# Patient Record
Sex: FEMALE | Race: WHITE | NOT HISPANIC OR LATINO | Employment: UNEMPLOYED | URBAN - METROPOLITAN AREA
[De-identification: names, ages, dates, MRNs, and addresses within clinical notes are randomized per-mention and may not be internally consistent; named-entity substitution may affect disease eponyms.]

---

## 2017-03-20 ENCOUNTER — OFFICE VISIT - RIVER FALLS (OUTPATIENT)
Dept: FAMILY MEDICINE | Facility: CLINIC | Age: 37
End: 2017-03-20

## 2017-03-20 ENCOUNTER — COMMUNICATION - RIVER FALLS (OUTPATIENT)
Dept: FAMILY MEDICINE | Facility: CLINIC | Age: 37
End: 2017-03-20

## 2017-03-23 LAB
CREAT SERPL-MCNC: 0.63 MG/DL (ref 0.5–1.1)
GLUCOSE BLD-MCNC: 116 MG/DL (ref 65–99)

## 2017-05-24 ENCOUNTER — OFFICE VISIT - RIVER FALLS (OUTPATIENT)
Dept: FAMILY MEDICINE | Facility: CLINIC | Age: 37
End: 2017-05-24

## 2017-05-24 ENCOUNTER — COMMUNICATION - RIVER FALLS (OUTPATIENT)
Dept: FAMILY MEDICINE | Facility: CLINIC | Age: 37
End: 2017-05-24

## 2017-05-24 ASSESSMENT — MIFFLIN-ST. JEOR: SCORE: 1692.91

## 2017-07-20 ENCOUNTER — OFFICE VISIT - RIVER FALLS (OUTPATIENT)
Dept: FAMILY MEDICINE | Facility: CLINIC | Age: 37
End: 2017-07-20

## 2017-07-31 ENCOUNTER — OFFICE VISIT - RIVER FALLS (OUTPATIENT)
Dept: FAMILY MEDICINE | Facility: CLINIC | Age: 37
End: 2017-07-31

## 2017-07-31 ASSESSMENT — MIFFLIN-ST. JEOR: SCORE: 1704.7

## 2017-08-09 ENCOUNTER — AMBULATORY - RIVER FALLS (OUTPATIENT)
Dept: FAMILY MEDICINE | Facility: CLINIC | Age: 37
End: 2017-08-09

## 2017-08-10 ENCOUNTER — AMBULATORY - RIVER FALLS (OUTPATIENT)
Dept: FAMILY MEDICINE | Facility: CLINIC | Age: 37
End: 2017-08-10

## 2018-01-10 ENCOUNTER — OFFICE VISIT - RIVER FALLS (OUTPATIENT)
Dept: FAMILY MEDICINE | Facility: CLINIC | Age: 38
End: 2018-01-10

## 2018-01-10 ASSESSMENT — MIFFLIN-ST. JEOR: SCORE: 1737.36

## 2018-01-26 ENCOUNTER — OFFICE VISIT - RIVER FALLS (OUTPATIENT)
Dept: FAMILY MEDICINE | Facility: CLINIC | Age: 38
End: 2018-01-26

## 2018-03-20 ENCOUNTER — AMBULATORY - RIVER FALLS (OUTPATIENT)
Dept: FAMILY MEDICINE | Facility: CLINIC | Age: 38
End: 2018-03-20

## 2018-03-21 LAB
CREAT SERPL-MCNC: 0.77 MG/DL (ref 0.5–1.1)
GLUCOSE BLD-MCNC: 237 MG/DL (ref 65–99)

## 2018-03-22 ENCOUNTER — OFFICE VISIT - RIVER FALLS (OUTPATIENT)
Dept: FAMILY MEDICINE | Facility: CLINIC | Age: 38
End: 2018-03-22

## 2018-03-22 ASSESSMENT — MIFFLIN-ST. JEOR: SCORE: 1705.61

## 2018-03-23 LAB — HBA1C MFR BLD: 7.5 %

## 2018-04-05 ENCOUNTER — OFFICE VISIT - RIVER FALLS (OUTPATIENT)
Dept: FAMILY MEDICINE | Facility: CLINIC | Age: 38
End: 2018-04-05

## 2018-04-05 ASSESSMENT — MIFFLIN-ST. JEOR: SCORE: 1701.98

## 2018-04-11 ENCOUNTER — OFFICE VISIT - RIVER FALLS (OUTPATIENT)
Dept: FAMILY MEDICINE | Facility: CLINIC | Age: 38
End: 2018-04-11

## 2018-04-11 ASSESSMENT — MIFFLIN-ST. JEOR: SCORE: 1702.88

## 2018-05-22 ENCOUNTER — OFFICE VISIT - RIVER FALLS (OUTPATIENT)
Dept: FAMILY MEDICINE | Facility: CLINIC | Age: 38
End: 2018-05-22

## 2018-05-22 ASSESSMENT — MIFFLIN-ST. JEOR: SCORE: 1660.25

## 2018-05-31 ENCOUNTER — OFFICE VISIT - RIVER FALLS (OUTPATIENT)
Dept: FAMILY MEDICINE | Facility: CLINIC | Age: 38
End: 2018-05-31

## 2018-05-31 ASSESSMENT — MIFFLIN-ST. JEOR: SCORE: 1646.64

## 2018-06-19 ENCOUNTER — OFFICE VISIT - RIVER FALLS (OUTPATIENT)
Dept: FAMILY MEDICINE | Facility: CLINIC | Age: 38
End: 2018-06-19

## 2018-06-20 ENCOUNTER — OFFICE VISIT - RIVER FALLS (OUTPATIENT)
Dept: FAMILY MEDICINE | Facility: CLINIC | Age: 38
End: 2018-06-20

## 2018-06-20 ASSESSMENT — MIFFLIN-ST. JEOR: SCORE: 1621.24

## 2019-01-22 ENCOUNTER — OFFICE VISIT - RIVER FALLS (OUTPATIENT)
Dept: FAMILY MEDICINE | Facility: CLINIC | Age: 39
End: 2019-01-22

## 2019-01-22 ASSESSMENT — MIFFLIN-ST. JEOR: SCORE: 1472.12

## 2019-01-24 LAB — HBA1C MFR BLD: 5.1 %

## 2019-04-25 ENCOUNTER — OFFICE VISIT - RIVER FALLS (OUTPATIENT)
Dept: FAMILY MEDICINE | Facility: CLINIC | Age: 39
End: 2019-04-25

## 2019-04-25 ASSESSMENT — MIFFLIN-ST. JEOR: SCORE: 1502.05

## 2019-07-15 ENCOUNTER — OFFICE VISIT - RIVER FALLS (OUTPATIENT)
Dept: FAMILY MEDICINE | Facility: CLINIC | Age: 39
End: 2019-07-15

## 2019-07-15 ASSESSMENT — MIFFLIN-ST. JEOR: SCORE: 1520.2

## 2019-07-16 ENCOUNTER — COMMUNICATION - RIVER FALLS (OUTPATIENT)
Dept: FAMILY MEDICINE | Facility: CLINIC | Age: 39
End: 2019-07-16

## 2019-07-16 LAB
BUN SERPL-MCNC: 9 MG/DL (ref 7–25)
BUN/CREAT RATIO - HISTORICAL: NORMAL (ref 6–22)
CALCIUM SERPL-MCNC: 9.9 MG/DL (ref 8.6–10.2)
CHLORIDE BLD-SCNC: 103 MMOL/L (ref 98–110)
CHOLEST SERPL-MCNC: 227 MG/DL
CHOLEST/HDLC SERPL: 4.9 {RATIO}
CO2 SERPL-SCNC: 21 MMOL/L (ref 20–32)
CREAT SERPL-MCNC: 0.65 MG/DL (ref 0.5–1.1)
EGFRCR SERPLBLD CKD-EPI 2021: 111 ML/MIN/1.73M2
ERYTHROCYTE [DISTWIDTH] IN BLOOD BY AUTOMATED COUNT: 19.1 % (ref 11–15)
GLUCOSE BLD-MCNC: 82 MG/DL (ref 65–99)
HBA1C MFR BLD: 5.3 %
HCT VFR BLD AUTO: 32.3 % (ref 35–45)
HDLC SERPL-MCNC: 46 MG/DL
HGB BLD-MCNC: 8.8 GM/DL (ref 11.7–15.5)
IRON SERPL-MCNC: <10 MCG/DL (ref 40–190)
LDLC SERPL CALC-MCNC: 134 MG/DL
MCH RBC QN AUTO: 19.3 PG (ref 27–33)
MCHC RBC AUTO-ENTMCNC: 27.2 GM/DL (ref 32–36)
MCV RBC AUTO: 70.7 FL (ref 80–100)
NONHDLC SERPL-MCNC: 181 MG/DL
PLATELET # BLD AUTO: 418 10*3/UL (ref 140–400)
PMV BLD: 10.8 FL (ref 7.5–12.5)
POTASSIUM BLD-SCNC: 4.3 MMOL/L (ref 3.5–5.3)
RBC # BLD AUTO: 4.57 10*6/UL (ref 3.8–5.1)
SODIUM SERPL-SCNC: 135 MMOL/L (ref 135–146)
TRIGL SERPL-MCNC: 327 MG/DL
TSH SERPL DL<=0.005 MIU/L-ACNC: 2.1 MIU/L
WBC # BLD AUTO: 8.4 10*3/UL (ref 3.8–10.8)

## 2019-07-30 ENCOUNTER — AMBULATORY - RIVER FALLS (OUTPATIENT)
Dept: FAMILY MEDICINE | Facility: CLINIC | Age: 39
End: 2019-07-30

## 2019-07-31 ENCOUNTER — COMMUNICATION - RIVER FALLS (OUTPATIENT)
Dept: FAMILY MEDICINE | Facility: CLINIC | Age: 39
End: 2019-07-31

## 2019-07-31 LAB
ERYTHROCYTE [DISTWIDTH] IN BLOOD BY AUTOMATED COUNT: 27.5 % (ref 11–15)
HCT VFR BLD AUTO: 34.8 % (ref 35–45)
HGB BLD-MCNC: 9.8 GM/DL (ref 11.7–15.5)
IRON SERPL-MCNC: 50 MCG/DL (ref 40–190)
MCH RBC QN AUTO: 21.9 PG (ref 27–33)
MCHC RBC AUTO-ENTMCNC: 28.2 GM/DL (ref 32–36)
MCV RBC AUTO: 77.9 FL (ref 80–100)
PLATELET # BLD AUTO: 346 10*3/UL (ref 140–400)
PMV BLD: 10 FL (ref 7.5–12.5)
RBC # BLD AUTO: 4.47 10*6/UL (ref 3.8–5.1)
WBC # BLD AUTO: 8.8 10*3/UL (ref 3.8–10.8)

## 2019-08-01 ENCOUNTER — COMMUNICATION - RIVER FALLS (OUTPATIENT)
Dept: FAMILY MEDICINE | Facility: CLINIC | Age: 39
End: 2019-08-01

## 2019-08-29 ENCOUNTER — OFFICE VISIT - RIVER FALLS (OUTPATIENT)
Dept: FAMILY MEDICINE | Facility: CLINIC | Age: 39
End: 2019-08-29

## 2019-08-29 ASSESSMENT — MIFFLIN-ST. JEOR: SCORE: 1542.88

## 2019-09-05 ENCOUNTER — OFFICE VISIT - RIVER FALLS (OUTPATIENT)
Dept: FAMILY MEDICINE | Facility: CLINIC | Age: 39
End: 2019-09-05

## 2019-09-05 LAB
ALBUMIN UR-MCNC: NEGATIVE G/DL
APPEARANCE UR: CLEAR
BILIRUB UR QL STRIP: NEGATIVE
COLOR UR AUTO: YELLOW
GLUCOSE UR STRIP-MCNC: NEGATIVE MG/DL
HGB UR QL STRIP: NEGATIVE
KETONES UR STRIP-MCNC: NEGATIVE MG/DL
LEUKOCYTE ESTERASE UR QL STRIP: NEGATIVE
NITRATE UR QL: NEGATIVE
PH UR STRIP: 7 [PH]
SP GR UR STRIP: 1.01
UROBILINOGEN UR STRIP-MCNC: NORMAL MG/DL

## 2019-09-05 ASSESSMENT — MIFFLIN-ST. JEOR: SCORE: 1541.06

## 2019-09-09 ENCOUNTER — TRANSFERRED RECORDS (OUTPATIENT)
Dept: HEALTH INFORMATION MANAGEMENT | Facility: CLINIC | Age: 39
End: 2019-09-09

## 2019-09-09 LAB
HPV ABSTRACT: NORMAL
TRICHOMONAS VAGINALIS (HISTORICAL): NOT DETECTED

## 2019-09-10 ENCOUNTER — COMMUNICATION - RIVER FALLS (OUTPATIENT)
Dept: FAMILY MEDICINE | Facility: CLINIC | Age: 39
End: 2019-09-10

## 2019-09-16 ENCOUNTER — COMMUNICATION - RIVER FALLS (OUTPATIENT)
Dept: FAMILY MEDICINE | Facility: CLINIC | Age: 39
End: 2019-09-16

## 2019-09-17 ENCOUNTER — OFFICE VISIT - RIVER FALLS (OUTPATIENT)
Dept: FAMILY MEDICINE | Facility: CLINIC | Age: 39
End: 2019-09-17

## 2019-10-15 ENCOUNTER — COMMUNICATION - RIVER FALLS (OUTPATIENT)
Dept: FAMILY MEDICINE | Facility: CLINIC | Age: 39
End: 2019-10-15

## 2019-10-17 ENCOUNTER — OFFICE VISIT - RIVER FALLS (OUTPATIENT)
Dept: FAMILY MEDICINE | Facility: CLINIC | Age: 39
End: 2019-10-17

## 2019-10-17 ASSESSMENT — MIFFLIN-ST. JEOR: SCORE: 1565.56

## 2019-10-18 LAB
BUN SERPL-MCNC: 11 MG/DL (ref 7–25)
BUN/CREAT RATIO - HISTORICAL: ABNORMAL (ref 6–22)
CALCIUM SERPL-MCNC: 10.3 MG/DL (ref 8.6–10.2)
CHLORIDE BLD-SCNC: 102 MMOL/L (ref 98–110)
CO2 SERPL-SCNC: 25 MMOL/L (ref 20–32)
CREAT SERPL-MCNC: 0.75 MG/DL (ref 0.5–1.1)
EGFRCR SERPLBLD CKD-EPI 2021: 100 ML/MIN/1.73M2
ERYTHROCYTE [DISTWIDTH] IN BLOOD BY AUTOMATED COUNT: 17.7 % (ref 11–15)
ESTRADIOL SERPL-MCNC: 27 PG/ML
GLUCOSE BLD-MCNC: 84 MG/DL (ref 65–99)
HCT VFR BLD AUTO: 40 % (ref 35–45)
HGB BLD-MCNC: 14 GM/DL (ref 11.7–15.5)
IRON SERPL-MCNC: 38 MCG/DL (ref 40–190)
MCH RBC QN AUTO: 29.7 PG (ref 27–33)
MCHC RBC AUTO-ENTMCNC: 35 GM/DL (ref 32–36)
MCV RBC AUTO: 84.7 FL (ref 80–100)
PLATELET # BLD AUTO: 361 10*3/UL (ref 140–400)
PMV BLD: 10.4 FL (ref 7.5–12.5)
POTASSIUM BLD-SCNC: 4.4 MMOL/L (ref 3.5–5.3)
RBC # BLD AUTO: 4.72 10*6/UL (ref 3.8–5.1)
SODIUM SERPL-SCNC: 140 MMOL/L (ref 135–146)
T3FREE SERPL-MCNC: 2.6 PG/ML (ref 2.3–4.2)
T4 FREE SERPL-MCNC: 1.2 NG/DL (ref 0.8–1.8)
THYROID PEROXIDASE ANTIBODIES - HISTORICAL: 1 IU/ML
TSH SERPL DL<=0.005 MIU/L-ACNC: 1.34 MIU/L
WBC # BLD AUTO: 10 10*3/UL (ref 3.8–10.8)

## 2019-10-21 ENCOUNTER — COMMUNICATION - RIVER FALLS (OUTPATIENT)
Dept: FAMILY MEDICINE | Facility: CLINIC | Age: 39
End: 2019-10-21

## 2019-12-09 ENCOUNTER — COMMUNICATION - RIVER FALLS (OUTPATIENT)
Dept: FAMILY MEDICINE | Facility: CLINIC | Age: 39
End: 2019-12-09

## 2019-12-16 ENCOUNTER — OFFICE VISIT - RIVER FALLS (OUTPATIENT)
Dept: FAMILY MEDICINE | Facility: CLINIC | Age: 39
End: 2019-12-16

## 2020-01-06 ENCOUNTER — OFFICE VISIT - RIVER FALLS (OUTPATIENT)
Dept: FAMILY MEDICINE | Facility: CLINIC | Age: 40
End: 2020-01-06

## 2020-01-06 ASSESSMENT — MIFFLIN-ST. JEOR: SCORE: 1585.52

## 2020-01-07 ENCOUNTER — COMMUNICATION - RIVER FALLS (OUTPATIENT)
Dept: FAMILY MEDICINE | Facility: CLINIC | Age: 40
End: 2020-01-07

## 2020-01-07 LAB — HBA1C MFR BLD: 5.3 %

## 2020-01-22 ENCOUNTER — OFFICE VISIT - RIVER FALLS (OUTPATIENT)
Dept: FAMILY MEDICINE | Facility: CLINIC | Age: 40
End: 2020-01-22

## 2020-01-22 ASSESSMENT — MIFFLIN-ST. JEOR: SCORE: 1573.72

## 2020-02-24 ENCOUNTER — OFFICE VISIT - RIVER FALLS (OUTPATIENT)
Dept: FAMILY MEDICINE | Facility: CLINIC | Age: 40
End: 2020-02-24

## 2020-02-24 ASSESSMENT — MIFFLIN-ST. JEOR: SCORE: 1580.98

## 2020-03-04 ENCOUNTER — AMBULATORY - RIVER FALLS (OUTPATIENT)
Dept: FAMILY MEDICINE | Facility: CLINIC | Age: 40
End: 2020-03-04

## 2020-03-05 LAB
ALT SERPL W P-5'-P-CCNC: 16 UNIT/L (ref 6–29)
AST SERPL W P-5'-P-CCNC: 16 UNIT/L (ref 10–30)
BUN SERPL-MCNC: 6 MG/DL (ref 7–25)
BUN/CREAT RATIO - HISTORICAL: 9 (ref 6–22)
CALCIUM SERPL-MCNC: 10 MG/DL (ref 8.6–10.2)
CHLORIDE BLD-SCNC: 105 MMOL/L (ref 98–110)
CHOLEST SERPL-MCNC: 270 MG/DL
CHOLEST/HDLC SERPL: 5.5 {RATIO}
CO2 SERPL-SCNC: 25 MMOL/L (ref 20–32)
CREAT SERPL-MCNC: 0.65 MG/DL (ref 0.5–1.1)
EGFRCR SERPLBLD CKD-EPI 2021: 110 ML/MIN/1.73M2
GLUCOSE BLD-MCNC: 81 MG/DL (ref 65–99)
HBA1C MFR BLD: 5.8 %
HDLC SERPL-MCNC: 49 MG/DL
LDLC SERPL CALC-MCNC: 180 MG/DL
MICROALBUMIN UR-MCNC: 0.2 MG/DL
NONHDLC SERPL-MCNC: 221 MG/DL
POTASSIUM BLD-SCNC: 3.9 MMOL/L (ref 3.5–5.3)
SODIUM SERPL-SCNC: 139 MMOL/L (ref 135–146)
TRIGL SERPL-MCNC: 217 MG/DL

## 2020-03-30 ENCOUNTER — COMMUNICATION - RIVER FALLS (OUTPATIENT)
Dept: FAMILY MEDICINE | Facility: CLINIC | Age: 40
End: 2020-03-30

## 2020-03-31 ENCOUNTER — OFFICE VISIT - RIVER FALLS (OUTPATIENT)
Dept: FAMILY MEDICINE | Facility: CLINIC | Age: 40
End: 2020-03-31

## 2020-04-02 ENCOUNTER — COMMUNICATION - RIVER FALLS (OUTPATIENT)
Dept: FAMILY MEDICINE | Facility: CLINIC | Age: 40
End: 2020-04-02

## 2020-06-02 ENCOUNTER — OFFICE VISIT - RIVER FALLS (OUTPATIENT)
Dept: FAMILY MEDICINE | Facility: CLINIC | Age: 40
End: 2020-06-02

## 2020-10-30 ENCOUNTER — COMMUNICATION - RIVER FALLS (OUTPATIENT)
Dept: FAMILY MEDICINE | Facility: CLINIC | Age: 40
End: 2020-10-30

## 2020-11-03 ENCOUNTER — AMBULATORY - RIVER FALLS (OUTPATIENT)
Dept: FAMILY MEDICINE | Facility: CLINIC | Age: 40
End: 2020-11-03

## 2020-11-03 ENCOUNTER — OFFICE VISIT - RIVER FALLS (OUTPATIENT)
Dept: FAMILY MEDICINE | Facility: CLINIC | Age: 40
End: 2020-11-03

## 2020-11-03 ASSESSMENT — MIFFLIN-ST. JEOR: SCORE: 1622.71

## 2020-11-04 ENCOUNTER — COMMUNICATION - RIVER FALLS (OUTPATIENT)
Dept: FAMILY MEDICINE | Facility: CLINIC | Age: 40
End: 2020-11-04

## 2020-11-04 LAB — HBA1C MFR BLD: 5.9 %

## 2020-11-11 ENCOUNTER — OFFICE VISIT - RIVER FALLS (OUTPATIENT)
Dept: FAMILY MEDICINE | Facility: CLINIC | Age: 40
End: 2020-11-11

## 2020-11-11 ASSESSMENT — MIFFLIN-ST. JEOR: SCORE: 1629.97

## 2021-05-29 ENCOUNTER — RECORDS - HEALTHEAST (OUTPATIENT)
Dept: ADMINISTRATIVE | Facility: CLINIC | Age: 41
End: 2021-05-29

## 2021-05-31 ENCOUNTER — RECORDS - HEALTHEAST (OUTPATIENT)
Dept: ADMINISTRATIVE | Facility: CLINIC | Age: 41
End: 2021-05-31

## 2021-06-02 ENCOUNTER — RECORDS - HEALTHEAST (OUTPATIENT)
Dept: ADMINISTRATIVE | Facility: CLINIC | Age: 41
End: 2021-06-02

## 2021-06-03 ENCOUNTER — RECORDS - HEALTHEAST (OUTPATIENT)
Dept: ADMINISTRATIVE | Facility: CLINIC | Age: 41
End: 2021-06-03

## 2021-07-14 ENCOUNTER — OFFICE VISIT - RIVER FALLS (OUTPATIENT)
Dept: FAMILY MEDICINE | Facility: CLINIC | Age: 41
End: 2021-07-14

## 2021-07-15 ENCOUNTER — COMMUNICATION - RIVER FALLS (OUTPATIENT)
Dept: FAMILY MEDICINE | Facility: CLINIC | Age: 41
End: 2021-07-15
Payer: COMMERCIAL

## 2021-07-15 LAB
BUN SERPL-MCNC: 6 MG/DL (ref 7–25)
BUN/CREAT RATIO - HISTORICAL: 10 (ref 6–22)
CALCIUM SERPL-MCNC: 10.1 MG/DL (ref 8.6–10.2)
CHLORIDE BLD-SCNC: 104 MMOL/L (ref 98–110)
CHOLEST SERPL-MCNC: 221 MG/DL
CHOLEST/HDLC SERPL: 4.6 {RATIO}
CO2 SERPL-SCNC: 25 MMOL/L (ref 20–32)
CREAT SERPL-MCNC: 0.61 MG/DL (ref 0.5–1.1)
CREAT UR-MCNC: 64 MG/DL (ref 20–275)
EGFRCR SERPLBLD CKD-EPI 2021: 112 ML/MIN/1.73M2
GLUCOSE BLD-MCNC: 76 MG/DL (ref 65–99)
HBA1C MFR BLD: 5.3 %
HDLC SERPL-MCNC: 48 MG/DL
LDLC SERPL CALC-MCNC: 138 MG/DL
MICROALBUMIN UR-MCNC: 0.4 MG/DL
MICROALBUMIN/CREAT UR: 6 MG/G{CREAT}
NONHDLC SERPL-MCNC: 173 MG/DL
POTASSIUM BLD-SCNC: 4.2 MMOL/L (ref 3.5–5.3)
SODIUM SERPL-SCNC: 140 MMOL/L (ref 135–146)
TRIGL SERPL-MCNC: 211 MG/DL

## 2021-07-17 ENCOUNTER — NURSE TRIAGE (OUTPATIENT)
Dept: NURSING | Facility: CLINIC | Age: 41
End: 2021-07-17

## 2021-07-18 NOTE — TELEPHONE ENCOUNTER
"\"Well I started having tooth pain on Monday and started on antibiotics on Wednesday afternoon.\" By Wednesday evening patient reporting that her neck was swollen (\"ballooned up\"). Shannan saw the dentist on Friday morning who recommend patient to be seen if her symptoms worsened. Patient currently reporting mild intermittent chest pain lasting <5 minutes. \"Feels like like gas bubble and pressure in my diaphragm.\" Shannan denies severe breathing difficulty, feeling faint/ dizzy/ lightneaded, or severe chest pain.     Triage guidelines recommend to be seen in the ED. RN advised to call back with any changes, worsening of symptoms, and questions or concerns. Patient verbalized understanding of and agreement with plan and had no further questions.    Reason for Disposition    [1] Intermittent  chest pain or \"angina\" AND [2] increasing in severity or frequency  (Exception: pains lasting a few seconds)    Additional Information    Negative: Severe difficulty breathing (e.g., struggling for each breath, speaks in single words)    Negative: Difficult to awaken or acting confused (e.g., disoriented, slurred speech)    Negative: Shock suspected (e.g., cold/pale/clammy skin, too weak to stand, low BP, rapid pulse)    Negative: [1] Chest pain lasts > 5 minutes AND [2] history of heart disease  (i.e., heart attack, bypass surgery, angina, angioplasty, CHF; not just a heart murmur)    Negative: [1] Chest pain lasts > 5 minutes AND [2] described as crushing, pressure-like, or heavy    Negative: [1] Chest pain lasts > 5 minutes AND [2] age > 50    Negative: [1] Chest pain lasts > 5 minutes AND [2] age > 30 AND [3] at least one cardiac risk factor (i.e., hypertension, diabetes, obesity, smoker or strong family history of heart disease)    Negative: [1] Chest pain lasts > 5 minutes AND [2] not relieved with nitroglycerin    Negative: Passed out (i.e., lost consciousness, collapsed and was not responding)    Negative: Heart beating < 50 " beats per minute OR > 140 beats per minute    Negative: Visible sweat on face or sweat dripping down face    Negative: Sounds like a life-threatening emergency to the triager    Negative: SEVERE chest pain    Protocols used: CHEST PAIN-A-AH    Bebe Iniguez RN-BSN  [facility]

## 2021-09-22 ENCOUNTER — OFFICE VISIT - RIVER FALLS (OUTPATIENT)
Dept: FAMILY MEDICINE | Facility: CLINIC | Age: 41
End: 2021-09-22
Payer: COMMERCIAL

## 2021-09-23 LAB
BASOPHILS # BLD MANUAL: 94 10*3/UL (ref 0–200)
BASOPHILS NFR BLD MANUAL: 0.8 %
EOSINOPHIL # BLD MANUAL: 105 10*3/UL (ref 15–500)
EOSINOPHIL NFR BLD MANUAL: 0.9 %
ERYTHROCYTE [DISTWIDTH] IN BLOOD BY AUTOMATED COUNT: 12.9 % (ref 11–15)
HCT VFR BLD AUTO: 48.7 % (ref 35–45)
HGB BLD-MCNC: 17 GM/DL (ref 11.7–15.5)
LYMPHOCYTES # BLD MANUAL: 3089 10*3/UL (ref 850–3900)
LYMPHOCYTES NFR BLD MANUAL: 26.4 %
MCH RBC QN AUTO: 32.8 PG (ref 27–33)
MCHC RBC AUTO-ENTMCNC: 34.9 GM/DL (ref 32–36)
MCV RBC AUTO: 93.8 FL (ref 80–100)
MONOCYTES # BLD MANUAL: 445 10*3/UL (ref 200–950)
MONOCYTES NFR BLD MANUAL: 3.8 %
NEUTROPHILS # BLD MANUAL: 7968 10*3/UL (ref 1500–7800)
NEUTROPHILS NFR BLD MANUAL: 68.1 %
PLATELET # BLD AUTO: 338 10*3/UL (ref 140–400)
PMV BLD: 11.1 FL (ref 7.5–12.5)
RBC # BLD AUTO: 5.19 10*6/UL (ref 3.8–5.1)
T3FREE SERPL-MCNC: 2.7 PG/ML (ref 2.3–4.2)
T4 FREE SERPL-MCNC: 1 NG/DL (ref 0.8–1.8)
TSH SERPL DL<=0.005 MIU/L-ACNC: 2.26 MIU/L
WBC # BLD AUTO: 11.7 10*3/UL (ref 3.8–10.8)

## 2021-09-24 ENCOUNTER — COMMUNICATION - RIVER FALLS (OUTPATIENT)
Dept: FAMILY MEDICINE | Facility: CLINIC | Age: 41
End: 2021-09-24
Payer: COMMERCIAL

## 2021-11-03 ENCOUNTER — OFFICE VISIT - RIVER FALLS (OUTPATIENT)
Dept: FAMILY MEDICINE | Facility: CLINIC | Age: 41
End: 2021-11-03
Payer: COMMERCIAL

## 2021-11-15 ENCOUNTER — OFFICE VISIT - RIVER FALLS (OUTPATIENT)
Dept: FAMILY MEDICINE | Facility: CLINIC | Age: 41
End: 2021-11-15
Payer: COMMERCIAL

## 2021-11-17 ENCOUNTER — OFFICE VISIT - RIVER FALLS (OUTPATIENT)
Dept: FAMILY MEDICINE | Facility: CLINIC | Age: 41
End: 2021-11-17
Payer: COMMERCIAL

## 2021-11-17 ASSESSMENT — MIFFLIN-ST. JEOR: SCORE: 1501.15

## 2021-11-30 ENCOUNTER — OFFICE VISIT - RIVER FALLS (OUTPATIENT)
Dept: FAMILY MEDICINE | Facility: CLINIC | Age: 41
End: 2021-11-30
Payer: COMMERCIAL

## 2022-01-13 ENCOUNTER — COMMUNICATION - RIVER FALLS (OUTPATIENT)
Dept: FAMILY MEDICINE | Facility: CLINIC | Age: 42
End: 2022-01-13
Payer: COMMERCIAL

## 2022-01-17 ENCOUNTER — OFFICE VISIT - RIVER FALLS (OUTPATIENT)
Dept: FAMILY MEDICINE | Facility: CLINIC | Age: 42
End: 2022-01-17
Payer: COMMERCIAL

## 2022-02-12 VITALS
TEMPERATURE: 98 F | WEIGHT: 214.8 LBS | TEMPERATURE: 98 F | HEART RATE: 88 BPM | TEMPERATURE: 98.8 F | HEART RATE: 88 BPM | BODY MASS INDEX: 30.67 KG/M2 | WEIGHT: 214 LBS | DIASTOLIC BLOOD PRESSURE: 78 MMHG | DIASTOLIC BLOOD PRESSURE: 72 MMHG | BODY MASS INDEX: 29.32 KG/M2 | DIASTOLIC BLOOD PRESSURE: 82 MMHG | HEIGHT: 70 IN | WEIGHT: 214.2 LBS | HEIGHT: 70 IN | SYSTOLIC BLOOD PRESSURE: 132 MMHG | HEIGHT: 70 IN | HEIGHT: 70 IN | SYSTOLIC BLOOD PRESSURE: 120 MMHG | WEIGHT: 204.8 LBS | HEART RATE: 76 BPM | BODY MASS INDEX: 30.75 KG/M2 | SYSTOLIC BLOOD PRESSURE: 136 MMHG | BODY MASS INDEX: 30.64 KG/M2

## 2022-02-12 VITALS
BODY MASS INDEX: 27.09 KG/M2 | HEIGHT: 70 IN | WEIGHT: 186.6 LBS | HEART RATE: 84 BPM | WEIGHT: 189.2 LBS | BODY MASS INDEX: 26.71 KG/M2 | OXYGEN SATURATION: 97 % | DIASTOLIC BLOOD PRESSURE: 70 MMHG | SYSTOLIC BLOOD PRESSURE: 126 MMHG | HEIGHT: 70 IN

## 2022-02-12 VITALS
HEART RATE: 68 BPM | WEIGHT: 180.2 LBS | WEIGHT: 179.4 LBS | DIASTOLIC BLOOD PRESSURE: 92 MMHG | OXYGEN SATURATION: 97 % | DIASTOLIC BLOOD PRESSURE: 60 MMHG | SYSTOLIC BLOOD PRESSURE: 128 MMHG | OXYGEN SATURATION: 98 % | BODY MASS INDEX: 25.74 KG/M2 | HEIGHT: 70 IN | WEIGHT: 184.8 LBS | SYSTOLIC BLOOD PRESSURE: 118 MMHG | HEIGHT: 70 IN | BODY MASS INDEX: 25.68 KG/M2 | HEART RATE: 78 BPM | BODY MASS INDEX: 26.23 KG/M2 | WEIGHT: 179.8 LBS | OXYGEN SATURATION: 97 % | DIASTOLIC BLOOD PRESSURE: 70 MMHG | HEART RATE: 83 BPM | HEIGHT: 70 IN | DIASTOLIC BLOOD PRESSURE: 80 MMHG | SYSTOLIC BLOOD PRESSURE: 136 MMHG | SYSTOLIC BLOOD PRESSURE: 112 MMHG | HEART RATE: 81 BPM | BODY MASS INDEX: 26.45 KG/M2

## 2022-02-12 VITALS
TEMPERATURE: 97.3 F | SYSTOLIC BLOOD PRESSURE: 128 MMHG | DIASTOLIC BLOOD PRESSURE: 80 MMHG | HEIGHT: 70 IN | HEART RATE: 68 BPM | WEIGHT: 170.8 LBS | BODY MASS INDEX: 24.45 KG/M2

## 2022-02-12 VITALS
OXYGEN SATURATION: 98 % | HEART RATE: 76 BPM | HEIGHT: 70 IN | SYSTOLIC BLOOD PRESSURE: 130 MMHG | WEIGHT: 170.6 LBS | DIASTOLIC BLOOD PRESSURE: 82 MMHG | TEMPERATURE: 98.2 F | BODY MASS INDEX: 24.42 KG/M2

## 2022-02-12 VITALS
TEMPERATURE: 98.1 F | HEART RATE: 84 BPM | HEIGHT: 70 IN | SYSTOLIC BLOOD PRESSURE: 134 MMHG | BODY MASS INDEX: 31.75 KG/M2 | WEIGHT: 221.8 LBS | TEMPERATURE: 97.4 F | DIASTOLIC BLOOD PRESSURE: 86 MMHG

## 2022-02-12 VITALS
BODY MASS INDEX: 28.09 KG/M2 | WEIGHT: 196.2 LBS | HEART RATE: 76 BPM | WEIGHT: 193.4 LBS | TEMPERATURE: 97.7 F | SYSTOLIC BLOOD PRESSURE: 124 MMHG | TEMPERATURE: 97.9 F | DIASTOLIC BLOOD PRESSURE: 80 MMHG | WEIGHT: 201.8 LBS | HEIGHT: 70 IN | HEART RATE: 80 BPM | DIASTOLIC BLOOD PRESSURE: 70 MMHG | SYSTOLIC BLOOD PRESSURE: 130 MMHG | BODY MASS INDEX: 27.95 KG/M2 | BODY MASS INDEX: 28.89 KG/M2 | HEIGHT: 70 IN

## 2022-02-12 VITALS
TEMPERATURE: 98.4 F | SYSTOLIC BLOOD PRESSURE: 144 MMHG | DIASTOLIC BLOOD PRESSURE: 86 MMHG | HEART RATE: 79 BPM | SYSTOLIC BLOOD PRESSURE: 121 MMHG | HEIGHT: 70 IN | OXYGEN SATURATION: 96 % | WEIGHT: 199 LBS | BODY MASS INDEX: 28.49 KG/M2 | DIASTOLIC BLOOD PRESSURE: 92 MMHG | HEIGHT: 70 IN | WEIGHT: 197.4 LBS | BODY MASS INDEX: 28.26 KG/M2

## 2022-02-12 VITALS
BODY MASS INDEX: 30.35 KG/M2 | HEART RATE: 72 BPM | DIASTOLIC BLOOD PRESSURE: 86 MMHG | HEIGHT: 70 IN | WEIGHT: 212 LBS | SYSTOLIC BLOOD PRESSURE: 138 MMHG | HEART RATE: 92 BPM | TEMPERATURE: 98.7 F | DIASTOLIC BLOOD PRESSURE: 74 MMHG | SYSTOLIC BLOOD PRESSURE: 132 MMHG | WEIGHT: 216 LBS | TEMPERATURE: 97.6 F | BODY MASS INDEX: 31.44 KG/M2

## 2022-02-12 VITALS
HEART RATE: 62 BPM | SYSTOLIC BLOOD PRESSURE: 120 MMHG | DIASTOLIC BLOOD PRESSURE: 78 MMHG | WEIGHT: 164.2 LBS | BODY MASS INDEX: 23.51 KG/M2 | TEMPERATURE: 98.2 F | HEIGHT: 70 IN

## 2022-02-12 VITALS
HEIGHT: 70 IN | WEIGHT: 188.2 LBS | HEIGHT: 70 IN | BODY MASS INDEX: 27.39 KG/M2 | DIASTOLIC BLOOD PRESSURE: 60 MMHG | BODY MASS INDEX: 26.94 KG/M2 | SYSTOLIC BLOOD PRESSURE: 92 MMHG | HEART RATE: 75 BPM

## 2022-02-12 VITALS
BODY MASS INDEX: 26.05 KG/M2 | WEIGHT: 179 LBS | SYSTOLIC BLOOD PRESSURE: 128 MMHG | DIASTOLIC BLOOD PRESSURE: 78 MMHG | HEART RATE: 66 BPM

## 2022-02-12 VITALS
DIASTOLIC BLOOD PRESSURE: 76 MMHG | OXYGEN SATURATION: 97 % | HEART RATE: 74 BPM | HEIGHT: 70 IN | WEIGHT: 214.6 LBS | SYSTOLIC BLOOD PRESSURE: 124 MMHG | BODY MASS INDEX: 30.72 KG/M2

## 2022-02-12 VITALS
WEIGHT: 174.8 LBS | BODY MASS INDEX: 25.03 KG/M2 | SYSTOLIC BLOOD PRESSURE: 112 MMHG | DIASTOLIC BLOOD PRESSURE: 62 MMHG | HEART RATE: 70 BPM | TEMPERATURE: 97.6 F | HEIGHT: 70 IN

## 2022-02-12 VITALS
DIASTOLIC BLOOD PRESSURE: 82 MMHG | HEART RATE: 66 BPM | SYSTOLIC BLOOD PRESSURE: 136 MMHG | WEIGHT: 169 LBS | BODY MASS INDEX: 24.6 KG/M2
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
## 2022-02-15 NOTE — TELEPHONE ENCOUNTER
---------------------  From: Agnieszka Ahn MD   To: Leslee Danielson;     Sent: 9/29/2021 11:17:15 AM CDT  Subject: ultrasound results     ** Submitted: **  Order:Referral (Request)  Details:  9/29/2021 11:16 AM CDT, Referred to: Otolaryngology (ENT), Thyroid nodule  Swallowing pain  Foreign body sensation in throat         Signed by Agnieszka Ahn MD  9/29/2021 4:16:00 PM [facility]    Discussed ultrasound findings. Benign lymph nodes and small thyroid nodule. Would like to see ENT.         Patient also has had a cough, congestion that started in the past week. No COVID exposure. No one else in family has been ill. Will follow up if not improving over next few days. Will need to be evaluated prior to specialist visit if symptoms persist.---------------------  From: Leslee Danielson   To: Agnieszka Ahn MD;     Sent: 9/29/2021 11:31:18 AM CDT  Subject: RE: ultrasound results     Noted

## 2022-02-15 NOTE — TELEPHONE ENCOUNTER
---------------------  From: Agnieszka Ahn MD   To: AL JOE KEYSHAWN    Sent: 10/21/2019 2:38:49 PM CDT  Subject: Patient Message - Results Notification      Joe,  Here is a copy of your recent labs. Calcium is just above normal and iron is just below normal. Otherwise, all labs are within range. I will print a copy of the labs to mail to you as well. Please let me know if you have questions.  Thanks,  Radha Ahn    Results:  Date Result Name Ind Value Ref Range   10/17/2019 4:24 PM Sodium Level  140 mmol/L (135 - 146)   10/17/2019 4:24 PM Potassium Level  4.4 mmol/L (3.5 - 5.3)   10/17/2019 4:24 PM Chloride Level  102 mmol/L (98 - 110)   10/17/2019 4:24 PM CO2 Level  25 mmol/L (20 - 32)   10/17/2019 4:24 PM Glucose Level  84 mg/dL (65 - 99)   10/17/2019 4:24 PM BUN  11 mg/dL (7 - 25)   10/17/2019 4:24 PM Creatinine Level  0.75 mg/dL (0.50 - 1.10)   10/17/2019 4:24 PM BUN/Creat Ratio  NOT APPLICABLE (6 - 22)   10/17/2019 4:24 PM eGFR  100 mL/min/1.73m2 (> OR = 60 - )   10/17/2019 4:24 PM eGFR African American  116 mL/min/1.73m2 (> OR = 60 - )   10/17/2019 4:24 PM Calcium Level ((H)) 10.3 mg/dL (8.6 - 10.2)   10/17/2019 4:24 PM T4 Free  1.2 ng/dL (0.8 - 1.8)   10/17/2019 4:24 PM T3 Free  2.6 pg/mL (2.3 - 4.2)   10/17/2019 4:24 PM TSH  1.34 mIU/L    10/17/2019 4:24 PM Thyroid Peroxidase Ab (TPO)  1 IU/mL ( - <9)   10/17/2019 4:24 PM Iron Level ((L)) 38 mcg/dL (40 - 190)   10/17/2019 4:24 PM Estradiol Level  27 pg/mL    10/17/2019 4:24 PM FSH  6.5 mIU/mL    10/17/2019 4:24 PM WBC  10.0 (3.8 - 10.8)   10/17/2019 4:24 PM RBC  4.72 (3.80 - 5.10)   10/17/2019 4:24 PM Hgb  14.0 gm/dL (11.7 - 15.5)   10/17/2019 4:24 PM Hct  40.0 % (35.0 - 45.0)   10/17/2019 4:24 PM MCV  84.7 fL (80.0 - 100.0)   10/17/2019 4:24 PM MCH  29.7 pg (27.0 - 33.0)   10/17/2019 4:24 PM MCHC  35.0 gm/dL (32.0 - 36.0)   10/17/2019 4:24 PM RDW ((H)) 17.7 % (11.0 - 15.0)   10/17/2019 4:24 PM Platelet  361 (140 - 400)   10/17/2019 4:24 PM MPV  10.4  fL (7.5 - 12.5)

## 2022-02-15 NOTE — TELEPHONE ENCOUNTER
---------------------  From: Sharon Garcia CMA ([id number]   To: Agnieszka Ahn MD;     Sent: 8/13/2019 1:45:39 PM CDT  Subject: Phone Note: F/U low iron     Phone Message    PCP:   ROCKY      Time of Call:  12:20 pm    Phone number:  [phone]    Returned call at: n/a    Note:   Pt called with a few questions regarding her iron deficiency. States that she has been taking the iron tablets but this week had her period and is feeling very weak. She is wondering what more she can do. Besides taking the pills she has also changed her diet to eating high iron and citrus foods. Wondering what signs to look for? Please advise.   HGB of 9.8 om 7/30/19.    Pharmacy: n/a    Last office visit and reason: 7/15/19; px    Transferred to: JASWANT think she is doing the right things - just takes time to improve. The numbers were better from 7/15/19 to 7/30/19. Had planned to recheck at the end of August, but if she would like to stop and have a lab visit to have her hemoglobin level checked this week, that might be a good idea.---------------------  From: Agnieszka Ahn MD   To: Phone Messages Pool ([id number]_[place] [facility]);     Sent: 8/13/2019 3:17:14 PM CDT  Subject: RE: Phone Note: F/U low ironReturned Call  Time: 3:44 pm  Note:  Called & left a message for her to call back.Return Call    Time: 4:56pm    Note: Patient called back, let her know of KW recommendation.

## 2022-02-15 NOTE — NURSING NOTE
Depression Screening Entered On:  7/15/2019 9:08 AM CDT    Performed On:  7/15/2019 9:07 AM CDT by Cely Schuler CMA               Depression Screening   Little Interest - Pleasure in Activities :   Several days   Feeling Down, Depressed, Hopeless :   Several days   Initial Depression Screen Score :   2    Trouble Falling or Staying Asleep :   Not at all   Feeling Tired or Little Energy :   Several days   Poor Appetite or Overeating :   Several days   Feeling Bad About Yourself :   Several days   Trouble Concentrating :   Not at all   Moving or Speaking Slowly :   Not at all   Thoughts Better Off Dead or Hurting Self :   Not at all   Detailed Depression Screen Score :   3    Total Depression Screen Score :   5    FOREST Difficulty with Work, Home, Others :   Somewhat difficult   Cely Schuler CMA - 7/15/2019 9:07 AM CDT

## 2022-02-15 NOTE — NURSING NOTE
Comprehensive Intake Entered On:  3/31/2020 9:15 AM CDT    Performed On:  3/31/2020 9:14 AM CDT by Sharon Garcia CMA               Summary   Chief Complaint :   Phone Visit: c/o lower abdominal pain, worse with BM, describes the pain as sharp, nausea as well   Menstrual Status :   Menarcheal   Height Measured :   69.5 in(Converted to: 5 ft 9 in, 176.53 cm)    Sharon Garcia CMA - 3/31/2020 9:14 AM CDT   Health Status   Allergies Verified? :   Yes   Medication History Verified? :   Yes   Immunizations Current :   Yes   Medical History Verified? :   Yes   Pre-Visit Planning Status :   Not completed   Tobacco Use? :   Current every day smoker   Tobacco Cessation Review :   Advised to quit   Sharon Garcia CMA - 3/31/2020 9:14 AM CDT   Consents   Consent for Immunization Exchange :   Consent Granted   Consent for Immunizations to Providers :   Consent Granted   Sharon Garcia CMA - 3/31/2020 9:14 AM CDT   Meds / Allergies   (As Of: 3/31/2020 9:15:52 AM CDT)   Allergies (Active)   Bee Stings  Estimated Onset Date:   Unspecified ; Created By:   Coral Sharma CMA; Reaction Status:   Active ; Category:   Environment ; Substance:   Bee Stings ; Type:   Allergy ; Updated By:   Coral Sharma CMA; Reviewed Date:   3/31/2020 9:15 AM CDT      No Known Medication Allergies  Estimated Onset Date:   Unspecified ; Created By:   Coral Sharma CMA; Reaction Status:   Active ; Category:   Drug ; Substance:   No Known Medication Allergies ; Type:   Allergy ; Updated By:   Coral Sharma CMA; Reviewed Date:   3/31/2020 9:15 AM CDT        Medication List   (As Of: 3/31/2020 9:15:52 AM CDT)   Prescription/Discharge Order    metFORMIN  :   metFORMIN ; Status:   Prescribed ; Ordered As Mnemonic:   metFORMIN 500 mg oral tablet ; Simple Display Line:   2 tab(s), Oral, bid, 360 tab(s), 3 Refill(s) ; Ordering Provider:   Agnieszka Ahn MD; Catalog Code:   metFORMIN ; Order Dt/Tm:   7/15/2019 8:38:39 AM CDT          Miscellaneous Rx Supply  :    Miscellaneous Rx Supply ; Status:   Prescribed ; Ordered As Mnemonic:   glucose meter test strips and lancets ; Simple Display Line:   See Instructions, check blood sugar daily, 100 EA, 3 Refill(s) ; Ordering Provider:   Agnieszka Ahn MD; Catalog Code:   Miscellaneous Rx Supply ; Order Dt/Tm:   1/22/2019 6:47:08 PM CST          Miscellaneous Rx Supply  :   Miscellaneous Rx Supply ; Status:   Prescribed ; Ordered As Mnemonic:   glucose meter ; Simple Display Line:   See Instructions, check blood sugar daily, 1 EA, 0 Refill(s) ; Ordering Provider:   Agnieszka Ahn MD; Catalog Code:   Miscellaneous Rx Supply ; Order Dt/Tm:   4/5/2018 10:13:53 AM CDT            Home Meds    multivitamin with minerals  :   multivitamin with minerals ; Status:   Documented ; Ordered As Mnemonic:   Vital-D oral tablet ; Simple Display Line:   1 tab(s), po, daily, 0 Refill(s) ; Catalog Code:   multivitamin with minerals ; Order Dt/Tm:   3/22/2018 1:55:46 PM CDT
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
## 2022-02-15 NOTE — TELEPHONE ENCOUNTER
---------------------  From: Jose Elizabeth PA-C   To: JOE MELENDEZ    Sent: 1/24/2019 7:48:30 AM CST      This is excellent and in the normal range.  Results:  Date Result Name Value Ref Range   1/22/2019 6:57 PM Hgb A1c 5.1 ( - <5.7)

## 2022-02-15 NOTE — TELEPHONE ENCOUNTER
---------------------  From: Coral Sharma CMA (Phone Messages Pool ([id number]_[facility])   To: Agnieszka Ahn MD;     Sent: 10/15/2019 11:38:45 AM CDT  Subject: CONSUMER MESSAGE: Dr. Ahn           ---------------------  From: JOE MELENDEZ  To: [facility]  Sent: 10/15/2019 11:36 a.m. CDT  Subject: Dr. Jimy Ahn,  On September 25th I went in for a full hysterectomy. Dr. Shivani Perez is an amazing doctor. 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 Everything went well, I am healing well. However, I am experiencing serious hormonal irregulation and would like to see and endocrinologist as soon as possible. I haven t been sleeping and my heart is working overtime. Like an excess in adrenaline. Do I make an appt with you so you can recommend one that excepts my insurance or could you just tell me over these messages and I call and set up an appt. I would really like to get this regulated as soon as I can.  Today was Dr. Perez s day off.  ** Submitted: **  Order:Referral (Request)  Details:  10/15/2019 2:09 PM CDT, Referred to: Endocrinology, Hormone imbalance  Type 2 diabetes mellitus         Signed by Agnieszka Ahn MD  10/15/2019 2:09:00 PMI placed an order for endocrinology, but I would be happy to see you in the meantime while that appointment is getting set up so that we can check into options to help settle symptoms down.     Radha Ahn---------------------  From: Agnieszka Ahn MD   To: Phone Messages Pool ([id number]_[facility]); JOE MELENDEZ    Sent: 10/15/2019 2:10:05 PM CDT  Subject: RE: CONSUMER MESSAGE: Dr. Smith

## 2022-02-15 NOTE — LETTER
(Inserted Image. Unable to display)   [address] [place] [address]  January 07, 2020      JOE MELENDEZ      [address], [place] [id number]      Dear JOE,    Thank you for selecting [facility] for your healthcare needs. Below you will find the results of the recent tests done at our clinic.     Your lab results are excellent - no change found. I think it is reasonable for you to touch base with Keiko Quiroz, but overall you continue to have excellent control of your diabetes.    Result Name Current Result Previous Result Reference Range   Hgb A1c  5.3 1/6/2020  5.3 7/15/2019   5.1 1/22/2019  - <5.7       Please contact me or my assistant at [phone] if you have any questions or concerns.     Sincerely,        Agnieszka Ahn M.D.

## 2022-02-15 NOTE — TELEPHONE ENCOUNTER
---------------------  From: Jaimie Peralta MA (Phone Messages Pool ([id number]_[facility])   To: Agnieszka Ahn MD;     Sent: 9/16/2019 7:58:18 AM CDT  Subject: CONSUMER MESSAGE: For Dr. ahn           ---------------------  From: JOE MELENDEZ  [facility]  Sent: 09/15/2019 07:42 p.m. CDT  Subject: For Dr. ahn  Good evening Dr. Agnieszka Ahn,  I am writing you with the concern of abdominal pain that feels like something is herniating, but the pain is not constant.I also just realized that my periods have been irregular since the beginning of August. I just thought it was from being around different females at my work and our periods were shifting together from Pheromones making mine come two weeks early. But now I am on my 4th full period since August 5th.  Period dates w/5-7 day durations: August 5th & 24th, sept 5th & again on the 14th.  Should I be concerned? Could this be Perimenopause?Joe,  Given your recent anemia, the more frequent bleeding is concerning that you could end up iron deficient with low red blood cell counts again. Typically perimenopause starts later in our 40s, but every person is different. I am also concerned about the pain. I'm happy to see you in clinic to discuss at your convenience, to consider lab testing and whether further imaging such as an ultrasound is a good idea; or if you prefer to see a specialist, we can get you set up to see an obgyn.   Let me know what works best for you.  Radha Ahn---------------------  From: Agnieszka Ahn MD   To: Phone Messages Pool ([id number]_[facility]); JOE MELENDEZ    Sent: 9/16/2019 8:46:58 AM CDT  Subject: RE: CONSUMER MESSAGE: For Dr. ahn

## 2022-02-15 NOTE — PROCEDURES
Accession Number:       [id number]  CLINICAL INFORMATION::     Normal exam  LMP::     028845  PREV. PAP::     UNKNOWN  PREV. BX::     NONE GIVEN  SOURCE::     Cervix, Endocervix  STATEMENT OF ADEQUACY::     Satisfactory for evaluation. Endocervical/transformation zone component absent.  INTERPRETATION/RESULT::     Negative for intraepithelial lesion or malignancy.  COMMENT::     This Pap test has been evaluated with computer assisted technology.  CYTOTECHNOLOGIST::     CHINO GRUBER(ASCP) CT Screening location: [place] [id number]  COMMENT:     See comment       EXPLANATORY NOTE:         The Pap is a screening test for cervical cancer. It is       not a diagnostic test and is subject to false negative       and false positive results. It is most reliable when a       satisfactory sample, regularly obtained, is submitted       with relevant clinical findings and history, and when       the Pap result is evaluated along with historic and       current clinical information.  HPV mRNA E6/E7:     Not Detected       This test was performed using the APTIMA HPV Assay ([employer]       This assay detects E6/E7 viral messenger RNA (mRNA) from 14       high-risk HPV types (16,18,31,33,35,39,45,51,52,56,58,59,66,68).         The analytical performance characteristics of       this assay have been determined by [employer]. The modifications have not been       cleared or approved by the FDA. This assay has       been validated pursuant to the CLIA regulations       and is used for clinical purposes.

## 2022-02-15 NOTE — PROGRESS NOTES
Patient:   JOE MELENDEZ            MRN: [id number]            FIN: [id number]               Age:   38 years     Sex:  Female     :  1980   Associated Diagnoses:   Type 2 diabetes mellitus; Tobacco user   Author:   Agnieszka Ahn MD      Chief Complaint   2018 9:52 AM CDT     F/U Test Results      History of Present Illness   Patient here to discuss treatment for new diagnosis of type 2 diabetes mellitus.   Has had prior elevations in blood sugar at mild level (116 in 2017) but more recent glucose was >200.  A1c elevated at 7.5.  Patient notes that she feels like she follows a good diet. No pop. Mostly meat and vegetables.   Had an injury a little more than a year ago that resulted in concussion and knee injury requiring surgery. Has not felt like she fully recovered and knows she has not been exercising as regularly.  There is a family hx of diabetes - dad has diabetes and diabetes on mom's side, but reports that was in older ages, not before the age of 40.  Very worried about long term consequences.  Discussed risk factors - patient has smoked on and off for many years.  is a smoker. Had recently tried quitting but after 2 weeks without a cigarette has smoked each day for the past 3 days.      Health Status   Allergies:    Allergic Reactions (All)  Severity Not Documented  Bee Stings (No reactions were documented)  No Known Medication Allergies   Medications:  (Selected)   Prescriptions  Prescribed  buPROPion 150 mg/24 hours (XL) oral tablet, extended release: 1 tab(s) ( 150 mg ), po, daily, # 30 tab(s), 1 Refill(s), Type: Soft Stop, Pharmacy: [id number]  spironolactone 50 mg oral tablet: 1 tab(s) ( 50 mg ), po, daily, # 90 tab(s), 3 Refill(s), Type: Maintenance, Pharmacy: [id number], 1 tab(s) po daily  Documented Medications  Documented  Vital-D oral tablet: 1 tab(s), po, daily, 0 Refill(s), Type: Maintenance   Problem list:    All Problems  Female hirsutism / SNOMED CT  345987313 / Confirmed  Moderate major depression / SNOMED CT 4665929 / Confirmed  Obesity / SNOMED CT 1218550172 / Probable  Tobacco user / SNOMED CT 887390005 / Probable      Histories   Past Medical History:    Resolved  Brain concussion (SNOMED CT 944833360):  Resolved.   Family History:    Emotional problems  Mother     Procedure history:    ACL - Anterior cruciate ligament (9815847288) on 3/14/2017 at 37 Years.   Social History:        Alcohol Assessment            1-2 times per month, 1 drinks/episode average.      Tobacco Assessment            Current (some day smoker), Cigarettes        Physical Examination   Vital Signs   4/5/2018 9:52 AM CDT Temperature Tympanic 98.0 DegF    Peripheral Pulse Rate 88 bpm    Pulse Site Radial artery    HR Method Manual    Systolic Blood Pressure 132 mmHg  HI    Diastolic Blood Pressure 82 mmHg  HI    Mean Arterial Pressure 99 mmHg    BP Site Right arm    BP Method Manual      Measurements from flowsheet : Measurements   4/5/2018 9:52 AM CDT Height Measured - Standard 69.75 in    Weight Measured - Standard 214 lb    BSA 2.18 m2    Body Mass Index 30.92 kg/m2  HI      General:  Alert and oriented, Mild distress, anxious.    Psychiatric:  Cooperative, Appropriate mood & affect.       Review / Management   Results review:  Lab results   3/22/2018 2:38 PM CDT Hgb A1c 7.5  HI    WBC 9.9    RBC 4.73    Hgb 12.2 gm/dL    Hct 36.6 %    MCV 77.4 fL  LOW    MCH 25.8 pg  LOW    MCHC 33.3 gm/dL    RDW 16.1 %  HI    Platelet 458  HI    MPV 10.8 fL   3/20/2018 9:35 AM CDT Sodium Level 136 mmol/L    Potassium Level 4.6 mmol/L    Chloride Level 102 mmol/L    CO2 Level 25 mmol/L    Glucose Level 237 mg/dL  HI    BUN 10 mg/dL    Creatinine 0.77 mg/dL    BUN/Creat Ratio NOT APPLICABLE    eGFR 97 mL/min/1.73m2    eGFR African American 113 mL/min/1.73m2    Calcium Level 9.9 mg/dL   .       Impression and Plan   Diagnosis     Type 2 diabetes mellitus (AVZ81-TC E11.9).     Tobacco user (GJL11-VP  Z72.0).     20/25 minutes in counseling. Reviewed treatment of diabetes. Will start with metformin 500mg bid, increase to 1000mg bid if tolerates. Discussed testing blood sugars. Discussed goals for blood sugars. Discussed diabetes educator visit. Recommend follow up with me in 1 month after meets with diabetes educator to discuss further treatment - will need to consider statin, will need microalbumin and follow up A1c in 3 months. Strongly encouraged to quit smoking. At follow up determine if further assistance is needed to quit.

## 2022-02-15 NOTE — LETTER
(Inserted Image. Unable to display)   [address] [place] [address]  September 10, 2019    JOE MELENDEZ  [address] [place], [place] [id number]      Dear JOE,      Thank you for selecting [facility] for your [facility] needs.      Normal pap smear with negative HPV.  The pap can be repeated every five years unless there is concern about increased risk. We still will want to see you once a year for an annual exam.    Also, testing for trichomonas is negative - no signs of any infection. Urine testing also looked normal. Let me know if you have questions. I also sent these results through the patient portal.           Please contact me or my assistant at [phone] if you have any questions or concerns.     Sincerely,        Agnieszka Ahn MD
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
## 2022-02-15 NOTE — LETTER
(Inserted Image. Unable to display)   [address] [place] [address] [phone] (phone) [phone] (fax)  July 15, 2021      JOE MELENDEZ      [place] [address]      Dear JOE,    Thank you for selecting [facility] for your healthcare needs. Below you will find the results of the recent tests done at our clinic.     Your lab results are listed below. Cholesterol has improved. Other labs are all stable and look good. Let me know if you have questions.    Result Name Current Result Previous Result Reference Range   Sodium Level (mmol/L)  140 7/14/2021  139 3/4/2020 135 - 146   Potassium Level (mmol/L)  4.2 7/14/2021  3.9 3/4/2020 3.5 - 5.3   Chloride Level (mmol/L)  104 7/14/2021  105 3/4/2020 98 - 110   CO2 Level (mmol/L)  25 7/14/2021  25 3/4/2020 20 - 32   Glucose Level (mg/dL)  76 7/14/2021  81 3/4/2020 65 - 99   BUN (mg/dL) ((L)) 6 7/14/2021 ((L)) 6 3/4/2020 7 - 25   Creatinine Level (mg/dL)  0.61 7/14/2021  0.65 3/4/2020 0.50 - 1.10   BUN/Creat Ratio  10 7/14/2021  9 3/4/2020 6 - 22   eGFR (mL/min/1.73m2)  112 7/14/2021  110 3/4/2020 > OR = 60 -    eGFR  (mL/min/1.73m2)  130 7/14/2021  128 3/4/2020 > OR = 60 -    Calcium Level (mg/dL)  10.1 7/14/2021  10.0 3/4/2020 8.6 - 10.2   Hgb A1c  5.3 7/14/2021 ((H)) 5.9 11/3/2020  - <5.7   Cholesterol (mg/dL) ((H)) 221 7/14/2021 ((H)) 270 3/4/2020  - <200   Non-HDL Cholesterol ((H)) 173 7/14/2021 ((H)) 221 3/4/2020  - <130   HDL (mg/dL) ((L)) 48 7/14/2021 ((L)) 49 3/4/2020 > OR = 50 -    Cholesterol/HDL Ratio  4.6 7/14/2021 ((H)) 5.5 3/4/2020  - <5.0   LDL ((H)) 138 7/14/2021 ((H)) 180 3/4/2020    Triglyceride (mg/dL) ((H)) 211 7/14/2021 ((H)) 217 3/4/2020  - <150   U Creatinine (mg/dL)  64 7/14/2021  20 - 275   U Microalbumin (mg/dL)  0.4 7/14/2021  0.2 3/4/2020 See Note: -    Ur Microalbumin/Creatinine Ratio  6 7/14/2021   - <30       Please contact me  or my assistant at [phone] if you have any questions or concerns.     Sincerely,        Agineszka Ahn M.D.

## 2022-02-15 NOTE — TELEPHONE ENCOUNTER
---------------------  From: Jimy MARTÍNEZ [facility]   To: [employer] ([id number]_[facility]);     Sent: 2/15/2021 7:20:13 AM CST  Subject: BP recheck     Please call patient. Last BP on record was borderline elevated. If she doesn't have a way to get it checked, I would recommend a BP only visit. ThanksLMTCB @ [id number]
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
## 2022-02-15 NOTE — LETTER
(Inserted Image. Unable to display)   [address] [place] [address] [phone] (phone) [phone] (fax)  September 24, 2021      JOE MELENDEZ      [place] [address]      Dear JOE,    Thank you for selecting [facility] for your healthcare needs. Below you will find the results of the recent tests done at our clinic.     Your thyroid levels are all normal. Your white blood cell count and hemoglobin are slightly elevated. I would like for you to have a lab visit in 2-3 weeks to recheck a complete blood count. I'll be in touch after I get the ultrasound results back.    Result Name Current Result Previous Result Reference Range   T4 Free (ng/dL)  1.0 9/22/2021  1.2 10/17/2019 0.8 - 1.8   TSH (mIU/L)  2.26 9/22/2021  1.34 10/17/2019    WBC ((H)) 11.7 9/22/2021  10.0 10/17/2019 3.8 - 10.8   RBC ((H)) 5.19 9/22/2021  4.72 10/17/2019 3.80 - 5.10   Hgb (gm/dL) ((H)) 17.0 9/22/2021  14.0 10/17/2019 11.7 - 15.5   Hct (%) ((H)) 48.7 9/22/2021  40.0 10/17/2019 35.0 - 45.0   MCV (fL)  93.8 9/22/2021  84.7 10/17/2019 80.0 - 100.0   MCH (pg)  32.8 9/22/2021  29.7 10/17/2019 27.0 - 33.0   MCHC (gm/dL)  34.9 9/22/2021  35.0 10/17/2019 32.0 - 36.0   RDW (%)  12.9 9/22/2021 ((H)) 17.7 10/17/2019 11.0 - 15.0   Platelet  338 9/22/2021  361 10/17/2019 140 - 400   MPV (fL)  11.1 9/22/2021  10.4 10/17/2019 7.5 - 12.5   Abs Neutrophils ((H)) 7,968 9/22/2021  1,500 - 7,800   Abs Lymphocytes  3,089 9/22/2021  850 - 3,900   Abs Monocytes  445 9/22/2021  200 - 950   Abs Eosinophils  105 9/22/2021  15 - 500   Abs Basophils  94 9/22/2021  0 - 200   Neutrophils (%)  68.1 9/22/2021     Lymphocytes (%)  26.4 9/22/2021     Monocytes (%)  3.8 9/22/2021     Eosinophils (%)  0.9 9/22/2021     Basophils (%)  0.8 9/22/2021     T3 Free (pg/mL)  2.7 9/22/2021  2.6 10/17/2019 2.3 - 4.2       Please contact me or my assistant at [phone] if you have  any questions or concerns.     Sincerely,        Agnieszka Ahn M.D.

## 2022-02-15 NOTE — NURSING NOTE
Comprehensive Intake Entered On:  8/29/2019 4:43 PM CDT    Performed On:  8/29/2019 4:39 PM CDT by Cely Schuler CMA               Summary   Chief Complaint :   stung by bee on back; per EMT to go to hosptial patient decieded to come see PCP; does have a allergy to bees; took bendryl   Menstrual Status :   Menarcheal   Weight Measured :   179.8 lb(Converted to: 179 lb 13 oz, 81.56 kg)    Height Measured :   69.5 in(Converted to: 5 ft 9 in, 176.53 cm)    Body Mass Index :   26.17 kg/m2 (HI)    Body Surface Area :   2 m2   Systolic Blood Pressure :   112 mmHg   Diastolic Blood Pressure :   80 mmHg   Mean Arterial Pressure :   91 mmHg   Peripheral Pulse Rate :   81 bpm   Oxygen Saturation :   98 %   Cely Schuler CMA - 8/29/2019 4:39 PM CDT   Health Status   Allergies Verified? :   Yes   Medication History Verified? :   Yes   Immunizations Current :   Yes   Medical History Verified? :   Yes   Pre-Visit Planning Status :   Completed   Tobacco Use? :   Current every day smoker   Tobacco Cessation Review :   Not ready to quit   Cely Schuler CMA - 8/29/2019 4:39 PM CDT   Consents   Consent for Immunization Exchange :   Consent Granted   Consent for Immunizations to Providers :   Consent Granted   Cely Schuler CMA - 8/29/2019 4:39 PM CDT   Meds / Allergies   (As Of: 8/29/2019 4:44:00 PM CDT)   Allergies (Active)   Bee Stings  Estimated Onset Date:   Unspecified ; Created By:   Coral Sharma CMA; Reaction Status:   Active ; Category:   Environment ; Substance:   Bee Stings ; Type:   Allergy ; Updated By:   Coral Sharma CMA; Reviewed Date:   8/29/2019 4:42 PM CDT      No Known Medication Allergies  Estimated Onset Date:   Unspecified ; Created By:   Coral Sharma CMA; Reaction Status:   Active ; Category:   Drug ; Substance:   No Known Medication Allergies ; Type:   Allergy ; Updated By:   Coral Sharma CMA; Reviewed Date:   8/29/2019 4:42 PM CDT        Medication List   (As Of: 8/29/2019 4:44:00 PM CDT)    Prescription/Discharge Order    buPROPion  :   buPROPion ; Status:   Prescribed ; Ordered As Mnemonic:   buPROPion 300 mg/24 hours (XL) oral tablet, extended release ; Simple Display Line:   1 tab(s), Oral, q 24 hrs, 90 tab(s), 3 Refill(s) ; Ordering Provider:   Agnieszka Ahn MD; Catalog Code:   buPROPion ; Order Dt/Tm:   7/15/2019 8:38:40 AM          metFORMIN  :   metFORMIN ; Status:   Prescribed ; Ordered As Mnemonic:   metFORMIN 500 mg oral tablet ; Simple Display Line:   2 tab(s), Oral, bid, 360 tab(s), 3 Refill(s) ; Ordering Provider:   Agnieszka Ahn MD; Catalog Code:   metFORMIN ; Order Dt/Tm:   7/15/2019 8:38:39 AM          Miscellaneous Rx Supply  :   Miscellaneous Rx Supply ; Status:   Prescribed ; Ordered As Mnemonic:   glucose meter test strips and lancets ; Simple Display Line:   See Instructions, check blood sugar daily, 100 EA, 3 Refill(s) ; Ordering Provider:   Agnieszka Ahn MD; Catalog Code:   Miscellaneous Rx Supply ; Order Dt/Tm:   1/22/2019 6:47:08 PM          Miscellaneous Rx Supply  :   Miscellaneous Rx Supply ; Status:   Prescribed ; Ordered As Mnemonic:   glucose meter ; Simple Display Line:   See Instructions, check blood sugar daily, 1 EA, 0 Refill(s) ; Ordering Provider:   Agnieszka Ahn MD; Catalog Code:   Miscellaneous Rx Supply ; Order Dt/Tm:   4/5/2018 10:13:53 AM          spironolactone  :   spironolactone ; Status:   Prescribed ; Ordered As Mnemonic:   spironolactone 50 mg oral tablet ; Simple Display Line:   1 tab(s), Oral, daily, 90 tab(s), 3 Refill(s) ; Ordering Provider:   Agnieszka Ahn MD; Catalog Code:   spironolactone ; Order Dt/Tm:   7/15/2019 8:38:40 AM            Home Meds    ferrous sulfate  :   ferrous sulfate ; Status:   Documented ; Ordered As Mnemonic:   ferrous sulfate 325 mg (65 mg elemental iron) oral tablet ; Simple Display Line:   650 mg, 2 tab(s), Oral, daily, 0 Refill(s) ; Catalog Code:   ferrous sulfate ; Order Dt/Tm:   7/16/2019 3:38:43 PM           multivitamin with minerals  :   multivitamin with minerals ; Status:   Documented ; Ordered As Mnemonic:   Vital-D oral tablet ; Simple Display Line:   1 tab(s), po, daily, 0 Refill(s) ; Catalog Code:   multivitamin with minerals ; Order Dt/Tm:   3/22/2018 1:55:46 PM

## 2022-02-15 NOTE — TELEPHONE ENCOUNTER
"---------------------  From: Cherelle Espino   To: Latasha Quiroz;     Cc: Jimy MARTÍNEZ [facility];      Sent: 4/5/2021 1:13:30 PM CDT  Subject: Scheduling Management     Please see note below:  I tried saving it from the appointment reminders, but for some reason it will not save.    \" I called and spoke with patient regarding setting up an appointment with DS for a DM f/u per appointment pool reminder list. She has [facility] Medicaid (the insurance that must be assigned a PCP and the patient can only see that assigned provider.) Her PCP was randomly changed and [facility] as of today is no longer the PCP.  I called her and informed her of the provider change, that and she should call the back of her insurance card and speak to a representative to change it back to [facility].    The other issue is she will require a referral to see DS.  I'm not sure if it has to be done by the assigned PCP (which she is going to call and change back to [facility]) or if the patient is required to call BC herself and obtain the referral.      If the PCP does not change and she sees [facility], and/or the referral is not obtained, we will not be reimbursed by [place] Medicaid for services rendered.    Unfortunately This [place] insurance has strict guidelines, and the patient is the only one who can change PCP's.  We have tried to do this for the patient as it would be easier for both patient and provider, but [place] Medicaid will not allow us to change anything in the patient's behalf.  They must call them directly.    Please let me know if you have any questions.  Thank you for your time.    Cherelle Espino  Patient Services---------------------  From: Latasha Quiroz   To: Cherelle Espino;     Sent: 4/5/2021 2:01:14 PM CDT  Subject: RE: Scheduling Management     Thank you for the update.Noted. Thanks  "

## 2022-02-15 NOTE — TELEPHONE ENCOUNTER
---------------------  From: Melissa Ramírez CMA (Phone [employer]ages [employer] ([id number]_[facility]))   To: Agnieszka Ahn MD;     Sent: 10/31/2019 2:32:19 PM CDT  Subject: phone note- Ins. rejection     Phone Message    PCP:    [facility]      Time of Call:  2:17 pm       Person Calling:  Paco Queen  Phone number:  [phone]    Returned call at: 2:27 pm    Note:  Pharmacy called stating the insurance denied her prescription for Metformin 500mg tab. Rejection- potential alcoholism. The insurance have a DX; alcoholism. Pharmacy calling to confirm. Are you aware of this? Not on patients dx list in EMR. Please advise.      Transferred to: Jn, we do not have any record of patient having alcoholism and in our records evaluation for alcohol use has been low risk.---------------------  From: Agnieszka Ahn MD   To: [employer] Messages Pool ([id number]_[place] [facility]);     Sent: 10/31/2019 2:40:29 PM CDT  Subject: RE: phone note- Ins. rejectionTime: 3:10pm  Note: Called and notified Joel- Pharmacist.

## 2022-02-15 NOTE — NURSING NOTE
Comprehensive Intake Entered On:  4/25/2019 11:07 AM CDT    Performed On:  4/25/2019 11:01 AM CDT by Cely Schuler CMA               Summary   Chief Complaint :   swollen glands; more tired; neck felt swollen and warm to the touch; headache; body aches; feels better today   Menstrual Status :   Menarcheal   Weight Measured :   170.8 lb(Converted to: 170 lb 13 oz, 77.47 kg)    Height Measured :   69.5 in(Converted to: 5 ft 9 in, 176.53 cm)    Body Mass Index :   24.86 kg/m2   Body Surface Area :   1.95 m2   Systolic Blood Pressure :   128 mmHg   Diastolic Blood Pressure :   80 mmHg   Mean Arterial Pressure :   96 mmHg   Peripheral Pulse Rate :   68 bpm   BP Method :   Manual   HR Method :   Manual   Temperature Tympanic :   97.3 DegF(Converted to: 36.3 DegC)  (LOW)    Cely Schuler CMA - 4/25/2019 11:01 AM CDT   Health Status   Allergies Verified? :   Yes   Medication History Verified? :   Yes   Immunizations Current :   Yes   Medical History Verified? :   Yes   Pre-Visit Planning Status :   Completed   Tobacco Use? :   Current every day smoker   Tobacco Cessation Review :   Not ready to quit   Cely Schuler CMA - 4/25/2019 11:01 AM CDT   Consents   Consent for Immunization Exchange :   Consent Granted   Consent for Immunizations to Providers :   Consent Granted   Cely Schuler CMA - 4/25/2019 11:01 AM CDT   Meds / Allergies   (As Of: 4/25/2019 11:07:13 AM CDT)   Allergies (Active)   Bee Stings  Estimated Onset Date:   Unspecified ; Created By:   Coral Sharma CMA; Reaction Status:   Active ; Category:   Environment ; Substance:   Bee Stings ; Type:   Allergy ; Updated By:   Coral Sharma CMA; Reviewed Date:   4/25/2019 11:06 AM CDT      No Known Medication Allergies  Estimated Onset Date:   Unspecified ; Created By:   Coral Sharma CMA; Reaction Status:   Active ; Category:   Drug ; Substance:   No Known Medication Allergies ; Type:   Allergy ; Updated By:   Coral Sharma CMA; Reviewed Date:   4/25/2019 11:06 AM CDT         Medication List   (As Of: 4/25/2019 11:07:13 AM CDT)   Prescription/Discharge Order    buPROPion  :   buPROPion ; Status:   Prescribed ; Ordered As Mnemonic:   buPROPion 300 mg/24 hours (XL) oral tablet, extended release ; Simple Display Line:   300 mg, 1 tab(s), po, q 24 hrs, 90 tab(s), 3 Refill(s) ; Ordering Provider:   Agnieszka Ahn MD; Catalog Code:   buPROPion ; Order Dt/Tm:   5/31/2018 11:11:44 AM          metFORMIN  :   metFORMIN ; Status:   Prescribed ; Ordered As Mnemonic:   metFORMIN 500 mg oral tablet ; Simple Display Line:   2 tab(s), Oral, bid, 360 tab(s), 3 Refill(s) ; Ordering Provider:   Agnieszka Ahn MD; Catalog Code:   metFORMIN ; Order Dt/Tm:   1/22/2019 6:47:07 PM          Miscellaneous Rx Supply  :   Miscellaneous Rx Supply ; Status:   Prescribed ; Ordered As Mnemonic:   glucose meter test strips and lancets ; Simple Display Line:   See Instructions, check blood sugar daily, 100 EA, 3 Refill(s) ; Ordering Provider:   Agnieszka Ahn MD; Catalog Code:   Miscellaneous Rx Supply ; Order Dt/Tm:   1/22/2019 6:47:08 PM          Miscellaneous Rx Supply  :   Miscellaneous Rx Supply ; Status:   Prescribed ; Ordered As Mnemonic:   glucose meter ; Simple Display Line:   See Instructions, check blood sugar daily, 1 EA, 0 Refill(s) ; Ordering Provider:   Agnieszka Ahn MD; Catalog Code:   Miscellaneous Rx Supply ; Order Dt/Tm:   4/5/2018 10:13:53 AM          spironolactone  :   spironolactone ; Status:   Prescribed ; Ordered As Mnemonic:   spironolactone 50 mg oral tablet ; Simple Display Line:   1 tab(s), Oral, daily, 90 tab(s), 0 Refill(s) ; Ordering Provider:   Agnieszka Ahn MD; Catalog Code:   spironolactone ; Order Dt/Tm:   3/22/2019 8:17:55 AM            Home Meds    multivitamin with minerals  :   multivitamin with minerals ; Status:   Documented ; Ordered As Mnemonic:   Vital-D oral tablet ; Simple Display Line:   1 tab(s), po, daily, 0 Refill(s) ; Catalog Code:   multivitamin with  minerals ; Order Dt/Tm:   3/22/2018 1:55:46 PM

## 2022-02-15 NOTE — PROGRESS NOTES
Patient:   JOE MELENDEZ            MRN: [id number]            FIN: [id number]               Age:   38 years     Sex:  Female     :  1980   Associated Diagnoses:   Cystitis   Author:   Travis Damon MD      Chief Complaint   2018 12:16 PM CDT   c/o abdominal cramping, urgency feeling x Saturday     Chief complaint and symptoms noted above confirmed with patient.      History of Present Illness             The patient presents with dysuria.  There were exacerbating factors.  The onset was sudden.  The severity is moderate.  The course is worsening.        Review of Systems   Constitutional:  No fever, No chills, No sweats.    Gastrointestinal:  No nausea, No vomiting, No abdominal pain.    Genitourinary:  Dysuria, No CVA tenderness, No hematuria, No urethral discharge.    Gynecologic:  No vaginal discharge.       Health Status   Allergies:    Allergic Reactions (Selected)  Severity Not Documented  Bee Stings (No reactions were documented)  No Known Medication Allergies   Medications:  (Selected)   Prescriptions  Prescribed  Macrobid 100 mg oral capsule: 1 cap(s) ( 100 mg ), PO, BID, # 14 cap(s), 0 Refill(s), Type: Maintenance, Pharmacy: [facility], 1 cap(s) po bid,x7 day(s)  Pyridium 100 mg oral tablet: 1 tab(s) ( 100 mg ), PO, TID, # 9 tab(s), 0 Refill(s), Type: Maintenance, Pharmacy: [facility], 1 tab(s) po tid,x3 day(s)  buPROPion 150 mg/24 hours (XL) oral tablet, extended release: 1 tab(s) ( 150 mg ), po, daily, # 90 tab(s), 0 Refill(s), Type: Soft Stop, Pharmacy: [facility], 1 tab(s) po daily  glucose meter test strips and lancets: glucose meter test strips and lancets, See Instructions, Instructions: check blood sugar daily, Supply, # 100 EA, 3 Refill(s), Type: Maintenance, [facility], check blood sugar daily  glucose meter: glucose meter, See Instructions, Instructions: check blood sugar daily, Supply, # 1 EA, 0 Refill(s), Type:  Maintenance, Pharmacy: [id number], check blood sugar daily  metFORMIN 500 mg oral tablet: 2 tab(s) ( 1,000 mg ), PO, BID, # 360 tab(s), 1 Refill(s), Type: Maintenance, Pharmacy: [id number], 2 tab(s) po bid  spironolactone 50 mg oral tablet: 1 tab(s) ( 50 mg ), po, daily, # 90 tab(s), 3 Refill(s), Type: Maintenance, Pharmacy: [id number], 1 tab(s) po daily  Documented Medications  Documented  Vital-D oral tablet: 1 tab(s), po, daily, 0 Refill(s), Type: Maintenance   Problem list:    All Problems  Long term (current) use of oral hypoglycemic drugs / SNOMED CT 733734989 / Confirmed  Female hirsutism / SNOMED CT 220397651 / Confirmed  Moderate major depression / SNOMED CT 7375479 / Confirmed  Obesity / SNOMED CT 6792544377 / Probable  Tobacco user / SNOMED CT 251349619 / Probable  Type 2 diabetes mellitus / SNOMED CT 596050949 / Confirmed  Resolved: Brain concussion / SNOMED CT 547486785      Histories   Past Medical History:    Resolved  Brain concussion (SNOMED CT 985909229):  Resolved.   Family History:    Emotional problems  Mother     Procedure history:    ACL - Anterior cruciate ligament (SNOMED CT 7096877279) on 3/14/2017 at 37 Years.   Social History:        Alcohol Assessment            1-2 times per month, 1 drinks/episode average.      Tobacco Assessment            Current (some day smoker), Cigarettes        Physical Examination   Vital Signs   5/22/2018 12:16 PM CDT Temperature Tympanic 98 DegF    Peripheral Pulse Rate 76 bpm    Pulse Site Radial artery    HR Method Manual    Systolic Blood Pressure 136 mmHg  HI    Diastolic Blood Pressure 78 mmHg    Mean Arterial Pressure 97 mmHg    BP Site Right arm    BP Method Manual      Measurements from flowsheet : Measurements   5/22/2018 12:16 PM CDT Height Measured - Standard 69.75 in    Weight Measured - Standard 204.8 lb    BSA 2.14 m2    Body Mass Index 29.59 kg/m2  HI      General:  Alert and oriented, No acute distress.    Eye:   Normal conjunctiva.    HENT:  Oral mucosa is moist.    Cardiovascular:  No edema.    Gastrointestinal:  Soft, Non-tender, Non-distended, Normal bowel sounds, No organomegaly.    Genitourinary:  No flank pain.    Integumentary:  Warm, Dry.       Review / Management   Results review:  Lab results   5/22/2018 12:31 PM CDT Urine Color Urine Dipstick Colorless    Urine Appearance Urine Dipstick Clear    pH Urine Dipstick 7    Specific Gravity Urine Dipstick < 1.005    Glucose Urine Dipstick Negative    Bilirubin Urine Dipstick Negative    Ketones Urine Dipstick Negative    Blood Urine Dipstick Trace    Protein Urine Dipstick Negative    Nitrite Urine Dipstick Negative    Leukocyte Esterase Urine Dipstick Moderate    Urobilinogen Urine Dipstick 0.2 mg/dl    POC Test Comments POC Test Comments   3/22/2018 2:38 PM CDT Hgb A1c 7.5  HI    WBC 9.9    RBC 4.73    Hgb 12.2 gm/dL    Hct 36.6 %    MCV 77.4 fL  LOW    MCH 25.8 pg  LOW    MCHC 33.3 gm/dL    RDW 16.1 %  HI    Platelet 458  HI    MPV 10.8 fL   3/20/2018 9:35 AM CDT Sodium Level 136 mmol/L    Potassium Level 4.6 mmol/L    Chloride Level 102 mmol/L    CO2 Level 25 mmol/L    Glucose Level 237 mg/dL  HI    BUN 10 mg/dL    Creatinine 0.77 mg/dL    BUN/Creat Ratio NOT APPLICABLE    eGFR 97 mL/min/1.73m2    eGFR African American 113 mL/min/1.73m2    Calcium Level 9.9 mg/dL   .         Interpretation: Abnormal results  Urinalysis consistent with Urinary Tract Infection.       Impression and Plan   Diagnosis     Cystitis (EHA89-PR N30.01).     Course:  Not improving.    Orders     Orders   Lab (Gen Lab  Reference Lab):  Culture, Urine, Routine* ([facility]) (Order): Specimen Type: Urine (Clean Catch), Collection Date: 5/22/2018 1:19 PM CDT.     Orders (Selected)   Prescriptions  Prescribed  Macrobid 100 mg oral capsule: 1 cap(s) ( 100 mg ), PO, BID, # 14 cap(s), 0 Refill(s), Type: Maintenance, Pharmacy: [facility], 1 cap(s) po bid,x7 day(s)  Pyridium 100 mg oral  tablet: 1 tab(s) ( 100 mg ), PO, TID, # 9 tab(s), 0 Refill(s), Type: Maintenance, Pharmacy: [id number], 1 tab(s) po tid,x3 day(s).     Orders     Return to clinic or ER if no improvements or worsening signs symptoms.     Symptomatic care guidelines reviewed.     Urine will be cultured to test growth and sensitivity. We will call patient if bacterium cultured is not sensitive to antibiotic prescribed, or if culture does not return positive.     Counseled:  Reviewed the importance of adequate PO intake, if unable to take medication by mouth return to clinic or ER for evaluation.

## 2022-02-15 NOTE — NURSING NOTE
Comprehensive Intake Entered On:  11/11/2020 3:32 PM CST    Performed On:  11/11/2020 3:29 PM CST by Paradise Lopez CMA               Summary   Chief Complaint :   Annual px   Menstrual Status :   Menarcheal   Weight Measured :   199 lb(Converted to: 199 lb 0 oz, 90.265 kg)    Height Measured :   69.5 in(Converted to: 5 ft 9 in, 176.53 cm)    Body Mass Index :   28.96 kg/m2 (HI)    Body Surface Area :   2.1 m2   Systolic Blood Pressure :   144 mmHg (HI)    Diastolic Blood Pressure :   92 mmHg (HI)    Mean Arterial Pressure :   109 mmHg   Peripheral Pulse Rate :   79 bpm   BP Site :   Right arm   BP Method :   Manual   Temperature Tympanic :   98.4 DegF(Converted to: 36.9 DegC)    Oxygen Saturation :   96 %   Paradise Lopez CMA - 11/11/2020 3:29 PM CST   Health Status   Allergies Verified? :   Yes   Medication History Verified? :   Yes   Immunizations Current :   Yes   Pre-Visit Planning Status :   Completed   Tobacco Use? :   Current every day smoker   Paradise Lopez CMA - 11/11/2020 3:29 PM CST   Meds / Allergies   (As Of: 11/11/2020 3:32:31 PM CST)   Allergies (Active)   Bee Stings  Estimated Onset Date:   Unspecified ; Created By:   Coral Sharma CMA; Reaction Status:   Active ; Category:   Environment ; Substance:   Bee Stings ; Type:   Allergy ; Updated By:   Coral Sharma CMA; Reviewed Date:   3/31/2020 9:15 AM CDT      No Known Medication Allergies  Estimated Onset Date:   Unspecified ; Created By:   Coral Sharma CMA; Reaction Status:   Active ; Category:   Drug ; Substance:   No Known Medication Allergies ; Type:   Allergy ; Updated By:   Coral Sharma CMA; Reviewed Date:   3/31/2020 9:15 AM CDT        Medication List   (As Of: 11/11/2020 3:32:31 PM CST)   Prescription/Discharge Order    metFORMIN  :   metFORMIN ; Status:   Prescribed ; Ordered As Mnemonic:   metFORMIN 500 mg oral tablet ; Simple Display Line:   2 tab(s), Oral, bid, for 14 day(s), 56 tab(s), 0 Refill(s) ; Ordering Provider:   Agnieszka Ahn MD;  Catalog Code:   metFORMIN ; Order Dt/Tm:   10/19/2020 7:40:21 AM CDT          Miscellaneous Rx Supply  :   Miscellaneous Rx Supply ; Status:   Prescribed ; Ordered As Mnemonic:   glucose meter test strips and lancets ; Simple Display Line:   See Instructions, check blood sugar daily, 100 EA, 3 Refill(s) ; Ordering Provider:   Agnieszka Ahn MD; Catalog Code:   Miscellaneous Rx Supply ; Order Dt/Tm:   1/22/2019 6:47:08 PM CST          Miscellaneous Rx Supply  :   Miscellaneous Rx Supply ; Status:   Prescribed ; Ordered As Mnemonic:   glucose meter ; Simple Display Line:   See Instructions, check blood sugar daily, 1 EA, 0 Refill(s) ; Ordering Provider:   Agnieszka Ahn MD; Catalog Code:   Miscellaneous Rx Supply ; Order Dt/Tm:   4/5/2018 10:13:53 AM CDT            Home Meds    alprazolam  :   alprazolam ; Status:   Documented ; Ordered As Mnemonic:   ALPRAZolam 0.5 mg oral tablet ; Simple Display Line:   0 Refill(s) ; Catalog Code:   ALPRAZolam ; Order Dt/Tm:   11/11/2020 3:17:57 PM CST ; Comment:   Responsible Provider: JAKE SERRANO          multivitamin with minerals  :   multivitamin with minerals ; Status:   Documented ; Ordered As Mnemonic:   Vital-D oral tablet ; Simple Display Line:   1 tab(s), po, daily, 0 Refill(s) ; Catalog Code:   multivitamin with minerals ; Order Dt/Tm:   3/22/2018 1:55:46 PM CDT            ID Risk Screen   Recent Travel History :   No recent travel   Family Member Travel History :   No recent travel   Other Exposure to Infectious Disease :   Unknown   Paradise Lopez CMA - 11/11/2020 3:29 PM CST

## 2022-02-15 NOTE — NURSING NOTE
Call received from Julian Badillo in [place]; requesting copy of order from [facility] for PT.  Order printed and delivered in person to PT department.

## 2022-02-15 NOTE — NURSING NOTE
Comprehensive Intake Entered On:  2/24/2020 10:03 AM CST    Performed On:  2/24/2020 9:59 AM CST by Cely Schuler CMA               Summary   Chief Complaint :   Discuss resent visits with therpist   Menstrual Status :   Menarcheal   Weight Measured :   188.2 lb(Converted to: 188 lb 3 oz, 85.37 kg)    Height Measured :   69.5 in(Converted to: 5 ft 9 in, 176.53 cm)    Body Mass Index :   27.39 kg/m2 (HI)    Body Surface Area :   2.04 m2   Systolic Blood Pressure :   92 mmHg   Diastolic Blood Pressure :   60 mmHg   Mean Arterial Pressure :   71 mmHg   Peripheral Pulse Rate :   75 bpm   BP Method :   Manual   HR Method :   Electronic   Cely Schuler CMA - 2/24/2020 9:59 AM CST   Health Status   Allergies Verified? :   Yes   Medication History Verified? :   Yes   Immunizations Current :   Yes   Medical History Verified? :   Yes   Pre-Visit Planning Status :   Completed   Tobacco Use? :   Current every day smoker   Tobacco Cessation Review :   Not ready to quit   Cely Schuler CMA - 2/24/2020 9:59 AM CST   Consents   Consent for Immunization Exchange :   Consent Granted   Consent for Immunizations to Providers :   Consent Granted   Cely Schuler CMA - 2/24/2020 9:59 AM CST   Meds / Allergies   (As Of: 2/24/2020 10:03:34 AM CST)   Allergies (Active)   Bee Stings  Estimated Onset Date:   Unspecified ; Created By:   Coral Sharma CMA; Reaction Status:   Active ; Category:   Environment ; Substance:   Bee Stings ; Type:   Allergy ; Updated By:   Coral Sharma CMA; Reviewed Date:   2/24/2020 10:02 AM CST      No Known Medication Allergies  Estimated Onset Date:   Unspecified ; Created By:   Coral Sharma CMA; Reaction Status:   Active ; Category:   Drug ; Substance:   No Known Medication Allergies ; Type:   Allergy ; Updated By:   Coral Sharma CMA; Reviewed Date:   2/24/2020 10:02 AM CST        Medication List   (As Of: 2/24/2020 10:03:34 AM CST)   Prescription/Discharge Order    metFORMIN  :   metFORMIN ; Status:   Prescribed ;  Ordered As Mnemonic:   metFORMIN 500 mg oral tablet ; Simple Display Line:   2 tab(s), Oral, bid, 360 tab(s), 3 Refill(s) ; Ordering Provider:   Agnieszka Ahn MD; Catalog Code:   metFORMIN ; Order Dt/Tm:   7/15/2019 8:38:39 AM CDT          Miscellaneous Rx Supply  :   Miscellaneous Rx Supply ; Status:   Prescribed ; Ordered As Mnemonic:   glucose meter test strips and lancets ; Simple Display Line:   See Instructions, check blood sugar daily, 100 EA, 3 Refill(s) ; Ordering Provider:   Agnieszka Ahn MD; Catalog Code:   Miscellaneous Rx Supply ; Order Dt/Tm:   1/22/2019 6:47:08 PM CST          Miscellaneous Rx Supply  :   Miscellaneous Rx Supply ; Status:   Prescribed ; Ordered As Mnemonic:   glucose meter ; Simple Display Line:   See Instructions, check blood sugar daily, 1 EA, 0 Refill(s) ; Ordering Provider:   Agnieszka Ahn MD; Catalog Code:   Miscellaneous Rx Supply ; Order Dt/Tm:   4/5/2018 10:13:53 AM CDT            Home Meds    multivitamin with minerals  :   multivitamin with minerals ; Status:   Documented ; Ordered As Mnemonic:   Vital-D oral tablet ; Simple Display Line:   1 tab(s), po, daily, 0 Refill(s) ; Catalog Code:   multivitamin with minerals ; Order Dt/Tm:   3/22/2018 1:55:46 PM CDT

## 2022-02-15 NOTE — NURSING NOTE
Removed holter #1 @ 1003. Received completed diary. Downloaded, printed, and gave report to Dr. Hart.

## 2022-02-15 NOTE — TELEPHONE ENCOUNTER
Entered by Sharon Garcia CMA on July 22, 2020 5:01:54 PM CDT  ---------------------  From: Sharon Garcia CMA   To: [place] [id number]    Sent: 7/22/2020 5:01:54 PM CDT  Subject: Medication Management     ** Not Approved: Refill not appropriate, Responded by other means. **  ALPRAZolam (ALPRAZolam 0.5 MG Oral Tablet)  TAKE 1 TABLET BY MOUTH 4 TIMES DAILY AS NEEDED FOR ANXIETY  Qty:  10 tab(s)        Days Supply:  3        Refills:  0          Substitutions Allowed     Route To Pharmacy - [place] [id number]   Signed by Sharon Garcia CMA            ------------------------------------------  From: [place] [id number]  To: Agnieszka Ahn MD  Sent: July 18, 2020 6:28:26 PM CDT  Subject: Medication Management  Due: June 24, 2020 10:20:04 PM CDT     ** On Hold Pending Signature **     Drug: ALPRAZolam (ALPRAZolam 0.5 mg oral tablet), TAKE 1 TABLET BY MOUTH 4 TIMES DAILY AS NEEDED FOR ANXIETY  Quantity: 10 EA  Days Supply: 3  Refills: 0  Substitutions Allowed  Notes from Pharmacy:     Dispensed Drug: ALPRAZolam (ALPRAZolam 0.5 mg oral tablet), TAKE 1 TABLET BY MOUTH 4 TIMES DAILY AS NEEDED FOR ANXIETY  Quantity: 10 tab(s)  Days Supply: 3  Refills: 0  Substitutions Allowed  Notes from Pharmacy:  ------------------------------------------

## 2022-02-15 NOTE — PROGRESS NOTES
Patient:   SHANNAN MELENDEZ            MRN: [id number]            FIN: [id number]               Age:   38 years     Sex:  Female     :  1980   Associated Diagnoses:   Type 2 diabetes mellitus   Author:   Agnieszka Ahn MD      Visit Information      Date of Service: 2019 06:00 pm  Performing Location: [facility]  Encounter#: [id number]      Primary Care Provider (PCP):  Agnieszka Ahn MD    NPI# [id number]      Referring Provider:  Agnieszka Ahn MD    NPI# [id number]      Chief Complaint   2019 6:04 PM CST    DM med check; foot exam normal      History of Present Illness   Since being diagnosed with DM Shannan has taken enormous strides in terms of diet and exercise. She reduced her total amount of carbohydrates, even participating in a keto challenge in December for which she came in 2nd place. Over the holidays, she started eating more carbs again. Her blood sugars tend to range between , she notes blood sugars up to 140 2 hours post dinner. She has also noted episodes where she feels like she will faint, most often after exercise in the summer and also during her keto diet. Her last A1c was 7.5 on 3/22/2018    She walks most days but has had trouble with cold intolerance. She has lost around 50 pounds since her diagnosis.      Review of Systems   Constitutional:  Chills, Fatigue.    Endocrine:  Cold intolerance, No excessive thirst.       Health Status   Allergies:    Allergic Reactions (Selected)  Severity Not Documented  Bee Stings (No reactions were documented)  No Known Medication Allergies   Medications:  (Selected)   Prescriptions  Prescribed  buPROPion 300 mg/24 hours (XL) oral tablet, extended release: 1 tab(s) ( 300 mg ), po, q 24 hrs, # 90 tab(s), 3 Refill(s), Type: Maintenance, Pharmacy: [id number], 1 tab(s) po q 24 hrs  glucose meter test strips and lancets: glucose meter test strips and lancets, See Instructions, Instructions: check blood sugar daily,  Supply, # 100 EA, 3 Refill(s), Type: Maintenance, Pharmacy: [place] [id number], check blood sugar daily  glucose meter: glucose meter, See Instructions, Instructions: check blood sugar daily, Supply, # 1 EA, 0 Refill(s), Type: Maintenance, Pharmacy: [place] [id number], check blood sugar daily  metFORMIN 500 mg oral tablet: = 2 tab(s), Oral, bid, # 360 tab(s), 3 Refill(s), Type: Maintenance, Pharmacy: [place] [id number], 2 tab(s) Oral bid  spironolactone 50 mg oral tablet: = 1 tab(s) ( 50 mg ), po, daily, # 90 tab(s), 3 Refill(s), Type: Maintenance, Pharmacy: [place] [id number], 1 tab(s) Oral daily  Documented Medications  Documented  Vital-D oral tablet: 1 tab(s), po, daily, 0 Refill(s), Type: Maintenance,    Medications          *denotes recorded medication          glucose meter: See Instructions, check blood sugar daily, 1 EA, 0 Refill(s).          glucose meter test strips and lancets: See Instructions, check blood sugar daily, 100 EA, 3 Refill(s).          buPROPion 300 mg/24 hours (XL) oral tablet, extended release: 300 mg, 1 tab(s), po, q 24 hrs, 90 tab(s), 3 Refill(s).          metFORMIN 500 mg oral tablet: 2 tab(s), Oral, bid, 360 tab(s), 3 Refill(s).          *Vital-D oral tablet: 1 tab(s), po, daily, 0 Refill(s).          spironolactone 50 mg oral tablet: 50 mg, 1 tab(s), po, daily, 90 tab(s), 3 Refill(s).     Problem list:    All Problems (Selected)  Long term (current) use of oral hypoglycemic drugs / SNOMED CT 366245340 / Confirmed  Female hirsutism / SNOMED CT 427512892 / Confirmed  Moderate major depression / SNOMED CT 4200722 / Confirmed  Obesity / SNOMED CT 2336459074 / Probable  Tobacco user / SNOMED CT 396548185 / Probable  Type 2 diabetes mellitus / SNOMED CT 551116764 / Confirmed      Histories   Past Medical History:    Active  Female hirsutism (217080532)  Tobacco user (416238123)  Moderate major depression (5554490)  Type 2 diabetes mellitus (568125314)  Long term (current) use  of oral hypoglycemic drugs (487890967)  Resolved  Brain concussion (628663898):  Resolved.  Pregnancy (019890435):  Resolved on 5/16/1996 at 16 years.  Pregnancy (284824240):  Resolved on 10/21/2008 at 28 years.  Pregnancy (716865045):  Resolved on 2/28/2006 at 26 years.   Family History:    Diabetes mellitus  Mother  Father  Emotional problems  Mother  ADHD - Attention deficit disorder with hyperactivity  Son (Jean Pierre)  Autism  Son (Travis)  Son (Jean Pierre)     Procedure history:    ACL - Anterior cruciate ligament (0135884530) on 3/14/2017 at 37 Years.   Social History:        Alcohol Assessment: Current            1-2 times per month, 5 drinks/episode average.      Tobacco Assessment: Current            Current (some day smoker), Cigarettes      Substance Abuse Assessment: Denies Substance Abuse            Never      Home and Environment Assessment            Spouse/Partner name: Simone Srinivasan.      Nutrition and Health Assessment            Type of diet: Regular.      Exercise and Physical Activity Assessment: Does not exercise      Sexual Assessment            Sexually active: Yes.  Sexual orientation: Straight or heterosexual.      Other Assessment            First menses age 13.  Regular menses.  Menstrual duration 5 days.  Cycle interval 28 days.  No history of               abnormal Pap smear.      Physical Examination   Vital Signs   1/22/2019 6:04 PM CST Temperature Tympanic 98.2 DegF    Peripheral Pulse Rate 62 bpm    Pulse Site Radial artery    HR Method Manual    Systolic Blood Pressure 120 mmHg    Diastolic Blood Pressure 78 mmHg    Mean Arterial Pressure 92 mmHg    BP Site Left arm    BP Method Manual      Measurements from flowsheet : Measurements   1/22/2019 6:04 PM CST Height Measured - Standard 69.5 in    Weight Measured - Standard 164.2 lb    BSA 1.91 m2    Body Mass Index 23.9 kg/m2      General:  Alert and oriented, No acute distress.    Eye:  Extraocular movements are intact, Normal  conjunctiva.    HENT:  Normocephalic.    Respiratory:  Lungs are clear to auscultation, Respirations are non-labored, Breath sounds are equal, Symmetrical chest wall expansion.    Cardiovascular:  Normal rate, Regular rhythm, No murmur, No gallop.    Integumentary:  Warm, Dry, Pink.    Neurologic:  Alert, Oriented.    Psychiatric:  Cooperative, Appropriate mood & affect.       Impression and Plan   Diagnosis     Type 2 diabetes mellitus ([id number] E11.9).     Course:  Improving, Progressing as expected.    Plan:  Patient counseled on diet and exercise.    Recommend follow up with diabetes patient educator.  Shannan has good control over her diet and exercise, concerned about her ability to sustain long term without giving herself some breathing room.    -relax concerns about control; 140 2 hours postprandial is a very good measurement.  - Keep a journal of blood sugar measurements and bring to next appointment.    Patient Instructions:       Counseled: Patient, Regarding medications, Diet, Activity, Verbalized understanding.    Orders     Orders (Selected)   Outpatient Orders  Ordered (In Transit)  Hemoglobin A1c* ([facility]): Specimen Type: Blood, Collection Date: 01/22/19 18:42:00 CST.     Patient was seen with student BONI Brown, and history and exam confirmed. Agree that documentation reflects findings and plan.  Agnieszka Ahn MD
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
## 2022-02-15 NOTE — TELEPHONE ENCOUNTER
---------------------  From: Cely Schuler CMA   To: JOE MELENDEZ    Sent: 12/10/2019 8:16:27 AM CST  Subject: General Message     Good Morning CALEB Campbell sent the referral to [facility] in [place] this morning. We will see you on the 16th!     Cely Chávez CMA

## 2022-02-15 NOTE — TELEPHONE ENCOUNTER
---------------------  From: Agnieszka Ahn MD   To: SHANNAN MELENDEZ    Sent: 4/6/2020 4:04:07 PM CDT  Subject: ultrasound results     Shannan,  Good news - the ultrasound was read as normal. Both ovaries looked completely normal. If you are still having symptoms, please let me know and we may need to schedule you for an in person visit for further evaluation. Hope you are feeling better.  Radha Ahn
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
## 2022-02-15 NOTE — CARE COORDINATION
Pt appears on [facility] chronic disease panel as out of parameters for Statin.  Pt does have RTC for 10/5/18 fasting labs.    Per chart note 4/5/18 Recommend follow up with me in 1 month after meets with diabetes educator to discuss further treatment -   will need to consider statin, will need microalbumin and follow up A1c in 3 months. Strongly encouraged to quit smoking.   At follow up determine if further assistance is needed to quit.
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
## 2022-02-15 NOTE — TELEPHONE ENCOUNTER
---------------------  From: Cely Schuler CMA (Phone Messages Pool ([id number]_[facility]))   To: Agnieszka Ahn MD;     Sent: 1/31/2020 10:22:07 AM CST  Subject: Phone Message     PCP:   ROCKY      Time of Call:  9:19am       Person Calling:  Dr. Burdick at [facility]  Phone number:  [phone]  Leave a detailed Message:     Returned call at: 1:15am    Note:   Dr. Burdick called, she is wondering if you would like patient to have a Neurology Psych  eval done or if patient needs therapy. If a Neurology Psych eval is needed Dr. Burdick stated that she would need office notes stating this.     Dr. Burdick is aware that [facility] is out of the clinic today and back on Monday. Any questions please feel free to reach out to Dr. Burdick at the number listed above.     Last office visit and reason:  1/06/2020     Pharmacy:     [facility] to: [facility] was [facility] for counseling/therapy. If after they are meeting Dr. Burdick thinks a neuropsych eval would be helpful, I am happy to place that referral.---------------------  From: Agnieszka Ahn MD   To: Phone Messages Pool ([id number] [facility]);     Sent: 2/3/2020 8:13:43 AM CST  Subject: RE: Phone MessageReturned Call    Time: 8:56 am  Note:  Left msg with [facility] msg.

## 2022-02-15 NOTE — LETTER
(Inserted Image. Unable to display)   [address] [place] [address]  October 21, 2019      JOE MELENDEZ      [address], [place] [id number]      Dear JOE,    Thank you for selecting [facility] for your healthcare needs. Below you will find the results of the recent tests done at our clinic.         Result Name Current Result Previous Result Reference Range   Sodium Level (mmol/L)  140 10/17/2019  135 7/15/2019   136 3/20/2018 135 - 146   Potassium Level (mmol/L)  4.4 10/17/2019  4.3 7/15/2019   4.6 3/20/2018 3.5 - 5.3   Chloride Level (mmol/L)  102 10/17/2019  103 7/15/2019   102 3/20/2018 98 - 110   CO2 Level (mmol/L)  25 10/17/2019  21 7/15/2019   25 3/20/2018 20 - 32   Glucose Level (mg/dL)  84 10/17/2019  82 7/15/2019  ((H)) 237 3/20/2018 65 - 99   BUN (mg/dL)  11 10/17/2019  9 7/15/2019   10 3/20/2018 7 - 25   Creatinine Level (mg/dL)  0.75 10/17/2019  0.65 7/15/2019   0.77 3/20/2018 0.50 - 1.10   eGFR (mL/min/1.73m2)  100 10/17/2019  111 7/15/2019   97 3/20/2018 > OR = 60 -    Calcium Level (mg/dL) ((H)) 10.3 10/17/2019  9.9 7/15/2019   9.9 3/20/2018 8.6 - 10.2   T4 Free (ng/dL)  1.2 10/17/2019  0.8 - 1.8   T3 Free (pg/mL)  2.6 10/17/2019  2.3 - 4.2   TSH (mIU/L)  1.34 10/17/2019  2.10 7/15/2019    Thyroid Peroxidase Ab (TPO) (IU/mL)  1 10/17/2019   - <9   Iron Level (mcg/dL) ((L)) 38 10/17/2019  50 7/30/2019  ((L)) <10 7/15/2019 40 - 190   Estradiol Level (pg/mL)  27 10/17/2019     FSH (mIU/mL)  6.5 10/17/2019     WBC  10.0 10/17/2019  8.8 7/30/2019   8.4 7/15/2019 3.8 - 10.8   RBC  4.72 10/17/2019  4.47 7/30/2019   4.57 7/15/2019 3.80 - 5.10   Hgb (gm/dL)  14.0 10/17/2019 ((L)) 9.8 7/30/2019  ((L)) 8.8 7/15/2019 11.7 - 15.5   Hct (%)  40.0 10/17/2019 ((L)) 34.8 7/30/2019  ((L)) 32.3 7/15/2019 35.0 - 45.0   MCV (fL)  84.7 10/17/2019 ((L)) 77.9 7/30/2019  ((L)) 70.7 7/15/2019 80.0 - 100.0   MCH (pg)  29.7 10/17/2019 ((L)) 21.9 7/30/2019  ((L)) 19.3 7/15/2019 27.0 - 33.0    MCHC (gm/dL)  35.0 10/17/2019 ((L)) 28.2 7/30/2019  ((L)) 27.2 7/15/2019 32.0 - 36.0   RDW (%) ((H)) 17.7 10/17/2019 ((H)) 27.5 7/30/2019  ((H)) 19.1 7/15/2019 11.0 - 15.0   Platelet  361 10/17/2019  346 7/30/2019  ((H)) 418 7/15/2019 140 - 400   MPV (fL)  10.4 10/17/2019  10.0 7/30/2019   10.8 7/15/2019 7.5 - 12.5       Please contact me or my assistant at [phone] if you have any questions or concerns.     Sincerely,        Agnieszka Ahn M.D.

## 2022-02-15 NOTE — TELEPHONE ENCOUNTER
---------------------  From: Agnieszka Ahn MD   To: SHANNAN MELENDEZ P    Sent: 8/1/2019 2:57:08 PM CDT  Subject: Patient Message - Results Notification     Shannan,  These are better but hemoglobin isn't back to normal. Let's recheck a hemoglobin in one month.  Radha    Results:  Date Result Name Ind Value Ref Range   7/30/2019 2:59 PM Iron Level  50 mcg/dL (40 - 190)   7/30/2019 2:59 PM WBC  8.8 (3.8 - 10.8)   7/30/2019 2:59 PM RBC  4.47 (3.80 - 5.10)   7/30/2019 2:59 PM Hgb ((L)) 9.8 gm/dL (11.7 - 15.5)   7/30/2019 2:59 PM Hct ((L)) 34.8 % (35.0 - 45.0)   7/30/2019 2:59 PM MCV ((L)) 77.9 fL (80.0 - 100.0)   7/30/2019 2:59 PM MCH ((L)) 21.9 pg (27.0 - 33.0)   7/30/2019 2:59 PM MCHC ((L)) 28.2 gm/dL (32.0 - 36.0)   7/30/2019 2:59 PM RDW ((H)) 27.5 % (11.0 - 15.0)   7/30/2019 2:59 PM Platelet  346 (140 - 400)   7/30/2019 2:59 PM MPV  10.0 fL (7.5 - 12.5)

## 2022-02-15 NOTE — PROGRESS NOTES
Patient:   JOE MELENDEZ            MRN: [id number]            FIN: [id number]               Age:   38 years     Sex:  Female     :  1980   Associated Diagnoses:   Obesity; Type 2 diabetes mellitus   Author:   Latasha Quiroz      Visit Information   Visit type:   Diabetes Self Management Education Follow Up  Pt was last seen by BUCK, NAINAE on 18.    Referral source:  Jimy MARTÍNEZ, Agnieszka.       Chief Complaint   DM Type II      History of Present Illness   Pt does have a family hx of diabetes but no one that she knows of dx at her age.  Pt has had 3 children with one weighing over 9# no dx of GDM.    Estimated Average Glucose (eAG) 169 mg/dL with A1C of 7.5, glucose 237 on 3/20/18    Discussed nutrition, diabetes, and lifestyle management with pt.  Amazing improvement in how she feels physically and mentally since adjusting her intake and physical activity routine.    Nutrition:   Reading labels, trying new recipes, staying within carb guidelines, enjoys learning how the foods can affect BG readings.  Some questions about going out to eat and other outings to friends and family     Physical Activity:  none  Stress:   high over the last year   Weight: down 18# since last RD visit!   Sleep: fair   Support: friends  BG Testing: testing pre/ 2 hr pp and pt states most readings are typically 95 - 120's with the highest of 165mg/dL   DM related medication: metformin 500mg ii tabs BID - taking as directed and tolerating well   Routine diabetes care:  eye and dental exams due, skin intact, feet - wearing flip flops - appropriate foot care education completed today         Health Status   Allergies:    Allergic Reactions (Selected)  Severity Not Documented  Bee Stings (No reactions were documented)  No Known Medication Allergies   Medications:  (Selected)   Prescriptions  Prescribed  Cipro 250 mg oral tablet: 1 tab(s) ( 250 mg ), PO, q12hr, # 20 tab(s), 0 Refill(s), Type: Maintenance, Pharmacy: [id number], 1  tab(s) po q12 hrs,x10 day(s)  Pyridium 100 mg oral tablet: 1 tab(s) ( 100 mg ), PO, TID, # 9 tab(s), 0 Refill(s), Type: Maintenance, Pharmacy: [facility] [id number], 1 tab(s) po tid,x3 day(s)  buPROPion 300 mg/24 hours (XL) oral tablet, extended release: 1 tab(s) ( 300 mg ), po, q 24 hrs, # 90 tab(s), 3 Refill(s), Type: Maintenance, Pharmacy: [place] [facility] [id number], 1 tab(s) po q 24 hrs  glucose meter test strips and lancets: glucose meter test strips and lancets, See Instructions, Instructions: check blood sugar daily, Supply, # 100 EA, 3 Refill(s), Type: Maintenance, Pharmacy: [facility] [id number], check blood sugar daily  glucose meter: glucose meter, See Instructions, Instructions: check blood sugar daily, Supply, # 1 EA, 0 Refill(s), Type: Maintenance, Pharmacy: [facility] [address], check blood sugar daily  metFORMIN 500 mg oral tablet: 2 tab(s) ( 1,000 mg ), PO, BID, # 360 tab(s), 1 Refill(s), Type: Maintenance, Pharmacy: [facility] [id number], 2 tab(s) po bid  spironolactone 50 mg oral tablet: 1 tab(s) ( 50 mg ), po, daily, # 90 tab(s), 3 Refill(s), Type: Maintenance, Pharmacy: [facility] [address], 1 tab(s) po daily  Documented Medications  Documented  Vital-D oral tablet: 1 tab(s), po, daily, 0 Refill(s), Type: Maintenance   Problem list:    All Problems  Long term (current) use of oral hypoglycemic drugs / SNOMED CT 892008371 / Confirmed  Female hirsutism / SNOMED CT 109261136 / Confirmed  Moderate major depression / SNOMED CT 8195805 / Confirmed  Obesity / SNOMED CT 2314033728 / Probable  Tobacco user / SNOMED CT 078777628 / Probable  Type 2 diabetes mellitus / SNOMED CT 449144901 / Confirmed  Resolved: Brain concussion / SNOMED CT 264854231      Histories   Past Medical History:    Resolved  Brain concussion (524584803):  Resolved.   Family History:    Emotional problems  Mother     Procedure history:    ACL - Anterior cruciate ligament (SNOMED CT 9332323537) on 3/14/2017 at 37 Years.   Social  History:        Alcohol Assessment            1-2 times per month, 1 drinks/episode average.      Tobacco Assessment            Current (some day smoker), Cigarettes        Physical Examination   Vital Signs   6/19/2018 3:01 PM CDT Temperature Tympanic 97.9 DegF    Peripheral Pulse Rate 76 bpm    Pulse Site Radial artery    HR Method Manual    Systolic Blood Pressure 124 mmHg    Diastolic Blood Pressure 70 mmHg    Mean Arterial Pressure 88 mmHg    BP Site Right arm    BP Method Manual      Measurements from flowsheet : Measurements   6/20/2018 9:58 AM CDT Height Measured - Standard 69.75 in    Weight Measured - Standard 196.2 lb    BSA 2.09 m2    Body Mass Index 28.35 kg/m2  HI   6/19/2018 3:01 PM CDT Weight Measured - Standard 193.4 lb         Health Maintenance      Recommendations     Pending (in the next year)        Due            Cervical Cancer Screen (if sexually active) due  06/20/18  and every 3  year(s)           DM - Communication with Managing Provider due  06/20/18  and every 1  year(s)           DM - Eye Exam due  06/20/18  and every 1  year(s)           DM - Foot Exam due  06/20/18  and every 1  year(s)           DM - Microalbumin due  06/20/18  and every 1  year(s)           Lipid Disorders Screen (Female) due  06/20/18  and every 1  year(s)        Near Due            DM - HgbA1c near due  06/22/18  and every 3  month(s)        Due In Future            Type 2 Diabetes Mellitus Screen (Female) not due until  03/22/19  and every 1  year(s)           Alcohol Misuse Screen (Female) not due until  05/31/19  and every 1  year(s)           Depression Screen (Female) not due until  05/31/19  and every 1  year(s)           High Blood Pressure Screen (Female) not due until  06/19/19  and every 1  year(s)     Satisfied (in the past 1 year)        Satisfied            Alcohol Misuse Screen (Female) on  05/31/18.           Alcohol Misuse Screen (Female) on  01/10/18.           Body Mass Index Check (Female) on   06/20/18.           Body Mass Index Check (Female) on  05/31/18.           Body Mass Index Check (Female) on  05/22/18.           Body Mass Index Check (Female) on  04/11/18.           Body Mass Index Check (Female) on  04/05/18.           Body Mass Index Check (Female) on  03/22/18.           Body Mass Index Check (Female) on  01/10/18.           Body Mass Index Check (Female) on  07/31/17.           DM - HgbA1c on  03/22/18.           Depression Screen (Female) on  05/31/18.           Depression Screen (Female) on  05/31/18.           Depression Screen (Female) on  05/31/18.           Depression Screen (Female) on  01/10/18.           Depression Screen (Female) on  01/10/18.           Depression Screen (Female) on  01/10/18.           High Blood Pressure Screen (Female) on  06/19/18.           High Blood Pressure Screen (Female) on  05/31/18.           High Blood Pressure Screen (Female) on  05/22/18.           High Blood Pressure Screen (Female) on  04/05/18.           High Blood Pressure Screen (Female) on  03/22/18.           High Blood Pressure Screen (Female) on  01/10/18.           High Blood Pressure Screen (Female) on  07/31/17.           Obesity Screen and Counseling (Female) on  06/20/18.           Obesity Screen and Counseling (Female) on  06/19/18.           Obesity Screen and Counseling (Female) on  05/31/18.           Obesity Screen and Counseling (Female) on  05/22/18.           Obesity Screen and Counseling (Female) on  04/11/18.           Obesity Screen and Counseling (Female) on  04/05/18.           Obesity Screen and Counseling (Female) on  03/22/18.           Obesity Screen and Counseling (Female) on  01/10/18.           Obesity Screen and Counseling (Female) on  07/31/17.           Type 2 Diabetes Mellitus Screen (Female) on  03/22/18.          Review / Management   Results review:  Lab results   6/19/2018 3:17 PM CDT Urine Color Urine Dipstick Yellow    Urine Appearance Urine Dipstick  Slightly cloudy    pH Urine Dipstick 7    Specific Gravity Urine Dipstick 1.015    Glucose Urine Dipstick Negative    Bilirubin Urine Dipstick Negative    Ketones Urine Dipstick Negative    Blood Urine Dipstick Trace    Protein Urine Dipstick Negative    Nitrite Urine Dipstick Negative    Leukocyte Esterase Urine Dipstick Large    Urobilinogen Urine Dipstick 0.2 mg/dl    POC Test Comments POC Test Comments   5/22/2018 1:32 PM CDT Culture Urine See comment   5/22/2018 12:31 PM CDT Urine Color Urine Dipstick Colorless    Urine Appearance Urine Dipstick Clear    pH Urine Dipstick 7    Specific Gravity Urine Dipstick < 1.005    Glucose Urine Dipstick Negative    Bilirubin Urine Dipstick Negative    Ketones Urine Dipstick Negative    Blood Urine Dipstick Trace    Protein Urine Dipstick Negative    Nitrite Urine Dipstick Negative    Leukocyte Esterase Urine Dipstick Moderate    Urobilinogen Urine Dipstick 0.2 mg/dl    POC Test Comments POC Test Comments   3/22/2018 2:38 PM CDT Hgb A1c 7.5  HI    WBC 9.9    RBC 4.73    Hgb 12.2 gm/dL    Hct 36.6 %    MCV 77.4 fL  LOW    MCH 25.8 pg  LOW    MCHC 33.3 gm/dL    RDW 16.1 %  HI    Platelet 458  HI    MPV 10.8 fL   3/20/2018 9:35 AM CDT Sodium Level 136 mmol/L    Potassium Level 4.6 mmol/L    Chloride Level 102 mmol/L    CO2 Level 25 mmol/L    Glucose Level 237 mg/dL  HI    BUN 10 mg/dL    Creatinine 0.77 mg/dL    BUN/Creat Ratio NOT APPLICABLE    eGFR 97 mL/min/1.73m2    eGFR African American 113 mL/min/1.73m2    Calcium Level 9.9 mg/dL   .       Impression and Plan   Diagnosis     Obesity (CSD29-OW E66.9).     Type 2 diabetes mellitus (XPC90-AN E11.9).       Counseled: Patient, SATNAM7 Self Care Behaviors   Today the patient was provided with a review and further instruction on the progression of diabetes mellitus, prevention of heart disease, prevention of complications, and lifestyle guidelines.  Healthy Eating - review of carbohydrate counting, reading food labels,  appropriate portion sizes, well balanced meals, diabetic plate method as a general rule.  Patient is provided with additional ideas to incorporate dietary recommendations, increase meal planning and preparation.  Mindful eating, finding other coping mechanisms besides food, weight loss tips   Instructed on Therapeutic Lifestyle Changes (TLC) nutrition plan for heart healthy eating.     Low cholesterol (<200mg), low fat, low saturated fat, zero trans fat   Low sodium (2000mg)   High fiber diet (20 - 30gm); Soluble fiber 10+ gm/ day    Eat more omega 3 fats  Vegetarian at least 1 day per week  Being Active - Education on how exercise helps with :    - lowering BG by increasing the muscles ability to take up and use glucose   - weight loss   - healthier heart (improve lipid profile)   - improve sleep, mood, energy   - decrease stress  Monitoring -  Reviewed recommended testing schedule and blood glucose target levels.    Taking Medication - on Metformin - education on the mechanism of action   Discussed the progression of diabetes and potential of needing additional medication in the future.    Problem Solving - medical support team assistance, resources provided (written and apps, internet); good control of stress  Healthy Coping - support of friends, family, and medical support team   Reducing Risks - Detailed education on potential complications associated with uncontrolled diabetes and prevention recommendations.  Recommendations include: annual eye exam, good oral cares with brushing BID and flossing daily, routine dental appointments, good foot care tips and shoe wear - discussed monofilament test yearly.  Importance of adequate sleep and good control of BG to prevent developing complications related to uncontrolled DM including nephropathy, neuropathy, retinopathy, and cardiovascular disease.  Weight loss goal of 7 - 10% of current body weight pounds as a reasonable goal to help increase insulin sensitivity          Goals:   1.  Practice healthy stress management and get good quality sleep with the goal of 7-8 hours per night.    2.   Physical activity: Recommend increasing to 2 hrs and 30 min a week (150 minutes) of moderate-intensity, or 1 hr and 15 min (75 minutes) a week of vigorous-intensity aerobic physical activity, or an equivalent combination of moderate- and vigorous-intensity aerobic physical activity.  Aerobic activity should be performed in episodes of at least 10 minutes, preferably spread throughout the week.  Muscle-strengthening activities on 2 or more days per week.    3.  Eat in a healthy way, per diabetic plate method.  Nutrition reference: Eat 3 meals a day; snacks are optional.  A meal is three or more food groups; make it colorful for better nutrition.    4.  Total Carbohydrate intake 30 grams for meals, snacks ~ 15 grams  5.  Pt to monitor BG BID a few days/ week.  BG goals: Fasting and before meals 80 - 120 mg/dL, 2 hrs after the start of a meal 80 - 140 mg/dL.    6.  Goal weight 193 by 10/2018   7.  Read handouts provided.  F/u in 3 months  8.  Continue with metformin as directed       Professional Services   Time spent with pt 60 min   cc Dr. Ahn

## 2022-02-15 NOTE — TELEPHONE ENCOUNTER
---------------------  [facility]   To: MERRITT MELENDEZJARROD MAHAJAN    Sent: 9/24/2021 7:07:38 AM CDT  Subject: lab results     Joe,  Your thyroid levels are all normal. Your white blood cell count and hemoglobin are slightly elevated. I would like for you to have a lab visit in 2-3 weeks to recheck a complete blood count. I'll be in touch after I get the ultrasound results back. I'll send a copy through the mail as well.   Radha Ahn      Results:  Date Result Name Ind Value Ref Range   9/22/2021 3:07 PM T4 Free  1.0 ng/dL (0.8 - 1.8)   9/22/2021 3:07 PM TSH  2.26 mIU/L    9/22/2021 3:07 PM WBC ((H)) 11.7 (3.8 - 10.8)   9/22/2021 3:07 PM RBC ((H)) 5.19 (3.80 - 5.10)   9/22/2021 3:07 PM Hgb ((H)) 17.0 gm/dL (11.7 - 15.5)   9/22/2021 3:07 PM Hct ((H)) 48.7 % (35.0 - 45.0)   9/22/2021 3:07 PM MCV  93.8 fL (80.0 - 100.0)   9/22/2021 3:07 PM MCH  32.8 pg (27.0 - 33.0)   9/22/2021 3:07 PM MCHC  34.9 gm/dL (32.0 - 36.0)   9/22/2021 3:07 PM RDW  12.9 % (11.0 - 15.0)   9/22/2021 3:07 PM Platelet  338 (140 - 400)   9/22/2021 3:07 PM MPV  11.1 fL (7.5 - 12.5)   9/22/2021 3:07 PM Abs Neutrophils ((H)) 7,968 (1,500 - 7,800)   9/22/2021 3:07 PM Abs Lymphocytes  3,089 (850 - 3,900)   9/22/2021 3:07 PM Abs Monocytes  445 (200 - 950)   9/22/2021 3:07 PM Abs Eosinophils  105 (15 - 500)   9/22/2021 3:07 PM Abs Basophils  94 (0 - 200)   9/22/2021 3:07 PM Neutrophils  68.1 %    9/22/2021 3:07 PM Lymphocytes  26.4 %    9/22/2021 3:07 PM Monocytes  3.8 %    9/22/2021 3:07 PM Eosinophils  0.9 %    9/22/2021 3:07 PM Basophils  0.8 %    9/22/2021 3:07 PM T3 Free  2.7 pg/mL (2.3 - 4.2)

## 2022-02-15 NOTE — TELEPHONE ENCOUNTER
Entered by Cely Schuler CMA on September 11, 2020 1:12:46 PM CDT  ---------------------  From: Cely Schuler CMA   To: [facility] [id number]    Sent: 9/11/2020 1:12:46 PM CDT  Subject: Medication Management     ** Submitted: **  Order:Miscellaneous Prescription (metFORMIN HCl 500 MG Oral Tablet)  2 tab(s)  Oral  bid  Qty:  120 tab(s)        Refills:  0          Substitutions Allowed     Route To [facility] [address]    Signed by Cely Schuler CMA  9/11/2020 6:12:00 PM [facility]    ** Not Approved:  **  Miscellaneous Prescription (metFORMIN HCl 500 MG Oral Tablet)  Take 2 tablets by mouth twice daily  Qty:  360 tab(s)        Days Supply:  90        Refills:  0          Substitutions Allowed     Route To [place] [address]   Signed by Cely Schuler CMA            ------------------------------------------  From: [place] [id number]  To: Agnieszka Ahn MD  Sent: September 11, 2020 8:13:03 AM CDT  Subject: Medication Management  Due: September 9, 2020 4:20:39 PM CDT     ** On Hold Pending Signature **     Dispensed Drug: metFORMIN HCl 500 MG Oral Tablet, Take 2 tablets by mouth twice daily  Quantity: 360 tab(s)  Days Supply: 90  Refills: 0  Substitutions Allowed  Notes from Pharmacy:  ------------------------------------------

## 2022-02-15 NOTE — TELEPHONE ENCOUNTER
---------------------  From: Melissa Ramírez CMA   Sent: 4/24/2019 2:32:26 PM CDT  Subject: phone note- swollen glands     Phone Message    PCP:    ROCKY      Time of Call:  1:58pm       Person Calling:  Shannan  Phone number:  [phone] O.K. to leave detailed message    Returned call at: 2:26pm    Note:  Patient called asking to be worked into ROCKY's  schedule today. Patient states 3 weeks go she had a high fever that went away after a few days, her glands in neck are swollen and have still not gone down completely, says they are warm to touch. Also feels she may have elevated BP. Advised patient to be seen, but can wait until tomorrow. Patient agreed, transferred to  to schedule appointment.     Last office visit and reason:  _1/22/19 DM med check

## 2022-02-15 NOTE — PROGRESS NOTES
Patient:   JOE MELENDEZ            MRN: [id number]            FIN: [id number]               Age:   38 years     Sex:  Female     :  1980   Associated Diagnoses:   Type 2 diabetes mellitus; Moderate major depression   Author:   Agnieszka Ahn MD      Chief Complaint   2018 11:04 AM CDT   Follow up on some issues per pt      History of Present Illness   1. follow up diabetes, discussed dietary changes, overall doing well, would like to have A1c next month to check progress  2. depression not fully controlled, reviewed PHQ9, much improved, would like to try higher dose of wellbutrin  3. check incision on left abdomen, had opened up, seems to be improved      Health Status   Allergies:    Allergic Reactions (All)  Severity Not Documented  Bee Stings (No reactions were documented)  No Known Medication Allergies   Medications:  (Selected)   Prescriptions  Prescribed  buPROPion 150 mg/24 hours (XL) oral tablet, extended release: 1 tab(s) ( 150 mg ), po, daily, # 90 tab(s), 0 Refill(s), Type: Soft Stop, [facility], 1 tab(s) po daily  glucose meter test strips and lancets: glucose meter test strips and lancets, See Instructions, Instructions: check blood sugar daily, Supply, # 100 EA, 3 Refill(s), Type: Maintenance, [facility], check blood sugar daily  glucose meter: glucose meter, See Instructions, Instructions: check blood sugar daily, Supply, # 1 EA, 0 Refill(s), Type: Maintenance, Pharmacy: [facility], check blood sugar daily  metFORMIN 500 mg oral tablet: 2 tab(s) ( 1,000 mg ), PO, BID, # 360 tab(s), 1 Refill(s), Type: Maintenance, [facility], 2 tab(s) po bid  spironolactone 50 mg oral tablet: 1 tab(s) ( 50 mg ), po, daily, # 90 tab(s), 3 Refill(s), Type: Maintenance, Pharm[facility], 1 tab(s) po daily  Documented Medications  Documented  Vital-D oral tablet: 1 tab(s), po, daily, 0 Refill(s), Type: Maintenance,     Medications          *denotes recorded medication          glucose meter: See Instructions, check blood sugar daily, 1 EA, 0 Refill(s).          glucose meter test strips and lancets: See Instructions, check blood sugar daily, 100 EA, 3 Refill(s).          buPROPion 150 mg/24 hours (XL) oral tablet, extended release: 150 mg, 1 tab(s), po, daily, 90 tab(s), 0 Refill(s).          metFORMIN 500 mg oral tablet: 1,000 mg, 2 tab(s), PO, BID, 360 tab(s), 1 Refill(s).          *Vital-D oral tablet: 1 tab(s), po, daily, 0 Refill(s).          spironolactone 50 mg oral tablet: 50 mg, 1 tab(s), po, daily, 90 tab(s), 3 Refill(s).     Problem list:    All Problems  Long term (current) use of oral hypoglycemic drugs / SNOMED CT 880508738 / Confirmed  Female hirsutism / SNOMED CT 877286170 / Confirmed  Moderate major depression / SNOMED CT 4934482 / Confirmed  Obesity / SNOMED CT 2534327793 / Probable  Tobacco user / SNOMED CT 820305552 / Probable  Type 2 diabetes mellitus / SNOMED CT 147194872 / Confirmed      Histories   Past Medical History:    Resolved  Brain concussion (SNOMED CT 008781008):  Resolved.   Family History:    Emotional problems  Mother     Procedure history:    ACL - Anterior cruciate ligament (7172874037) on 3/14/2017 at 37 Years.   Social History:        Alcohol Assessment            1-2 times per month, 1 drinks/episode average.      Tobacco Assessment            Current (some day smoker), Cigarettes        Physical Examination   Vital Signs   5/31/2018 11:04 AM CDT Temperature Tympanic 97.7 DegF  LOW    Peripheral Pulse Rate 80 bpm    Systolic Blood Pressure 130 mmHg    Diastolic Blood Pressure 80 mmHg    Mean Arterial Pressure 97 mmHg      Measurements from flowsheet : Measurements   5/31/2018 11:04 AM CDT Height Measured - Standard 69.75 in    Weight Measured - Standard 201.8 lb    BSA 2.12 m2    Body Mass Index 29.16 kg/m2  HI      General:  Alert and oriented, No acute distress.    Integumentary:   incision on left is healing well, not open, no drainage.    Psychiatric:  Cooperative, Appropriate mood & affect.       Impression and Plan   Diagnosis     Type 2 diabetes mellitus (MOX62-UH E11.9).     Course:  Improving.    Orders     Orders (Selected)   Outpatient Orders  Ordered  Return to Clinic (Request): RFV: lab visit: A1c, Return in 1 month.     Diagnosis     Moderate major depression (ZFA83-DE F32.1).     Course:  Improving.    Orders     Orders (Selected)   Prescriptions  Prescribed  buPROPion 300 mg/24 hours (XL) oral tablet, extended release: 1 tab(s) ( 300 mg ), po, q 24 hrs, # 90 tab(s), 3 Refill(s), Type: Maintenance, Pharmacy: [id number], 1 tab(s) po q 24 hrs.

## 2022-02-15 NOTE — TELEPHONE ENCOUNTER
Entered by Agnieszka Ahn MD on November 22, 2021 4:13:48 PM CST  [facility] at [phone]      ---------------------  From: Agnieszka Ahn MD   To: Agnieszka Ahn MD;     Sent: 11/18/2021 8:05:53 PM CST  Subject: General Message     call patient's caseworkerspoke with Annelise.   Patient needs to do AODA assessment and then try outpatient treatment before she would qualify for residential treatment. I am supportive of seeing whether she would qualify for [facility].

## 2022-02-15 NOTE — NURSING NOTE
CAGE Assessment Entered On:  9/5/2019 2:48 PM CDT    Performed On:  9/5/2019 2:48 PM CDT by Cely Schuler CMA               Assessment   Have you ever felt you should cut down on your drinking :   Yes   Have people annoyed you by criticizing your drinking :   No   Have you ever felt bad or guilty about your drinking :   No   Have you ever taken a drink first thing in the morning to steady your nerves or get rid of a hangover (Eye-opener) :   No   CAGE Score :   1    Cely Schuler CMA - 9/5/2019 2:48 PM CDT

## 2022-02-15 NOTE — LETTER
(Inserted Image. Unable to display)   September 06, 2019      JOE MELENDEZ  [address]  [place], [place] [id number]        Dear JOE,      Thank you for selecting [facility] (previously [place] [place] [facility]) for your healthcare needs.     Our records indicate you are due for the following services:     Non-Fasting Lab    If you had your labs done at another facility or with [facility] at [facility], please bring in a copy of the results to your next visit, mail a copy, or drop off a copy of your results to your Healthcare Provider.    To schedule an appointment or if you have further questions, please contact your primary clinic:   [facility]          [phone]   [facility]    [phone]             [facility]         [phone]      Powered by [employer]    Sincerely,    Agnieszka Ahn M.D.

## 2022-02-15 NOTE — PROGRESS NOTES
Patient:   JOE MELENDEZ            MRN: [id number]            FIN: [id number]               Age:   37 years     Sex:  Female     :  1980   Associated Diagnoses:   Palpitations; Breast swelling; Closed head injury   Author:   Agnieszka Ahn MD      Chief Complaint   2017 9:07 AM CDT    patient here to follow up on palpations - needs holter monitor.      History of Present Illness   Patient with persistent palpitations. Never got Holter scheduled. Still happens most days. Would like to proceed. Has seemed to be less severe.   Patient has concerns about periods. Has had severe cramping with prior period. Passed large clot. Has happened intermittently. Feels like she is back to normal. Would like to monitor for now.  Check tongue. Yesterday noticed white furry covering. Brushed with water and peroxide. Better now. Worried about underlying cause.  Discuss mammogram. Had abnormal mammogram in 2015. Had palpable mass on left but mammogram showed abnormality on the right. Feels like right side has been changing. Would like to recheck mammogram.  Concussion in January. Has had persistent issues with dizziness and balance. Also notes memory isn't as good as it used to be. Had PT for the knee injury but never saw PT for concussion. Has not seen improvement in months.           Health Status   Allergies:    Allergic Reactions (All)  Severity Not Documented  Bee Stings (No reactions were documented)   Medications:  (Selected)   Documented Medications  Documented  spironolactone: po, 0 Refill(s), Type: Maintenance   Problem list:    All Problems  Female hirsutism / SNOMED CT 386339947 / Confirmed  Obesity / SNOMED CT 1863506733 / Probable  Tobacco user / SNOMED CT 712034370 / Probable      Histories   Past Medical History:    No active or resolved past medical history items have been selected or recorded.   Family History:    Emotional problems  Mother     Procedure history:    ACL - Anterior cruciate  ligament ([id number]) on 3/14/2017 at 37 Years.      Physical Examination   Vital Signs   7/31/2017 9:07 AM CDT Peripheral Pulse Rate 74 bpm    Systolic Blood Pressure 124 mmHg    Diastolic Blood Pressure 76 mmHg    Mean Arterial Pressure 92 mmHg    Oxygen Saturation 97 %      Measurements from flowsheet : Measurements   7/31/2017 9:07 AM CDT Height Measured - Standard 69.75 in    Weight Measured - Standard 214.6 lb    BSA 2.19 m2    Body Mass Index 31.01 kg/m2      General:  Alert and oriented, No acute distress.    Eye:  Pupils are equal, round and reactive to light, Normal conjunctiva.    HENT:  Normocephalic, Oral mucosa is moist, No pharyngeal erythema.    Neck:  Supple, Non-tender, No lymphadenopathy.    Respiratory:  Lungs are clear to auscultation.    Cardiovascular:  Normal rate, Regular rhythm.    Breast:  No mass, No tenderness, No discharge.       Impression and Plan   Diagnosis     Palpitations (VFY75-WD R00.2).     Course:  not resolved.    Orders     Orders (Selected)   Outpatient Orders  Ordered  Holter Monitor 24 Hour (Request): Palpitations.     Diagnosis     Breast swelling (WVB39-NF N63).     Course:  nothing palpable but given history will check diagnostic mammogram.    Diagnosis     Closed head injury (EYG49-JK S09.90XA).     Orders     Orders (Selected)   Outpatient Orders  Ordered  Referral (Request): 07/31/17 9:35:00 CDT, Referred to: Physical Therapy, [facility], Reason for referral: patient with balance and memory issues since head injury January 2017., Closed head injury.
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
## 2022-02-15 NOTE — NURSING NOTE
Quick Intake Entered On:  1/22/2020 12:25 PM CST    Performed On:  1/22/2020 12:25 PM CST by Latasha Quiroz               Summary   Menstrual Status :   Menarcheal   Weight Measured :   186.6 lb(Converted to: 186 lb 10 oz, 84.64 kg)    Height Measured :   69.5 in(Converted to: 5 ft 9 in, 176.53 cm)    Body Mass Index :   27.16 kg/m2 (HI)    Body Surface Area :   2.04 m2   Latasha Quiroz - 1/22/2020 12:25 PM CST

## 2022-02-15 NOTE — NURSING NOTE
Comprehensive Intake Entered On:  1/6/2020 11:44 AM CST    Performed On:  1/6/2020 11:37 AM CST by Cely Schuler CMA               Summary   Chief Complaint :   Discuss medications stopped medications and is filling better; issues with blood sugars since surgery 9/25/19;    Menstrual Status :   Hysterectomy   Weight Measured :   189.2 lb(Converted to: 189 lb 3 oz, 85.82 kg)    Height Measured :   69.5 in(Converted to: 5 ft 9 in, 176.53 cm)    Body Mass Index :   27.54 kg/m2 (HI)    Body Surface Area :   2.05 m2   Systolic Blood Pressure :   126 mmHg   Diastolic Blood Pressure :   70 mmHg   Mean Arterial Pressure :   89 mmHg   Peripheral Pulse Rate :   84 bpm   BP Method :   Electronic   HR Method :   Electronic   Oxygen Saturation :   97 %   Cely Schuler CMA - 1/6/2020 11:37 AM CST   Health Status   Allergies Verified? :   Yes   Medication History Verified? :   Yes   Immunizations Current :   Yes   Medical History Verified? :   Yes   Pre-Visit Planning Status :   Completed   Tobacco Use? :   Current every day smoker   Tobacco Cessation Review :   Not ready to quit   Cely Schuler CMA - 1/6/2020 11:37 AM CST   Consents   Consent for Immunization Exchange :   Consent Granted   Consent for Immunizations to Providers :   Consent Granted   Cely Schuler CMA - 1/6/2020 11:37 AM CST   Meds / Allergies   (As Of: 1/6/2020 11:44:14 AM CST)   Allergies (Active)   Bee Stings  Estimated Onset Date:   Unspecified ; Created By:   Coral Sharma CMA; Reaction Status:   Active ; Category:   Environment ; Substance:   Bee Stings ; Type:   Allergy ; Updated By:   Coral Sharma CMA; Reviewed Date:   1/6/2020 11:41 AM CST      No Known Medication Allergies  Estimated Onset Date:   Unspecified ; Created By:   Coral Sharma CMA; Reaction Status:   Active ; Category:   Drug ; Substance:   No Known Medication Allergies ; Type:   Allergy ; Updated By:   Coral Sharma CMA; Reviewed Date:   1/6/2020 11:41 AM CST        Medication List   (As Of:  1/6/2020 11:44:14 AM CST)   Prescription/Discharge Order    buPROPion  :   buPROPion ; Status:   Prescribed ; Ordered As Mnemonic:   buPROPion 300 mg/24 hours (XL) oral tablet, extended release ; Simple Display Line:   1 tab(s), Oral, q 24 hrs, 90 tab(s), 3 Refill(s) ; Ordering Provider:   Agnieszka Ahn MD; Catalog Code:   buPROPion ; Order Dt/Tm:   7/15/2019 8:38:40 AM CDT          metFORMIN  :   metFORMIN ; Status:   Prescribed ; Ordered As Mnemonic:   metFORMIN 500 mg oral tablet ; Simple Display Line:   2 tab(s), Oral, bid, 360 tab(s), 3 Refill(s) ; Ordering Provider:   Agnieszka Ahn MD; Catalog Code:   metFORMIN ; Order Dt/Tm:   7/15/2019 8:38:39 AM CDT          Miscellaneous Rx Supply  :   Miscellaneous Rx Supply ; Status:   Prescribed ; Ordered As Mnemonic:   glucose meter test strips and lancets ; Simple Display Line:   See Instructions, check blood sugar daily, 100 EA, 3 Refill(s) ; Ordering Provider:   Agnieszka Ahn MD; Catalog Code:   Miscellaneous Rx Supply ; Order Dt/Tm:   1/22/2019 6:47:08 PM CST          Miscellaneous Rx Supply  :   Miscellaneous Rx Supply ; Status:   Prescribed ; Ordered As Mnemonic:   glucose meter ; Simple Display Line:   See Instructions, check blood sugar daily, 1 EA, 0 Refill(s) ; Ordering Provider:   Agnieszka Ahn MD; Catalog Code:   Miscellaneous Rx Supply ; Order Dt/Tm:   4/5/2018 10:13:53 AM CDT          spironolactone  :   spironolactone ; Status:   Prescribed ; Ordered As Mnemonic:   spironolactone 50 mg oral tablet ; Simple Display Line:   1 tab(s), Oral, daily, 90 tab(s), 3 Refill(s) ; Ordering Provider:   Agnieszka Ahn MD; Catalog Code:   spironolactone ; Order Dt/Tm:   7/15/2019 8:38:40 AM CDT            Home Meds    multivitamin with minerals  :   multivitamin with minerals ; Status:   Documented ; Ordered As Mnemonic:   Vital-D oral tablet ; Simple Display Line:   1 tab(s), po, daily, 0 Refill(s) ; Catalog Code:   multivitamin with minerals ; Order  Dt/Tm:   3/22/2018 1:55:46 PM CDT            Procedures / Surgeries        -    Procedure History   (As Of: 2020 11:44:14 AM CST)     Procedure Dt/Tm:   3/14/2017 ; Anesthesia Minutes:   0 ; Procedure Name:   ACL - Anterior cruciate ligament ; Procedure Minutes:   0            Procedure Dt/Tm:   10/21/2008 ; Anesthesia Minutes:   0 ; Procedure Name:    section ; Procedure Minutes:   0            Procedure Dt/Tm:   1996 ; Anesthesia Minutes:   0 ; Procedure Name:    section ; Procedure Minutes:   0            Procedure Dt/Tm:   2006 ; Anesthesia Minutes:   0 ; Procedure Name:    section ; Procedure Minutes:   0            Procedure Dt/Tm:   2019 ; Anesthesia Minutes:   0 ; Procedure Name:   Hysterectomy ; Procedure Minutes:   0 ; Last Reviewed Dt/Tm:   2020 11:44:07 AM CST            OB/GYN History   Menstrual Status :   Menarcheal   Schuler Cely ALVARENGA - 2020 11:37 AM CST   Pregnancy History   (As Of: 2020 11:44:14 AM CST)    - 3, Para Fullterm - 3, Para  - , Abortions - , Para Living - 3      Delivery/Outcome Date: 10/21/2008   Gestation Age At Birth:   37 Weeks 0 Days ;   Delivery Method:    ; Gender:   Male ;  Outcome:   Live Birth ; Child Name:   Jean Pierre          Delivery/Outcome Date: 1996   Gestation Age At Birth:   38 Weeks 0 Days ; Full Gestation ;  Delivery Method:    ; Gender:   Male ;  Outcome:   Live Birth ; Child Name:   Werner          Delivery/Outcome Date: 2006   Gestation Age At Birth:   38 Weeks 0 Days ; Full Gestation ;  Delivery Method:    ; Gender:   Male ;  Outcome:   Live Birth ; Child Name:   Travis

## 2022-02-15 NOTE — TELEPHONE ENCOUNTER
---------------------  From: Sharon Garcia CMA (eRx Pool ([id number] - Export))   To: Jose Elizabeth PA-C;     Sent: 7/20/2020 12:04:03 PM CDT  Subject: FW: Medication Management: Alprazolam    Due Date/Time: 7/19/2020 6:28:00 PM CDT     Med Refill    Date of last office visit and reason:  3/31/20; abdominal pain       Date of last Med Check / Px:   2/24/20; pt was seen for anxiety by [facility]. Started on alprazolam at that time. Did fall off med list but pt continues to take as a prn. Pt does go to therapy as well.     RTC order in chart:  Due for px.           ------------------------------------------  From: [id number]  To: Agnieszka Ahn MD  Sent: July 18, 2020 6:28:26 PM CDT  Subject: Medication Management  Due: June 24, 2020 10:20:04 PM CDT     ** On Hold Pending Signature **     Drug: ALPRAZolam (ALPRAZolam 0.5 mg oral tablet), TAKE 1 TABLET BY MOUTH 4 TIMES DAILY AS NEEDED FOR ANXIETY  Quantity: 10 EA  Days Supply: 3  Refills: 0  Substitutions Allowed  Notes from Pharmacy:     Dispensed Drug: ALPRAZolam (ALPRAZolam 0.5 mg oral tablet), TAKE 1 TABLET BY MOUTH 4 TIMES DAILY AS NEEDED FOR ANXIETY  Quantity: 10 tab(s)  Days Supply: 3  Refills: 0  Substitutions Allowed  Notes from Pharmacy:  ------------------------------------------

## 2022-02-15 NOTE — PROGRESS NOTES
Patient:   JOE MELENDEZ            MRN: [id number]            FIN: [id number]               Age:   39 years     Sex:  Female     :  1980   Associated Diagnoses:   Bee sting   Author:   Agnieszka Ahn MD      Chief Complaint   2019 4:39 PM CDT    stung by bee on back; per EMT to go to hosptial patient decieded to come see PCP; does have a allergy to bees; took bendryl      History of Present Illness   Chief complaint and symptoms reviewed with patient and confirmed as above.  Patient has remote history of bee sting reaction as a young child but no known stings or reactions since  Was camping when she was stung on her back  initially just noticed back pain but then felt tightness in her chest, so took benadryl  she and her cousin noted the stinger was in place and called ranger station for assistance removing  ranger station called the ambulance  by the time ambulance arrived, patient reports that the shortness of breath had resolved but was feeling tired from the benadryl  ambulance staff told her to go to the ER because symptoms could return after benadryl wears off  did not want to go to the ER, so came here  continues to feel very sleepy, otherwise no complaints  slightly tender at site of the sting      Health Status   Allergies:    Allergic Reactions (All)  Severity Not Documented  Bee Stings (No reactions were documented)  No Known Medication Allergies   Medications:  (Selected)   Prescriptions  Prescribed  buPROPion 300 mg/24 hours (XL) oral tablet, extended release: = 1 tab(s), Oral, q 24 hrs, # 90 tab(s), 3 Refill(s), Type: Maintenance, Pharmacy: [id number], 1 tab(s) Oral q 24 hrs  glucose meter test strips and lancets: glucose meter test strips and lancets, See Instructions, Instructions: check blood sugar daily, Supply, # 100 EA, 3 Refill(s), Type: Maintenance, Pharmacy: [facility], check blood sugar daily  glucose meter: glucose meter, See Instructions, Instructions:  check blood sugar daily, Supply, # 1 EA, 0 Refill(s), Type: Maintenance, Pharmacy: [facility], check blood sugar daily  metFORMIN 500 mg oral tablet: = 2 tab(s), Oral, bid, # 360 tab(s), 3 Refill(s), Type: Maintenance, Pharmacy: [facility], 2 tab(s) Oral bid  spironolactone 50 mg oral tablet: = 1 tab(s), Oral, daily, # 90 tab(s), 3 Refill(s), Type: Maintenance, Pharmacy: [facility], 1 tab(s) Oral daily  Documented Medications  Documented  Vital-D oral tablet: 1 tab(s), po, daily, 0 Refill(s), Type: Maintenance  ferrous sulfate 325 mg (65 mg elemental iron) oral tablet: = 2 tab(s) ( 650 mg ), Oral, daily, 0 Refill(s), Type: Maintenance      Physical Examination   Vital Signs   8/29/2019 4:39 PM CDT Peripheral Pulse Rate 81 bpm    Systolic Blood Pressure 112 mmHg    Diastolic Blood Pressure 80 mmHg    Mean Arterial Pressure 91 mmHg    Oxygen Saturation 98 %      Measurements from flowsheet : Measurements   8/29/2019 4:39 PM CDT Height Measured - Standard 69.5 in    Weight Measured - Standard 179.8 lb    BSA 2 m2    Body Mass Index 26.17 kg/m2  HI      General:  Alert and oriented, No acute distress.    Eye:  Pupils are equal, round and reactive to light, Normal conjunctiva.    HENT:  Normocephalic, Oral mucosa is moist, No pharyngeal erythema.    Neck:  Supple, Non-tender, No lymphadenopathy.    Respiratory:  Lungs are clear to auscultation.    Cardiovascular:  Normal rate, Regular rhythm.    Integumentary:  2cm red spot on back, minimally elevated, no other rash.       Impression and Plan   Diagnosis     Bee sting ([id number]-WS T63.441A).     Plan:  no evidence of systemic/anaphylactic reaction at this time, discussed with patient if she develops symptoms such as difficulty breathing, chest pain, syncopal symptoms, palpitations, or other concerns she should seek emergency care. Follow up as needed..

## 2022-02-15 NOTE — TELEPHONE ENCOUNTER
---------------------  From: Agnieszka Ahn MD   To: Phone Messages Pool ([id number] - [place]);     Sent: 7/16/2019 2:06:07 PM CDT    Patient with evidence of significant iron deficiency, with her hemoglobin low at 8.8. She should start over the counter iron pill twice a day. Most common reason is from bleeding, and for women that can be menstrual bleeding. Could also be diet related. We should repeat these iron labs along with a CBC in 4 weeks. Happy to discuss with her if she has questions.     Results:  Date Result Name Ind Value Ref Range   7/15/2019 8:58 AM Sodium Level  135 mmol/L (135 - 146)   7/15/2019 8:58 AM Potassium Level  4.3 mmol/L (3.5 - 5.3)   7/15/2019 8:58 AM Chloride Level  103 mmol/L (98 - 110)   7/15/2019 8:58 AM CO2 Level  21 mmol/L (20 - 32)   7/15/2019 8:58 AM Glucose Level  82 mg/dL (65 - 99)   7/15/2019 8:58 AM BUN  9 mg/dL (7 - 25)   7/15/2019 8:58 AM Creatinine Level  0.65 mg/dL (0.50 - 1.10)   7/15/2019 8:58 AM BUN/Creat Ratio  NOT APPLICABLE (6 - 22)   7/15/2019 8:58 AM eGFR  111 mL/min/1.73m2 (> OR = 60 - )   7/15/2019 8:58 AM eGFR African American  129 mL/min/1.73m2 (> OR = 60 - )   7/15/2019 8:58 AM Calcium Level  9.9 mg/dL (8.6 - 10.2)   7/15/2019 8:58 AM Hgb A1c  5.3 ( - <5.7)   7/15/2019 8:58 AM Cholesterol ((H)) 227 mg/dL ( - <200)   7/15/2019 8:58 AM Non-HDL Cholesterol ((H)) 181 ( - <130)   7/15/2019 8:58 AM HDL ((L)) 46 mg/dL (>50 - )   7/15/2019 8:58 AM Cholesterol/HDL Ratio  4.9 ( - <5.0)   7/15/2019 8:58 AM LDL ((H)) 134    7/15/2019 8:58 AM Triglyceride ((H)) 327 mg/dL ( - <150)   7/15/2019 8:58 AM TSH  2.10 mIU/L    7/15/2019 8:58 AM Iron Level ((L)) <10 mcg/dL (40 - 190)   7/15/2019 8:58 AM WBC  8.4 (3.8 - 10.8)   7/15/2019 8:58 AM RBC  4.57 (3.80 - 5.10)   7/15/2019 8:58 AM Hgb ((L)) 8.8 gm/dL (11.7 - 15.5)   7/15/2019 8:58 AM Hct ((L)) 32.3 % (35.0 - 45.0)   7/15/2019 8:58 AM MCV ((L)) 70.7 fL (80.0 - 100.0)   7/15/2019 8:58 AM MCH ((L)) 19.3 pg (27.0 - 33.0)    7/15/2019 8:58 AM MCHC ((L)) 27.2 gm/dL (32.0 - 36.0)   7/15/2019 8:58 AM RDW ((H)) 19.1 % (11.0 - 15.0)   7/15/2019 8:58 AM Platelet ((H)) 418 (140 - 400)   7/15/2019 8:58 AM MPV  10.8 fL (7.5 - 12.5)Returned Call  Time: 2:34 pm  Note:  Called & notified pt. She states that she was anemic as a young child and understood the diagnosis. She will  a daily iron tablet OTC and f/u in one month. RTC entered. Pt has no further questions at this time.
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
## 2022-02-15 NOTE — NURSING NOTE
Comprehensive Intake Entered On:  1/22/2019 6:10 PM CST    Performed On:  1/22/2019 6:04 PM CST by Cely Schuler CMA               Summary   Chief Complaint :   DM med check; foot exam normal   Menstrual Status :   Menarcheal   Weight Measured :   164.2 lb(Converted to: 164 lb 3 oz, 74.48 kg)    Height Measured :   69.5 in(Converted to: 5 ft 9 in, 176.53 cm)    Body Mass Index :   23.9 kg/m2   Body Surface Area :   1.91 m2   Systolic Blood Pressure :   120 mmHg   Diastolic Blood Pressure :   78 mmHg   Mean Arterial Pressure :   92 mmHg   Peripheral Pulse Rate :   62 bpm   BP Site :   Left arm   Pulse Site :   Radial artery   BP Method :   Manual   HR Method :   Manual   Temperature Tympanic :   98.2 DegF(Converted to: 36.8 DegC)    Cely Schuler CMA - 1/22/2019 6:04 PM CST   Health Status   Allergies Verified? :   Yes   Medication History Verified? :   Yes   Immunizations Current :   Yes   Medical History Verified? :   Yes   Pre-Visit Planning Status :   Completed   Tobacco Use? :   Current every day smoker   Tobacco Cessation Review :   Ready to change, not to quit   Cely Schuler CMA - 1/22/2019 6:04 PM CST   Consents   Consent for Immunization Exchange :   Consent Granted   Consent for Immunizations to Providers :   Consent Granted   Cely Schuler CMA - 1/22/2019 6:04 PM CST   Meds / Allergies   (As Of: 1/22/2019 6:10:58 PM CST)   Allergies (Active)   Bee Stings  Estimated Onset Date:   Unspecified ; Created By:   Coral Sharma CMA; Reaction Status:   Active ; Category:   Environment ; Substance:   Bee Stings ; Type:   Allergy ; Updated By:   Coral Sharma CMA; Reviewed Date:   1/22/2019 6:06 PM CST      No Known Medication Allergies  Estimated Onset Date:   Unspecified ; Created By:   Coral Sharma CMA; Reaction Status:   Active ; Category:   Drug ; Substance:   No Known Medication Allergies ; Type:   Allergy ; Updated By:   Coral Sharma CMA; Reviewed Date:   1/22/2019 6:06 PM CST        Medication List   (As Of:  1/22/2019 6:10:58 PM CST)   Prescription/Discharge Order    buPROPion  :   buPROPion ; Status:   Prescribed ; Ordered As Mnemonic:   buPROPion 300 mg/24 hours (XL) oral tablet, extended release ; Simple Display Line:   300 mg, 1 tab(s), po, q 24 hrs, 90 tab(s), 3 Refill(s) ; Ordering Provider:   Agnieszka Ahn MD; Catalog Code:   buPROPion ; Order Dt/Tm:   5/31/2018 11:11:44 AM          ciprofloxacin  :   ciprofloxacin ; Status:   Processing ; Ordered As Mnemonic:   Cipro 250 mg oral tablet ; Ordering Provider:   Agnieszka Ahn MD; Action Display:   Complete ; Catalog Code:   ciprofloxacin ; Order Dt/Tm:   1/22/2019 6:07:15 PM          metFORMIN  :   metFORMIN ; Status:   Prescribed ; Ordered As Mnemonic:   metFORMIN 500 mg oral tablet ; Simple Display Line:   2 tab(s), Oral, bid, 120 tab(s), 0 Refill(s) ; Ordering Provider:   Agnieszka Ahn MD; Catalog Code:   metFORMIN ; Order Dt/Tm:   12/28/2018 2:31:21 PM          Miscellaneous Rx Supply  :   Miscellaneous Rx Supply ; Status:   Prescribed ; Ordered As Mnemonic:   glucose meter test strips and lancets ; Simple Display Line:   See Instructions, check blood sugar daily, 100 EA, 3 Refill(s) ; Ordering Provider:   Agnieszka Ahn MD; Catalog Code:   Miscellaneous Rx Supply ; Order Dt/Tm:   4/5/2018 10:13:49 AM          Miscellaneous Rx Supply  :   Miscellaneous Rx Supply ; Status:   Prescribed ; Ordered As Mnemonic:   glucose meter ; Simple Display Line:   See Instructions, check blood sugar daily, 1 EA, 0 Refill(s) ; Ordering Provider:   Agnieszka Ahn MD; Catalog Code:   Miscellaneous Rx Supply ; Order Dt/Tm:   4/5/2018 10:13:53 AM          phenazopyridine  :   phenazopyridine ; Status:   Processing ; Ordered As Mnemonic:   Pyridium 100 mg oral tablet ; Ordering Provider:   Agnieszka Ahn MD; Action Display:   Complete ; Catalog Code:   phenazopyridine ; Order Dt/Tm:   1/22/2019 6:07:15 PM          spironolactone  :   spironolactone ; Status:    Prescribed ; Ordered As Mnemonic:   spironolactone 50 mg oral tablet ; Simple Display Line:   50 mg, 1 tab(s), po, daily, 90 tab(s), 3 Refill(s) ; Ordering Provider:   Agnieszka Ahn MD; Catalog Code:   spironolactone ; Order Dt/Tm:   3/29/2018 3:28:43 PM            Home Meds    multivitamin with minerals  :   multivitamin with minerals ; Status:   Documented ; Ordered As Mnemonic:   Vital-D oral tablet ; Simple Display Line:   1 tab(s), po, daily, 0 Refill(s) ; Catalog Code:   multivitamin with minerals ; Order Dt/Tm:   3/22/2018 1:55:46 PM

## 2022-02-15 NOTE — LETTER
(Inserted Image. Unable to display)   [address] [facility] [address]  [phone] (phone) [phone] (fax)  November 18, 2021        JOE MELENDEZ  [address] [place] [id number]      Dear JOE,      Thank you for selecting [facility] for your heatlare needs.      The xray of your chest and the xray of your thumb were both read as normal. Let me know if you have questions.      Please contact me or my assistant at [phone] if you have any questions or concerns.     Sincerely,      Agnieszka Ahn MD

## 2022-02-15 NOTE — NURSING NOTE
PCP:   ROCKY      Time of Call:  3:00pm        Person Calling:  Patient   Phone number:  [phone]    Returned call at: 3:09pm     Note:   Patient calling requestin lab results. Letter read, and patient advised she would be revieving a letter.     Pharmacy:     Last office visit and reason:

## 2022-02-15 NOTE — LETTER
(Inserted Image. Unable to display)     March 08, 2021      JOE MELENDEZ  [address], [place] [id number]          Dear JOE,      Thank you for selecting [facility] ([facility]viously [facility] for your [facility] needs.    Your Healthcare Provider has recommended that you schedule a diabetes management appointment with our Certified Diabetes Educator, Latasha Quiroz RD, CD, CDE.     Please bring your glucose meter and your blood glucose diary to your appointment.    Available services include:  - Education about diabetes with guidance and support   - Education on the 7 Self Care Behaviors for Diabetes Management:     Healthy Eating   portion control, label readings, carbohydrate recommendations, heart healthy guidelines, meal planning    Being Active - Physical activity guidelines     Monitoring   blood glucose targets, evaluation of blood glucose readings and lab results    Taking Medication   medication management    Problem Solving   discuss any concerns/ questions     Healthy Coping   disease progression and management    Reducing Risks   prevention strategies to help avoid potential complications related to uncontrolled diabetes  - Weight loss strategies      (FYI   Regarding office visit: In some instances, a video visit may be offered as an option.)        To schedule an appointment or if you have further questions, please contact your clinic at [phone].      Powered by [employer]    Sincerely,    Latasha Quiroz RD, CD, CDE

## 2022-02-15 NOTE — NURSING NOTE
Quick Intake Entered On:  11/3/2020 2:09 PM CST    Performed On:  11/3/2020 2:05 PM CST by Latasha Quiroz               Summary   Menstrual Status :   Menarcheal   Weight Measured :   197.4 lb(Converted to: 197 lb 6 oz, 89.539 kg)    Height Measured :   69.5 in(Converted to: 5 ft 9 in, 176.53 cm)    Body Mass Index :   28.73 kg/m2 (HI)    Body Surface Area :   2.09 m2   Systolic Blood Pressure :   121 mmHg   Diastolic Blood Pressure :   86 mmHg (HI)    Mean Arterial Pressure :   98 mmHg   Latasha Quiroz - 11/3/2020 2:05 PM CST

## 2022-02-15 NOTE — LETTER
(Inserted Image. Unable to display)   October 14, 2021    JOE MELENDEZ  [address] [place] [address]            Dear JOE,      Thank you for selecting [facility] for your healthcare needs.    Your Healthcare Provider has recommended that you schedule a diabetes management appointment with our Certified Diabetes Educator, Latasha Quiroz RD, CD, CDE.     Please bring your glucose meter and your blood glucose diary to your appointment.    Available services include:  - Education about diabetes with guidance and support   - Education on the 7 Self Care Behaviors for Diabetes Management:     Healthy Eating   portion control, label readings, carbohydrate recommendations, heart healthy guidelines, meal planning    Being Active - Physical activity guidelines     Monitoring   blood glucose targets, evaluation of blood glucose readings and lab results    Taking Medication   medication management    Problem Solving   discuss any concerns/ questions     Healthy Coping   disease progression and management    Reducing Risks   prevention strategies to help avoid potential complications related to uncontrolled diabetes  - Weight loss strategies      (FYI   Regarding office visit: In some instances, a video visit may be offered as an option.)        To schedule an appointment or if you have further questions, please contact your clinic at [phone].      Powered by [employer]    Sincerely,    Latasha Quiroz RD, CD, CDE

## 2022-02-15 NOTE — TELEPHONE ENCOUNTER
Entered by Aide Vaughn on October 19, 2020 7:33:48 AM CDT  PCP:   rocky    Medication:   Metformin   Last Filled:  9/11/20   Quantity:  120 Refills:  0    Date of last office visit and reason:   3/31/20 abd pain   Date of last labs pertaining to condition:  _    Note:  Please advise on refills. No RTC    Return to Clinic order placed?  n/a    Resource:   _  Phone:   _            ------------------------------------------  From: [id number]  To: Agnieszka Ahn MD  Sent: October 16, 2020 2:10:48 PM CDT  Subject: Medication Management  Due: October 8, 2020 2:03:37 PM CDT     ** On Hold Pending Signature **     Dispensed Drug: metFORMIN HCl 500 MG Oral Tablet, TAKE 2 TABLETS BY MOUTH TWICE DAILY . DUE FOR APPOINTMENT PRIOR TO NEXT REFILL  Quantity: 120 tab(s)  Days Supply: 30  Refills: 0  Substitutions Allowed  Notes from Pharmacy:  ---------------------------------------------------------------  From: Aide Vaughn (eRx Pool ([id number]_[place] [facility] [place]))   To: ROCKY [employer] ([id number]);     Sent: 10/19/2020 7:34:01 AM CDT  Subject: FW: Medication Management   Due Date/Time: 10/17/2020 2:10:00 PM CDT---------------------  From: Paradise Lopez CMA   To: [id number]    Sent: 10/19/2020 7:39:51 AM CDT  Subject: FW: Medication Management     ** Not Approved: Refill not appropriate **  Miscellaneous Prescription (metFORMIN HCl 500 MG Oral Tablet)  TAKE 2 TABLETS BY MOUTH TWICE DAILY .  DUE  FOR  APPOINTMENT  PRIOR  TO  NEXT  REFILL  Qty:  120 tab(s)        Days Supply:  30        Refills:  0          Substitutions Allowed     Route To Pharmacy - [id number]   Signed by Selin Lopez CMA have filled for 2 week supply, pt already received 30 day protocol fill in Sept. Due for a px/DM check and SO labs.     Please contact pt to get scheduled. I also placed a RTC.---------------------  From: Paradise Lopez CMA ([facility] Message Pool ([id number]_[facility]))   To: Appointment Pool  ([id number]_[place] [facility] [place]);     Sent: 10/19/2020 7:41:20 AM CDT  Subject: FW: Medication Management   Due Date/Time: 10/17/2020 2:10:00 PM CDTleft message for pt to call back to set up apt and labs---------------------  From: Ortiz , April (Appointment Pool ([id number]_[place]))   To: [facility] Message Pool ([place]     Sent: 10/21/2020 8:17:34 AM CDT  Subject: RE: Medication Management     final attempt - left 2nd vmsent back to provider pool - 2nd attempt
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
## 2022-02-15 NOTE — TELEPHONE ENCOUNTER
---------------------  From: Bettina Castillo   To: JOE MELENDEZ    Sent: 4/5/2021 11:21:40 AM CDT  Subject: General Message     You are due for a visit with Latasha Quiroz. If you would like to schedule, please call [phone]. Thank you!

## 2022-02-15 NOTE — TELEPHONE ENCOUNTER
---------------------  From: Cher Flower   To: Agnieszka Ahn MD;     Sent: 11/15/2021 9:14:44 AM CST  Subject: Scheduling Management     Patient no showed appointment. Appointment was for follow up cough.Noted - please call patient to see if she wants to reschedule. OK to work in Wednesday if needed.---------------------  From: Agnieszka Ahn MD   To: Cher Flower;     Sent: 11/15/2021 9:16:37 AM CST  Subject: RE: Scheduling Management---------------------  From: Cher Flower   To: Agnieszka Ahn MD;     Sent: 11/15/2021 9:25:35 AM CST  Subject: RE: Scheduling Management     Patient rescheduled on 11.17.21

## 2022-02-15 NOTE — LETTER
(Inserted Image. Unable to display)   [address] [place] [address]   [phone]  November 04, 2020      JOE MELENDEZ      [address], [place] [id number]      Dear JOE,    Thank you for selecting [facility] for your healthcare needs. Below you will find the results of the recent tests done at our clinic.     Your lab results are excellent. We can discuss at your next visit.    Result Name Current Result Previous Result Reference Range   Hgb A1c ((H)) 5.9 11/3/2020 ((H)) 5.8 3/4/2020   5.3 1/6/2020  - <5.7       Please contact me or my assistant at [phone] if you have any questions or concerns.     Sincerely,        Agnieszka Ahn M.D.

## 2022-02-15 NOTE — TELEPHONE ENCOUNTER
---------------------  From: Mohini Robert CMA (Phone Messages Pool ([id number]_[facility]))   Sent: 10/30/2020 3:37:26 PM CDT  Subject: Phone Message     Phone Message    PCP:   ROCKY PECK      Time of Call:  1515       Person Calling:  Patient  Phone number:  [phone]    Returned call at: 1530    Note:   Calls to let us know her insurance requires a pre-authorization form for her appointment with Keiko Quiroz on Tuesday. Called back left a message given our fax number instructed patient to have insurance fax us the form.    Last office visit and reason:  _06/02/20 DM type 2 DS

## 2022-02-15 NOTE — NURSING NOTE
Placed holter #1 on @ 1018 per Dr. Ahn for dx of palpitations. Gave patient diary, written & verbal instructions, and told to return in 24 hrs. Patient indicated she understood.

## 2022-02-15 NOTE — TELEPHONE ENCOUNTER
---------------------  From: Melissa Ramírez CMA (Phone Messages Pool ([id number]_[facility]))   To: Agnieszka Ahn MD;     Sent: 4/2/2020 8:19:08 AM CDT  Subject: FW: Dr. Agnieszka Ahn           ---------------------  From: JOE MELENDEZ  To: [facility]  Sent: 04/01/2020 05:28 p.m. CDT  Subject: Dr. Agnieszka Ahn  I ve decided I d like to go in. I now have a pinch inside on my left side. What should I do?  ** Submitted: **  Order:US Pelvic (Request)  Details:  Priority: Urgent, Acute pelvic pain         Signed by Agnieszka Ahn MD  4/2/2020 2:10:00 PMPlease help patient get urgent pelvic ultrasound scheduled in the next couple of days. Thank you.---------------------  From: Agnieszka Ahn MD   To: Phone Messages Pool ([id number]_[facility]); Referral Coordinators Pool ([id number]_[employer]);     Sent: 4/2/2020 9:11:23 AM CDT  Subject: RE: Dr. Agnieszka Pateloted---------------------  From: Leslee Danielson (Referral Coordinators Pool ([id number]   To: Agnieszka Ahn MD;     Sent: 4/2/2020 9:59:11 AM CDT  Subject: RE: Dr. Agnieszka Ahn     I will take care of.

## 2022-02-15 NOTE — PROGRESS NOTES
Patient:   JOE MELENDEZ            MRN: [id number]            FIN: [id number]               Age:   39 years     Sex:  Female     :  1980   Associated Diagnoses:   Infectious mononucleosis-like syndrome   Author:   Agnieszka Ahn MD      Chief Complaint   2019 11:01 AM CDT   swollen glands; more tired; neck felt swollen and warm to the touch; headache; body aches; feels better today      History of Present Illness   patient with 3 week illness  started with high fevers, ulcers in mouth and throat, extreme fatigue  fevers and ulcers improved over several days  noted swelling in neck about 2 weeks ago  had seemed to be improving until yesterday when noticed that they were more swollen  this morning woke up feeling better, swelling has improved, still somewhat fatigued  no ongoing fever, no congestion, no cough, throat is not sore      Health Status   Allergies:    Allergic Reactions (All)  Severity Not Documented  Bee Stings (No reactions were documented)  No Known Medication Allergies   Medications:  (Selected)   Prescriptions  Prescribed  buPROPion 300 mg/24 hours (XL) oral tablet, extended release: 1 tab(s) ( 300 mg ), po, q 24 hrs, # 90 tab(s), 3 Refill(s), Type: Maintenance, Pharmacy: [facility] [id number], 1 tab(s) po q 24 hrs  glucose meter test strips and lancets: glucose meter test strips and lancets, See Instructions, Instructions: check blood sugar daily, Supply, # 100 EA, 3 Refill(s), Type: Maintenance, Pharmacy: [place] [address], check blood sugar daily  glucose meter: glucose meter, See Instructions, Instructions: check blood sugar daily, Supply, # 1 EA, 0 Refill(s), Type: Maintenance, Pharmacy: [facility], check blood sugar daily  metFORMIN 500 mg oral tablet: = 2 tab(s), Oral, bid, # 360 tab(s), 3 Refill(s), Type: Maintenance, Pharmacy: [place] [facility] [address], 2 tab(s) Oral bid  spironolactone 50 mg oral tablet: = 1 tab(s), Oral, daily, # 90 tab(s), 0 Refill(s), Type:  Maintenance, Pharmacy: [id number], Due for an appt JUNE 2019  Documented Medications  Documented  Vital-D oral tablet: 1 tab(s), po, daily, 0 Refill(s), Type: Maintenance   Problem list:    All Problems (Selected)  Long term (current) use of oral hypoglycemic drugs / SNOMED CT 683054632 / Confirmed  Female hirsutism / SNOMED CT 358751683 / Confirmed  Moderate major depression / SNOMED CT 8000380 / Confirmed  Obesity / SNOMED CT 2957937235 / Probable  Tobacco user / SNOMED CT 420326119 / Probable  Type 2 diabetes mellitus / SNOMED CT 027471992 / Confirmed      Histories   Past Medical History:    Active  Female hirsutism (SNOMED CT 394074078)  Tobacco user (SNOMED CT 925876755)  Moderate major depression (SNOMED CT 7683797)  Type 2 diabetes mellitus (SNOMED CT 460228128)  Long term (current) use of oral hypoglycemic drugs (SNOMED CT 893355894)  Resolved  Brain concussion (SNOMED CT 453873785):  Resolved.  Pregnancy (SNOMED CT 508574391):  Resolved on 5/16/1996 at 16 years.  Pregnancy (SNOMED CT 567806963):  Resolved on 10/21/2008 at 28 years.  Pregnancy (SNOMED CT 207746281):  Resolved on 2/28/2006 at 26 years.   Family History:    Diabetes mellitus  Mother  Father  Emotional problems  Mother  ADHD - Attention deficit disorder with hyperactivity  Son (Jean Pierre)  Autism  Son (Travis)  Son (Jean Pierre)     Procedure history:    ACL - Anterior cruciate ligament (7099062293) on 3/14/2017 at 37 Years.   Social History:        Alcohol Assessment: Current            1-2 times per month, 5 drinks/episode average.      Tobacco Assessment: Current            Current (some day smoker), Cigarettes      Substance Abuse Assessment: Denies Substance Abuse            Never      Home and Environment Assessment            Spouse/Partner name: Simone Srinivasan.      Nutrition and Health Assessment            Type of diet: Regular.      Exercise and Physical Activity Assessment: Does not exercise      Sexual Assessment             Sexually active: Yes.  Sexual orientation: Straight or heterosexual.      Other Assessment            First menses age 13.  Regular menses.  Menstrual duration 5 days.  Cycle interval 28 days.  No history of               abnormal Pap smear.      Physical Examination   Vital Signs   4/25/2019 11:01 AM CDT Temperature Tympanic 97.3 DegF  LOW    Peripheral Pulse Rate 68 bpm    HR Method Manual    Systolic Blood Pressure 128 mmHg    Diastolic Blood Pressure 80 mmHg    Mean Arterial Pressure 96 mmHg    BP Method Manual      Measurements from flowsheet : Measurements   4/25/2019 11:01 AM CDT Height Measured - Standard 69.5 in    Weight Measured - Standard 170.8 lb    BSA 1.95 m2    Body Mass Index 24.86 kg/m2      General:  Alert and oriented, No acute distress.    Eye:  Pupils are equal, round and reactive to light, Normal conjunctiva.    HENT:  Normocephalic, Tympanic membranes are clear, Oral mucosa is moist, No pharyngeal erythema, No sinus tenderness.    Neck:  Supple, Non-tender, mild posterior lymphadenopathy.    Respiratory:  Lungs are clear to auscultation.    Cardiovascular:  Normal rate, Regular rhythm.       Review / Management   Results review:  Lab results: 1/22/2019 6:57 PM CST    Hgb A1c                   5.1.       Impression and Plan   Diagnosis     Infectious mononucleosis-like syndrome (MUL23-GI B34.9).     Plan:  discussed with patient that this seems to be a viral syndrome, most like mono or an equivalent. Given improvement, no further testing is indicated at this time. If symptoms worsen again, let me know..

## 2022-02-15 NOTE — TELEPHONE ENCOUNTER
---------------------  From: Yue Denson CMA (Phone Messages Pool ([id number]_[facility]))   To: Advanced Practice Provider [place] ([id number]_[facility]);     Sent: 7/16/2021 2:56:21 PM CDT  Subject: lumps/tenderness on neck     Phone Message    PCP:   K[facility]      Time of Call:  1443       Person Calling:  pt  Phone number:  [phone]    Returned call at: _    Note:   Pt saw KWL 7/14 for tooth infection (note attached) and rx'd amoxicillin 875mg BID x 10 days. Since then, tooth abscessed and drained. Had gone to dentist this morning and was told tooth looks okay now but concerned with possible spreading infection. Lumps found under pt's jawline. Neck and back of neck is tender to touch. Denies fever today but felt feverish yesterday. Please advise with next step. ER? UC tomorrow?---------------------  From: Yue Denson CMA (Phone Messages Pool ([id number]_[facility]))   To: Gee Reyes MD;     Sent: 7/16/2021 3:11:51 PM CDT  Subject: FW: lumps/tenderness on neck     Forwarding to CR to review.---------------------  From: Gee Reyes MD   To: Phone Messages Pool ([id number]_[place] [facility]);     Sent: 7/16/2021 3:13:16 PM CDT  Subject: RE: lumps/tenderness on neck     UC tomorrow ER if gets worsePt notified. She will plan to be seen tomorrow in UC or to ED if sx worsen.

## 2022-02-15 NOTE — PROGRESS NOTES
Patient:   JOE MELENDEZ            MRN: [id number]            FIN: [id number]               Age:   37 years     Sex:  Female     :  1980   Associated Diagnoses:   Palpitations; Loose stools; Abdominal gas pain   Author:   Agnieszka Ahn MD      Chief Complaint   2017 3:34 PM CDT    Pt. has noted irregular heart rate,palpitations. Daily episodes. Pt. also c/o abd. pain worsening. Does not digest food properly.  Pt. has stress in the home.      History of Present Illness   Patient with episodes of palpitations. Worst at night or when first waking up. Sometimes with activity. Does not feel like it is brought on by stress. Feels dizzy and short of breath. Heart rate when elevated goes to 110's.   Does not drink caffeine. No change in diet. Has been stressed in general.   Symptoms are happening daily.   Also notes that she only eats salads. Feels like anything else she eats does not agree with her. Notes food passes through sometimes minimally digested. Stools are sometimes loose. No blood in stools. Wondering about celiac disease.       Review of Systems   Constitutional:  Fatigue, Decreased activity, No fever.    Eye:  Negative.    Ear/Nose/Mouth/Throat:  Negative.    Respiratory:  Negative except as documented in history of present illness.    Cardiovascular:  Negative except as documented in history of present illness.    Gastrointestinal:  Diarrhea.         Abdominal pain: The pain is mild, Characterized as ( Cramping/colicky, Intermittent ).    Genitourinary:  Negative.       Health Status   Allergies:    Allergic Reactions (All)  Severity Not Documented  Bee Stings (No reactions were documented)   Medications:  (Selected)   Prescriptions  Prescribed  spironolactone 50 mg oral tablet: 1 tab(s) ( 50 mg ), po, daily, # 90 tab(s), 3 Refill(s), Type: Maintenance, Pharmacy: [facility] [place], [place], 1 tab(s) po daily   Problem list:    All Problems  Female hirsutism / SNOMED CT 827622357 /  Confirmed  Obesity / SNOMED CT 7894279576 / Probable  Tobacco user / SNOMED CT 641302209 / Probable      Histories   Past Medical History:    No active or resolved past medical history items have been selected or recorded.   Family History:    Mother  Emotional problems     Procedure history:    ACL - Anterior cruciate ligament (6900275245) on 3/14/2017 at 37 Years.   Social History:        Tobacco Assessment            Current (some day smoker), Cigarettes        Physical Examination   Vital Signs   5/24/2017 3:34 PM CDT Temperature Tympanic 98.7 DegF    Peripheral Pulse Rate 72 bpm    Pulse Site Radial artery    HR Method Manual    Systolic Blood Pressure 132 mmHg    Diastolic Blood Pressure 74 mmHg    Mean Arterial Pressure 93 mmHg    BP Site Left arm    BP Method Manual      Measurements from flowsheet : Measurements   5/24/2017 3:34 PM CDT Height Measured - Standard 69.75 in    Weight Measured - Standard 212 lb    BSA 2.17 m2    Body Mass Index 30.63 kg/m2      General:  Alert and oriented, No acute distress.    Eye:  Pupils are equal, round and reactive to light, Normal conjunctiva.    HENT:  Normocephalic, Oral mucosa is moist, No pharyngeal erythema.    Neck:  Supple, Non-tender, No lymphadenopathy.    Respiratory:  Lungs are clear to auscultation.    Cardiovascular:  Normal rate, Regular rhythm.    Gastrointestinal:  Soft, Normal bowel sounds.    Integumentary:  Warm, Dry, Pink.    Neurologic:  Alert, Oriented.    Psychiatric:  Cooperative, Appropriate mood & affect.       Review / Management   ECG interpretation:  Within normal limits, Normal sinus rhythm.       Impression and Plan   Diagnosis     Palpitations (LXW33-NS R00.2).     Course:  Not improving.    Plan:  Unclear etiology. Happening daily, so Holter monitor should be helpful. Discussed with patient that could be benign, or could be panic related vs cardiac in origin. .    Orders     Orders   Requests (Return to Office):  Return to Clinic  (Request) (Order): RFV: Holter monitor.     Diagnosis     Loose stools (DNV82-JO R19.5).     Abdominal gas pain (CXB95-JC R14.1).     Course:  Will check labs. Patient will continue to monitor diet. Can refer on to gastroenterology if labs are normal and symptoms are ongoing..    Orders     Orders (Selected)   Outpatient Orders  Completed  CBC w/o Diff (Request): Palpitations  Loose stools  Abdominal gas pain  Celiac Disease Ab Profile, w/ Reflex (Request): Palpitations  Loose stools  Abdominal gas pain  Hepatic Function Panel (Request): Palpitations  Loose stools  Abdominal gas pain.

## 2022-02-15 NOTE — TELEPHONE ENCOUNTER
---------------------  From: Conchis Carr   To: Agnieszka Ahn MD;     Sent: 12/16/2019 2:23:26 PM CST  Subject: Scheduling Management     No Show RX Refillnoted - please contact patient to see if she needs to reschedule---------------------  From: Agnieszka Ahn MD   To: Conchis Carr;     Sent: 12/16/2019 2:40:36 PM CST  Subject: RE: Scheduling Management---------------------  From: Conchis Carr   To: Agnieszka Ahn MD;     Sent: 12/17/2019 3:40:52 PM CST  Subject: RE: Scheduling Management     SCHEDULED Mon, Jan 6 11:40Noted. Thanks

## 2022-02-15 NOTE — NURSING NOTE
Comprehensive Intake Entered On:  9/5/2019 1:40 PM CDT    Performed On:  9/5/2019 1:27 PM CDT by Cely Schuler CMA   Chief Complaint :   Physcial and PAP   Menstrual Status :   Menarcheal   Weight Measured :   179.4 lb(Converted to: 179 lb 6 oz, 81.37 kg)    Height Measured :   69.5 in(Converted to: 5 ft 9 in, 176.53 cm)    Body Mass Index :   26.11 kg/m2 (HI)    Body Surface Area :   2 m2   Systolic Blood Pressure :   118 mmHg   Diastolic Blood Pressure :   60 mmHg   Mean Arterial Pressure :   79 mmHg   Peripheral Pulse Rate :   68 bpm   BP Method :   Manual   HR Method :   Manual   Cely Schuler CMA - 9/5/2019 1:27 PM CDT   Health Status   Allergies Verified? :   Yes   Medication History Verified? :   Yes   Immunizations Current :   Yes   Medical History Verified? :   Yes   Pre-Visit Planning Status :   Completed   Tobacco Use? :   Current every day smoker   Cely Schuler CMA - 9/5/2019 1:27 PM CDT   Consents   Consent for Immunization Exchange :   Consent Granted   Consent for Immunizations to Providers :   Consent Granted   Cely Schuler CMA - 9/5/2019 1:27 PM CDT   Meds / Allergies   (As Of: 9/5/2019 1:40:26 PM CDT)   Allergies (Active)   Bee Stings  Estimated Onset Date:   Unspecified ; Created By:   Coral Sharma CMA; Reaction Status:   Active ; Category:   Environment ; Substance:   Bee Stings ; Type:   Allergy ; Updated By:   Coral Sharma CMA; Reviewed Date:   9/5/2019 1:38 PM CDT      No Known Medication Allergies  Estimated Onset Date:   Unspecified ; Created By:   Coral Sharma CMA; Reaction Status:   Active ; Category:   Drug ; Substance:   No Known Medication Allergies ; Type:   Allergy ; Updated By:   Coral Sharma CMA; Reviewed Date:   9/5/2019 1:38 PM CDT        Medication List   (As Of: 9/5/2019 1:40:26 PM CDT)   Prescription/Discharge Order    buPROPion  :   buPROPion ; Status:   Prescribed ; Ordered As Mnemonic:   buPROPion 300 mg/24 hours (XL) oral tablet, extended release ;  Simple Display Line:   1 tab(s), Oral, q 24 hrs, 90 tab(s), 3 Refill(s) ; Ordering Provider:   Agnieszka Ahn MD; Catalog Code:   buPROPion ; Order Dt/Tm:   7/15/2019 8:38:40 AM          metFORMIN  :   metFORMIN ; Status:   Prescribed ; Ordered As Mnemonic:   metFORMIN 500 mg oral tablet ; Simple Display Line:   2 tab(s), Oral, bid, 360 tab(s), 3 Refill(s) ; Ordering Provider:   Agnieszka Ahn MD; Catalog Code:   metFORMIN ; Order Dt/Tm:   7/15/2019 8:38:39 AM          Miscellaneous Rx Supply  :   Miscellaneous Rx Supply ; Status:   Prescribed ; Ordered As Mnemonic:   glucose meter test strips and lancets ; Simple Display Line:   See Instructions, check blood sugar daily, 100 EA, 3 Refill(s) ; Ordering Provider:   Agnieszka Ahn MD; Catalog Code:   Miscellaneous Rx Supply ; Order Dt/Tm:   1/22/2019 6:47:08 PM          Miscellaneous Rx Supply  :   Miscellaneous Rx Supply ; Status:   Prescribed ; Ordered As Mnemonic:   glucose meter ; Simple Display Line:   See Instructions, check blood sugar daily, 1 EA, 0 Refill(s) ; Ordering Provider:   Agnieszka Ahn MD; Catalog Code:   Miscellaneous Rx Supply ; Order Dt/Tm:   4/5/2018 10:13:53 AM          spironolactone  :   spironolactone ; Status:   Prescribed ; Ordered As Mnemonic:   spironolactone 50 mg oral tablet ; Simple Display Line:   1 tab(s), Oral, daily, 90 tab(s), 3 Refill(s) ; Ordering Provider:   Agnieszka Ahn MD; Catalog Code:   spironolactone ; Order Dt/Tm:   7/15/2019 8:38:40 AM            Home Meds    ferrous sulfate  :   ferrous sulfate ; Status:   Documented ; Ordered As Mnemonic:   ferrous sulfate 325 mg (65 mg elemental iron) oral tablet ; Simple Display Line:   650 mg, 2 tab(s), Oral, daily, 0 Refill(s) ; Catalog Code:   ferrous sulfate ; Order Dt/Tm:   7/16/2019 3:38:43 PM          multivitamin with minerals  :   multivitamin with minerals ; Status:   Documented ; Ordered As Mnemonic:   Vital-D oral tablet ; Simple Display Line:   1 tab(s), po,  daily, 0 Refill(s) ; Catalog Code:   multivitamin with minerals ; Order Dt/Tm:   3/22/2018 1:55:46 PM

## 2022-02-15 NOTE — PROGRESS NOTES
Patient:   JOE MELENDEZ            MRN: [id number]            FIN: [id number]               Age:   38 years     Sex:  Female     :  1980   Associated Diagnoses:   Flank mass; Preop examination; Elevated serum glucose   Author:   Agnieszka Ahn MD      Chief Complaint   3/22/2018 1:52 PM CDT    Pre-op: 3/27/2018 at [facility], Dr. Dumont     Patient with ongoing issues with flank pain and mass. Planning excision next week with Dr. Dumont. CT scan was completed today.  Noted to have elevated glucose on chem 14. Otherwise normal labs.      Review of Systems   Constitutional:  Chills, Fatigue, No fever, No sweats, No weakness.    Ear/Nose/Mouth/Throat:  Nasal congestion, Sore throat, tinnitus.    Respiratory:  Cough.    Cardiovascular:  Peripheral edema.    Gastrointestinal:  Nausea, Diarrhea.    Genitourinary:  Negative.    Hematology/Lymphatics:  Negative.    Endocrine:  Negative.    Immunologic:  Negative.    Musculoskeletal:  Negative.    Integumentary:  Negative.    Neurologic:  Negative.    Psychiatric:  Negative.       Health Status   Allergies:    Allergic Reactions (All)  Severity Not Documented  Bee Stings (No reactions were documented)  No Known Medication Allergies   Medications:  (Selected)   Prescriptions  Prescribed  buPROPion 150 mg/24 hours (XL) oral tablet, extended release: 1 tab(s) ( 150 mg ), po, daily, # 30 tab(s), 1 Refill(s), Type: Soft Stop, Pharmacy: [id number]  Documented Medications  Documented  Vital-D oral tablet: 1 tab(s), po, daily, 0 Refill(s), Type: Maintenance  spironolactone: po, 0 Refill(s), Type: Maintenance   Problem list:    All Problems (Selected)  Female hirsutism / SNOMED CT 202905541 / Confirmed  Moderate major depression / SNOMED CT 1413830 / Confirmed  Obesity / SNOMED CT 4873394955 / Probable  Tobacco user / SNOMED CT 091789608 / Probable      Histories   Past Medical History:    Resolved  Brain concussion (SNOMED CT 948344022):  Resolved.   Family  History:    Emotional problems  Mother     Procedure history:    ACL - Anterior cruciate ligament (5676313552) on 3/14/2017 at 37 Years.   Social History:        Alcohol Assessment            1-2 times per month, 1 drinks/episode average.      Tobacco Assessment            Current (some day smoker), Cigarettes        Physical Examination   Vital Signs   3/22/2018 1:52 PM CDT Temperature Tympanic 98.8 DegF    Peripheral Pulse Rate 88 bpm    Pulse Site Radial artery    HR Method Manual    Systolic Blood Pressure 120 mmHg    Diastolic Blood Pressure 72 mmHg    Mean Arterial Pressure 88 mmHg    BP Site Right arm    BP Method Manual      Measurements from flowsheet : Measurements   3/22/2018 1:52 PM CDT Height Measured - Standard 69.75 in    Weight Measured - Standard 214.8 lb    BSA 2.19 m2    Body Mass Index 31.04 kg/m2  HI      General:  Alert and oriented, No acute distress.    Eye:  Pupils are equal, round and reactive to light, Normal conjunctiva.    HENT:  Normocephalic, Tympanic membranes are clear, Normal hearing, No pharyngeal erythema.    Neck:  Supple, Non-tender, No lymphadenopathy, No thyromegaly.    Respiratory:  Lungs are clear to auscultation, Respirations are non-labored, Breath sounds are equal.    Cardiovascular:  Normal rate, Regular rhythm.    Gastrointestinal:  Soft, Non-tender, Non-distended, Normal bowel sounds, No organomegaly.    Musculoskeletal:  Normal range of motion, No deformity.    Integumentary:  Warm, Dry.    Neurologic:  Alert, Oriented.       Review / Management   Results review:  Lab results   3/20/2018 9:35 AM CDT Sodium Level 136 mmol/L    Potassium Level 4.6 mmol/L    Chloride Level 102 mmol/L    CO2 Level 25 mmol/L    Glucose Level 237 mg/dL  HI    BUN 10 mg/dL    Creatinine Level 0.77 mg/dL    BUN/Creat Ratio NOT APPLICABLE    eGFR 97 mL/min/1.73m2    eGFR African American 113 mL/min/1.73m2    Calcium Level 9.9 mg/dL   .       Impression and Plan   Diagnosis     Flank mass  (SZW58-RF R19.00).     Preop examination (EVC74-PN Z01.818).     Condition:  Patient is stable and appropriate to proceed with surgery..    Diagnosis     Elevated serum glucose (IPB25-HD R73.9).     Course:  labs pending - CBC, A1c.

## 2022-02-15 NOTE — TELEPHONE ENCOUNTER
---------------------  From: Sharon Garcia CMA (Phone Messages Pool ([id number]_[facility]))   To: Agnieszka Ahn MD;     Sent: 3/30/2020 8:35:34 AM CDT  Subject: FW: Dr. Agnieszka Ahn     Please advise.       ---------------------  From: JOE MELENDEZ  To: [facility]  Sent: 03/27/2020 06:37 p.m. CDT  Subject: Dr. Agnieszka Ahn  Good evening,  I am writing you cause I am concerned about some very discomforting pains in my lower abdomen. Very sharp pains. They worsen with bowel movements, becoming more severe and nauseating. With the coronavirus going around I m not sure what I should do. Do I wait it out and just bear with the pain or should I be seen?Please offer phone visit.---------------------  From: Agnieszka Ahn MD   To: Phone Messages Pool ([id number]);     Sent: 3/30/2020 8:41:07 AM CDT  Subject: RE: Dr. Agnieszka AhnReturned Call  Time: 9:26 am  Note:  Called and left a message asking pt to call back to discuss.Returned Call  Time: 4:22 pm  Note:  Pt called back and left a message. I tried calling patient several times in the last 1/2 hour with no  or the call would not go through. K[facility] agreed to see patient tomorrow at 1:00 pm as a Telemedicine visit. Need to confirm with patient if she can make that work and the best phone number to reach her.Returned Call  Time: 5:03 pm  Note:  Pt called back and verbalized to her the option of a telemedicine visit. She would like to go ahead with that. Apt was scheduled and medications were reviewed. Advised that ROCKY would be calling around 1:00 pm tomorrow and she will try to be somewhere with good cell service.
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
## 2022-02-15 NOTE — NURSING NOTE
Depression Screening Entered On:  9/5/2019 2:48 PM CDT    Performed On:  9/5/2019 2:48 PM CDT by Cely Schuler CMA               Depression Screening   Little Interest - Pleasure in Activities :   Not at all   Feeling Down, Depressed, Hopeless :   Not at all   Initial Depression Screen Score :   0    Trouble Falling or Staying Asleep :   Not at all   Feeling Tired or Little Energy :   Not at all   Poor Appetite or Overeating :   Several days   Feeling Bad About Yourself :   Not at all   Trouble Concentrating :   Several days   Moving or Speaking Slowly :   Not at all   Thoughts Better Off Dead or Hurting Self :   Not at all   Detailed Depression Screen Score :   2    Total Depression Screen Score :   2    FOREST Difficulty with Work, Home, Others :   Not difficult at all   Cely Schuler CMA - 9/5/2019 2:48 PM CDT

## 2022-02-15 NOTE — NURSING NOTE
CAGE Assessment Entered On:  7/15/2019 9:07 AM CDT    Performed On:  7/15/2019 9:07 AM CDT by Cely Schuler CMA               Assessment   Have you ever felt you should cut down on your drinking :   Yes   Have people annoyed you by criticizing your drinking :   No   Have you ever felt bad or guilty about your drinking :   No   Have you ever taken a drink first thing in the morning to steady your nerves or get rid of a hangover (Eye-opener) :   No   CAGE Score :   1    Cely Schuler CMA - 7/15/2019 9:07 AM CDT

## 2022-02-15 NOTE — LETTER
(Inserted Image. Unable to display)   [address] [place] [address]  August 01, 2019      JOE MELENDEZ      [address], [place] [id number]      Dear JOE,    Thank you for selecting [facility] for your healthcare needs. Below you will find the results of the recent tests done at our clinic.     Your lab results are below. I also sent a message through the portal. Repeat hemoglobin in one month.    Result Name Current Result Previous Result Reference Range   Iron Level (mcg/dL)  50 7/30/2019 ((L)) <10 7/15/2019 40 - 190   WBC  8.8 7/30/2019  8.4 7/15/2019   9.9 3/22/2018 3.8 - 10.8   RBC  4.47 7/30/2019  4.57 7/15/2019   4.73 3/22/2018 3.80 - 5.10   Hgb (gm/dL) ((L)) 9.8 7/30/2019 ((L)) 8.8 7/15/2019   12.2 3/22/2018 11.7 - 15.5   Hct (%) ((L)) 34.8 7/30/2019 ((L)) 32.3 7/15/2019   36.6 3/22/2018 35.0 - 45.0   MCV (fL) ((L)) 77.9 7/30/2019 ((L)) 70.7 7/15/2019  ((L)) 77.4 3/22/2018 80.0 - 100.0   MCH (pg) ((L)) 21.9 7/30/2019 ((L)) 19.3 7/15/2019  ((L)) 25.8 3/22/2018 27.0 - 33.0   MCHC (gm/dL) ((L)) 28.2 7/30/2019 ((L)) 27.2 7/15/2019   33.3 3/22/2018 32.0 - 36.0   RDW (%) ((H)) 27.5 7/30/2019 ((H)) 19.1 7/15/2019  ((H)) 16.1 3/22/2018 11.0 - 15.0   Platelet  346 7/30/2019 ((H)) 418 7/15/2019  ((H)) 458 3/22/2018 140 - 400   MPV (fL)  10.0 7/30/2019  10.8 7/15/2019   10.8 3/22/2018 7.5 - 12.5       Please contact me or my assistant at [phone] if you have any questions or concerns.     Sincerely,        Agnieszka Ahn M.D.
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
## 2022-02-15 NOTE — LETTER
(Inserted Image. Unable to display)   May 12, 2021  JOE MELENDEZ  [address] [place] [address]          Dear JOE,      Thank you for selecting [facility] for your healthcare needs.    Our records indicate you are due for the following services:    Diabetic Exam ~ Please bring your glucose meter and/or your blood glucose diary to your appointment.  Urine labs ~ Please be prepared to leave a urine specimen for evaluation  Fasting Lab Tests ~ Please do not eat or drink anything 10 hours prior to your scheduled appointment time.  (Water and any medications that you may need are allowed unless directed otherwise.)    If you had your labs done at another facility or with Direct Access Lab Testing at [facility], please bring in a copy of the results to your next visit, mail a copy, or drop off a copy of your results to your Healthcare Provider.    You are due for lab work and an office visit; please schedule the lab appointment 1 week before the office visit.  This will assure all results are available to discuss with your Healthcare Provider during your visit.    (FYI   Regarding office visits: In some instances, a video visit or telephone visit may be offered as an option.)      **It is very helpful if you bring your medication bottles to your appointment.  This assures we have all of your current medications, including strength and dosing information, documented accurately in your medical record.    To schedule an appointment or if you have further questions, please contact your clinic at [phone].         Powered by [employer]      Sincerely,    Agnieszka Ahn M.D.

## 2022-02-15 NOTE — TELEPHONE ENCOUNTER
---------------------  From: Sharon Garcia CMA ([id number]   To: Agnieszka Ahn MD;     Sent: 6/22/2020 11:13:45 AM CDT  Subject: Phone Note: Right Knee Pain      Phone Message    PCP:   [facility]      Time of Call:  10:24 am    Phone number:  [phone]    Returned call at: 11:00 am    Note:   Pt called stating that she has been having a lot of pain and swelling in her right knee for about three weeks. She notes that she had an ACL repair on her left knee in 2017 and saw Dr. Ruiz at [place]. She is wanting to see him again as she thinks this is a meniscus injury. I asked pt if she remembers injuring and she only notes that she went for a seven mile hike about three weeks ago and hurt after that. This last Thursday she was waiting in line for a long period of time and her knee swelled up like a balloon. Seems to get worse when weight bearing. Pt has been using ice and a brace. Requesting a referral. Should pt be seen first and have imaging? Please advise.     Pharmacy: n/a    Last office visit and reason: 3/31/20; abdominal pain     Transferred to: [facility]  ** Submitted: **  Order:Referral (Request)  Details:  6/22/2020 12:50 PM CDT, Referred to: Orthopaedics, Referred to: Dr. Girard, Right knee pain         Signed by Agnisezka Ahn MD  6/22/2020 5:50:00 PM---------------------  From: Agnieszka Ahn MD   To: Phone Messages Pool ([id number]_[place] - [place]);     Sent: 6/22/2020 12:51:32 PM CDT  Subject: RE: Phone Note: Right Knee PainReturned Call  Time: 2:00 pm  Note:  Called & notified pt of the referral

## 2022-02-15 NOTE — NURSING NOTE
Comprehensive Intake Entered On:  9/17/2019 4:41 PM CDT    Performed On:  9/17/2019 4:37 PM CDT by Melissa Ramírez CMA               Summary   Chief Complaint :   f/u ER at [facility]   Menstrual Status :   Menarcheal   Weight Measured :   180.2 lb(Converted to: 180 lb 3 oz, 81.74 kg)    Systolic Blood Pressure :   136 mmHg (HI)    Diastolic Blood Pressure :   92 mmHg (HI)    Mean Arterial Pressure :   107 mmHg   Peripheral Pulse Rate :   78 bpm   BP Site :   Right arm   BP Method :   Manual   HR Method :   Electronic   Oxygen Saturation :   97 %   Melissa Ramírez CMA - 9/17/2019 4:37 PM CDT   Health Status   Allergies Verified? :   Yes   Medication History Verified? :   Yes   Immunizations Current :   Yes   Medical History Verified? :   Yes   Melissa Ramírez CMA - 9/17/2019 4:37 PM CDT   Consents   Consent for Immunization Exchange :   Consent Granted   Consent for Immunizations to Providers :   Consent Granted   Melissa Ramírez CMA - 9/17/2019 4:37 PM CDT   Meds / Allergies   (As Of: 9/17/2019 4:41:05 PM CDT)   Allergies (Active)   Bee Stings  Estimated Onset Date:   Unspecified ; Created By:   Coral Sharma CMA; Reaction Status:   Active ; Category:   Environment ; Substance:   Bee Stings ; Type:   Allergy ; Updated By:   Coral Sharma CMA; Reviewed Date:   9/17/2019 4:38 PM CDT      No Known Medication Allergies  Estimated Onset Date:   Unspecified ; Created By:   Coral Sharma CMA; Reaction Status:   Active ; Category:   Drug ; Substance:   No Known Medication Allergies ; Type:   Allergy ; Updated By:   Coral Sharma CMA; Reviewed Date:   9/17/2019 4:38 PM CDT        Medication List   (As Of: 9/17/2019 4:41:05 PM CDT)   Prescription/Discharge Order    buPROPion  :   buPROPion ; Status:   Prescribed ; Ordered As Mnemonic:   buPROPion 300 mg/24 hours (XL) oral tablet, extended release ; Simple Display Line:   1 tab(s), Oral, q 24 hrs, 90 tab(s), 3 Refill(s) ; Ordering Provider:   Agnieszka Ahn MD; Catalog Code:    buPROPion ; Order Dt/Tm:   7/15/2019 8:38:40 AM          metFORMIN  :   metFORMIN ; Status:   Prescribed ; Ordered As Mnemonic:   metFORMIN 500 mg oral tablet ; Simple Display Line:   2 tab(s), Oral, bid, 360 tab(s), 3 Refill(s) ; Ordering Provider:   Agnieszka Ahn MD; Catalog Code:   metFORMIN ; Order Dt/Tm:   7/15/2019 8:38:39 AM          Miscellaneous Rx Supply  :   Miscellaneous Rx Supply ; Status:   Prescribed ; Ordered As Mnemonic:   glucose meter test strips and lancets ; Simple Display Line:   See Instructions, check blood sugar daily, 100 EA, 3 Refill(s) ; Ordering Provider:   Agnieszka Ahn MD; Catalog Code:   Miscellaneous Rx Supply ; Order Dt/Tm:   1/22/2019 6:47:08 PM          Miscellaneous Rx Supply  :   Miscellaneous Rx Supply ; Status:   Prescribed ; Ordered As Mnemonic:   glucose meter ; Simple Display Line:   See Instructions, check blood sugar daily, 1 EA, 0 Refill(s) ; Ordering Provider:   Agnieszka Ahn MD; Catalog Code:   Miscellaneous Rx Supply ; Order Dt/Tm:   4/5/2018 10:13:53 AM          spironolactone  :   spironolactone ; Status:   Prescribed ; Ordered As Mnemonic:   spironolactone 50 mg oral tablet ; Simple Display Line:   1 tab(s), Oral, daily, 90 tab(s), 3 Refill(s) ; Ordering Provider:   Agnieszka Ahn MD; Catalog Code:   spironolactone ; Order Dt/Tm:   7/15/2019 8:38:40 AM            Home Meds    multivitamin with minerals  :   multivitamin with minerals ; Status:   Documented ; Ordered As Mnemonic:   Vital-D oral tablet ; Simple Display Line:   1 tab(s), po, daily, 0 Refill(s) ; Catalog Code:   multivitamin with minerals ; Order Dt/Tm:   3/22/2018 1:55:46 PM
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
## 2022-02-15 NOTE — TELEPHONE ENCOUNTER
---------------------  From: Paradise Lopez CMA   Sent: 10/25/2019 9:37:01 AM CDT  Subject: PA-Blood glucose meter     PA needed for blood glucose meter-[employer] form needs to be completed and faxed to [phone]. Completed form and faxed to [facility] site to have [facility] sign

## 2022-02-15 NOTE — TELEPHONE ENCOUNTER
---------------------  From: Cely Schuler CMA (Phone Messages Pool ([id number]_[facility]))   To: Jose Elizabeth PA-C;     Sent: 2/13/2020 9:57:49 AM CST  Subject: Phone Message : Referral      PCP:   ROCKY      Time of Call:  9:11am       [facility]  Phone number:  [phone]  Leave a detailed Message:     Returned call at: 9:30am    Note:   Belkis called, she states that patient has reached out to them about a referral to there clinic. I returned Belkis's call to get more information as to what the referral may have been in regards too, Belkis was unsure as well. Per chart review and clarification from Belkis it is possible that patient may be looking for a referral to Endocrinology (they do have an Endocrinologist in clinic) and  per last office note with ROCKY. I did call patient to see what type of referral that she was looking for and it is a referral to Endocrinology at [facility] in [place].     Please Fax referral atten Belkis at [phone]      Patient is aware that [facility] is out of clinic and okay with another PCP sending in a referral     Last office visit and reason:  1/*6/2020     Pharmacy:     [facility] to: [facility] due to [facility] being out of clinic---------------------  From: Jose Elizabeth PA-C   To: Phone Messages Pool ([id number]_[place] [facility] [place]);     Sent: 2/13/2020 10:00:56 AM CST  Subject: RE: Phone Message : Referral      Order in chart. Please print and fax    [facility]ed to [facility] at [phone]    Cely WAN CMA

## 2022-02-15 NOTE — TELEPHONE ENCOUNTER
Entered by Sharon Garcia CMA on June 18, 2019 10:49:26 AM CDT  ---------------------  From: Sharon Garcia CMA   To: [place] [address]    Sent: 6/18/2019 10:49:26 AM CDT  Subject: Medication Management     ** Submitted: **  Order:buPROPion (buPROPion 300 mg/24 hours (XL) oral tablet, extended release)  1 tab(s)  Oral  q 24 hrs  Qty:  30 tab(s)        Days Supply:  30        Refills:  0          Substitutions Allowed     Route To Pharmacy - [place] [address]    Signed by Sharon Garcia CMA  6/18/2019 10:48:00 AM    ** Submitted: **  Complete:buPROPion (buPROPion 300 mg/24 hours (XL) oral tablet, extended release)   Signed by Sharon Garcia CMA  6/18/2019 10:49:00 AM    ** Not Approved:  **  buPROPion (BUPROPION XL 300MG TAB)  TAKE 1 TABLET BY MOUTH EVERY 24 HOURS  Qty:  30 tab(s)        Days Supply:  30        Refills:  11          Substitutions Allowed     Route To Pharmacy - [place] [address]   Note from Pharmacy:  Please consider 90 day supplies to promote better adherence  Signed by Sharon Garcia CMA            ------------------------------------------  From: [place] [address]  To: Agnieszka Ahn MD  Sent: June 18, 2019 10:03:23 AM CDT  Subject: Medication Management  Due: June 19, 2019 10:03:23 AM CDT    ** On Hold Pending Signature **  Drug: buPROPion (buPROPion 300 mg/24 hours (XL) oral tablet, extended release)  TAKE 1 TABLET BY MOUTH EVERY 24 HOURS  Quantity: 90 tab(s)     Days Supply: 0         Refills: 0  Substitutions Allowed  Notes from Pharmacy:     Dispensed Drug: buPROPion (buPROPion 300 mg/24 hours (XL) oral tablet, extended release)  TAKE 1 TABLET BY MOUTH EVERY 24 HOURS  Quantity: 30 tab(s)     Days Supply: 30        Refills: 11  Substitutions Allowed  Notes from Pharmacy: Please consider 90 day supplies to promote better adherence  ------------------------------------------Med Refill    Date of last office visit and reason:  4/25/19; ill      Date of last Med Check / Px:   5/2018  Date  of last labs pertaining to med:  3/22/18    RTC order in chart:  yes; due for med check    For Protocol refill, has patient been contacted:  Called & left a message letting pt know that I could fill for a 30 day supply and that she would need to make an apt for med check & fasting blood work.

## 2022-02-15 NOTE — NURSING NOTE
Comprehensive Intake Entered On:  7/15/2019 8:25 AM CDT    Performed On:  7/15/2019 8:10 AM CDT by Cely Schuler CMA               Summary   Chief Complaint :   Physical and fasting for labs   Menstrual Status :   Menarcheal   Weight Measured :   174.8 lb(Converted to: 174 lb 13 oz, 79.29 kg)    Height Measured :   69.5 in(Converted to: 5 ft 9 in, 176.53 cm)    Body Mass Index :   25.44 kg/m2 (HI)    Body Surface Area :   1.97 m2   Systolic Blood Pressure :   112 mmHg   Diastolic Blood Pressure :   62 mmHg   Mean Arterial Pressure :   79 mmHg   Peripheral Pulse Rate :   70 bpm   BP Method :   Manual   HR Method :   Manual   Temperature Tympanic :   97.6 DegF(Converted to: 36.4 DegC)  (LOW)    Cely Schuler CMA - 7/15/2019 8:10 AM CDT   Health Status   Allergies Verified? :   Yes   Medication History Verified? :   Yes   Immunizations Current :   Yes   Medical History Verified? :   Yes   Pre-Visit Planning Status :   Completed   Tobacco Use? :   Current every day smoker   Tobacco Cessation Review :   Not ready to quit   Cely Schuler CMA - 7/15/2019 8:10 AM CDT   Consents   Consent for Immunization Exchange :   Consent Granted   Consent for Immunizations to Providers :   Consent Granted   Cely Schuler CMA - 7/15/2019 8:10 AM CDT   Meds / Allergies   (As Of: 7/15/2019 8:25:51 AM CDT)   Allergies (Active)   Bee Stings  Estimated Onset Date:   Unspecified ; Created By:   Coral Sharma CMA; Reaction Status:   Active ; Category:   Environment ; Substance:   Bee Stings ; Type:   Allergy ; Updated By:   Coral Sharma CMA; Reviewed Date:   7/15/2019 8:23 AM CDT      No Known Medication Allergies  Estimated Onset Date:   Unspecified ; Created By:   Coral Sharma CMA; Reaction Status:   Active ; Category:   Drug ; Substance:   No Known Medication Allergies ; Type:   Allergy ; Updated By:   Coral Sharma CMA; Reviewed Date:   7/15/2019 8:23 AM CDT        Medication List   (As Of: 7/15/2019 8:25:51 AM CDT)   Prescription/Discharge  Order    buPROPion  :   buPROPion ; Status:   Prescribed ; Ordered As Mnemonic:   buPROPion 300 mg/24 hours (XL) oral tablet, extended release ; Simple Display Line:   1 tab(s), Oral, q 24 hrs, 30 tab(s), 0 Refill(s) ; Ordering Provider:   Agnieszka Ahn MD; Catalog Code:   buPROPion ; Order Dt/Tm:   6/18/2019 10:48:53 AM          metFORMIN  :   metFORMIN ; Status:   Prescribed ; Ordered As Mnemonic:   metFORMIN 500 mg oral tablet ; Simple Display Line:   2 tab(s), Oral, bid, 360 tab(s), 3 Refill(s) ; Ordering Provider:   Agnieszka Ahn MD; Catalog Code:   metFORMIN ; Order Dt/Tm:   1/22/2019 6:47:07 PM          Miscellaneous Rx Supply  :   Miscellaneous Rx Supply ; Status:   Prescribed ; Ordered As Mnemonic:   glucose meter test strips and lancets ; Simple Display Line:   See Instructions, check blood sugar daily, 100 EA, 3 Refill(s) ; Ordering Provider:   Agnieszka Ahn MD; Catalog Code:   Miscellaneous Rx Supply ; Order Dt/Tm:   1/22/2019 6:47:08 PM          Miscellaneous Rx Supply  :   Miscellaneous Rx Supply ; Status:   Prescribed ; Ordered As Mnemonic:   glucose meter ; Simple Display Line:   See Instructions, check blood sugar daily, 1 EA, 0 Refill(s) ; Ordering Provider:   Agnieszka Ahn MD; Catalog Code:   Miscellaneous Rx Supply ; Order Dt/Tm:   4/5/2018 10:13:53 AM          spironolactone  :   spironolactone ; Status:   Prescribed ; Ordered As Mnemonic:   spironolactone 50 mg oral tablet ; Simple Display Line:   1 tab(s), Oral, daily, 90 tab(s), 0 Refill(s) ; Ordering Provider:   Agnieszka Ahn MD; Catalog Code:   spironolactone ; Order Dt/Tm:   3/22/2019 8:17:55 AM            Home Meds    multivitamin with minerals  :   multivitamin with minerals ; Status:   Documented ; Ordered As Mnemonic:   Vital-D oral tablet ; Simple Display Line:   1 tab(s), po, daily, 0 Refill(s) ; Catalog Code:   multivitamin with minerals ; Order Dt/Tm:   3/22/2018 1:55:46 PM

## 2022-02-15 NOTE — TELEPHONE ENCOUNTER
---------------------  From: Agnieszka Ahn MD   To: SHANNAN MELENDEZ    Sent: 11/18/2021 12:51:58 PM CST  Subject: XR results     Shannan - just wanted to let you know that both x-rays were read as normal. Let me know if you have questions. I'll send a letter in the mail as well.  -Radha Ahn
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
## 2022-02-15 NOTE — NURSING NOTE
Urine Dipstick POC Entered On:  9/5/2019 2:17 PM CDT    Performed On:  9/5/2019 2:16 PM CDT by Schoenike , Andrea               Urine Dipstick POC   Urine Color Urine Dipstick :   Yellow   Urine Appearance Urine Dipstick :   Clear   Glucose Urine Dipstick :   Negative   Bilirubin Urine Dipstick :   Negative   Ketones Urine Dipstick :   Negative   Specific Gravity Urine Dipstick :   1.010   Blood Urine Dipstick :   Negative   pH Urine Dipstick :   7   Protein Urine Dipstick :   Negative   Urobilinogen Urine Dipstick :   0.2 mg/dl   Nitrite Urine Dipstick :   Negative   Leukocytes Urine Dipstick :   Negative   Schoenike , Andrea - 9/5/2019 2:16 PM CDT   Details   Collection Date :   9/5/2019 2:15 PM CDT   Handling Specimen POC :   Midstream   POC Test Comments :   Lab Test Preformed by:   [facility] Office  [address] [place] [address]  Phone: [phone]  Fax: [phone]     Schoenike , Andrea - 9/5/2019 2:16 PM CDT

## 2022-02-15 NOTE — PROGRESS NOTES
Patient:   JOE MELENDEZ            MRN: [id number]            FIN: [id number]               Age:   39 years     Sex:  Female     :  1980   Associated Diagnoses:   Well adult exam; Type 2 diabetes mellitus; Long term (current) use of oral hypoglycemic drugs; Hair loss; Female hirsutism   Author:   Agnieszka Ahn MD      Visit Information   Visit type:  Annual exam.    Source of history:  Self.    History limitation:  None.       Chief Complaint   7/15/2019 8:10 AM CDT    Physical and fasting for labs      Well Adult History   Well Adult History             The patient presents for well adult exam.  The patient's general health status is described as good and Watches diet, exercises regularly. Diabetes has been well controlled. Fasting for labs. Notes she is menstruating. Does not recall how long since prior pap. Will return in one week for pap only..  only concern is ongoing hair loss, hair is brittle and breaks easily, has been going on since prior to use of bupropion and spironolactone.  The patient's diet is described as balanced.  Exercise: routine.  Last menstrual period: regular.  Compliance problems: none.        Review of Systems   ROS reviewed as documented in chart      Health Status   Allergies:    Allergic Reactions (Selected)  Severity Not Documented  Bee Stings (No reactions were documented)  No Known Medication Allergies   Medications:  (Selected)   Prescriptions  Prescribed  buPROPion 300 mg/24 hours (XL) oral tablet, extended release: = 1 tab(s), Oral, q 24 hrs, # 30 tab(s), 0 Refill(s), Type: Maintenance, Pharmacy: [facility], Needs apt prior to refills.  glucose meter test strips and lancets: glucose meter test strips and lancets, See Instructions, Instructions: check blood sugar daily, Supply, # 100 EA, 3 Refill(s), Type: Maintenance, Pharmacy: [id number], check blood sugar daily  glucose meter: glucose meter, See Instructions, Instructions: check blood sugar daily,  Supply, # 1 EA, 0 Refill(s), Type: Maintenance, Pharmacy: [facility], check blood sugar daily  metFORMIN 500 mg oral tablet: = 2 tab(s), Oral, bid, # 360 tab(s), 3 Refill(s), Type: Maintenance, Pharmacy: [facility], 2 tab(s) Oral bid  spironolactone 50 mg oral tablet: = 1 tab(s), Oral, daily, # 90 tab(s), 0 Refill(s), Type: Maintenance, Pharmacy: [facility], Due for an appt JUNE 2019  Documented Medications  Documented  Vital-D oral tablet: 1 tab(s), po, daily, 0 Refill(s), Type: Maintenance   Problem list:    All Problems  Long term (current) use of oral hypoglycemic drugs / SNOMED CT 859642363 / Confirmed  Female hirsutism / SNOMED CT 314668988 / Confirmed  Moderate major depression / SNOMED CT 8370003 / Confirmed  Obesity / SNOMED CT 1504558433 / Probable  Tobacco user / SNOMED CT 824292903 / Probable  Type 2 diabetes mellitus / SNOMED CT 146818217 / Confirmed  Resolved: Brain concussion / SNOMED CT 131581594  Resolved: Pregnancy / SNOMED CT 057382819  Resolved: Pregnancy / SNOMED CT 795223219  Resolved: Pregnancy / SNOMED CT 658064637      Histories   Past Medical History:    Active  Female hirsutism (SNOMED CT 433981305)  Tobacco user (SNOMED CT 347223287)  Moderate major depression (SNOMED CT 7500394)  Type 2 diabetes mellitus (SNOMED CT 907786853)  Long term (current) use of oral hypoglycemic drugs (SNOMED CT 604744588)  Resolved  Brain concussion (SNOMED CT 461349056):  Resolved.  Pregnancy (SNOMED CT 115981320):  Resolved on 5/16/1996 at 16 years.  Pregnancy (SNOMED CT 230998927):  Resolved on 10/21/2008 at 28 years.  Pregnancy (SNOMED CT 857819731):  Resolved on 2/28/2006 at 26 years.   Family History:    Diabetes mellitus  Mother  Father  Emotional problems  Mother  ADHD - Attention deficit disorder with hyperactivity  Son (Jean Pierre)  Autism  Son (Travis)  Son (Jean Pierre)     Procedure history:    ACL - Anterior cruciate ligament (4157645113) on 3/14/2017 at 37  Years.   Social History:        Alcohol Assessment: Current            1-2 times per month, 5 drinks/episode average.      Tobacco Assessment: Current            Current (some day smoker), Cigarettes      Substance Abuse Assessment: Denies Substance Abuse            Never      Home and Environment Assessment            Spouse/Partner name: Simone Srinivasan.      Nutrition and Health Assessment            Type of diet: Regular.      Exercise and Physical Activity Assessment: Does not exercise      Sexual Assessment            Sexually active: Yes.  Sexual orientation: Straight or heterosexual.      Other Assessment            First menses age 13.  Regular menses.  Menstrual duration 5 days.  Cycle interval 28 days.  No history of               abnormal Pap smear.      Physical Examination   Vital Signs   7/15/2019 8:10 AM CDT Temperature Tympanic 97.6 DegF  LOW    Peripheral Pulse Rate 70 bpm    HR Method Manual    Systolic Blood Pressure 112 mmHg    Diastolic Blood Pressure 62 mmHg    Mean Arterial Pressure 79 mmHg    BP Method Manual      Measurements from flowsheet : Measurements   7/15/2019 8:10 AM CDT Height Measured - Standard 69.5 in    Weight Measured - Standard 174.8 lb    BSA 1.97 m2    Body Mass Index 25.44 kg/m2  HI      General:  Alert and oriented, No acute distress.    Eye:  Pupils are equal, round and reactive to light, Extraocular movements are intact, Normal conjunctiva.    HENT:  Normocephalic, Tympanic membranes are clear, Oral mucosa is moist, No pharyngeal erythema.    Neck:  Supple, Non-tender, No lymphadenopathy, No thyromegaly.    Respiratory:  Lungs are clear to auscultation.    Cardiovascular:  Normal rate, Regular rhythm.    Breast:  No mass, No tenderness, No discharge.    Gastrointestinal:  Soft, Non-tender, Non-distended, No organomegaly.    Musculoskeletal:  Normal range of motion, Normal strength.    Integumentary:  Warm, Dry, Pink, No rash, no concerning lesions.    Neurologic:  Alert,  Oriented, Normal sensory.    Psychiatric:  Cooperative, Appropriate mood & affect.       Impression and Plan   Diagnosis     Well adult exam (TFU32-LZ Z00.00).     Course:  Progressing as expected.    Patient Instructions:       Counseled: Patient, BMI, diet, and exercise.    Diagnosis     Type 2 diabetes mellitus (WMH22-MN E11.9).     Long term (current) use of oral hypoglycemic drugs (UKA55-PJ Z79.84).     Hair loss (EGC93-QB L65.9).     Female hirsutism (DUG49-CQ L68.0).     Orders     Orders (Selected)   Outpatient Orders  Ordered  Return to Clinic (Request): RFV: Annual px, Return in 1 year  Ordered (In Transit)  Basic Metabolic Panel* ([facility]): Specimen Type: Serum, Collection Date: 07/15/19 8:34:00 CDT  CBC (h/h, RBC, indices, WBC, Plt)* ([facility]): Specimen Type: Blood, Collection Date: 07/15/19 8:34:00 CDT  Hemoglobin A1c* ([facility]): Specimen Type: Blood, Collection Date: 07/15/19 8:34:00 CDT  Iron, Total* ([facility]): Specimen Type: Serum, Collection Date: 07/15/19 8:34:00 CDT  Lipid panel with reflex to direct ldl* ([facility]): Specimen Type: Serum, Collection Date: 07/15/19 8:34:00 CDT  TSH* ([facility]): Specimen Type: Serum, Collection Date: 07/15/19 8:34:00 CDT  Prescriptions  Prescribed  buPROPion 300 mg/24 hours (XL) oral tablet, extended release: = 1 tab(s), Oral, q 24 hrs, # 90 tab(s), 3 Refill(s), Type: Maintenance, Pharmacy: [facility], 1 tab(s) Oral q 24 hrs  metFORMIN 500 mg oral tablet: = 2 tab(s), Oral, bid, # 360 tab(s), 3 Refill(s), Type: Maintenance, Pharmacy: [facility], 2 tab(s) Oral bid  spironolactone 50 mg oral tablet: = 1 tab(s), Oral, daily, # 90 tab(s), 3 Refill(s), Type: Maintenance, [facility], 1 tab(s) Oral daily.
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
## 2022-02-15 NOTE — TELEPHONE ENCOUNTER
Entered by Cely Schuler CMA on March 22, 2019 8:18:24 AM CDT  ---------------------  From: Cely Schuler CMA   To: [place] [id number]    Sent: 3/22/2019 8:18:24 AM CDT  Subject: Medication Management     ** Submitted: **  Order:spironolactone (spironolactone 50 mg oral tablet)  1 tab(s)  Oral  daily  Qty:  90 tab(s)        Days Supply:  90        Refills:  0          Substitutions Allowed     Route To Pharmacy - [place] [address]    Signed by Cely Schuler CMA  3/22/2019 8:17:00 AM    ** Submitted: **  Complete:spironolactone (spironolactone 50 mg oral tablet)   Signed by Cely Schuler CMA  3/22/2019 8:18:00 AM    ** Not Approved:  **  spironolactone (SPIRONOLACTONE 50MG    TAB)  TAKE 1 TABLET BY MOUTH ONCE DAILY  Qty:  90 tab(s)        Days Supply:  90        Refills:  3          Substitutions Allowed     Route To Pharmacy - [place] [address]   Signed by Cely Schuler CMA            ------------------------------------------  From: [place] [id number]  To: Agnieszka Ahn MD  Sent: March 22, 2019 6:38:46 AM CDT  Subject: Medication Management  Due: March 23, 2019 6:38:46 AM CDT    ** On Hold Pending Signature **  Drug: spironolactone (spironolactone 50 mg oral tablet)  TAKE 1 TABLET BY MOUTH ONCE DAILY  Quantity: 90 tab(s)     Days Supply: 0         Refills: 0  Substitutions Allowed  Notes from Pharmacy:     Dispensed Drug: spironolactone (spironolactone 50 mg oral tablet)  TAKE 1 TABLET BY MOUTH ONCE DAILY  Quantity: 90 tab(s)     Days Supply: 90        Refills: 3  Substitutions Allowed  Notes from Pharmacy:   ------------------------------------------Date of last office visit and reason:  1/22/19 DM med check      Date of last Med Check / Px:   1/22/19  Date of last labs pertaining to med:  3/20/18    RTC order in chart:  yes June 2019    For Protocol refill, has patient been contacted:  n\a

## 2022-02-15 NOTE — PROGRESS NOTES
Patient:   JOE MELENDEZ            MRN: [id number]            FIN: [id number]               Age:   38 years     Sex:  Female     :  1980   Associated Diagnoses:   Obesity; Type 2 diabetes mellitus   Author:   Latasha Quiroz      Visit Information   Visit type:  Diabetes Self Management Education .    Accompanied by:  son.    Referral source:  Agnieszka Ahn MD.       Chief Complaint   DM Type II, Obesity       History of Present Illness   Pt does have a family hx of diabetes but no one that she knows of dx at her age.  Pt has had 3 children with one weighing over 9# no dx of GDM.    Estimated Average Glucose (eAG) 169 mg/dL with A1C of 7.5, glucose 237 on 3/20/18  BG in office today 150mg/dL     Discussed nutrition, diabetes, and lifestyle management with pt  Nutrition:  since dx pt has been reading about carbs and has been following a low carb/ keto diet.  Prior to dx pt would rarely eat breakfast, lunch consistenly a salad with mixed greens, light dressing.  Evening meal is always from scratch and there is where pt states she hasn't watched her portions because breakfast and lunch is small.    Physical Activity:  none  Stress:   high over the last year   Sleep: fair   Support: friends  BG Testing: testing at varies times pt states most readings are still in the 200's with the lowest of 158mg/dL   DM related medication: metformin 500mg i tab BID taking as directed and tolerating well, will increase to ii tabs BID   Routine diabetes care:  will discuss during follow up consults        Health Status   Allergies:    Allergic Reactions (Selected)  Severity Not Documented  Bee Stings (No reactions were documented)  No Known Medication Allergies   Medications:  (Selected)   Prescriptions  Prescribed  buPROPion 150 mg/24 hours (XL) oral tablet, extended release: 1 tab(s) ( 150 mg ), po, daily, # 30 tab(s), 1 Refill(s), Type: Soft Stop, Pharmacy: [facility]  glucose meter test strips and lancets:  glucose meter test strips and lancets, See Instructions, Instructions: check blood sugar daily, Supply, # 100 EA, 3 Refill(s), Type: Maintenance, Pharmacy: [facility], check blood sugar daily  glucose meter: glucose meter, See Instructions, Instructions: check blood sugar daily, Supply, # 1 EA, 0 Refill(s), Type: Maintenance, Pharmacy: [facility], check blood sugar daily  metFORMIN 500 mg oral tablet: 2 tab(s) ( 1,000 mg ), PO, BID, # 360 tab(s), 1 Refill(s), Type: Maintenance, Pharmacy: [facility], 2 tab(s) po bid  spironolactone 50 mg oral tablet: 1 tab(s) ( 50 mg ), po, daily, # 90 tab(s), 3 Refill(s), Type: Maintenance, Pharmacy: [facility], 1 tab(s) po daily  Documented Medications  Documented  Vital-D oral tablet: 1 tab(s), po, daily, 0 Refill(s), Type: Maintenance   Problem list:    All Problems  Female hirsutism / SNOMED CT 277069122 / Confirmed  Moderate major depression / SNOMED CT 7777688 / Confirmed  Obesity / SNOMED CT 8553016270 / Probable  Tobacco user / SNOMED CT 159992106 / Probable  Type 2 diabetes mellitus / SNOMED CT 116174948 / Confirmed      Histories   Past Medical History:    Resolved  Brain concussion (261635286):  Resolved.   Family History:    Emotional problems  Mother     Procedure history:    ACL - Anterior cruciate ligament (SNOMED CT 7081688514) on 3/14/2017 at 37 Years.   Social History:        Alcohol Assessment            1-2 times per month, 1 drinks/episode average.      Tobacco Assessment            Current (some day smoker), Cigarettes        Physical Examination   Measurements from flowsheet : Measurements   4/11/2018 4:19 PM CDT Height Measured - Standard 69.75 in    Weight Measured - Standard 214.2 lb    BSA 2.18 m2    Body Mass Index 30.95 kg/m2  HI         Health Maintenance      Recommendations     Pending (in the next year)        Due            Cervical Cancer Screen (if sexually active) due  04/11/18  and every 3  year(s)            DM - Communication with Managing Provider due  04/11/18  and every 1  year(s)           DM - Eye Exam due  04/11/18  and every 1  year(s)           DM - Foot Exam due  04/11/18  and every 1  year(s)           DM - Microalbumin due  04/11/18  and every 1  year(s)           Lipid Disorders Screen (Female) due  04/11/18  and every 1  year(s)        Due In Future            DM - HgbA1c not due until  06/22/18  and every 3  month(s)           Alcohol Misuse Screen (Female) not due until  01/10/19  and every 1  year(s)           Depression Screen (Female) not due until  01/10/19  and every 1  year(s)           Type 2 Diabetes Mellitus Screen (Female) not due until  03/22/19  and every 1  year(s)           High Blood Pressure Screen (Female) not due until  04/05/19  and every 1  year(s)     Satisfied (in the past 1 year)        Satisfied            Alcohol Misuse Screen (Female) on  01/10/18.           Alcohol Misuse Screen (Female) on  05/24/17.           Body Mass Index Check (Female) on  04/11/18.           Body Mass Index Check (Female) on  04/05/18.           Body Mass Index Check (Female) on  03/22/18.           Body Mass Index Check (Female) on  01/10/18.           Body Mass Index Check (Female) on  07/31/17.           Body Mass Index Check (Female) on  05/24/17.           DM - HgbA1c on  03/22/18.           Depression Screen (Female) on  01/10/18.           Depression Screen (Female) on  01/10/18.           Depression Screen (Female) on  01/10/18.           Depression Screen (Female) on  05/24/17.           Depression Screen (Female) on  05/24/17.           Depression Screen (Female) on  05/24/17.           High Blood Pressure Screen (Female) on  04/05/18.           High Blood Pressure Screen (Female) on  03/22/18.           High Blood Pressure Screen (Female) on  01/10/18.           High Blood Pressure Screen (Female) on  07/31/17.           High Blood Pressure Screen (Female) on  05/24/17.           Obesity  Screen and Counseling (Female) on  04/11/18.           Obesity Screen and Counseling (Female) on  04/05/18.           Obesity Screen and Counseling (Female) on  03/22/18.           Obesity Screen and Counseling (Female) on  01/10/18.           Obesity Screen and Counseling (Female) on  07/31/17.           Obesity Screen and Counseling (Female) on  05/24/17.           Type 2 Diabetes Mellitus Screen (Female) on  03/22/18.        Canceled            HIV Screen (if sexually active) (Female) on  05/23/17. Reason: Provider Request           STD Counseling (If sexually active) (Female) on  05/23/17. Reason: Provider Request           Syphilis Screen (if sexually active) (Female) on  05/23/17. Reason: Provider Request        Review / Management   Results review:  Lab results   3/22/2018 2:38 PM CDT Hgb A1c 7.5  HI    WBC 9.9    RBC 4.73    Hgb 12.2 gm/dL    Hct 36.6 %    MCV 77.4 fL  LOW    MCH 25.8 pg  LOW    MCHC 33.3 gm/dL    RDW 16.1 %  HI    Platelet 458  HI    MPV 10.8 fL   3/20/2018 9:35 AM CDT Sodium Level 136 mmol/L    Potassium Level 4.6 mmol/L    Chloride Level 102 mmol/L    CO2 Level 25 mmol/L    Glucose Level 237 mg/dL  HI    BUN 10 mg/dL    Creatinine 0.77 mg/dL    BUN/Creat Ratio NOT APPLICABLE    eGFR 97 mL/min/1.73m2    eGFR African American 113 mL/min/1.73m2    Calcium Level 9.9 mg/dL   .       Impression and Plan   Diagnosis     Obesity (IUK83-IG E66.9).     Type 2 diabetes mellitus (KBV57-SQ E11.9).       Counseled: Patient, SUNIE7 Self Care Behaviors   Today the patient was instructed on the basic physiology of diabetes mellitus, BG testing goals, and lifestyle guidelines.  Healthy Eating - Education on what foods are primary sources of carbohydrates and how intake affects BG readings, edu on carbohydrate counting, reading food labels, appropriate portion sizes, well balanced meals, diabetic plate method as a general rule.  Patient is provided with numerous ideas to incorporate dietary recommendations,  meal planning and preparation, mindful eating techniques and weight loss tips.    Being Active - Education on how exercise helps with :    - lowering BG by increasing the muscles ability to take up and use glucose   - weight loss   - healthier heart (improve lipid profile)   - improve sleep, mood, energy   - decrease stress      Monitoring - Today the patient is instructed recommended testing schedule and blood glucose target levels.    Taking Medication - on Metformin - education on the mechanism of action, will increase to [id number] ii tabs BID, discussed potentially requiring additional medication to help improve glycemic control if lifestyle interventions not sufficent to reduce glucose.       Problem Solving - medical support team assistance, resources provided (written and apps, internet); good control of stress  Healthy Coping - support of friends, family, and medical support team   Reducing Risks - Will discuss at f/u apts.  Weight loss goal of 7 - 10% of current body weight pounds as a reasonable goal to help increase insulin sensitivity   .      Goals:   1.  Practice healthy stress management and get good quality sleep with the goal of 7-8 hours per night.    2.   Physical activity: Recommend increasing to 2 hrs and 30 min a week (150 minutes) of moderate-intensity, or 1 hr and 15 min (75 minutes) a week of vigorous-intensity aerobic physical activity, or an equivalent combination of moderate- and vigorous-intensity aerobic physical activity.  Aerobic activity should be performed in episodes of at least 10 minutes, preferably spread throughout the week.  Muscle-strengthening activities on 2 or more days per week.    3.  Eat in a healthy way, per diabetic plate method.  Nutrition reference: Eat 3 meals a day; snacks are optional.  A meal is three or more food groups; make it colorful for better nutrition.    4.  Total Carbohydrate intake 30 grams for meals, snacks ~ 15 grams  5.  Pt to monitor BG BID a few  days/ week.  BG goals: Fasting and before meals 80 - 120 mg/dL, 2 hrs after the start of a meal 80 - 140 mg/dL.    6.  Goal weight 193 by 10/2018    7.  Read handouts provided.  F/u in 4 weeks       Professional Services   Time spent with pt 60 min   cc Dr. Ahn

## 2022-02-15 NOTE — NURSING NOTE
Comprehensive Intake Entered On:  10/17/2019 3:12 PM CDT    Performed On:  10/17/2019 3:08 PM CDT by Cely Schuler CMA               Summary   Chief Complaint :   changes in hormanes; insomina; new metor   Menstrual Status :   Menarcheal   Weight Measured :   184.8 lb(Converted to: 184 lb 13 oz, 83.82 kg)    Height Measured :   69.5 in(Converted to: 5 ft 9 in, 176.53 cm)    Body Mass Index :   26.9 kg/m2 (HI)    Body Surface Area :   2.03 m2   Systolic Blood Pressure :   128 mmHg   Diastolic Blood Pressure :   70 mmHg   Mean Arterial Pressure :   89 mmHg   Peripheral Pulse Rate :   83 bpm   Oxygen Saturation :   97 %   Cely Schuler CMA - 10/17/2019 3:08 PM CDT   Health Status   Allergies Verified? :   Yes   Medication History Verified? :   Yes   Immunizations Current :   Yes   Medical History Verified? :   Yes   Pre-Visit Planning Status :   Completed   Tobacco Use? :   Current some day smoker   Tobacco Cessation Review :   Not ready to quit   Cely Schuler CMA - 10/17/2019 3:08 PM CDT   Consents   Consent for Immunization Exchange :   Consent Granted   Consent for Immunizations to Providers :   Consent Granted   Cely Schuler CMA - 10/17/2019 3:08 PM CDT   Meds / Allergies   (As Of: 10/17/2019 3:13:00 PM CDT)   Allergies (Active)   Bee Stings  Estimated Onset Date:   Unspecified ; Created By:   Coral Sharma CMA; Reaction Status:   Active ; Category:   Environment ; Substance:   Bee Stings ; Type:   Allergy ; Updated By:   Corla Sharma CMA; Reviewed Date:   10/17/2019 3:10 PM CDT      No Known Medication Allergies  Estimated Onset Date:   Unspecified ; Created By:   Coral Sharma CMA; Reaction Status:   Active ; Category:   Drug ; Substance:   No Known Medication Allergies ; Type:   Allergy ; Updated By:   Coral Sharma CMA; Reviewed Date:   10/17/2019 3:10 PM CDT        Medication List   (As Of: 10/17/2019 3:13:00 PM CDT)   Prescription/Discharge Order    buPROPion  :   buPROPion ; Status:   Prescribed ; Ordered As  Mnemonic:   buPROPion 300 mg/24 hours (XL) oral tablet, extended release ; Simple Display Line:   1 tab(s), Oral, q 24 hrs, 90 tab(s), 3 Refill(s) ; Ordering Provider:   Agnieszka Ahn MD; Catalog Code:   buPROPion ; Order Dt/Tm:   7/15/2019 8:38:40 AM CDT          metFORMIN  :   metFORMIN ; Status:   Prescribed ; Ordered As Mnemonic:   metFORMIN 500 mg oral tablet ; Simple Display Line:   2 tab(s), Oral, bid, 360 tab(s), 3 Refill(s) ; Ordering Provider:   Agnieszka Ahn MD; Catalog Code:   metFORMIN ; Order Dt/Tm:   7/15/2019 8:38:39 AM CDT          Miscellaneous Rx Supply  :   Miscellaneous Rx Supply ; Status:   Prescribed ; Ordered As Mnemonic:   glucose meter test strips and lancets ; Simple Display Line:   See Instructions, check blood sugar daily, 100 EA, 3 Refill(s) ; Ordering Provider:   Agnieszka Ahn MD; Catalog Code:   Miscellaneous Rx Supply ; Order Dt/Tm:   1/22/2019 6:47:08 PM CST          Miscellaneous Rx Supply  :   Miscellaneous Rx Supply ; Status:   Prescribed ; Ordered As Mnemonic:   glucose meter ; Simple Display Line:   See Instructions, check blood sugar daily, 1 EA, 0 Refill(s) ; Ordering Provider:   Agnieszka Ahn MD; Catalog Code:   Miscellaneous Rx Supply ; Order Dt/Tm:   4/5/2018 10:13:53 AM CDT          spironolactone  :   spironolactone ; Status:   Prescribed ; Ordered As Mnemonic:   spironolactone 50 mg oral tablet ; Simple Display Line:   1 tab(s), Oral, daily, 90 tab(s), 3 Refill(s) ; Ordering Provider:   Agnieszka Ahn MD; Catalog Code:   spironolactone ; Order Dt/Tm:   7/15/2019 8:38:40 AM CDT            Home Meds    multivitamin with minerals  :   multivitamin with minerals ; Status:   Documented ; Ordered As Mnemonic:   Vital-D oral tablet ; Simple Display Line:   1 tab(s), po, daily, 0 Refill(s) ; Catalog Code:   multivitamin with minerals ; Order Dt/Tm:   3/22/2018 1:55:46 PM CDT

## 2022-02-15 NOTE — LETTER
(Inserted Image. Unable to display)   November 16, 2021  JOE MELENDEZ  [address] [place] [address]        Dear JOE,    Thank you for selecting [facility] for your healthcare needs.    Our records indicate you are due for the following services:     Annual Physical     (FYI   Regarding office visits: In some instances, a video visit or telephone visit may be offered as an option.)    To schedule an appointment or if you have further questions, please contact your clinic at [phone].    Powered by [employer]    Sincerely,    Agnieszka Ahn MD

## 2022-02-15 NOTE — PROGRESS NOTES
Patient:   JOE MELENDEZ            MRN: [id number]            FIN: [id number]               Age:   38 years     Sex:  Female     :  1980   Associated Diagnoses:   Acute cystitis   Author:   Agnieszka Ahn MD      Chief Complaint   2018 3:01 PM CDT    In for possible UTI did have UTI 2 weeks ago and started getting symptoms again 2 days ago; states urine is cloudy      History of Present Illness   patient with recurrence of urgency, dysuria, frequency over the past 2 days. In May had UTI diagnosed , culture confirmed, treated with macrobid, seemed to be better until .  No fevers, no flank pain, no hematuria         Health Status   Allergies:    Allergic Reactions (All)  Severity Not Documented  Bee Stings (No reactions were documented)  No Known Medication Allergies   Medications:  (Selected)   Prescriptions  Prescribed  buPROPion 300 mg/24 hours (XL) oral tablet, extended release: 1 tab(s) ( 300 mg ), po, q 24 hrs, # 90 tab(s), 3 Refill(s), Type: Maintenance, [facility], 1 tab(s) po q 24 hrs  glucose meter test strips and lancets: glucose meter test strips and lancets, See Instructions, Instructions: check blood sugar daily, Supply, # 100 EA, 3 Refill(s), Type: Maintenance, Pharmacy: [facility], check blood sugar daily  glucose meter: glucose meter, See Instructions, Instructions: check blood sugar daily, Supply, # 1 EA, 0 Refill(s), Type: Maintenance, [facility], check blood sugar daily  metFORMIN 500 mg oral tablet: 2 tab(s) ( 1,000 mg ), PO, BID, # 360 tab(s), 1 Refill(s), Type: Maintenance, [facility], 2 tab(s) po bid  spironolactone 50 mg oral tablet: 1 tab(s) ( 50 mg ), po, daily, # 90 tab(s), 3 Refill(s), Type: Maintenance, Pharmacy: [facility], 1 tab(s) po daily  Documented Medications  Documented  Vital-D oral tablet: 1 tab(s), po, daily, 0 Refill(s), Type: Maintenance   Problem list:    All  Problems  Long term (current) use of oral hypoglycemic drugs / SNOMED CT 625837904 / Confirmed  Female hirsutism / SNOMED CT 318067475 / Confirmed  Moderate major depression / SNOMED CT 9768094 / Confirmed  Obesity / SNOMED CT 0717460227 / Probable  Tobacco user / SNOMED CT 454378919 / Probable  Type 2 diabetes mellitus / SNOMED CT 754321563 / Confirmed  Resolved: Brain concussion / SNOMED CT 659511951      Histories   Past Medical History:    Resolved  Brain concussion (SNOMED CT 440215314):  Resolved.   Procedure history:    ACL - Anterior cruciate ligament ([id number]) on 3/14/2017 at 37 Years.      Physical Examination   Vital Signs   6/19/2018 3:01 PM CDT Temperature Tympanic 97.9 DegF    Peripheral Pulse Rate 76 bpm    Pulse Site Radial artery    HR Method Manual    Systolic Blood Pressure 124 mmHg    Diastolic Blood Pressure 70 mmHg    Mean Arterial Pressure 88 mmHg    BP Site Right arm    BP Method Manual      Measurements from flowsheet : Measurements   6/19/2018 3:01 PM CDT    Weight Measured - Standard                193.4 lb     General:  Alert and oriented, No acute distress.    HENT:  Oral mucosa is moist.    Gastrointestinal:  Soft, Non-tender.    Genitourinary:  No costovertebral angle tenderness.       Review / Management   Results review:  Lab results   6/19/2018 3:17 PM CDT Urine Color Dipstick Yellow    Urine Appearance Slightly cloudy    Urine pH Dipstick 7    Urine Specific Gravity Dipstick 1.015    Urine Glucose Dipstick Negative    Urine Bilirubin Dipstick Negative    Urine Ketones Dipstick Negative    Urine Blood Dipstick Trace    Urine Protein Dipstick Negative    Urine Nitrite Dipstick Negative    Urine Leukocyte Esterase Dipstick Large    Urine Urobilinogen Dipstick 0.2 mg/dl    POC Test Comments POC Test Comments   .       Impression and Plan   Diagnosis     Acute cystitis ([id number]-ES N30.00).     Course:  Not improving.    Plan:       Diet: Fluids encouraged.    Orders     Orders  (Selected)   Outpatient Orders  Ordered (In Transit)  Culture, Urine, Routine* ([facility]): Specimen Type: Urine (Clean Catch), Collection Date: 06/19/18 15:21:00 CDT  Prescriptions  Prescribed  Cipro 250 mg oral tablet: 1 tab(s) ( 250 mg ), PO, q12hr, # 20 tab(s), 0 Refill(s), Type: Maintenance, Pharmacy: [id number], 1 tab(s) po q12 hrs,x10 day(s)  Pyridium 100 mg oral tablet: 1 tab(s) ( 100 mg ), PO, TID, # 9 tab(s), 0 Refill(s), Type: Maintenance, Pharmacy: [id number], 1 tab(s) po tid,x3 day(s).

## 2022-02-15 NOTE — TELEPHONE ENCOUNTER
---------------------  From: Agnieszka Ahn MD   To: JOE MELENDEZ KEYSHAWN    Sent: 7/15/2021 1:30:20 PM CDT  Subject: results      Joe,  Your lab results are listed below. Cholesterol has improved. Other labs are all stable and look good. Let me know if you have questions.  Let me know if you have questions  Radha Ahn      Results:  Date Result Name Ind Value Ref Range   7/14/2021 2:01 PM Sodium Level  140 mmol/L (135 - 146)   7/14/2021 2:01 PM Potassium Level  4.2 mmol/L (3.5 - 5.3)   7/14/2021 2:01 PM Chloride Level  104 mmol/L (98 - 110)   7/14/2021 2:01 PM CO2 Level  25 mmol/L (20 - 32)   7/14/2021 2:01 PM Glucose Level  76 mg/dL (65 - 99)   7/14/2021 2:01 PM BUN ((L)) 6 mg/dL (7 - 25)   7/14/2021 2:01 PM Creatinine Level  0.61 mg/dL (0.50 - 1.10)   7/14/2021 2:01 PM BUN/Creat Ratio  10 (6 - 22)   7/14/2021 2:01 PM eGFR  112 mL/min/1.73m2 (> OR = 60 - )   7/14/2021 2:01 PM eGFR African American  130 mL/min/1.73m2 (> OR = 60 - )   7/14/2021 2:01 PM Calcium Level  10.1 mg/dL (8.6 - 10.2)   7/14/2021 2:01 PM Hgb A1c  5.3 ( - <5.7)   7/14/2021 2:01 PM Cholesterol ((H)) 221 mg/dL ( - <200)   7/14/2021 2:01 PM Non-HDL Cholesterol ((H)) 173 ( - <130)   7/14/2021 2:01 PM HDL ((L)) 48 mg/dL (> OR = 50 - )   7/14/2021 2:01 PM Cholesterol/HDL Ratio  4.6 ( - <5.0)   7/14/2021 2:01 PM LDL ((H)) 138    7/14/2021 2:01 PM Triglyceride ((H)) 211 mg/dL ( - <150)   7/14/2021 2:01 PM U Creatinine  64 mg/dL (20 - 275)   7/14/2021 2:01 PM U Microalbumin  0.4 mg/dL (See Note: - )   7/14/2021 2:01 PM Ur Microalbumin/Creatinine Ratio  6 ( - <30)

## 2022-02-15 NOTE — PROGRESS NOTES
Patient:   JOE MELENDEZ            MRN: [id number]            FIN: [id number]               Age:   38 years     Sex:  Female     :  1980   Associated Diagnoses:   None   Author:   Wale Islas MD      Visit Information      Date of Service: 2018 02:19 pm  Performing Location: [facility]  Encounter#: [id number]      Primary Care Provider (PCP):  Jimy MARTÍNEZ, Agnieszka    NPI# [id number]      Referring Provider:  Steve MARTÍNEZ, Deb    NPI# [id number]      Chief Complaint   2018 2:29 PM CST    pt here for throat consult, says her throat always feels like it is aching, also noticed a growth on the right side of the back of her throat, she noticed this a few months ago      History of Present Illness   Asked to see the patient by Dr. Ahn for evaluatiokn of ther tongue.  Patient reports that she wants the bump on the back of the tongue checked out.  She noticed it in 2017.  She has had soreness and dull ahce in the throat.  She gets food caught in the right tonsil.  No problems swallowing.  She has had some raspiness in her voice.  She is a smoker.        Review of Systems   Constitutional:  Negative.    Ear/Nose/Mouth/Throat:  Negative except as documented in history of present illness.    Respiratory:  Negative.       Health Status   Allergies:    Allergic Reactions (Selected)  Severity Not Documented  Bee Stings (No reactions were documented)  No Known Medication Allergies   Medications:  (Selected)   Prescriptions  Prescribed  Wellbutrin  mg/24 hours oral tablet, extended release: 1 tab(s) ( 150 mg ), po, q 24 hrs, # 30 tab(s), 1 Refill(s), Type: Maintenance, Pharmacy: [id number], 1 tab(s) po q 24 hrs  Documented Medications  Documented  spironolactone: po, 0 Refill(s), Type: Maintenance   Problem list:    All Problems  Female hirsutism / SNOMED CT 380888511 / Confirmed  Obesity / SNOMED CT 7720412857 / Probable  Tobacco user / SNOMED CT 032784132 /  Probable      Histories   Past Medical History:    No active or resolved past medical history items have been selected or recorded.   Family History:    Emotional problems  Mother     Procedure history:    ACL - Anterior cruciate ligament ([id number]) on 3/14/2017 at 37 Years.      Physical Examination   Vital Signs   1/26/2018 2:29 PM CST    Temperature Tympanic      97.4 DegF  LOW     CONSTITUTIONAL  General Appearance:  Normal, well developed, well nourished, no obvious distress  Ability to Communicate:  communicates appropriately.    HEAD AND FACE  Appearance and Symmetry:  Normal, no scalp or facial scarring or suspicious lesions.  Paranasal sinuses tenderness:  Normal, Paranasal sinuses non tender    EARS  Clinical speech reception threshold:  Normal, able to hear normal speech.  Auricle:  Normal, Auricles without scars, lesions, masses.  External auditory canal:  Normal, External auditory canal normal.  Tympanic membrane:  Normal, Tympanic membranes normal without swelling or erythema.  Tympanic membrane mobility:  Normal, Normal tympanic membrane mobility.    NOSE (speculum or scope)  Architecture:  Normal, Grossly normal external nasal architecture with no masses or lesions.  Mucosa:  Normal mucosa, No polyps or masses.  Septum:  Normal, Septum non-obstructing.  Turbinates:  Normal, No turbinate abnormalities    ORAL CAVITY AND OROPHARYNX  Lips:  Normal.  Dental and gingiva:  Normal, No obvious dental or gingival disease.  Mucosa:  Normal, Moist mucous membranes.  Tongue:  Normal, Tongue mobile with no mucosal abnormalities  Hard and soft palate:  Normal, Hard and soft palate without cleft or mucosal lesions.  Tonsils:  Oral pharynx:  Normal, Posterior pharynx without lesions or remarkable asymmetry.  Saliva:  Normal, Clear saliva.  Masses:  Normal, No palpable masses or pathologically enlarged lymph nodes.    LARYNGOSCOPY  Risks and benefit of Flexible laryngeal endoscopy were discussed with the patient  and they wished to proceed.  Patient tolerated the procedure well.  See exam note for findings.    LARYNX AND HYPOPHARYNX (mirror or scope)  Voice Quality:  Normal voice quality.  Supra glottis:  Normal, No edema or erythema.  Epiglottis:  Normal, No epiglottic mass or mucosal lesions.  Pyriform sinuses:  Normal, Pyriform sinuses clear.  Vocal cords appearance:  Normal, Vocal cord motion symmetric.  Vocal cord motion:  Normal, Vocal cord motion symmetric  Endolarynx:  Normal, No Endolaryngeal mass or mucosal lesions.    NECK  Masses/lymph nodes:  Normal, No worrisome neck masses or lymph nodes.  Salivary glands:  Normal, Parotid and submandibular glands.  Trachea and larynx position:  Normal, Trachea and larynx midline.  Thyroid:  Normal, No thyroid abnormality.  Tenderness:  Normal, No cervical tenderness.  Suppleness:  Normal, Neck supple    NEUROLOGICAL  Speech pattern:  Normal, Proasaic    RESPIRATORY  Symmetry and Respiratory effort:  Normal, Symmetric chest movement and expansion with no increased intercostal retractions or use of accessory muscles.       Impression and Plan   The patient's exam was unremarkable.  She describes a tickle in her throat that makes her cough.  I certainly examined the oropharynx and larynx carefully.  I reassured her there was no evidence of serious problem.  She reports that she does get occaisonal heartburn.  I explained that some of her symptoms may be related to reflux.  I recommended a trial of otc PPIs.  Follow up as needed.

## 2022-02-15 NOTE — LETTER
(Inserted Image. Unable to display)   [address] [place] [address]  July 16, 2019      JOE MELENDEZ      [address], [place] [id number]      Dear JOE,    Thank you for selecting [facility] for your healthcare needs. Below you will find the results of the recent tests done at our clinic.     Your lab results are listed below and I hope by the time you get these we have touched base by phone. Results are consistent with iron deficiency. Please call the clinic if you didn't speak to us by phone yet. Thanks!    Result Name Current Result Previous Result Reference Range   Sodium Level (mmol/L)  135 7/15/2019  136 3/20/2018 135 - 146   Potassium Level (mmol/L)  4.3 7/15/2019  4.6 3/20/2018 3.5 - 5.3   Chloride Level (mmol/L)  103 7/15/2019  102 3/20/2018 98 - 110   CO2 Level (mmol/L)  21 7/15/2019  25 3/20/2018 20 - 32   Glucose Level (mg/dL)  82 7/15/2019 ((H)) 237 3/20/2018 65 - 99   BUN (mg/dL)  9 7/15/2019  10 3/20/2018 7 - 25   Creatinine Level (mg/dL)  0.65 7/15/2019  0.77 3/20/2018 0.50 - 1.10   Calcium Level (mg/dL)  9.9 7/15/2019  9.9 3/20/2018 8.6 - 10.2   Hgb A1c  5.3 7/15/2019  5.1 1/22/2019  ((H)) 7.5 3/22/2018  - <5.7   Cholesterol (mg/dL) ((H)) 227 7/15/2019   - <200   Non-HDL Cholesterol ((H)) 181 7/15/2019   - <130   HDL (mg/dL) ((L)) 46 7/15/2019  >50 -    Cholesterol/HDL Ratio  4.9 7/15/2019   - <5.0   LDL ((H)) 134 7/15/2019     Triglyceride (mg/dL) ((H)) 327 7/15/2019   - <150   TSH (mIU/L)  2.10 7/15/2019     Iron Level (mcg/dL) ((L)) <10 7/15/2019  40 - 190   WBC  8.4 7/15/2019  9.9 3/22/2018 3.8 - 10.8   RBC  4.57 7/15/2019  4.73 3/22/2018 3.80 - 5.10   Hgb (gm/dL) ((L)) 8.8 7/15/2019  12.2 3/22/2018 11.7 - 15.5   Hct (%) ((L)) 32.3 7/15/2019  36.6 3/22/2018 35.0 - 45.0   MCV (fL) ((L)) 70.7 7/15/2019 ((L)) 77.4 3/22/2018 80.0 - 100.0   MCH (pg) ((L)) 19.3 7/15/2019 ((L)) 25.8 3/22/2018 27.0 - 33.0   MCHC (gm/dL) ((L)) 27.2 7/15/2019  33.3 3/22/2018 32.0 -  36.0   RDW (%) ((H)) 19.1 7/15/2019 ((H)) 16.1 3/22/2018 11.0 - 15.0   Platelet ((H)) 418 7/15/2019 ((H)) 458 3/22/2018 140 - 400   MPV (fL)  10.8 7/15/2019  10.8 3/22/2018 7.5 - 12.5       Please contact me or my assistant at [phone] if you have any questions or concerns.     Sincerely,        Agnieszka Ahn M.D.

## 2022-02-15 NOTE — TELEPHONE ENCOUNTER
---------------------  From: Agnieszka Ahn MD   To: SHANNAN MELENDEZ    Sent: 9/10/2019 11:14:29 AM CDT  Subject: Patient Message - Results Notification      Shannan,  Great news - no evidence of trichomonas. Your urine testing was also normal. Pap was good as well. Normal pap smear with negative HPV.  The pap can be repeated every five years unless there is concern about increased risk. We still will want to see you once a year for an annual exam.  I'll send these through the mail as well.  Thanks,  Radha Ahn    Results:  Date Result Name Value Ref Range   9/5/2019 2:53 PM Trichomonas vaginalis NOT DETECTED (NOT DETECTED - )

## 2022-03-02 VITALS
BODY MASS INDEX: 26.34 KG/M2 | DIASTOLIC BLOOD PRESSURE: 95 MMHG | HEIGHT: 70 IN | TEMPERATURE: 98.1 F | WEIGHT: 184 LBS | HEART RATE: 82 BPM | SYSTOLIC BLOOD PRESSURE: 156 MMHG

## 2022-03-02 NOTE — TELEPHONE ENCOUNTER
---------------------  From: Meghan Saldana RN (Phone Messages Pool ([id number]_[facility]))   Sent: 1/13/2022 1:19:29 PM CST  Subject: General Message       PCP:  ROCKY o/c      Time of Call:  1:12pm       Person Calling:  pt  Phone number:  [phone]    Note:  Pt called in, stating she needs to do a ER injury f/u on Monday. [facility] is booked. Stated she also needs to discuss a female issue she is having.   Offered OV on 1/14/22 with a female provider; pt agreed and was transferred to scheduling.

## 2022-03-02 NOTE — TELEPHONE ENCOUNTER
---------------------  From: Agnieszka Ahn MD   To: Referral Coordinators Pool ([id number]_[facility]);     Sent: 1/17/2022 10:17:35 AM CST  Subject: referrals     Obgyn and hand surgeon referralsreferrals have been sent
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
## 2022-03-02 NOTE — NURSING NOTE
Comprehensive Intake Entered On:  1/17/2022 10:00 AM CST    Performed On:  1/17/2022 9:54 AM CST by Whitney Chapman               Summary   Chief Complaint :   ED f/u. Fell 1/11 and hurt back.    Menstrual Status :   Menarcheal   Weight Measured :   184 lb(Converted to: 184 lb 0 oz, 83.461 kg)    Height Measured :   69.5 in(Converted to: 5 ft 9 in, 176.53 cm)    Body Mass Index :   26.78 kg/m2 (HI)    Body Surface Area :   2.02 m2   Systolic Blood Pressure :   156 mmHg (HI)    Diastolic Blood Pressure :   95 mmHg (HI)    Mean Arterial Pressure :   115 mmHg   Peripheral Pulse Rate :   82 bpm   Temperature Tympanic :   98.1 DegF(Converted to: 36.7 DegC)    Whitney Chapman - 1/17/2022 9:54 AM CST   Health Status   Allergies Verified? :   Yes   Medication History Verified? :   Yes   Immunizations Current :   Yes   Medical History Verified? :   Yes   Pre-Visit Planning Status :   Completed   Tobacco Use? :   Never smoker   Whitney Chapman - 1/17/2022 9:54 AM CST   Meds / Allergies   (As Of: 1/17/2022 10:00:17 AM CST)   Allergies (Active)   Bee Stings  Estimated Onset Date:   Unspecified ; Created By:   Coral Sharma CMA; Reaction Status:   Active ; Category:   Environment ; Substance:   Bee Stings ; Type:   Allergy ; Updated By:   Coral Sharma CMA; Reviewed Date:   1/17/2022 9:59 AM CST      No Known Medication Allergies  Estimated Onset Date:   Unspecified ; Created By:   Coral Shamra CMA; Reaction Status:   Active ; Category:   Drug ; Substance:   No Known Medication Allergies ; Type:   Allergy ; Updated By:   Coral Sharma CMA; Reviewed Date:   1/17/2022 9:59 AM CST        Medication List   (As Of: 1/17/2022 10:00:17 AM CST)   Prescription/Discharge Order    hydrOXYzine  :   hydrOXYzine ; Status:   Prescribed ; Ordered As Mnemonic:   hydrOXYzine hydrochloride 25 mg oral tablet ; Simple Display Line:   25 mg, 1 tab(s), Oral, qid, PRN: for anxiety, 40 tab(s), 0 Refill(s) ; Ordering Provider:   Jimy MARTÍNEZ,  Agnieszka; Catalog Code:   hydrOXYzine ; Order Dt/Tm:   9/22/2021 2:59:40 PM CDT          metFORMIN  :   metFORMIN ; Status:   Prescribed ; Ordered As Mnemonic:   metFORMIN 500 mg oral tablet ; Simple Display Line:   2 tab(s), Oral, bid, 360 tab(s), 0 Refill(s) ; Ordering Provider:   Agnieszka Ahn MD; Catalog Code:   metFORMIN ; Order Dt/Tm:   1/12/2022 5:09:49 PM CST          Miscellaneous Rx Supply  :   Miscellaneous Rx Supply ; Status:   Prescribed ; Ordered As Mnemonic:   glucose meter test strips and lancets ; Simple Display Line:   See Instructions, check blood sugar daily, 100 EA, 3 Refill(s) ; Ordering Provider:   Agnieszka Ahn MD; Catalog Code:   Miscellaneous Rx Supply ; Order Dt/Tm:   1/22/2019 6:47:08 PM CST          Miscellaneous Rx Supply  :   Miscellaneous Rx Supply ; Status:   Prescribed ; Ordered As Mnemonic:   glucose meter ; Simple Display Line:   See Instructions, check blood sugar daily, 1 EA, 0 Refill(s) ; Ordering Provider:   Agnieszka Ahn MD; Catalog Code:   Miscellaneous Rx Supply ; Order Dt/Tm:   4/5/2018 10:13:53 AM CDT            Home Meds    cyclobenzaprine  :   cyclobenzaprine ; Status:   Documented ; Ordered As Mnemonic:   cyclobenzaprine ; Simple Display Line:   Oral, 0 Refill(s) ; Catalog Code:   cyclobenzaprine ; Order Dt/Tm:   1/17/2022 9:58:54 AM CST          oxyCODONE  :   oxyCODONE ; Status:   Documented ; Ordered As Mnemonic:   oxyCODONE ; Simple Display Line:   Oral, 0 Refill(s) ; Catalog Code:   oxyCODONE ; Order Dt/Tm:   1/17/2022 9:58:19 AM CST

## 2022-03-02 NOTE — TELEPHONE ENCOUNTER
---------------------  From: Whitney Chapman (eRx Pool ([address]))   To: Advanced Practice Provider [place] ([address]);     Sent: 1/12/2022 5:11:25 PM CST  Subject: FW: Medication Management   Due Date/Time: 1/12/2022 6:36:00 PM CST     KWL oc until 1/17. Please advise on hydroxyzine refill         ** Not Approved: Refill not appropriate **  Lancets (metFORMIN HCl 500 MG Oral Tablet)  Take 2 tablets by mouth twice daily  Qty:  360 tab(s)        Days Supply:  90        Refills:  0          Substitutions Allowed     Route To Pharmacy - [facility]   Signed by Whitney Chapman          Med Refill      Date of last office visit and reason: cough, thumb cyst, anxiety 11/17/21       Date of last Med Check / Px:   11/17/21  Date of last labs pertaining to med:  7/14/21    RTC order in chart:  Yes     For Protocol refill, has patient been contacted:  no          ------------------------------------------  From: [facility]  To: Agnieszka Ahn MD  Sent: January 6, 2022 6:36:52 PM CST  Subject: Medication Management  Due: December 15, 2021 8:20:29 PM CST     ** On Hold Pending Signature **     Dispensed Drug: metFORMIN HCl 500 MG Oral Tablet, Take 2 tablets by mouth twice daily  Quantity: 360 tab(s)  Days Supply: 90  Refills: 0  Substitutions Allowed  Notes from Pharmacy:     ** On Hold Pending Signature **     Dispensed Drug: hydrOXYzine (hydrOXYzine hydrochloride 25 mg oral tablet), TAKE 1 TABLET BY MOUTH 4 TIMES DAILY AS NEEDED FOR ANXIETY  Quantity: 40 tab(s)  Days Supply: 10  Refills: 0  Substitutions Allowed  Notes from Pharmacy:  ---------------------------------------------------------------  From: Danni Chacon (Advanced Practice Provider Pool ([address]))   To: eRx Pool ([address]_[place] [facility] [place]);     Sent: 1/13/2022 11:37:26 AM CST  Subject: RE: Medication Management     ok for 30 days, set up appt if she is due for chronic  disease---------------------  From: Agnieszka Ahn MD   To: [id number]    Sent: 1/18/2022 9:18:57 AM CST  Subject: RE: Medication Management     ** Submitted: **  Complete:hydrOXYzine (hydrOXYzine hydrochloride 25 mg oral tablet)   Signed by Agnieszka Ahn MD  1/18/2022 3:18:00 PM [facility]    ** Approved with modifications: **  hydrOXYzine (hydrOXYzine HCl 25 MG Oral Tablet)  TAKE 1 TABLET BY MOUTH 4 TIMES DAILY AS NEEDED FOR ANXIETY  Qty:  40 tab(s)        Days Supply:  10        Refills:  2          Substitutions Allowed     Route To Pharmacy - [id number]

## 2022-04-15 DIAGNOSIS — E11.9 TYPE 2 DIABETES MELLITUS WITHOUT COMPLICATION, WITHOUT LONG-TERM CURRENT USE OF INSULIN (H): Primary | ICD-10-CM

## 2022-05-09 NOTE — RESULTS
(Inserted Image. Unable to display)          (Inserted Image. Unable to display)     [address], [place] [address]  Phone [phone]  Fax [phone]  HOLTER MONITOR REPORT    JOE MELENDEZ                                                           :  1980  Date of Exam:   2017                                                          MRN:  [id number]  Ordering HCP:  Agnieszka Ahn MD      INDICATION:    Palpitations    FINDINGS:   The Holter monitor recording started on 2017 at 10:18; it recorded for 23 hours and   42 minutes.  Artifact:  acceptable at less than 1 percent of the recording.    Predominant rhythm:  sinus rhythm.  Average rate:  82.  Minimum rate:  54.  Maximum rate:  140.  There were   2 minutes of tachycardia over 120.  No bradycardia.  No pauses.    Ventricular ectopics:  53.  No runs.    Supraventricular ectopics:  28.  No runs.    No ST-segment abnormality.    No patient symptom events.    IMPRESSION:  Normal Holter monitor.  No patient symptoms during the recording.                                                                         Roddy Hart MD, FACP  Interpreting HCP                    NUNO/luh  D:  08/10/2017                                                                          T:  2017   oral

## 2022-07-14 ENCOUNTER — TELEPHONE (OUTPATIENT)
Dept: FAMILY MEDICINE | Facility: CLINIC | Age: 42
End: 2022-07-14

## 2022-07-14 NOTE — TELEPHONE ENCOUNTER
Patient calling with bullseye rash located on Inside of right arm, rash appeared 2 days ago. Denies any tick attached, denies injuring or scratching area. Patient denies any other symptoms, denies SOB, chest pain. Patient has office visit scheduled 7/20/22, advised office visit tomorrow. Transferred patient to scheduling.

## 2022-07-15 ENCOUNTER — OFFICE VISIT (OUTPATIENT)
Dept: FAMILY MEDICINE | Facility: CLINIC | Age: 42
End: 2022-07-15
Payer: COMMERCIAL

## 2022-07-15 VITALS
SYSTOLIC BLOOD PRESSURE: 129 MMHG | WEIGHT: 192.3 LBS | DIASTOLIC BLOOD PRESSURE: 91 MMHG | BODY MASS INDEX: 27.99 KG/M2 | HEART RATE: 76 BPM | TEMPERATURE: 98.4 F

## 2022-07-15 DIAGNOSIS — A69.20 ECM (ERYTHEMA CHRONICUM MIGRANS): Primary | ICD-10-CM

## 2022-07-15 DIAGNOSIS — F32.89 OTHER DEPRESSION: ICD-10-CM

## 2022-07-15 PROCEDURE — 99213 OFFICE O/P EST LOW 20 MIN: CPT | Performed by: INTERNAL MEDICINE

## 2022-07-15 RX ORDER — HYDROXYZINE HYDROCHLORIDE 25 MG/1
1 TABLET, FILM COATED ORAL PRN
COMMUNITY
Start: 2021-09-22 | End: 2022-07-15

## 2022-07-15 RX ORDER — DOXYCYCLINE HYCLATE 100 MG
100 TABLET ORAL 2 TIMES DAILY
Qty: 28 TABLET | Refills: 0 | Status: SHIPPED | OUTPATIENT
Start: 2022-07-15 | End: 2022-07-29

## 2022-07-15 ASSESSMENT — ENCOUNTER SYMPTOMS
FATIGUE: 1
FEVER: 1
JOINT SWELLING: 0
ARTHRALGIAS: 0
HEADACHES: 0
COUGH: 0

## 2022-07-15 ASSESSMENT — PATIENT HEALTH QUESTIONNAIRE - PHQ9
10. IF YOU CHECKED OFF ANY PROBLEMS, HOW DIFFICULT HAVE THESE PROBLEMS MADE IT FOR YOU TO DO YOUR WORK, TAKE CARE OF THINGS AT HOME, OR GET ALONG WITH OTHER PEOPLE: EXTREMELY DIFFICULT
SUM OF ALL RESPONSES TO PHQ QUESTIONS 1-9: 12
10. IF YOU CHECKED OFF ANY PROBLEMS, HOW DIFFICULT HAVE THESE PROBLEMS MADE IT FOR YOU TO DO YOUR WORK, TAKE CARE OF THINGS AT HOME, OR GET ALONG WITH OTHER PEOPLE: EXTREMELY DIFFICULT
SUM OF ALL RESPONSES TO PHQ QUESTIONS 1-9: 12

## 2022-07-15 NOTE — Clinical Note
Saw the patient about a rash end ended up talking to her about depression.  I placed a mental health referral.  You may want to have your staff reach out to her for a follow-up visit to discuss depression and possibly medication further, and to make sure the referral occurs.

## 2022-07-15 NOTE — PROGRESS NOTES
Addendum  I spoke with the patient about her PHQ-9 result suggesting depression.  This has been an ongoing struggle for her.  Multiple stressors and history of TBI.  She feels like she is coping well and hopeful.  Exercise and healthy eating are her coping mechanisms.  She feels there are problems that just cannot be solved.  Does not feel unsafe.  Not actively suicidal but has had fleeting thoughts things would be better if she were dead.  She has used medications in the past but had side effects.  She has been trying to get set up with a counselor.  Not interested in starting medication at this time.  She is interested in the mental health referral.      Assessment & Plan     ECM (erythema chronicum migrans)  Patient presents with 4-day history of a rash on the right arm consistent with ECM.  It is improved.  She did have some systemic symptoms with fever and myalgia.  We discussed the likely etiology of this being Lyme disease.  Discussed whether to get lab work and a Lyme screen, but it would not likely change therapy.  - doxycycline hyclate (VIBRA-TABS) 100 MG tablet; Take 1 tablet (100 mg) by mouth 2 times daily for 14 days           Depression Screening Follow Up    PHQ 7/15/2022   PHQ-9 Total Score 12   Q9: Thoughts of better off dead/self-harm past 2 weeks Several days   F/U: Thoughts of suicide or self-harm Yes   F/U: Self harm-plan No   F/U: Self-harm action No   F/U: Safety concerns No                 Follow Up    Follow Up Actions Taken  Crisis resource information provided in the After Visit Summary  Patient is not suicidal.  Mental health referral.    No follow-ups on file.    Roddy Hart MD  [facility]    Jessica Campbell is a 42 year old accompanied by her self, presenting for the following health issues:  Derm Problem (Rash on R arm x 2 days that does. Has been breaking out in hives)      History of Present Illness       Reason for visit:  Possible bullseye rash  under my right arm  Symptom onset:  3-7 days ago  Symptoms include:  Bullseye rash, hives, itchyness  Symptom intensity:  Mild  Symptom progression:  Staying the same  Had these symptoms before:  No  What makes it worse:  No  What makes it better:  Yes, Benadryl    She eats 2-3 servings of fruits and vegetables daily.She consumes 0 sweetened beverage(s) daily.She exercises with enough effort to increase her heart rate 30 to 60 minutes per day.  She exercises with enough effort to increase her heart rate 4 days per week.   She is taking medications regularly.    Today's PHQ-9         PHQ-9 Total Score: 12    PHQ-9 Q9 Thoughts of better off dead/self-harm past 2 weeks :   Several days  Thoughts of suicide or self harm: (P) Yes  Self-harm Plan:   (P) No  Self-harm Action:     (P) No  Safety concerns for self or others: (P) No    How difficult have these problems made it for you to do your work, take care of things at home, or get along with other people: Extremely difficult  Patient had noted a large circular red lesion on her right arm 4 days ago which gradually reduced in size.  She has had some fever chills achiness although feels improved.  Patient is not actively suicidal.  She has a history of chronic depression and multiple stressors.  0956}        Review of Systems   Constitutional: Positive for fatigue and fever.   Eyes: Negative for visual disturbance.   Respiratory: Negative for cough.    Musculoskeletal: Negative for arthralgias and joint swelling.   Neurological: Negative for headaches.            Objective    BP (!) 129/91 (BP Location: Right arm)   Pulse 76   Temp 98.4  F (36.9  C)   Wt 87.2 kg (192 lb 4.8 oz)   BMI 27.99 kg/m    Body mass index is 27.99 kg/m .  Physical Exam   Patient is a healthy-appearing woman in no distress.  Mood affect and thinking normal.  There is a coin sized red slightly indurated lesion on the right arm.  The faded outline of the larger lesion is visible.                   .  ..

## 2022-07-20 ENCOUNTER — OFFICE VISIT (OUTPATIENT)
Dept: FAMILY MEDICINE | Facility: CLINIC | Age: 42
End: 2022-07-20
Payer: COMMERCIAL

## 2022-07-20 VITALS
DIASTOLIC BLOOD PRESSURE: 84 MMHG | OXYGEN SATURATION: 97 % | BODY MASS INDEX: 28.12 KG/M2 | SYSTOLIC BLOOD PRESSURE: 136 MMHG | HEART RATE: 87 BPM | WEIGHT: 193.2 LBS

## 2022-07-20 DIAGNOSIS — R22.32 SUBCUTANEOUS MASS OF LEFT THUMB: ICD-10-CM

## 2022-07-20 DIAGNOSIS — A69.20 ECM (ERYTHEMA CHRONICUM MIGRANS): Primary | ICD-10-CM

## 2022-07-20 DIAGNOSIS — M25.511 CHRONIC RIGHT SHOULDER PAIN: ICD-10-CM

## 2022-07-20 DIAGNOSIS — F43.10 PTSD (POST-TRAUMATIC STRESS DISORDER): ICD-10-CM

## 2022-07-20 DIAGNOSIS — S99.929A INJURY OF TOENAIL, UNSPECIFIED LATERALITY, INITIAL ENCOUNTER: ICD-10-CM

## 2022-07-20 DIAGNOSIS — G89.29 CHRONIC RIGHT SHOULDER PAIN: ICD-10-CM

## 2022-07-20 DIAGNOSIS — R19.5 STOOL COLOR ABNORMAL: ICD-10-CM

## 2022-07-20 LAB
BASOPHILS # BLD AUTO: 0.1 10E3/UL (ref 0–0.2)
BASOPHILS NFR BLD AUTO: 1 %
EOSINOPHIL # BLD AUTO: 0.1 10E3/UL (ref 0–0.7)
EOSINOPHIL NFR BLD AUTO: 1 %
ERYTHROCYTE [DISTWIDTH] IN BLOOD BY AUTOMATED COUNT: 13.2 % (ref 10–15)
HCT VFR BLD AUTO: 49.4 % (ref 35–47)
HGB BLD-MCNC: 16.5 G/DL (ref 11.7–15.7)
IMM GRANULOCYTES # BLD: 0 10E3/UL
IMM GRANULOCYTES NFR BLD: 0 %
LYMPHOCYTES # BLD AUTO: 2.7 10E3/UL (ref 0.8–5.3)
LYMPHOCYTES NFR BLD AUTO: 24 %
MCH RBC QN AUTO: 31.3 PG (ref 26.5–33)
MCHC RBC AUTO-ENTMCNC: 33.4 G/DL (ref 31.5–36.5)
MCV RBC AUTO: 94 FL (ref 78–100)
MONOCYTES # BLD AUTO: 0.4 10E3/UL (ref 0–1.3)
MONOCYTES NFR BLD AUTO: 4 %
NEUTROPHILS # BLD AUTO: 7.8 10E3/UL (ref 1.6–8.3)
NEUTROPHILS NFR BLD AUTO: 71 %
PLATELET # BLD AUTO: 324 10E3/UL (ref 150–450)
RBC # BLD AUTO: 5.27 10E6/UL (ref 3.8–5.2)
WBC # BLD AUTO: 11.1 10E3/UL (ref 4–11)

## 2022-07-20 PROCEDURE — 99214 OFFICE O/P EST MOD 30 MIN: CPT | Performed by: FAMILY MEDICINE

## 2022-07-20 PROCEDURE — 99000 SPECIMEN HANDLING OFFICE-LAB: CPT | Performed by: FAMILY MEDICINE

## 2022-07-20 PROCEDURE — 85025 COMPLETE CBC W/AUTO DIFF WBC: CPT | Mod: QW | Performed by: FAMILY MEDICINE

## 2022-07-20 PROCEDURE — 86618 LYME DISEASE ANTIBODY: CPT | Performed by: FAMILY MEDICINE

## 2022-07-20 PROCEDURE — 36415 COLL VENOUS BLD VENIPUNCTURE: CPT | Performed by: FAMILY MEDICINE

## 2022-07-20 PROCEDURE — 86666 EHRLICHIA ANTIBODY: CPT | Mod: 90 | Performed by: FAMILY MEDICINE

## 2022-07-20 ASSESSMENT — ENCOUNTER SYMPTOMS
NECK PAIN: 1
GASTROINTESTINAL NEGATIVE: 1
EYES NEGATIVE: 1
CONSTITUTIONAL NEGATIVE: 1
HEADACHES: 1
CARDIOVASCULAR NEGATIVE: 1
RESPIRATORY NEGATIVE: 1
ENDOCRINE NEGATIVE: 1
PSYCHIATRIC NEGATIVE: 1
ALLERGIC/IMMUNOLOGIC NEGATIVE: 1
HEMATOLOGIC/LYMPHATIC NEGATIVE: 1

## 2022-07-20 ASSESSMENT — PATIENT HEALTH QUESTIONNAIRE - PHQ9
SUM OF ALL RESPONSES TO PHQ QUESTIONS 1-9: 12
10. IF YOU CHECKED OFF ANY PROBLEMS, HOW DIFFICULT HAVE THESE PROBLEMS MADE IT FOR YOU TO DO YOUR WORK, TAKE CARE OF THINGS AT HOME, OR GET ALONG WITH OTHER PEOPLE: EXTREMELY DIFFICULT

## 2022-07-20 NOTE — PATIENT INSTRUCTIONS
You have been referred to Physical Therapy. You can contact the physical therapy office directly to schedule your appointment at the number below.    [facility] (formerly [facility]) - [phone]    If you have any questions or you need further assistance, please contact the clinic at [phone].

## 2022-07-20 NOTE — LETTER
July 22, 2022      Shannan Cameron  [address] [address]        Dear ,    We are writing to inform you of your test results.    Lyme test was negative. We will discuss the CBC results at your next appointment.    Resulted Orders   Lyme Disease Total Abs Bld with Reflex to Confirm CLIA   Result Value Ref Range    Lyme Disease Antibodies Total 0.09 <0.90      Comment:      Non-reactive, Absence of detectable Borrelia burgdorferi antibodies. A non-reactive result does not exclude the possibility of Borrelia burgdorferi infection. If early Lyme disease is suspected, a second sample should be collected and tested 2 to 4 weeks later.   CBC with platelets and differential   Result Value Ref Range    WBC Count 11.1 (H) 4.0 - 11.0 10e3/uL    RBC Count 5.27 (H) 3.80 - 5.20 10e6/uL    Hemoglobin 16.5 (H) 11.7 - 15.7 g/dL    Hematocrit 49.4 (H) 35.0 - 47.0 %    MCV 94 78 - 100 fL    MCH 31.3 26.5 - 33.0 pg    MCHC 33.4 31.5 - 36.5 g/dL    RDW 13.2 10.0 - 15.0 %    Platelet Count 324 150 - 450 10e3/uL    % Neutrophils 71 %    % Lymphocytes 24 %    % Monocytes 4 %    % Eosinophils 1 %    % Basophils 1 %    % Immature Granulocytes 0 %    Absolute Neutrophils 7.8 1.6 - 8.3 10e3/uL    Absolute Lymphocytes 2.7 0.8 - 5.3 10e3/uL    Absolute Monocytes 0.4 0.0 - 1.3 10e3/uL    Absolute Eosinophils 0.1 0.0 - 0.7 10e3/uL    Absolute Basophils 0.1 0.0 - 0.2 10e3/uL    Absolute Immature Granulocytes 0.0 <=0.4 10e3/uL       If you have any questions or concerns, please call the clinic at the number listed above.       Sincerely,      Agnieszka Ahn MD

## 2022-07-20 NOTE — PROGRESS NOTES
For  Assessment & Plan     ECM (erythema chronicum migrans)  Patient with abnormal rash concerning for Lyme.  Will check labs as ordered.  No other systemic symptoms at this time.  Continue doxycycline as previously prescribed.  - Lyme Disease Total Abs Bld with Reflex to Confirm CLIA; Future  - CBC with platelets and differential; Future  - Anaplasma phagocytoph antibody IgG IgM; Future  - Lyme Disease Total Abs Bld with Reflex to Confirm CLIA  - CBC with platelets and differential  - Anaplasma phagocytoph antibody IgG IgM    Subcutaneous mass of left thumb  Patient with previously identified mass subcutaneously on the pad of her left thumb that has been slowly getting bigger.  Recommend referral to orthopedics as we previously discussed.  This order is placed again.  Follow-up if not getting better  - Orthopedic  Referral; Future    Chronic right shoulder pain  Right shoulder pain has been going on for several months.  Patient is concerned it may be related to prior injury when she fell on the ice.  Symptoms have not improved.  She actually feels like her symptoms are getting worse and therefore x-ray was completed.  X-ray shows by my read no evidence of any significant abnormality.  I have recommended that we start with physical therapy and she is in agreement.  She will follow-up at [facility] physical therapy in [place] as this is easier for her to access.  If not improving consider either consultation with orthopedics or referral for MRI  - REVIEW OF HEALTH MAINTENANCE PROTOCOL ORDERS  - XR Shoulder Right G/E 3 Views; Future  - Physical Therapy Referral; Future    PTSD (post-traumatic stress disorder)  Patient with ongoing symptoms of PTSD.  We reviewed her PHQ-9.  She is agreeable with pursuing a behavioral health evaluation.  We will ask for them to follow-up with her about scheduling a consultation.    Injury of toenail, unspecified laterality, initial encounter  patient with trauma to  "bilateral toenails but I do not see any evidence of fungus.  We reviewed symptoms to monitor for.  Follow-up if not getting better.    Stool color abnormal  Patient noting abnormal stool color.  CBC is within normal limits.  No obvious evidence of blood loss.  However we will have her do a stool test for blood to confirm that she is not having GI bleeding.  More likely related to diet  - Fecal colorectal cancer screen (FIT); Future             Nicotine/Tobacco Cessation:  She reports that she has been smoking. She has never used smokeless tobacco.  Nicotine/Tobacco Cessation Plan:   Information offered: Patient not interested at this time      BMI:   Estimated body mass index is 28.12 kg/m  as calculated from the following:    Height as of 1/17/22: 1.765 m (5' 9.5\").    Weight as of this encounter: 87.6 kg (193 lb 3.2 oz).       Depression Screening Follow Up    PHQ 7/15/2022   PHQ-9 Total Score 12   Q9: Thoughts of better off dead/self-harm past 2 weeks Several days   F/U: Thoughts of suicide or self-harm Yes   F/U: Self harm-plan No   F/U: Self-harm action No   F/U: Safety concerns No     Last PHQ-9 7/15/2022   1.  Little interest or pleasure in doing things 2   2.  Feeling down, depressed, or hopeless 2   3.  Trouble falling or staying asleep, or sleeping too much 2   4.  Feeling tired or having little energy 0   5.  Poor appetite or overeating 1   6.  Feeling bad about yourself 2   7.  Trouble concentrating 2   8.  Moving slowly or restless 0   Q9: Thoughts of better off dead/self-harm past 2 weeks 1   PHQ-9 Total Score 12   In the past two weeks have you had thoughts of suicide or self harm? Yes   Do you have concerns about your personal safety or the safety of others? No   In the past 2 weeks have you thought about a plan or had intention to harm yourself? No   In the past 2 weeks have you acted on these thoughts in any way? No             Follow Up      Follow Up Actions Taken  Mental Health Referral " "placed    Discussed the following ways the patient can remain in a safe environment:  be around others      No follow-ups on file.    Agnieszka Ahn MD  [facility]    Jessica Campbell is a 42 year old accompanied by her self, presenting for the following health issues:  Musculoskeletal Problem (RT arm, patient fell down stairs in 01/2022, but unknown.), Nail Problem (Concerns of fungus on bilateral big toe. Or possibly residual issue from toe injury 4 months ago.), Thumb (\"Tumor\" in thumb was discussed in past, never seen surgeon like referred.), and Bowel Problems (Patient states her stools are a dark maroon recently.)      Musculoskeletal Problem  This is a new problem. Episode onset: 7 months ago with an injury. The problem occurs constantly. The problem has been gradually worsening (pain is a dull ache that is occasionally sharp, limits ROM, all in right shoulder down right arm). Associated symptoms include headaches and neck pain. Nothing aggravates the symptoms. She has tried nothing for the symptoms. The treatment provided no relief.   History of Present Illness       Reason for visit:  Possible bullseye rash under my right arm  Symptom onset:  3-7 days ago  Symptoms include:  Bullseye rash, hives, itchyness  Symptom intensity:  Mild  Symptom progression:  Staying the same  Had these symptoms before:  No  What makes it worse:  No  What makes it better:  Yes, Benadryl    She eats 2-3 servings of fruits and vegetables daily.She consumes 0 sweetened beverage(s) daily.She exercises with enough effort to increase her heart rate 30 to 60 minutes per day.  She exercises with enough effort to increase her heart rate 4 days per week.   She is taking medications regularly.    Today's PHQ-9         PHQ-9 Total Score: 12    PHQ-9 Q9 Thoughts of better off dead/self-harm past 2 weeks :   Several days  Thoughts of suicide or self harm: (P) Yes  Self-harm Plan:   (P) No  Self-harm Action: "     (P) No  Safety concerns for self or others: (P) No    How difficult have these problems made it for you to do your work, take care of things at home, or get along with other people: Extremely difficult             Review of Systems   Constitutional: Negative.    HENT: Negative.    Eyes: Negative.    Respiratory: Negative.    Cardiovascular: Negative.    Gastrointestinal: Negative.    Endocrine: Negative.    Breasts:  negative.    Genitourinary: Negative.    Musculoskeletal: Positive for neck pain.   Skin: Negative.    Allergic/Immunologic: Negative.    Neurological: Positive for headaches.   Hematological: Negative.    Psychiatric/Behavioral: Negative.             Objective    /84   Pulse 87   Wt 87.6 kg (193 lb 3.2 oz)   SpO2 97%   BMI 28.12 kg/m    Body mass index is 28.12 kg/m .  Physical Exam   GENERAL: healthy, alert and no distress  On examination of her skin patient has faint pink rings present primarily on her upper right arm  On examination of her thumb she has a fatty mass over the palmar aspect at the distal thumb, no redness noted  Patient with pain at her right shoulder and decreased range of motion due to discomfort, no deformity, no redness  Affect is mildly flat, patient makes good eye contact and demonstrates good insight                    .  ..

## 2022-07-21 LAB — B BURGDOR IGG+IGM SER QL: 0.09

## 2022-07-26 LAB
A PHAGOCYTOPH IGG TITR SER IF: NORMAL {TITER}
A PHAGOCYTOPH IGM TITR SER IF: NORMAL {TITER}

## 2022-08-22 ENCOUNTER — OFFICE VISIT (OUTPATIENT)
Dept: FAMILY MEDICINE | Facility: CLINIC | Age: 42
End: 2022-08-22
Payer: COMMERCIAL

## 2022-08-22 VITALS
SYSTOLIC BLOOD PRESSURE: 120 MMHG | DIASTOLIC BLOOD PRESSURE: 78 MMHG | HEART RATE: 98 BPM | BODY MASS INDEX: 28.14 KG/M2 | WEIGHT: 193.3 LBS | OXYGEN SATURATION: 97 %

## 2022-08-22 DIAGNOSIS — R41.840 ATTENTION AND CONCENTRATION DEFICIT: ICD-10-CM

## 2022-08-22 DIAGNOSIS — M53.3 PAIN IN THE COCCYX: ICD-10-CM

## 2022-08-22 DIAGNOSIS — F41.1 GENERALIZED ANXIETY DISORDER: Primary | ICD-10-CM

## 2022-08-22 DIAGNOSIS — E11.9 TYPE 2 DIABETES MELLITUS WITHOUT COMPLICATION, WITHOUT LONG-TERM CURRENT USE OF INSULIN (H): ICD-10-CM

## 2022-08-22 DIAGNOSIS — D72.829 LEUKOCYTOSIS, UNSPECIFIED TYPE: ICD-10-CM

## 2022-08-22 PROBLEM — F32.1 MODERATE MAJOR DEPRESSION (H): Status: ACTIVE | Noted: 2022-08-22

## 2022-08-22 PROBLEM — Z79.84 DIABETES MELLITUS TREATED WITH ORAL MEDICATION (H): Status: ACTIVE | Noted: 2022-08-22

## 2022-08-22 PROBLEM — E28.2 POLYCYSTIC OVARY SYNDROME: Status: ACTIVE | Noted: 2022-08-22

## 2022-08-22 LAB
ANION GAP SERPL CALCULATED.3IONS-SCNC: 10 MMOL/L (ref 7–15)
BUN SERPL-MCNC: 7.1 MG/DL (ref 6–20)
CALCIUM SERPL-MCNC: 9.6 MG/DL (ref 8.6–10)
CHLORIDE SERPL-SCNC: 102 MMOL/L (ref 98–107)
CHOLEST SERPL-MCNC: 298 MG/DL
CREAT SERPL-MCNC: 0.55 MG/DL (ref 0.51–0.95)
CREAT UR-MCNC: 232 MG/DL
DEPRECATED HCO3 PLAS-SCNC: 26 MMOL/L (ref 22–29)
ERYTHROCYTE [DISTWIDTH] IN BLOOD BY AUTOMATED COUNT: 13.3 % (ref 10–15)
GFR SERPL CREATININE-BSD FRML MDRD: >90 ML/MIN/1.73M2
GLUCOSE SERPL-MCNC: 122 MG/DL (ref 70–99)
HBA1C MFR BLD: 6.1 % (ref 0–5.6)
HCT VFR BLD AUTO: 46.8 % (ref 35–47)
HDLC SERPL-MCNC: 33 MG/DL
HGB BLD-MCNC: 15.5 G/DL (ref 11.7–15.7)
LDLC SERPL CALC-MCNC: ABNORMAL MG/DL
LDLC SERPL DIRECT ASSAY-MCNC: 158 MG/DL
MCH RBC QN AUTO: 31.3 PG (ref 26.5–33)
MCHC RBC AUTO-ENTMCNC: 33.1 G/DL (ref 31.5–36.5)
MCV RBC AUTO: 95 FL (ref 78–100)
MICROALBUMIN UR-MCNC: <12 MG/L
MICROALBUMIN/CREAT UR: NORMAL MG/G{CREAT}
NONHDLC SERPL-MCNC: 265 MG/DL
PLATELET # BLD AUTO: 296 10E3/UL (ref 150–450)
POTASSIUM SERPL-SCNC: 3.9 MMOL/L (ref 3.4–5.3)
RBC # BLD AUTO: 4.95 10E6/UL (ref 3.8–5.2)
SODIUM SERPL-SCNC: 138 MMOL/L (ref 136–145)
TRIGL SERPL-MCNC: 602 MG/DL
WBC # BLD AUTO: 9.6 10E3/UL (ref 4–11)

## 2022-08-22 PROCEDURE — 83721 ASSAY OF BLOOD LIPOPROTEIN: CPT | Mod: 59 | Performed by: FAMILY MEDICINE

## 2022-08-22 PROCEDURE — 90471 IMMUNIZATION ADMIN: CPT | Mod: SL | Performed by: FAMILY MEDICINE

## 2022-08-22 PROCEDURE — 91301 PR COVID VAC MODERNA 100 MCG/0.5 ML IM: CPT | Performed by: FAMILY MEDICINE

## 2022-08-22 PROCEDURE — 36415 COLL VENOUS BLD VENIPUNCTURE: CPT | Performed by: FAMILY MEDICINE

## 2022-08-22 PROCEDURE — 80048 BASIC METABOLIC PNL TOTAL CA: CPT | Performed by: FAMILY MEDICINE

## 2022-08-22 PROCEDURE — 99214 OFFICE O/P EST MOD 30 MIN: CPT | Mod: 25 | Performed by: FAMILY MEDICINE

## 2022-08-22 PROCEDURE — 91301 COVID-19,PF,MODERNA (18+ YRS PRIMARY SERIES .5ML): CPT | Performed by: FAMILY MEDICINE

## 2022-08-22 PROCEDURE — 83036 HEMOGLOBIN GLYCOSYLATED A1C: CPT | Performed by: FAMILY MEDICINE

## 2022-08-22 PROCEDURE — 0011A COVID-19,PF,MODERNA (18+ YRS PRIMARY SERIES .5ML): CPT | Performed by: FAMILY MEDICINE

## 2022-08-22 PROCEDURE — 82043 UR ALBUMIN QUANTITATIVE: CPT | Performed by: FAMILY MEDICINE

## 2022-08-22 PROCEDURE — 90677 PCV20 VACCINE IM: CPT | Performed by: FAMILY MEDICINE

## 2022-08-22 PROCEDURE — 80061 LIPID PANEL: CPT | Performed by: FAMILY MEDICINE

## 2022-08-22 PROCEDURE — 85027 COMPLETE CBC AUTOMATED: CPT | Performed by: FAMILY MEDICINE

## 2022-08-22 RX ORDER — HYDROXYZINE HYDROCHLORIDE 25 MG/1
25 TABLET, FILM COATED ORAL 3 TIMES DAILY PRN
COMMUNITY
End: 2023-10-31

## 2022-08-22 RX ORDER — PROPRANOLOL HYDROCHLORIDE 10 MG/1
10-20 TABLET ORAL 3 TIMES DAILY PRN
Qty: 30 TABLET | Refills: 3 | Status: SHIPPED | OUTPATIENT
Start: 2022-08-22 | End: 2023-10-17

## 2022-08-22 ASSESSMENT — ENCOUNTER SYMPTOMS
ALLERGIC/IMMUNOLOGIC NEGATIVE: 1
EYES NEGATIVE: 1
MUSCULOSKELETAL NEGATIVE: 1
RESPIRATORY NEGATIVE: 1
ENDOCRINE NEGATIVE: 1
NEUROLOGICAL NEGATIVE: 1
PSYCHIATRIC NEGATIVE: 1
HEMATOLOGIC/LYMPHATIC NEGATIVE: 1
GASTROINTESTINAL NEGATIVE: 1
CARDIOVASCULAR NEGATIVE: 1
CONSTITUTIONAL NEGATIVE: 1

## 2022-08-22 ASSESSMENT — PATIENT HEALTH QUESTIONNAIRE - PHQ9
SUM OF ALL RESPONSES TO PHQ QUESTIONS 1-9: 18
SUM OF ALL RESPONSES TO PHQ QUESTIONS 1-9: 18
10. IF YOU CHECKED OFF ANY PROBLEMS, HOW DIFFICULT HAVE THESE PROBLEMS MADE IT FOR YOU TO DO YOUR WORK, TAKE CARE OF THINGS AT HOME, OR GET ALONG WITH OTHER PEOPLE: EXTREMELY DIFFICULT

## 2022-08-22 NOTE — PROGRESS NOTES
Assessment & Plan     Generalized anxiety disorder  Patient with uncontrolled anxiety.  Unclear what underlying other issues may be contributing such as PTSD and also her concerns about ADHD.  Will continue current bupropion dose.  Discussed options for other daily medication but she feels like she needs something as needed and hydroxyzine is not working.  I do not think she is a good candidate for benzodiazepine as I think it will make her feel too sedated which would be uncomfortable for her.  We will try propranolol to see if that helps her from feeling so edgy during anxiety provoking events.  We did discuss that she is most likely to find benefit when she uses it preventatively during episodes that are likely to trigger anxiety  - propranolol (INDERAL) 10 MG tablet; Take 1-2 tablets (10-20 mg) by mouth 3 times daily as needed (anxiety)    Attention and concentration deficit  Patient noting increasing difficulty with attention.  She has both PTSD and anxiety as well as a history of depression.  But it certainly also could be that she has untreated ADHD.  I think she would most benefit from a referral for psychological testing.  I have placed that order and given her some various options in the area.  She will follow-up if not finding any available evaluation.  - Adult Mental Health  Referral; Future    Type 2 diabetes mellitus without complication, without long-term current use of insulin (H)  Has generally well-controlled diabetes but has not had her levels checked in some time.  She has not been checking her blood sugars at home.  Has not been following any particular diet.  Will recheck A1c today.  Foot exam is normal  - Albumin Random Urine Quantitative with Creat Ratio; Future  - Adult Eye  Referral; Future  - HEMOGLOBIN A1C; Future  - BASIC METABOLIC PANEL; Future  - Lipid panel reflex to direct LDL Non-fasting; Future  - Albumin Random Urine Quantitative with Creat Ratio  - HEMOGLOBIN  A1C  - BASIC METABOLIC PANEL  - Lipid panel reflex to direct LDL Non-fasting  - LDL cholesterol direct    Pain in the coccyx  Patient with low back pain that has not been getting better with conservative treatment.  Will have her start physical therapy.  Referral is placed - Physical Therapy Referral; Future    Leukocytosis, unspecified type  had borderline elevated white blood cell count done at last visit.  CBC repeated today is normal.  - CBC with platelets; Future  - CBC with platelets             Depression Screening Follow Up    PHQ 8/22/2022   PHQ-9 Total Score 18   Q9: Thoughts of better off dead/self-harm past 2 weeks Several days   F/U: Thoughts of suicide or self-harm Yes   F/U: Self harm-plan Yes   F/U: Self-harm action No   F/U: Safety concerns No     Last PHQ-9 8/22/2022   1.  Little interest or pleasure in doing things 2   2.  Feeling down, depressed, or hopeless 3   3.  Trouble falling or staying asleep, or sleeping too much 3   4.  Feeling tired or having little energy 1   5.  Poor appetite or overeating 2   6.  Feeling bad about yourself 3   7.  Trouble concentrating 3   8.  Moving slowly or restless 0   Q9: Thoughts of better off dead/self-harm past 2 weeks 1   PHQ-9 Total Score 18   In the past two weeks have you had thoughts of suicide or self harm? Yes   Do you have concerns about your personal safety or the safety of others? No   In the past 2 weeks have you thought about a plan or had intention to harm yourself? Yes   In the past 2 weeks have you acted on these thoughts in any way? No         Patient denies any suicidal ideation.      Follow Up    Follow Up Actions Taken  Crisis resource information provided in the After Visit Summary  Mental Health Referral placed    Discussed the following ways the patient can remain in a safe environment: Patient will continue to reach out for support where she can      Return in about 6 months (around 2/22/2023) for Follow up.    MD JYOTSNA Sunshine  [facility]    Jessica Campbell is a 42 year old accompanied by her self, presenting for the following health issues:  Results (Discuss.)      History of Present Illness       Diabetes:   She presents for follow up of diabetes.  She is not checking blood glucose. She is concerned about other. She is having blurry vision and weight gain. The patient has not had a diabetic eye exam in the last 12 months.         Reason for visit:  CBC test follow up, mental health & tumor    She eats 4 or more servings of fruits and vegetables daily.She consumes 0 sweetened beverage(s) daily.She exercises with enough effort to increase her heart rate 9 or less minutes per day.  She exercises with enough effort to increase her heart rate 3 or less days per week. She is missing 1 dose(s) of medications per week.  She is not taking prescribed medications regularly due to remembering to take.    Today's PHQ-9         PHQ-9 Total Score: 18    PHQ-9 Q9 Thoughts of better off dead/self-harm past 2 weeks :   Several days  Thoughts of suicide or self harm: (P) Yes  Self-harm Plan:   (P) Yes  Self-harm Action:     (P) No  Safety concerns for self or others: (P) No    How difficult have these problems made it for you to do your work, take care of things at home, or get along with other people: Extremely difficult           Review of Systems   Constitutional: Negative.    HENT: Negative.    Eyes: Negative.    Respiratory: Negative.    Cardiovascular: Negative.    Gastrointestinal: Negative.    Endocrine: Negative.    Breasts:  negative.    Genitourinary: Negative.    Musculoskeletal: Negative.    Skin: Negative.    Allergic/Immunologic: Negative.    Neurological: Negative.    Hematological: Negative.    Psychiatric/Behavioral: Negative.             Objective    /78   Pulse 98   Wt 87.7 kg (193 lb 4.8 oz)   SpO2 97%   BMI 28.14 kg/m    Body mass index is 28.14 kg/m .  Physical Exam   GENERAL: healthy, alert  and no distress  EYES: Eyes grossly normal to inspection, PERRL and conjunctivae and sclerae normal  HENT: ear canals and TM's normal, nose and mouth without ulcers or lesions  NECK: no adenopathy, no asymmetry, masses, or scars and thyroid normal to palpation  RESP: lungs clear to auscultation - no rales, rhonchi or wheezes  CV: regular rate and rhythm, normal S1 S2, no S3 or S4, no murmur, click or rub, no peripheral edema and peripheral pulses strong  Diabetic foot exam: normal DP and PT pulses, no trophic changes or ulcerative lesions and normal sensory exam    Results for orders placed or performed in visit on 08/22/22 (from the past 24 hour(s))   HEMOGLOBIN A1C   Result Value Ref Range    Hemoglobin A1C 6.1 (H) 0.0 - 5.6 %   BASIC METABOLIC PANEL   Result Value Ref Range    Creatinine 0.55 0.51 - 0.95 mg/dL    Sodium 138 136 - 145 mmol/L    Potassium 3.9 3.4 - 5.3 mmol/L    Urea Nitrogen 7.1 6.0 - 20.0 mg/dL    Chloride 102 98 - 107 mmol/L    Carbon Dioxide (CO2) 26 22 - 29 mmol/L    Anion Gap 10 7 - 15 mmol/L    Glucose 122 (H) 70 - 99 mg/dL    GFR Estimate >90 >60 mL/min/1.73m2    Calcium 9.6 8.6 - 10.0 mg/dL   Lipid panel reflex to direct LDL Non-fasting   Result Value Ref Range    Cholesterol 298 (H) <200 mg/dL    Triglycerides 602 (H) <150 mg/dL    Direct Measure HDL 33 (L) >=50 mg/dL    LDL Cholesterol Calculated      Non HDL Cholesterol 265 (H) <130 mg/dL    Narrative    Cholesterol  Desirable:  <200 mg/dL    Triglycerides  Normal:  Less than 150 mg/dL  Borderline High:  150-199 mg/dL  High:  200-499 mg/dL  Very High:  Greater than or equal to 500 mg/dL    Direct Measure HDL  Female:  Greater than or equal to 50 mg/dL   Male:  Greater than or equal to 40 mg/dL    LDL Cholesterol  Desirable:  <100mg/dL  Above Desirable:  100-129 mg/dL   Borderline High:  130-159 mg/dL   High:  160-189 mg/dL   Very High:  >= 190 mg/dL    Non HDL Cholesterol  Desirable:  130 mg/dL  Above Desirable:  130-159  mg/dL  Borderline High:  160-189 mg/dL  High:  190-219 mg/dL  Very High:  Greater than or equal to 220 mg/dL   CBC with platelets   Result Value Ref Range    WBC Count 9.6 4.0 - 11.0 10e3/uL    RBC Count 4.95 3.80 - 5.20 10e6/uL    Hemoglobin 15.5 11.7 - 15.7 g/dL    Hematocrit 46.8 35.0 - 47.0 %    MCV 95 78 - 100 fL    MCH 31.3 26.5 - 33.0 pg    MCHC 33.1 31.5 - 36.5 g/dL    RDW 13.3 10.0 - 15.0 %    Platelet Count 296 150 - 450 10e3/uL   LDL cholesterol direct   Result Value Ref Range    LDL Cholesterol Direct 158 (H) <100 mg/dL   Albumin Random Urine Quantitative with Creat Ratio   Result Value Ref Range    Albumin Urine mg/L <12.0 mg/L    Albumin Urine mg/g Cr      Creatinine Urine mg/dL 232.0 mg/dL                   .  ..

## 2022-08-22 NOTE — PATIENT INSTRUCTIONS
Please see options for Behavioral Health specialists.    [facility] [place]  [phone]  [email or web address]/, [facility] [place]  [phone]  [email or web address], [place] [facility]  [phone]  [email or web address], [facility] in [place] [phone]  [email or web address], and [facility] in [place]  [phone]  [email or web address]    [facility] - memory and ADHD evaluations - [phone]      Orthopedic  Referral  Where: [facility], [facility] [place] (REF'L)  Address: [address] [place] [address]  Phone: [phone]     [facility]. Optometrist ... [place]: [phone]  [facility] [place] [phone]

## 2022-08-22 NOTE — LETTER
August 25, 2022      Shannan P Cameron  [address]  [facility] [address]        Dear ,    We are writing to inform you of your test results.    Overall labs are stable except triglycerides are elevated. This is likely related to diet. Let me know if you have questions. We can discuss at your next visit.    Resulted Orders   Albumin Random Urine Quantitative with Creat Ratio   Result Value Ref Range    Albumin Urine mg/L <12.0 mg/L      Comment:      The reference ranges have not been established in urine albumin. The results should be integrated into the clinical context for interpretation.    Albumin Urine mg/g Cr        Comment:      Unable to calculate, urine albumin and/or urine creatinine is outside detectable limits.  Microalbuminuria is defined as an albumin:creatinine ratio of 17 to 299 for males and 25 to 299 for females. A ratio of albumin:creatinine of 300 or higher is indicative of overt proteinuria.  Due to biologic variability, positive results should be confirmed by a second, first-morning random or 24-hour timed urine specimen. If there is discrepancy, a third specimen is recommended. When 2 out of 3 results are in the microalbuminuria range, this is evidence for incipient nephropathy and warrants increased efforts at glucose control, blood pressure control, and institution of therapy with an angiotensin-converting-enzyme (ACE) inhibitor (if the patient can tolerate it).      Creatinine Urine mg/dL 232.0 mg/dL      Comment:      The reference ranges have not been established in urine creatinine. The results should be integrated into the clinical context for interpretation.   HEMOGLOBIN A1C   Result Value Ref Range    Hemoglobin A1C 6.1 (H) 0.0 - 5.6 %      Comment:      Normal <5.7%   Prediabetes 5.7-6.4%    Diabetes 6.5% or higher     Note: Adopted from ADA consensus guidelines.   BASIC METABOLIC PANEL   Result Value Ref Range    Creatinine 0.55 0.51 - 0.95 mg/dL    Sodium 138 136 - 145 mmol/L     Potassium 3.9 3.4 - 5.3 mmol/L    Urea Nitrogen 7.1 6.0 - 20.0 mg/dL    Chloride 102 98 - 107 mmol/L    Carbon Dioxide (CO2) 26 22 - 29 mmol/L    Anion Gap 10 7 - 15 mmol/L    Glucose 122 (H) 70 - 99 mg/dL    GFR Estimate >90 >60 mL/min/1.73m2      Comment:      Effective December 21, 2021 eGFRcr in adults is calculated using the 2021 CKD-EPI creatinine equation which includes age and gender (Raj et al., NE, DOI: 10.1056/GWRZjk3686462)    Calcium 9.6 8.6 - 10.0 mg/dL   Lipid panel reflex to direct LDL Non-fasting   Result Value Ref Range    Cholesterol 298 (H) <200 mg/dL    Triglycerides 602 (H) <150 mg/dL    Direct Measure HDL 33 (L) >=50 mg/dL    LDL Cholesterol Calculated        Comment:      Cannot estimate LDL when triglyceride exceeds 400 mg/dL    Non HDL Cholesterol 265 (H) <130 mg/dL    Narrative    Cholesterol  Desirable:  <200 mg/dL    Triglycerides  Normal:  Less than 150 mg/dL  Borderline High:  150-199 mg/dL  High:  200-499 mg/dL  Very High:  Greater than or equal to 500 mg/dL    Direct Measure HDL  Female:  Greater than or equal to 50 mg/dL   Male:  Greater than or equal to 40 mg/dL    LDL Cholesterol  Desirable:  <100mg/dL  Above Desirable:  100-129 mg/dL   Borderline High:  130-159 mg/dL   High:  160-189 mg/dL   Very High:  >= 190 mg/dL    Non HDL Cholesterol  Desirable:  130 mg/dL  Above Desirable:  130-159 mg/dL  Borderline High:  160-189 mg/dL  High:  190-219 mg/dL  Very High:  Greater than or equal to 220 mg/dL   CBC with platelets   Result Value Ref Range    WBC Count 9.6 4.0 - 11.0 10e3/uL    RBC Count 4.95 3.80 - 5.20 10e6/uL    Hemoglobin 15.5 11.7 - 15.7 g/dL    Hematocrit 46.8 35.0 - 47.0 %    MCV 95 78 - 100 fL    MCH 31.3 26.5 - 33.0 pg    MCHC 33.1 31.5 - 36.5 g/dL    RDW 13.3 10.0 - 15.0 %    Platelet Count 296 150 - 450 10e3/uL   LDL cholesterol direct   Result Value Ref Range    LDL Cholesterol Direct 158 (H) <100 mg/dL      Comment:      Age 2-19 years:  Desirable: 0-110 mg/dL    Borderline high: 110-129 mg/dL   High: >= 130 mg/dL    Age 20 years and older:  Desirable: <100mg/dL  Above desirable: 100-129 mg/dL   Borderline high: 130-159 mg/dL   High: 160-189 mg/dL   Very high: >= 190 mg/dL       If you have any questions or concerns, please call the clinic at the number listed above.       Sincerely,      Agnieszka Ahn MD

## 2022-08-22 NOTE — LETTER
August 23, 2022      Shannan P Rakesh  [address] [facility] [address]        Dear ,    We are writing to inform you of your test results.    Your labs are overall stable. No other concerns at this time. We should recheck A1c in 6 months.    Resulted Orders   Albumin Random Urine Quantitative with Creat Ratio   Result Value Ref Range    Albumin Urine mg/L <12.0 mg/L      Comment:      The reference ranges have not been established in urine albumin. The results should be integrated into the clinical context for interpretation.    Albumin Urine mg/g Cr        Comment:      Unable to calculate, urine albumin and/or urine creatinine is outside detectable limits.  Microalbuminuria is defined as an albumin:creatinine ratio of 17 to 299 for males and 25 to 299 for females. A ratio of albumin:creatinine of 300 or higher is indicative of overt proteinuria.  Due to biologic variability, positive results should be confirmed by a second, first-morning random or 24-hour timed urine specimen. If there is discrepancy, a third specimen is recommended. When 2 out of 3 results are in the microalbuminuria range, this is evidence for incipient nephropathy and warrants increased efforts at glucose control, blood pressure control, and institution of therapy with an angiotensin-converting-enzyme (ACE) inhibitor (if the patient can tolerate it).      Creatinine Urine mg/dL 232.0 mg/dL      Comment:      The reference ranges have not been established in urine creatinine. The results should be integrated into the clinical context for interpretation.   HEMOGLOBIN A1C   Result Value Ref Range    Hemoglobin A1C 6.1 (H) 0.0 - 5.6 %      Comment:      Normal <5.7%   Prediabetes 5.7-6.4%    Diabetes 6.5% or higher     Note: Adopted from ADA consensus guidelines.   BASIC METABOLIC PANEL   Result Value Ref Range    Creatinine 0.55 0.51 - 0.95 mg/dL    Sodium 138 136 - 145 mmol/L    Potassium 3.9 3.4 - 5.3 mmol/L    Urea Nitrogen 7.1 6.0 -  20.0 mg/dL    Chloride 102 98 - 107 mmol/L    Carbon Dioxide (CO2) 26 22 - 29 mmol/L    Anion Gap 10 7 - 15 mmol/L    Glucose 122 (H) 70 - 99 mg/dL    GFR Estimate >90 >60 mL/min/1.73m2      Comment:      Effective December 21, 2021 eGFRcr in adults is calculated using the 2021 CKD-EPI creatinine equation which includes age and gender (Raj noel al., [place], DOI: 10.1056/VQAQbf9472286)    Calcium 9.6 8.6 - 10.0 mg/dL   Lipid panel reflex to direct LDL Non-fasting   Result Value Ref Range    Cholesterol 298 (H) <200 mg/dL    Triglycerides 602 (H) <150 mg/dL    Direct Measure HDL 33 (L) >=50 mg/dL    LDL Cholesterol Calculated        Comment:      Cannot estimate LDL when triglyceride exceeds 400 mg/dL    Non HDL Cholesterol 265 (H) <130 mg/dL    Narrative    Cholesterol  Desirable:  <200 mg/dL    Triglycerides  Normal:  Less than 150 mg/dL  Borderline High:  150-199 mg/dL  High:  200-499 mg/dL  Very High:  Greater than or equal to 500 mg/dL    Direct Measure HDL  Female:  Greater than or equal to 50 mg/dL   Male:  Greater than or equal to 40 mg/dL    LDL Cholesterol  Desirable:  <100mg/dL  Above Desirable:  100-129 mg/dL   Borderline High:  130-159 mg/dL   High:  160-189 mg/dL   Very High:  >= 190 mg/dL    Non HDL Cholesterol  Desirable:  130 mg/dL  Above Desirable:  130-159 mg/dL  Borderline High:  160-189 mg/dL  High:  190-219 mg/dL  Very High:  Greater than or equal to 220 mg/dL   CBC with platelets   Result Value Ref Range    WBC Count 9.6 4.0 - 11.0 10e3/uL    RBC Count 4.95 3.80 - 5.20 10e6/uL    Hemoglobin 15.5 11.7 - 15.7 g/dL    Hematocrit 46.8 35.0 - 47.0 %    MCV 95 78 - 100 fL    MCH 31.3 26.5 - 33.0 pg    MCHC 33.1 31.5 - 36.5 g/dL    RDW 13.3 10.0 - 15.0 %    Platelet Count 296 150 - 450 10e3/uL   LDL cholesterol direct   Result Value Ref Range    LDL Cholesterol Direct 158 (H) <100 mg/dL      Comment:      Age 2-19 years:  Desirable: 0-110 mg/dL   Borderline high: 110-129 mg/dL   High: >= 130 mg/dL    Age  20 years and older:  Desirable: <100mg/dL  Above desirable: 100-129 mg/dL   Borderline high: 130-159 mg/dL   High: 160-189 mg/dL   Very high: >= 190 mg/dL       If you have any questions or concerns, please call the clinic at the number listed above.       Sincerely,      Agnieszka Ahn MD

## 2023-01-27 ENCOUNTER — TELEPHONE (OUTPATIENT)
Dept: FAMILY MEDICINE | Facility: CLINIC | Age: 43
End: 2023-01-27
Payer: COMMERCIAL

## 2023-01-27 DIAGNOSIS — R22.32 SUBCUTANEOUS MASS OF LEFT THUMB: Primary | ICD-10-CM

## 2023-01-27 NOTE — TELEPHONE ENCOUNTER
1-27-23  Pt called stated she was seen 1-17-22 w/ Dr river about her thumb & MD referred her to a surgeon. Pt states she never ended up making that appointment & now is ready since her thumb is bothering  Her.  Please call patient back with that surgeon's name  afshin

## 2023-01-27 NOTE — TELEPHONE ENCOUNTER
Per 1/22/22 visit note:    Thumb pain (CZB90-RI M79.646).     Course:  unclear etiology, will refer on to hand surgery to determine what imaging could be helpful.    Referral was placed. Pt notified.

## 2023-01-31 ENCOUNTER — TRANSFERRED RECORDS (OUTPATIENT)
Dept: HEALTH INFORMATION MANAGEMENT | Facility: CLINIC | Age: 43
End: 2023-01-31

## 2023-02-24 ENCOUNTER — OFFICE VISIT (OUTPATIENT)
Dept: FAMILY MEDICINE | Facility: CLINIC | Age: 43
End: 2023-02-24
Payer: COMMERCIAL

## 2023-02-24 VITALS
RESPIRATION RATE: 16 BRPM | OXYGEN SATURATION: 96 % | TEMPERATURE: 98.9 F | SYSTOLIC BLOOD PRESSURE: 137 MMHG | WEIGHT: 218 LBS | BODY MASS INDEX: 31.73 KG/M2 | HEART RATE: 95 BPM | DIASTOLIC BLOOD PRESSURE: 83 MMHG

## 2023-02-24 DIAGNOSIS — L03.012 CELLULITIS OF FINGER OF LEFT HAND: Primary | ICD-10-CM

## 2023-02-24 PROCEDURE — 99214 OFFICE O/P EST MOD 30 MIN: CPT | Performed by: PHYSICIAN ASSISTANT

## 2023-02-24 RX ORDER — CEPHALEXIN 500 MG/1
500 CAPSULE ORAL 4 TIMES DAILY
Qty: 40 CAPSULE | Refills: 0 | Status: SHIPPED | OUTPATIENT
Start: 2023-02-24 | End: 2023-03-06

## 2023-02-24 NOTE — PROGRESS NOTES
Assessment & Plan:      Problem List Items Addressed This Visit    None  Visit Diagnoses     Cellulitis of finger of left hand    -  Primary    Relevant Medications    cephALEXin (KEFLEX) 500 MG capsule        Medical Decision Making  Patient presents with recent surgery of the left thumb 1 week ago now with worsening swelling and redness.  Symptoms appear borderline for possible mild cellulitis versus irritation of surgical site.  Recommend oral antibiotics at this time with close monitoring of symptoms.  If symptoms worsen, patient should present immediately to the emergency room and/or contact surgical team.  In the meantime, continue to wear brace and use cold compresses.     Subjective:      Shannan Cameron is a 43 year old female here for evaluation of left thumb swelling and redness following recent surgical procedure 1 week ago.  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  She has noted gradually worsening redness and swelling around the stitches.  She otherwise denies pains and increased warmth in the thumb.  No fevers or signs of pus draining from the wound.     The following portions of the patient's history were reviewed and updated as appropriate: allergies, current medications, and problem list.     Review of Systems  Pertinent items are noted in HPI.    Allergies  Allergies   Allergen Reactions     Bee Venom Shortness Of Breath and Other (See Comments)     Fainting.       Family History   Problem Relation Age of Onset     Alcoholism Mother      Allergic rhinitis Mother      Anemia Mother      Arthritis Mother      Cancer Mother      Depression Mother      Substance Abuse Mother      Emotional abuse Mother      Fibromyalgia Mother      Mental Illness Mother      Alcoholism Father      Depression Father      Diabetes Father      Substance Abuse Father        Social History     Tobacco Use     Smoking status: Every Day     Smokeless tobacco: Never   Substance Use Topics     Alcohol use: No         Objective:      /83   Pulse 95   Temp 98.9  F (37.2  C) (Oral)   Resp 16   Wt 98.9 kg (218 lb)   SpO2 96%   BMI 31.73 kg/m    General appearance - alert, well appearing, and in no distress and non-toxic  Skin - Left thumb: Sutures in place with appropriately healing wound, moderate erythema surrounding the suture site with appearance of mild swelling, no increased warmth to touch, no active purulent discharge seen    The use of Dragon[employer] dictation services was used to construct the content of this note; any grammatical errors are non-intentional. Please contact the author directly if you are in need of any clarification.

## 2023-02-28 ENCOUNTER — TRANSFERRED RECORDS (OUTPATIENT)
Dept: HEALTH INFORMATION MANAGEMENT | Facility: CLINIC | Age: 43
End: 2023-02-28

## 2023-04-11 ENCOUNTER — TRANSFERRED RECORDS (OUTPATIENT)
Dept: HEALTH INFORMATION MANAGEMENT | Facility: CLINIC | Age: 43
End: 2023-04-11
Payer: COMMERCIAL

## 2023-08-04 ENCOUNTER — NURSE TRIAGE (OUTPATIENT)
Dept: FAMILY MEDICINE | Facility: CLINIC | Age: 43
End: 2023-08-04
Payer: COMMERCIAL

## 2023-08-04 NOTE — TELEPHONE ENCOUNTER
Nurse Triage SBAR    Is this a 2nd Level Triage? YES, LICENSED PRACTITIONER REVIEW IS REQUIRED    Situation:   Wasp or a yellow jacket , got stuck in her and she had to pull it out   Background:   Assessment:   1. TYPE:        Wasp or a yellow jacket , got stuck in her and she had to pull it out   2. ONSET:   About 24 hours ago  3. LOCATION:   Upper inner arm, right  4. SWELLING SIZE:       Spreading across arm,  down elbow, around back of arm and towards shoulder, hand is swollen  5. REDNESS:   Most of swelling is red  6. PAIN:  Not as much pain today, but is tender to touch, holding away from body  7. ITCHING:  Yes lots of itching   8. RESPIRATORY DISTRESS:  No  9. PRIOR REACTIONS:  Yes, bee stings (hospitalized)  10. OTHER SYMPTOMS:   Nausea, very dizzy and light headed after it happened for about a half hour, hives  11. PREGNANCY:   No    Has hives on face, on left arm just a few says she is not worried about it, these have increased from yesterday dispite large doses of benadryl    Took 200 mg of benadryl thinks , and took it again this AM    Protocol Recommended Disposition:   See in Office Today, Go To ED/UCC Now (Or To Office With PCP Approval)    Recommendation:      No available appointments in clinic RN recommended UC if none in area recommended to go to emergency department    Routed to provider    Does the patient meet one of the following criteria for ADS visit consideration? No         YURI Jiang  [facility]     Reason for Disposition   Hives, itching, or swelling elsewhere on body (i.e., not at a site of sting) and started within 2 hours of sting   Red or very tender (to touch) area, and started over 24 hours after the sting    Additional Information   Negative: Passed out (i.e., fainted, collapsed and was not responding)   Negative: Wheezing or difficulty breathing   Negative: Hoarseness, cough, or tightness in the throat or chest   Negative: Swollen tongue or difficulty  swallowing   Negative: Life-threatening reaction in past to sting (anaphylaxis) and < 2 hours since sting   Negative: Sounds like a life-threatening emergency to the triager   Negative: Not a bee, wasp, hornet, or yellow jacket sting   Negative: Vomiting or abdominal cramps and started within 2 hours of sting   Negative: Gave epinephrine shot and no symptoms now   Negative: Sting inside the mouth   Negative: Sting on eyeball (e.g., cornea)   Negative: More than 50 stings   Negative: Fever and red area   Negative: Fever and area is very tender to touch   Negative: Red streak or red line and length > 2 inches (5 cm)    Protocols used: Bee or Yellow Jacket Sting-A-OH

## 2023-08-04 NOTE — TELEPHONE ENCOUNTER
Call with pt, given recommendations per Danni Mendoza. Pt stated she just pulled up to the [facility] ER and will receive treatment at the ER.

## 2023-08-30 ENCOUNTER — OFFICE VISIT (OUTPATIENT)
Dept: FAMILY MEDICINE | Facility: CLINIC | Age: 43
End: 2023-08-30
Payer: COMMERCIAL

## 2023-08-30 VITALS
WEIGHT: 216.9 LBS | BODY MASS INDEX: 31.05 KG/M2 | RESPIRATION RATE: 18 BRPM | TEMPERATURE: 97.9 F | HEART RATE: 91 BPM | SYSTOLIC BLOOD PRESSURE: 128 MMHG | DIASTOLIC BLOOD PRESSURE: 80 MMHG | OXYGEN SATURATION: 98 % | HEIGHT: 70 IN

## 2023-08-30 DIAGNOSIS — N83.209 CYST OF OVARY, UNSPECIFIED LATERALITY: ICD-10-CM

## 2023-08-30 DIAGNOSIS — E04.1 THYROID NODULE: ICD-10-CM

## 2023-08-30 DIAGNOSIS — Z79.84 DIABETES MELLITUS TREATED WITH ORAL MEDICATION (H): ICD-10-CM

## 2023-08-30 DIAGNOSIS — R22.1 NECK SWELLING: Primary | ICD-10-CM

## 2023-08-30 DIAGNOSIS — E11.9 DIABETES MELLITUS TREATED WITH ORAL MEDICATION (H): ICD-10-CM

## 2023-08-30 LAB — HBA1C MFR BLD: 9.5 % (ref 0–5.6)

## 2023-08-30 PROCEDURE — 36415 COLL VENOUS BLD VENIPUNCTURE: CPT | Performed by: FAMILY MEDICINE

## 2023-08-30 PROCEDURE — 80048 BASIC METABOLIC PNL TOTAL CA: CPT | Performed by: FAMILY MEDICINE

## 2023-08-30 PROCEDURE — 82043 UR ALBUMIN QUANTITATIVE: CPT | Performed by: FAMILY MEDICINE

## 2023-08-30 PROCEDURE — 82570 ASSAY OF URINE CREATININE: CPT | Performed by: FAMILY MEDICINE

## 2023-08-30 PROCEDURE — 80061 LIPID PANEL: CPT | Performed by: FAMILY MEDICINE

## 2023-08-30 PROCEDURE — 99214 OFFICE O/P EST MOD 30 MIN: CPT | Performed by: FAMILY MEDICINE

## 2023-08-30 PROCEDURE — 83721 ASSAY OF BLOOD LIPOPROTEIN: CPT | Performed by: FAMILY MEDICINE

## 2023-08-30 PROCEDURE — 83036 HEMOGLOBIN GLYCOSYLATED A1C: CPT | Performed by: FAMILY MEDICINE

## 2023-08-30 RX ORDER — DIPHENHYDRAMINE HCL 50 MG
100 CAPSULE ORAL EVERY 4 HOURS
COMMUNITY
End: 2024-02-26

## 2023-08-30 RX ORDER — LANCETS
EACH MISCELLANEOUS
Qty: 100 EACH | Refills: 6 | Status: SHIPPED | OUTPATIENT
Start: 2023-08-30 | End: 2024-01-22

## 2023-08-30 ASSESSMENT — PATIENT HEALTH QUESTIONNAIRE - PHQ9
SUM OF ALL RESPONSES TO PHQ QUESTIONS 1-9: 9
10. IF YOU CHECKED OFF ANY PROBLEMS, HOW DIFFICULT HAVE THESE PROBLEMS MADE IT FOR YOU TO DO YOUR WORK, TAKE CARE OF THINGS AT HOME, OR GET ALONG WITH OTHER PEOPLE: VERY DIFFICULT
SUM OF ALL RESPONSES TO PHQ QUESTIONS 1-9: 9

## 2023-08-30 NOTE — PROGRESS NOTES
Assessment & Plan     Neck swelling  Patient with lower neck swelling in left supraclavicular area without discrete nodule. Improving. Patient will monitor. If recurs or finds discrete nodule, will let me know and consider imaging    Thyroid nodule  Patient with history of thyroid nodules, one that required follow up ultrasound. Will get ultrasound completed  - US Thyroid; Future    Cyst of ovary, unspecified laterality  Patient with history of ovarian cysts, noting low left back pain, will check ultrasound  - US Pelvic Complete with Transvaginal; Future    Diabetes mellitus treated with oral medication (H)  Patient with history of diabetes. Has metformin but not taking at this time. Would like to start monitoring blood sugars but does not have working meter. Will check labs.  - HEMOGLOBIN A1C; Future  - BASIC METABOLIC PANEL; Future  - Lipid panel reflex to direct LDL Non-fasting; Future  - Albumin Random Urine Quantitative with Creat Ratio; Future  - blood glucose monitoring (NO BRAND SPECIFIED) meter device kit; Use to test blood sugar 1 times daily or as directed. Preferred blood glucose meter OR supplies to accompany: Blood Glucose Monitor Brands: per insurance.  - thin (NO BRAND SPECIFIED) lancets; Use with lanceting device. To accompany: Blood Glucose Monitor Brands: per insurance.  - blood glucose (NO BRAND SPECIFIED) test strip; Use to test blood sugar 1 times daily or as directed. To accompany: Blood Glucose Monitor Brands: per insurance.  - HEMOGLOBIN A1C  - BASIC METABOLIC PANEL  - Lipid panel reflex to direct LDL Non-fasting  - Albumin Random Urine Quantitative with Creat Ratio             Nicotine/Tobacco Cessation:  She reports that she has been smoking. She has never used smokeless tobacco.  Nicotine/Tobacco Cessation Plan:   Information offered: Patient not interested at this time      BMI:   Estimated body mass index is 31.57 kg/m  as calculated from the following:    Height as of this encounter:  "1.765 m (5' 9.5\").    Weight as of this encounter: 98.4 kg (216 lb 14.4 oz).           Agnieszka Ahn MD  [facility]    Jessica Campbell is a 43 year old, presenting for the following health issues:  RECHECK (Swollen Lump on left collarbone area since Sunday after being stung by a bee on 8/9/23, having pain in left shoulder, and random hives)        8/30/2023     4:14 PM   Additional Questions   Roomed by LYLE Arce     Patient with several concerns    Over the weekend patient with onset of swelling and irritation above clavicle, sudden onset, has been improving. Thought maybe it was an insect bite but never saw area of bite.   In 2021 patient reports she was diagnosed with ovarian cyst. Surgery was recommended but patient never followed up. Still having some intermittent left low back pain  Diabetes not being monitored, last A1c was a year ago, not taking metformin. Glucose meter is broken, needs new order.   Reviewed old records, prior thyroid ultrasound showed thyroid nodules, one year follow up needed    History of Present Illness       Reason for visit:  Weird lump on my neck/shoulder warm to touch  Symptom onset:  3-7 days ago  Symptoms include:  Flu like  body aches and pains, joint pain,headache  Symptom intensity:  Moderate  Symptom progression:  Improving  Had these symptoms before:  No  What makes it worse:  Stress  What makes it better:  Resting    She eats 2-3 servings of fruits and vegetables daily.She consumes 0 sweetened beverage(s) daily.She exercises with enough effort to increase her heart rate 30 to 60 minutes per day.  She exercises with enough effort to increase her heart rate 4 days per week. She is missing 7 dose(s) of medications per week.  She is not taking prescribed medications regularly due to side effects and remembering to take.               Review of Systems         Objective    /80 (BP Location: Right arm, Patient Position: Sitting, Cuff " "Size: Adult Regular)   Pulse 91   Temp 97.9  F (36.6  C) (Tympanic)   Resp 18   Ht 1.765 m (5' 9.5\")   Wt 98.4 kg (216 lb 14.4 oz)   LMP  (LMP Unknown)   SpO2 98%   BMI 31.57 kg/m    Body mass index is 31.57 kg/m .  Physical Exam   GENERAL: healthy, alert and no distress  NECK: no adenopathy, no asymmetry, masses, or scars and thyroid normal to palpation                      "

## 2023-08-30 NOTE — LETTER
September 5, 2023      Shannan Cameron  [address] [facility] [address]        Dear ,    We are writing to inform you of your test results.    Blood sugar is high as noted, and A1c is elevated. Please let me know if you had any trouble getting the prescription for the glucose meter filled. Let me know if your blood sugars aren't coming back down. We should repeat the A1c in 3 months.    Resulted Orders   HEMOGLOBIN A1C   Result Value Ref Range    Hemoglobin A1C 9.5 (H) 0.0 - 5.6 %   BASIC METABOLIC PANEL   Result Value Ref Range    Sodium 138 136 - 145 mmol/L    Potassium 4.6 3.4 - 5.3 mmol/L    Chloride 103 98 - 107 mmol/L    Carbon Dioxide (CO2) 24 22 - 29 mmol/L    Anion Gap 11 7 - 15 mmol/L    Urea Nitrogen 12.5 6.0 - 20.0 mg/dL    Creatinine 0.54 0.51 - 0.95 mg/dL    Calcium 9.6 8.6 - 10.0 mg/dL    Glucose 346 (H) 70 - 99 mg/dL    GFR Estimate >90 >60 mL/min/1.73m2   Lipid panel reflex to direct LDL Non-fasting   Result Value Ref Range    Cholesterol 271 (H) <200 mg/dL    Triglycerides 706 (H) <150 mg/dL    Direct Measure HDL 33 (L) >=50 mg/dL    LDL Cholesterol Calculated        Comment:      Cannot estimate LDL when triglyceride exceeds 400 mg/dL    Non HDL Cholesterol 238 (H) <130 mg/dL    Narrative    Cholesterol  Desirable:  <200 mg/dL    Triglycerides  Normal:  Less than 150 mg/dL  Borderline High:  150-199 mg/dL  High:  200-499 mg/dL  Very High:  Greater than or equal to 500 mg/dL    Direct Measure HDL  Female:  Greater than or equal to 50 mg/dL   Male:  Greater than or equal to 40 mg/dL    LDL Cholesterol  Desirable:  <100mg/dL  Above Desirable:  100-129 mg/dL   Borderline High:  130-159 mg/dL   High:  160-189 mg/dL   Very High:  >= 190 mg/dL    Non HDL Cholesterol  Desirable:  130 mg/dL  Above Desirable:  130-159 mg/dL  Borderline High:  160-189 mg/dL  High:  190-219 mg/dL  Very High:  Greater than or equal to 220 mg/dL   Albumin Random Urine Quantitative with Creat Ratio   Result Value Ref  Range    Creatinine Urine mg/dL 41.5 mg/dL      Comment:      The reference ranges have not been established in urine creatinine. The results should be integrated into the clinical context for interpretation.    Albumin Urine mg/L <12.0 mg/L      Comment:      The reference ranges have not been established in urine albumin. The results should be integrated into the clinical context for interpretation.    Albumin Urine mg/g Cr        Comment:      Unable to calculate, urine albumin and/or urine creatinine is outside detectable limits.  Microalbuminuria is defined as an albumin:creatinine ratio of 17 to 299 for males and 25 to 299 for females. A ratio of albumin:creatinine of 300 or higher is indicative of overt proteinuria.  Due to biologic variability, positive results should be confirmed by a second, first-morning random or 24-hour timed urine specimen. If there is discrepancy, a third specimen is recommended. When 2 out of 3 results are in the microalbuminuria range, this is evidence for incipient nephropathy and warrants increased efforts at glucose control, blood pressure control, and institution of therapy with an angiotensin-converting-enzyme (ACE) inhibitor (if the patient can tolerate it).     LDL cholesterol direct   Result Value Ref Range    LDL Cholesterol Direct 155 (H) <100 mg/dL      Comment:      Age 2-19 years:  Desirable: 0-110 mg/dL   Borderline high: 110-129 mg/dL   High: >= 130 mg/dL    Age 20 years and older:  Desirable: <100mg/dL  Above desirable: 100-129 mg/dL   Borderline high: 130-159 mg/dL   High: 160-189 mg/dL   Very high: >= 190 mg/dL       If you have any questions or concerns, please call the clinic at the number listed above.       Sincerely,      Agnieszka Ahn MD

## 2023-08-30 NOTE — PATIENT INSTRUCTIONS
We have referred you for imaging studies that will be done at an outside facility. The request will be routed to the outside facility in the next 24 hours. Please call the facility directly in 2-3 days to schedule your appointment.  [facility] - [phone]      If you are having difficulty getting the test scheduled or have other questions or concerns, or if you had your imaging done and you have not heard from us within 2 business days regarding the results, please contact the clinic at [phone].

## 2023-08-31 LAB
ANION GAP SERPL CALCULATED.3IONS-SCNC: 11 MMOL/L (ref 7–15)
BUN SERPL-MCNC: 12.5 MG/DL (ref 6–20)
CALCIUM SERPL-MCNC: 9.6 MG/DL (ref 8.6–10)
CHLORIDE SERPL-SCNC: 103 MMOL/L (ref 98–107)
CHOLEST SERPL-MCNC: 271 MG/DL
CREAT SERPL-MCNC: 0.54 MG/DL (ref 0.51–0.95)
CREAT UR-MCNC: 41.5 MG/DL
DEPRECATED HCO3 PLAS-SCNC: 24 MMOL/L (ref 22–29)
GFR SERPL CREATININE-BSD FRML MDRD: >90 ML/MIN/1.73M2
GLUCOSE SERPL-MCNC: 346 MG/DL (ref 70–99)
HDLC SERPL-MCNC: 33 MG/DL
LDLC SERPL CALC-MCNC: ABNORMAL MG/DL
LDLC SERPL DIRECT ASSAY-MCNC: 155 MG/DL
MICROALBUMIN UR-MCNC: <12 MG/L
MICROALBUMIN/CREAT UR: NORMAL MG/G{CREAT}
NONHDLC SERPL-MCNC: 238 MG/DL
POTASSIUM SERPL-SCNC: 4.6 MMOL/L (ref 3.4–5.3)
SODIUM SERPL-SCNC: 138 MMOL/L (ref 136–145)
TRIGL SERPL-MCNC: 706 MG/DL

## 2023-10-04 ENCOUNTER — TRANSFERRED RECORDS (OUTPATIENT)
Dept: MULTI SPECIALTY CLINIC | Facility: CLINIC | Age: 43
End: 2023-10-04

## 2023-10-04 LAB — RETINOPATHY: NORMAL

## 2023-10-17 ENCOUNTER — OFFICE VISIT (OUTPATIENT)
Dept: FAMILY MEDICINE | Facility: CLINIC | Age: 43
End: 2023-10-17
Payer: COMMERCIAL

## 2023-10-17 VITALS
WEIGHT: 213.8 LBS | HEIGHT: 70 IN | OXYGEN SATURATION: 98 % | SYSTOLIC BLOOD PRESSURE: 122 MMHG | TEMPERATURE: 98.1 F | HEART RATE: 93 BPM | RESPIRATION RATE: 20 BRPM | DIASTOLIC BLOOD PRESSURE: 76 MMHG | BODY MASS INDEX: 30.61 KG/M2

## 2023-10-17 DIAGNOSIS — G43.109 MIGRAINE WITH AURA AND WITHOUT STATUS MIGRAINOSUS, NOT INTRACTABLE: Primary | ICD-10-CM

## 2023-10-17 LAB
ALBUMIN SERPL BCG-MCNC: 4.8 G/DL (ref 3.5–5.2)
ALP SERPL-CCNC: 102 U/L (ref 35–104)
ALT SERPL W P-5'-P-CCNC: 123 U/L (ref 0–50)
ANION GAP SERPL CALCULATED.3IONS-SCNC: 10 MMOL/L (ref 7–15)
AST SERPL W P-5'-P-CCNC: 87 U/L (ref 0–45)
BASO+EOS+MONOS # BLD AUTO: ABNORMAL 10*3/UL
BASO+EOS+MONOS NFR BLD AUTO: ABNORMAL %
BASOPHILS # BLD AUTO: 0.1 10E3/UL (ref 0–0.2)
BASOPHILS NFR BLD AUTO: 1 %
BILIRUB SERPL-MCNC: 0.3 MG/DL
BUN SERPL-MCNC: 7.1 MG/DL (ref 6–20)
CALCIUM SERPL-MCNC: 10.3 MG/DL (ref 8.6–10)
CHLORIDE SERPL-SCNC: 104 MMOL/L (ref 98–107)
CREAT SERPL-MCNC: 0.62 MG/DL (ref 0.51–0.95)
CRP SERPL-MCNC: 7.62 MG/L
DEPRECATED HCO3 PLAS-SCNC: 26 MMOL/L (ref 22–29)
EGFRCR SERPLBLD CKD-EPI 2021: >90 ML/MIN/1.73M2
EOSINOPHIL # BLD AUTO: 0.2 10E3/UL (ref 0–0.7)
EOSINOPHIL NFR BLD AUTO: 2 %
ERYTHROCYTE [DISTWIDTH] IN BLOOD BY AUTOMATED COUNT: 13.4 % (ref 10–15)
ERYTHROCYTE [SEDIMENTATION RATE] IN BLOOD BY WESTERGREN METHOD: 15 MM/HR (ref 0–20)
GLUCOSE SERPL-MCNC: 177 MG/DL (ref 70–99)
HCT VFR BLD AUTO: 48.7 % (ref 35–47)
HGB BLD-MCNC: 16.1 G/DL (ref 11.7–15.7)
IMM GRANULOCYTES # BLD: 0 10E3/UL
IMM GRANULOCYTES NFR BLD: 0 %
LYMPHOCYTES # BLD AUTO: 2.5 10E3/UL (ref 0.8–5.3)
LYMPHOCYTES NFR BLD AUTO: 30 %
MCH RBC QN AUTO: 31.9 PG (ref 26.5–33)
MCHC RBC AUTO-ENTMCNC: 33.1 G/DL (ref 31.5–36.5)
MCV RBC AUTO: 96 FL (ref 78–100)
MONOCYTES # BLD AUTO: 0.3 10E3/UL (ref 0–1.3)
MONOCYTES NFR BLD AUTO: 3 %
NEUTROPHILS # BLD AUTO: 5.3 10E3/UL (ref 1.6–8.3)
NEUTROPHILS NFR BLD AUTO: 64 %
PLATELET # BLD AUTO: 275 10E3/UL (ref 150–450)
POTASSIUM SERPL-SCNC: 3.9 MMOL/L (ref 3.4–5.3)
PROT SERPL-MCNC: 7.7 G/DL (ref 6.4–8.3)
RBC # BLD AUTO: 5.05 10E6/UL (ref 3.8–5.2)
SODIUM SERPL-SCNC: 140 MMOL/L (ref 135–145)
WBC # BLD AUTO: 8.4 10E3/UL (ref 4–11)

## 2023-10-17 PROCEDURE — 85652 RBC SED RATE AUTOMATED: CPT | Performed by: PHYSICIAN ASSISTANT

## 2023-10-17 PROCEDURE — 80053 COMPREHEN METABOLIC PANEL: CPT | Performed by: PHYSICIAN ASSISTANT

## 2023-10-17 PROCEDURE — 85025 COMPLETE CBC W/AUTO DIFF WBC: CPT | Mod: QW | Performed by: PHYSICIAN ASSISTANT

## 2023-10-17 PROCEDURE — 99214 OFFICE O/P EST MOD 30 MIN: CPT | Performed by: PHYSICIAN ASSISTANT

## 2023-10-17 PROCEDURE — 36415 COLL VENOUS BLD VENIPUNCTURE: CPT | Performed by: PHYSICIAN ASSISTANT

## 2023-10-17 PROCEDURE — 86140 C-REACTIVE PROTEIN: CPT | Performed by: PHYSICIAN ASSISTANT

## 2023-10-17 RX ORDER — ONDANSETRON 8 MG/1
8 TABLET, FILM COATED ORAL EVERY 8 HOURS PRN
Qty: 9 TABLET | Refills: 1 | Status: SHIPPED | OUTPATIENT
Start: 2023-10-17 | End: 2023-10-31

## 2023-10-17 RX ORDER — RIZATRIPTAN BENZOATE 10 MG/1
10 TABLET ORAL
Qty: 10 TABLET | Refills: 1 | Status: SHIPPED | OUTPATIENT
Start: 2023-10-17 | End: 2024-04-22

## 2023-10-17 ASSESSMENT — ENCOUNTER SYMPTOMS
HEADACHES: 1
NECK STIFFNESS: 0
NECK PAIN: 1
CARDIOVASCULAR NEGATIVE: 1
NAUSEA: 1
ACTIVITY CHANGE: 1
RESPIRATORY NEGATIVE: 1
WEAKNESS: 0
PHOTOPHOBIA: 1
TREMORS: 0

## 2023-10-17 ASSESSMENT — PATIENT HEALTH QUESTIONNAIRE - PHQ9
10. IF YOU CHECKED OFF ANY PROBLEMS, HOW DIFFICULT HAVE THESE PROBLEMS MADE IT FOR YOU TO DO YOUR WORK, TAKE CARE OF THINGS AT HOME, OR GET ALONG WITH OTHER PEOPLE: VERY DIFFICULT
SUM OF ALL RESPONSES TO PHQ QUESTIONS 1-9: 15
SUM OF ALL RESPONSES TO PHQ QUESTIONS 1-9: 15

## 2023-10-17 NOTE — COMMUNITY RESOURCES LIST (ENGLISH)
10/17/2023   [facility]  N/A  For questions about this resource list or additional care needs, please contact your primary care clinic or care manager.  Phone: [phone]   Email: N/A   Address: [address] [place] [address]   Hours: N/A        Food and Nutrition       Food pantry  1  [facility] Distance: 0.77 miles      Pick   [address] [place] [address]  Language: English  Hours: Tue - Wed 9:00 AM - 2:00 PM , Thu 12:00 PM - 5:00 PM  Fees: Free   Phone: (684) 857-5162 Website: http://www.[employer]/     2  [facility] Distance: 10.66 miles      In-Person   [address] [place] [address]  Language: English  Hours: Mon - Fri 10:00 AM - 4:00 PM  Fees: Free   Phone: (188) 412-4742 Email: office@[email or web address].org Website: [email or web address]     SNAP application assistance  3  [employer] - [facility] Distance: 11.31 miles      In-Person, Phone/Virtual   [address] [facility] [place] [address]  Language: English, Hmong, Samoan, Swazi  Hours: Mon - Fri 8:00 AM - 4:30 PM  Fees: Free   Phone: (671) 364-9266 Website: https://www.workforceresource.org/     4  [facility] - SNAP Outreach Distance: 18.7 miles      Phone/Virtual   [address] [place] [address]  Language: Wolof, English, Hmong, Kristy, Swazi  Hours: Mon - Fri 10:00 AM - 12:00 PM , Mon - Fri 2:00 PM - 4:00 PM  Fees: Free   Phone: (722) 550-2158 Website: https://[facility].[email or web address]/     Soup kitchen or free meals  5  [facility] Distance: 3.9 miles      In-Person   [address] [place] [address]  Language: English  Hours: Wed 6:00 PM - 6:30 PM  Fees: Free   Phone: (322) 429-5001 Email: office@[facility].org Website: [email or web address]/     6  [facility] Distance: 8.98 miles      In-Person   [address] [place] [address]   Language: English  Hours: Fri 6:00 PM - 8:00 PM  Fees: Free   Phone: (125) 283-7231 Email: cody@[employer] Website: http://www.[employer] [employer]/          Important Numbers & Websites       Emergency Services   911  [facility] Services   311  Poison Control   (843) 763-9709  Suicide Prevention Lifeline   (963) 162-7479 (TALK)  Child Abuse Hotline   (829) 412-8607 (4-A-Child)  Sexual Assault Hotline   (928) 847-4715 (HOPE)  National Runaway Safeline   (980) 727-4749 (RUNAWAY)  All-Options Talkline   (649) 689-6071  Substance Abuse Referral   (822) 679-5442 (HELP)

## 2023-10-17 NOTE — PROGRESS NOTES
Assessment & Plan     Migraine with aura and without status migrainosus, not intractable  Follow-up with ophthalmology as scheduled on the 20th of this month trial of Maxalt and Zofran side effects discussed dosing discussed lab work pending she should follow-up with her primary in 3 weeks or return prior as needed  - rizatriptan (MAXALT) 10 MG tablet  Dispense: 10 tablet; Refill: 1  - ondansetron (ZOFRAN) 8 MG tablet  Dispense: 9 tablet; Refill: 1  - CBC with Platelets & Differential  - ESR: Erythrocyte sedimentation rate  - CRP, inflammation  - Comprehensive metabolic panel (BMP + Alb, Alk Phos, ALT, AST, Total. Bili, TP)  - CBC with Platelets & Differential  - ESR: Erythrocyte sedimentation rate  - CRP, inflammation  - Comprehensive metabolic panel (BMP + Alb, Alk Phos, ALT, AST, Total. Bili, TP)               Depression Screening Follow Up        10/17/2023     1:44 PM   PHQ   PHQ-9 Total Score 15   Q9: Thoughts of better off dead/self-harm past 2 weeks Several days   F/U: Thoughts of suicide or self-harm Yes   F/U: Self harm-plan No   F/U: Self-harm action No   F/U: Safety concerns No                 Follow Up    Follow Up Actions Taken      Discussed the following ways the patient can remain in a safe environment:        YUNI Reeder  [facility] is a 43 year old, presenting for the following health issues:  Migraine (Since having COVID beginning of September, having floaters/flashes, bulging lymph nodes in neck and temples has been using Tylenol/Ibuprofen, ice packs with no relief, does have upcoming apt with eye doctor  )        10/17/2023     1:53 PM   Additional Questions   Roomed by LYLE Arce       43-year-old female presents to the  clinic with complaint of headaches right sided tinnitus and visual change  Prior to COVID infection in September she had migraines maybe once or twice a year now she has been getting them more frequently and they  "are lasting for a longer period of time Tylenol and ibuprofen are not effective for them  She describes some scotomas she also has some floaters some visual field cuts and then may or may not get a headache within a day or 2 of those symptoms those symptoms do resolve he had a fairly significant headache over the weekend it is still faintly present  She does develop some photo and phonophobia  She does have tinnitus in the right ear she has not noticed hearing loss she does have nausea has not had emesis she has decreased appetite  She has no history of coronary artery disease  She denies homicidal or suicidal ideation.  She continues to smoke.  She has a stressful life situation.  She does not work outside the home at this time    History of Present Illness       Headaches:   Since the patient's last clinic visit, headaches are: improved  The patient is getting headaches:  Once a week minimal  She is not able to do normal daily activities when she has a migraine.  The patient is taking the following rescue/relief medications:  Ibuprofen (Advil, Motrin)   Patient states \"I get no relief\" from the rescue/relief medications.   The patient is taking the following medications to prevent migraines:  No medications to prevent migraines  In the past 4 weeks, the patient has gone to an Urgent Care or Emergency Room 0 times times due to headaches.    She eats 4 or more servings of fruits and vegetables daily.She consumes 0 sweetened beverage(s) daily.She exercises with enough effort to increase her heart rate 20 to 29 minutes per day.  She exercises with enough effort to increase her heart rate 4 days per week. She is missing 7 dose(s) of medications per week.  She is not taking prescribed medications regularly due to side effects and remembering to take.                 Review of Systems   Constitutional:  Positive for activity change.   HENT:  Positive for tinnitus.    Eyes:  Positive for photophobia and visual " "disturbance.   Respiratory: Negative.     Cardiovascular: Negative.    Gastrointestinal:  Positive for nausea.   Musculoskeletal:  Positive for neck pain. Negative for neck stiffness.   Skin: Negative.    Neurological:  Positive for headaches. Negative for tremors and weakness.            Objective    /76 (BP Location: Right arm, Patient Position: Sitting, Cuff Size: Adult Large)   Pulse 93   Temp 98.1  F (36.7  C) (Tympanic)   Resp 20   Ht 1.765 m (5' 9.5\")   Wt 97 kg (213 lb 12.8 oz)   LMP  (LMP Unknown)   SpO2 98%   BMI 31.12 kg/m    Body mass index is 31.12 kg/m .  Physical Exam alert attentive no acute distress  Ears canals and drums normal  Nasal mucosa is clear  Oropharynx benign  Eyes PERRLA extraocular muscles are intact conjunctiva pink  Neck supple no adenopathy she has some tenderness in the left paravertebral cervical musculature no adenopathy noted  No pulsations in the temporal artery distribution some tenderness is noted to palpation  No sinus tenderness  Lungs clear to ventilated  Cardiovascular gait rhythm  Gait normal  Affect somewhat blunted  Cranial nerves II through XII grossly nonfocal alternative 1 not formally tested                        "

## 2023-10-17 NOTE — COMMUNITY RESOURCES LIST (ENGLISH)
10/17/2023   [facility]  N/A  For additional resource needs, please contact your health insurance member services or your primary care team.  Phone: [phone]   Email: N/A   Address: [address] [place] [address]   Hours: N/A        Food and Nutrition       Food pantry  1  [facility] Food Pantry Distance: 0.77 miles      Pickup   [address] [place] [address]  Language: English  Hours: Tue - Wed 9:00 AM - 2:00 PM , Thu 12:00 PM - 5:00 PM  Fees: Free   Phone: (868) 587-8890 Website: http://www.BetterCloud/     2  [facility] Distance: 10.66 miles      In-Person   [address] [place] [address]  Language: English  Hours: Mon - Fri 10:00 AM - 4:00 PM  Fees: Free   Phone: (403) 548-5645 Email: office@[facility].org Website: http://[facility].[email or web address]     SNAP application assistance  3  [employer] [place] Distance: 20.96 miles      Phone/Virtual   [address] [address]  Language: English, Hmong, Cape Verdean, Burundian, Bahraini  Hours: Mon - Fri 8:30 AM - 4:30 PM  Fees: Free   Phone: (619) 949-8471 Email: helpline@[employer].org Website: https://www.hungersolutions.org/programs/[place]-food-helpline/     4  [facility] Distance: 11.31 miles      In-Person, Phone/Virtual   [address] [facility] [place] [address]  Language: English, Hmong, Burundian, Bahraini  Hours: Mon - Fri 8:00 AM - 4:30 PM  Fees: Free   Phone: (356) 931-4848 Website: https://www.workforceresource.org/     Soup kitchen or free meals  5  [facility] Distance: 3.9 miles      In-Person   [address] [place] [address]  Language: English  Hours: Wed 6:00 PM - 6:30 PM  Fees: Free   Phone: (188) 606-7332 Email: office@[facility].org Website: https://[facility].org/     6  [facility] Distance: 8.98 miles      In-Person   [address] [facility]  [place] [address]  Language: English  Hours: Fri 6:00 PM - 8:00 PM  Fees: Free   Phone: (835) 384-9238 Email: cody@[facility].org Website: http://www.[facility].org/          Important Numbers & Websites       [address]  Poison Control   (105) 817-9421 [place]  Suicide and Crisis Lifeline   [address] [place], [place] [address]line.org  Childhelp [place] Child Abuse Hotline   761.618.3610 Childhelphotline.org  [place] Sexual Assault Hotline   (914) 197-3964 (HOPE) [facility] Runaway Safeline   (256) 984-4933 (RUNAWAY) [facility].org  Pregnancy & Postpartum Support [place]   Call/text 257-637-7952 Ppsupport[place].org  Substance Abuse National Helpline ([facility]   558-710-HELP (5495) Findtreatment.gov  Emergency Services   911

## 2023-10-17 NOTE — LETTER
October 18, 2023      Shannan Cameron  [address] [facility] [address]        Dear ,    We are writing to inform you of your test results.    Your chemistries reveal that your glucose is mildly elevated.  Your hemoglobin is elevated most likely consistent with your smoking history.  Your CRP a test for inflammation is mildly elevated and is consistent with your recent infection.  Your sedimentation rate another marker of inflammation is normal.  Please follow-up with your primary in about 3 weeks to discuss your ongoing symptoms your response to recent start of newer medications, and your overall health.    Resulted Orders   ESR: Erythrocyte sedimentation rate   Result Value Ref Range    Erythrocyte Sedimentation Rate 15 0 - 20 mm/hr   CRP, inflammation   Result Value Ref Range    CRP Inflammation 7.62 (H) <5.00 mg/L   Comprehensive metabolic panel (BMP + Alb, Alk Phos, ALT, AST, Total. Bili, TP)   Result Value Ref Range    Sodium 140 135 - 145 mmol/L      Comment:      Reference intervals for this test were updated on 09/26/2023 to more accurately reflect our healthy population. There may be differences in the flagging of prior results with similar values performed with this method. Interpretation of those prior results can be made in the context of the updated reference intervals.     Potassium 3.9 3.4 - 5.3 mmol/L    Carbon Dioxide (CO2) 26 22 - 29 mmol/L    Anion Gap 10 7 - 15 mmol/L    Urea Nitrogen 7.1 6.0 - 20.0 mg/dL    Creatinine 0.62 0.51 - 0.95 mg/dL    GFR Estimate >90 >60 mL/min/1.73m2    Calcium 10.3 (H) 8.6 - 10.0 mg/dL    Chloride 104 98 - 107 mmol/L    Glucose 177 (H) 70 - 99 mg/dL    Alkaline Phosphatase 102 35 - 104 U/L    AST 87 (H) 0 - 45 U/L      Comment:      Reference intervals for this test were updated on 6/12/2023 to more accurately reflect our healthy population. There may be differences in the flagging of prior results with similar values performed with this method.  Interpretation of those prior results can be made in the context of the updated reference intervals.     (H) 0 - 50 U/L      Comment:      Reference intervals for this test were updated on 6/12/2023 to more accurately reflect our healthy population. There may be differences in the flagging of prior results with similar values performed with this method. Interpretation of those prior results can be made in the context of the updated reference intervals.      Protein Total 7.7 6.4 - 8.3 g/dL    Albumin 4.8 3.5 - 5.2 g/dL    Bilirubin Total 0.3 <=1.2 mg/dL   CBC with platelets and differential   Result Value Ref Range    WBC Count 8.4 4.0 - 11.0 10e3/uL    RBC Count 5.05 3.80 - 5.20 10e6/uL    Hemoglobin 16.1 (H) 11.7 - 15.7 g/dL    Hematocrit 48.7 (H) 35.0 - 47.0 %    MCV 96 78 - 100 fL    MCH 31.9 26.5 - 33.0 pg    MCHC 33.1 31.5 - 36.5 g/dL    RDW 13.4 10.0 - 15.0 %    Platelet Count 275 150 - 450 10e3/uL    % Neutrophils 64 %    % Lymphocytes 30 %    % Monocytes 3 %    Mids % (Monos, Eos, Basos)      % Eosinophils 2 %    % Basophils 1 %    % Immature Granulocytes 0 %    Absolute Neutrophils 5.3 1.6 - 8.3 10e3/uL    Absolute Lymphocytes 2.5 0.8 - 5.3 10e3/uL    Absolute Monocytes 0.3 0.0 - 1.3 10e3/uL    Mids Abs (Monos, Eos, Basos)      Absolute Eosinophils 0.2 0.0 - 0.7 10e3/uL    Absolute Basophils 0.1 0.0 - 0.2 10e3/uL    Absolute Immature Granulocytes 0.0 <=0.4 10e3/uL       If you have any questions or concerns, please call the clinic at the number listed above.       Sincerely,      YUNI Salinas

## 2023-10-31 ENCOUNTER — PRE VISIT (OUTPATIENT)
Dept: ONCOLOGY | Facility: CLINIC | Age: 43
End: 2023-10-31

## 2023-10-31 ENCOUNTER — OFFICE VISIT (OUTPATIENT)
Dept: FAMILY MEDICINE | Facility: CLINIC | Age: 43
End: 2023-10-31
Payer: COMMERCIAL

## 2023-10-31 ENCOUNTER — PATIENT OUTREACH (OUTPATIENT)
Dept: ONCOLOGY | Facility: CLINIC | Age: 43
End: 2023-10-31

## 2023-10-31 VITALS
TEMPERATURE: 98 F | OXYGEN SATURATION: 96 % | BODY MASS INDEX: 30.71 KG/M2 | HEART RATE: 83 BPM | RESPIRATION RATE: 20 BRPM | DIASTOLIC BLOOD PRESSURE: 86 MMHG | WEIGHT: 214.5 LBS | HEIGHT: 70 IN | SYSTOLIC BLOOD PRESSURE: 127 MMHG

## 2023-10-31 DIAGNOSIS — Z79.84 DIABETES MELLITUS TREATED WITH ORAL MEDICATION (H): Primary | ICD-10-CM

## 2023-10-31 DIAGNOSIS — G43.109 MIGRAINE WITH AURA AND WITHOUT STATUS MIGRAINOSUS, NOT INTRACTABLE: ICD-10-CM

## 2023-10-31 DIAGNOSIS — R11.0 NAUSEA: ICD-10-CM

## 2023-10-31 DIAGNOSIS — R91.8 PULMONARY NODULES: Primary | ICD-10-CM

## 2023-10-31 DIAGNOSIS — E11.9 DIABETES MELLITUS TREATED WITH ORAL MEDICATION (H): Primary | ICD-10-CM

## 2023-10-31 DIAGNOSIS — R91.8 PULMONARY NODULES: ICD-10-CM

## 2023-10-31 DIAGNOSIS — R10.84 ABDOMINAL PAIN, GENERALIZED: ICD-10-CM

## 2023-10-31 PROCEDURE — 99214 OFFICE O/P EST MOD 30 MIN: CPT | Performed by: PHYSICIAN ASSISTANT

## 2023-10-31 RX ORDER — ONDANSETRON 8 MG/1
8 TABLET, FILM COATED ORAL EVERY 8 HOURS PRN
Qty: 12 TABLET | Refills: 1 | Status: SHIPPED | OUTPATIENT
Start: 2023-10-31 | End: 2024-04-22

## 2023-10-31 ASSESSMENT — ENCOUNTER SYMPTOMS
ACTIVITY CHANGE: 1
RESPIRATORY NEGATIVE: 1
NAUSEA: 1
CARDIOVASCULAR NEGATIVE: 1
ABDOMINAL PAIN: 1

## 2023-10-31 NOTE — TELEPHONE ENCOUNTER
RECORDS STATUS - ALL OTHER DIAGNOSIS      RECORDS RECEIVED FROM: [facility]    Appt Date: TBD NN WQ     Action    Action Taken 10/31/2023 4:14pm KEB     I updated outside records in [place].    I faxed a request for imaging to       Imaging Received  December 7, 2023 9:33 AM SY   Action: Images from [facility] received and resolved to PACS.      NOTES STATUS DETAILS   OFFICE NOTE from referring provider Epic 10/31/23: YUNI Moulton   MEDICATION LIST [facility]    LABS     ANYTHING RELATED TO DIAGNOSIS Epic 12/6/23   IMAGING (NEED IMAGES & REPORT)     CT SCANS (Img req from [facility]) 10/27/23: CT Abd Pel   ULTRASOUND PACS 7/17/21: XR Chest

## 2023-10-31 NOTE — PROGRESS NOTES
New IP (Interventional Pulmonology) referral rec'd.  Chart reviewed.       New Patient: Interventional Pulmonary (Lung nodule) Nurse Navigator Note    Referring provider: Jose Elizabeth PARvfl //[facility]    Referred to (specialty): Interventional Pulmonary (Lung nodule)    Requested provider (if applicable): n/a    Date Referral Received: 10/31/2023    Evaluation for :  Lung nodule    Clinical History (per Nurse review of records provided):    **BOOK MARKED**      EXAM: CT ABDOMEN PELVIS STONE PROTOCOL WO   LOCATION: [facility] MEDICAL IMAGING   DATE: 10/27/2023Impression    1.  No acute findings or inflammatory changes in the abdomen or pelvis.     2.  Nonobstructing left nephrolithiasis. No hydronephrosis.     3.  Diffuse hepatic steatosis.     4.  Incidental left lower lobe 0.5 cm subpleural nodule. If no history of malignancy, consider follow-up per Fleischner recommendations: For low-risk patients, no routine follow-up required. For high-risk patients, optional CT at 12 months.FINDINGS:   LOWER CHEST: Right middle lobe 0.7 cm nodule, unchanged since 2019, benign; no follow-up indicated. Left lower lobe subpleural nodule measuring 0.5 cm (series 3, image 34), not visualized on prior, although atelectasis in this location on prior exam may have obscured the nodule. Lingular atelectasis versus scarring. No focal airspace disease.     HEPATOBILIARY: Diffuse hepatic steatosis. Gallbladder appears normal.     PANCREAS: Normal.     SPLEEN: Normal.     ADRENAL GLANDS: Normal.     KIDNEY/BLADDER: Nonobstructing left renal lower pole calculi measuring 0.5 cm and 0.2 cm. No right renal calculi. No hydronephrosis. Urinary bladder is partially decompressed.     BOWEL: No obstruction or inflammatory change. Normal appendix. No evidence of acute appendicitis.     LYMPH NODES: No lymphadenopathy.     VASCULATURE: Mild atherosclerotic calcifications.     PELVIC ORGANS: Status post hysterectomy.      MUSCULOSKELETAL: Multilevel degenerative changes of the spine  Records Location: [facility]     RECORDS NEEDED:  Last FIVE years CHEST imaging pushed to PACS from [facility]---thank you!!    Additional testing needed prior to consult: PFT's

## 2023-10-31 NOTE — PROGRESS NOTES
Assessment & Plan     Diabetes mellitus treated with oral medication (H)  Get into CDE to discuss options of the metformin for her diabetes and PCOS  - AMB Adult Diabetes Educator Referral    Pulmonary nodules  Get into nodule clinic  - Adult Pulmonary Medicine  Referral    Nausea  Improved refill resolved    Migraine with aura and without status migrainosus, not intractable  Medications are helping and less frequent episodes  - ondansetron (ZOFRAN) 8 MG tablet  Dispense: 12 tablet; Refill: 1    Abdominal pain. Follow-up if acutely worsening. Follow-up with [facility] in two weeks.         MED REC REQUIRED  Post Medication Reconciliation Status:       YUNI Reeder  [facility]    Jessica Campbell is a 43 year old, presenting for the following health issues:  Hospital F/U ([facility] 10/28/2023,  sudden abdominal pain)        10/31/2023     9:11 AM   Additional Questions   Roomed by QUAN Rodriguez       43-year-old female with history of type 2 diabetes and polycystic ovarian syndrome presents to the clinic for follow-up for recent hospital visit for abdominal pain and nausea  Overall she is feeling better although her pain has not entirely resolved  Her pelvic ultrasound and abdominal pelvis CT did not reveal a source of her symptoms  She has had no fever no chills  She continues to smoke  She has stopped her metformin due to gastric upset but that was a number of months ago  No urinary symptoms  No cough  The CT got the lung bases and did show some pulmonary nodules she stated she was told to follow-up with pulmonary           ED/UC Followup:    Facility:  [facility]  Date of visit: 10/28/2023  Reason for visit: abdominal pain  Current Status: continuous        Review of Systems   Constitutional:  Positive for activity change.   Respiratory: Negative.     Cardiovascular: Negative.    Gastrointestinal:  Positive for abdominal pain and nausea.   Genitourinary: Negative.            "  Objective    /86 (BP Location: Right arm, Patient Position: Sitting, Cuff Size: Adult Large)   Pulse 83   Temp 98  F (36.7  C) (Oral)   Resp 20   Ht 1.765 m (5' 9.5\")   Wt 97.3 kg (214 lb 8 oz)   LMP  (LMP Unknown)   SpO2 96%   BMI 31.22 kg/m    Body mass index is 31.22 kg/m .  Physical Exam alert attentive apprehensive because no diagnosis has been found  Lungs clear ventilated  Cardiovascular regular rate and rhythm  No CVA tenderness  No rash  Normal active bowel sounds soft some diffuse lower quadrant tenderness no guarding no rebound                        "

## 2023-12-06 ENCOUNTER — OFFICE VISIT (OUTPATIENT)
Dept: FAMILY MEDICINE | Facility: CLINIC | Age: 43
End: 2023-12-06
Payer: COMMERCIAL

## 2023-12-06 VITALS
BODY MASS INDEX: 32.33 KG/M2 | DIASTOLIC BLOOD PRESSURE: 84 MMHG | WEIGHT: 218.3 LBS | RESPIRATION RATE: 16 BRPM | SYSTOLIC BLOOD PRESSURE: 156 MMHG | HEART RATE: 76 BPM | HEIGHT: 69 IN | OXYGEN SATURATION: 97 %

## 2023-12-06 DIAGNOSIS — R22.32 SUBCUTANEOUS MASS OF LEFT THUMB: ICD-10-CM

## 2023-12-06 DIAGNOSIS — R10.84 ABDOMINAL PAIN, GENERALIZED: Primary | ICD-10-CM

## 2023-12-06 DIAGNOSIS — E11.9 TYPE 2 DIABETES MELLITUS WITHOUT COMPLICATION, WITHOUT LONG-TERM CURRENT USE OF INSULIN (H): ICD-10-CM

## 2023-12-06 DIAGNOSIS — K92.1 BLOOD IN STOOL: ICD-10-CM

## 2023-12-06 LAB
ALBUMIN SERPL BCG-MCNC: 4.4 G/DL (ref 3.5–5.2)
ALP SERPL-CCNC: 94 U/L (ref 40–150)
ALT SERPL W P-5'-P-CCNC: 132 U/L (ref 0–50)
ANION GAP SERPL CALCULATED.3IONS-SCNC: 13 MMOL/L (ref 7–15)
AST SERPL W P-5'-P-CCNC: 82 U/L (ref 0–45)
BILIRUB SERPL-MCNC: 0.2 MG/DL
BUN SERPL-MCNC: 5.9 MG/DL (ref 6–20)
CALCIUM SERPL-MCNC: 10 MG/DL (ref 8.6–10)
CHLORIDE SERPL-SCNC: 101 MMOL/L (ref 98–107)
CREAT SERPL-MCNC: 0.52 MG/DL (ref 0.51–0.95)
DEPRECATED HCO3 PLAS-SCNC: 23 MMOL/L (ref 22–29)
EGFRCR SERPLBLD CKD-EPI 2021: >90 ML/MIN/1.73M2
ERYTHROCYTE [DISTWIDTH] IN BLOOD BY AUTOMATED COUNT: 12.4 % (ref 10–15)
GLUCOSE SERPL-MCNC: 239 MG/DL (ref 70–99)
HBA1C MFR BLD: 9.1 % (ref 0–5.6)
HCT VFR BLD AUTO: 43.8 % (ref 35–47)
HGB BLD-MCNC: 14.7 G/DL (ref 11.7–15.7)
MCH RBC QN AUTO: 31.5 PG (ref 26.5–33)
MCHC RBC AUTO-ENTMCNC: 33.6 G/DL (ref 31.5–36.5)
MCV RBC AUTO: 94 FL (ref 78–100)
PLATELET # BLD AUTO: 288 10E3/UL (ref 150–450)
POTASSIUM SERPL-SCNC: 4 MMOL/L (ref 3.4–5.3)
PROT SERPL-MCNC: 7.6 G/DL (ref 6.4–8.3)
RBC # BLD AUTO: 4.66 10E6/UL (ref 3.8–5.2)
SODIUM SERPL-SCNC: 137 MMOL/L (ref 135–145)
WBC # BLD AUTO: 7.5 10E3/UL (ref 4–11)

## 2023-12-06 PROCEDURE — 85027 COMPLETE CBC AUTOMATED: CPT | Performed by: FAMILY MEDICINE

## 2023-12-06 PROCEDURE — 80053 COMPREHEN METABOLIC PANEL: CPT | Performed by: FAMILY MEDICINE

## 2023-12-06 PROCEDURE — 99214 OFFICE O/P EST MOD 30 MIN: CPT | Performed by: FAMILY MEDICINE

## 2023-12-06 PROCEDURE — 83036 HEMOGLOBIN GLYCOSYLATED A1C: CPT | Performed by: FAMILY MEDICINE

## 2023-12-06 PROCEDURE — 36415 COLL VENOUS BLD VENIPUNCTURE: CPT | Performed by: FAMILY MEDICINE

## 2023-12-06 ASSESSMENT — ENCOUNTER SYMPTOMS: ABDOMINAL PAIN: 1

## 2023-12-06 NOTE — PROGRESS NOTES
Assessment & Plan     Abdominal pain, generalized  Patient with nonspecific generalized abdominal pain.  Has had CT scan and ultrasound that were normal.  Given blood in stool and ongoing pain I would recommend colonoscopy.  We will set her up for diagnostic colonoscopy at [facility].  Labs are pending.  - Comprehensive metabolic panel (BMP + Alb, Alk Phos, ALT, AST, Total. Bili, TP); Future  - CBC with platelets; Future  - Comprehensive metabolic panel (BMP + Alb, Alk Phos, ALT, AST, Total. Bili, TP)  - CBC with platelets    Blood in stool  Patient with a history of hemorrhoids but given abdominal pain and blood in stool I would recommend colonoscopy.  We will get her set up with gastroenterology at [facility] for diagnostic colonoscopy.  If colonoscopy shows no other cause for blood in the stool other than hemorrhoids will refer to general surgery.  - Adult GI  Referral - Procedure Only; Future    Type 2 diabetes mellitus without complication, without long-term current use of insulin (H)  Patient with type 2 diabetes mellitus not as well-controlled as previous.  Will refer to diabetes education.  May need to start on statin given elevated cholesterol but will hold off while we await liver function tests which were recently mildly elevated.  Blood pressure is also slightly elevated.  Follow-up with me again in 4 weeks after diabetes education visit to discuss next steps.  - Hemoglobin A1c; Future  - AMB Adult Diabetes Educator Referral; Future  - Hemoglobin A1c    Subcutaneous mass of left thumb  Recurrence of swelling and mass on the pad of her distal left thumb, will refer back to hand surgery.  - Orthopedic  Referral; Future                 Agnieszka Ahn MD  [facility]    Jessica Campbell is a 43 year old, presenting for the following health issues:  Referral (Surgeon - Left Thumb Again /Has referral to Oncology - Wondering  why, scheduled 01/02/2024), Abdominal Pain (Pains/Pinching, Stabbing Pain (when having a bowel movement) - ), Derm Problem (Mole - Right Shoulder ;), and Hemorrhoids        12/6/2023    10:32 AM   Additional Questions   Roomed by Joanne ARELLANO CMA   Accompanied by None       Multiple concerns:  Abdominal pain and palpitations while on road trip, had nausea and felt like she would have diarrhea. Vomited and had syncope. Reviewed ER notes. Found incidental pulmonary nodule. Referred to nodule clinic/pulmonology. Still noticing bilateral sharp pinching pain on lower abdomen. With pain gets nausea and stool urgency. Stools have been normal since stopping metformin. Has noticed some dark stools. Some bright red blood.   Discussed diabetes. Not tolerating metformin.  Off medication.  Due for diabetes educator visit.  Blood pressure slightly high today.  Has not been high recently.  Not currently on a statin.  Had mildly elevated liver function tests during workup for abdominal pain.  Needs to be rechecked today.  Lung concerns - breathing sometimes has been difficult at night, feels wheezy, not noticing during the day.  Patient will be seeing pulmonology in early January.  During workup for abdominal pain was found to have incidental pulmonary nodule.  Also recommended follow-up for pulmonary function testing.  Thumb swelling recurring.  Previously saw [facility].  Was instructed to follow-up if recurred.    History of Present Illness       Diabetes:   She presents for follow up of diabetes.    She is not checking blood glucose.        She is concerned about blood sugar frequently over 200.   She is having blurry vision and weight gain.  The patient has had a diabetic eye exam in the last 12 months. Eye exam performed on october 4th 2023. Location of last eye exam [facility].        Reason for visit:  Many reasons    She eats 2-3 servings of fruits and vegetables daily.She consumes 1 sweetened  "beverage(s) daily.She exercises with enough effort to increase her heart rate 20 to 29 minutes per day.  She exercises with enough effort to increase her heart rate 3 or less days per week. She is missing 7 dose(s) of medications per week.               Review of Systems   Gastrointestinal:  Positive for abdominal pain.            Objective    BP (!) 142/90   Pulse 76   Resp 16   Ht 1.765 m (5' 9.49\")   Wt 99 kg (218 lb 4.8 oz)   LMP 09/25/2019 (Approximate)   SpO2 97%   BMI 31.79 kg/m    Body mass index is 31.79 kg/m .  Physical Exam   GENERAL: healthy, alert and no distress  EYES: Eyes grossly normal to inspection, PERRL and conjunctivae and sclerae normal  NECK: no adenopathy, no asymmetry, masses, or scars and thyroid normal to palpation  RESP: lungs clear to auscultation - no rales, rhonchi or wheezes  CV: regular rate and rhythm, normal S1 S2, no S3 or S4, no murmur, click or rub, no peripheral edema and peripheral pulses strong  ABDOMEN: soft, nontender, no hepatosplenomegaly, no masses and bowel sounds normal                      "

## 2023-12-06 NOTE — COMMUNITY RESOURCES LIST (ENGLISH)
12/06/2023   [facility] deeplocal  N/A  For questions about this resource list or additional care needs, please contact your primary care clinic or care manager.  Phone: [phone]   Email: N/A   Address: [address] [place] [id number]   Hours: N/A        Food and Nutrition       Food pantry  1  [facility] Distance: 0.77 miles      Pick   [address] [place] [address]  Language: English  Hours: Tue - Wed 9:00 AM - 2:00 PM , Thu 12:00 PM - 5:00 PM  Fees: Free   Phone: (631) 923-6453 Website: http://www.Plerts/     2  [facility] Distance: 10.66 miles      In-Person   [address] [place] [address]  Language: English  Hours: Mon - Fri 10:00 AM - 4:00 PM  Fees: Free   Phone: (519) 577-4766 Email: office@[facility].org Website: http://[facility] Center[facility]     SNAP application assistance  3  [employer] - [facility] Distance: 11.31 miles      In-Person, Phone/Virtual   [address] [facility] [place] [address]  Language: English, Hmong, Guinean, Icelandic  Hours: Mon - Fri 8:00 AM - 4:30 PM  Fees: Free   Phone: (927) 625-8059 Website: https://www.workforceresource.org/     4  [facility] - SNAP Outreach Distance: 18.7 miles      Phone/Virtual   [address] [place] [address]  Language: Kyrgyz, English, Hmong, Kristy, Icelandic  Hours: Mon - Fri 10:00 AM - 12:00 PM , Mon - Fri 2:00 PM - 4:00 PM  Fees: Free   Phone: (645) 174-5200 Website: https://[facility].org/     Soup kitchen or free meals  5  [facility] Distance: 3.9 miles      In-Person   [address] [place] [address]  Language: English  Hours: Wed 6:00 PM - 6:30 PM  Fees: Free   Phone: (697) 848-7849 Email: office@[facility].org Website: https://[facility].[email or web address]/     6  [facility] Distance: 8.98 miles      In-Person   [address] [place] [address]   Language: English  Hours: Fri 6:00 PM - 8:00 PM  Fees: Free   Phone: (801) 853-9616 Email: cody@[employer] Website: http://www.[employer]/          Important Numbers & Websites       Emergency Services   911  [facility]   311  Poison Control   (665) 682-9298  Suicide Prevention Lifeline   (882) 822-9211 (TALK)  Child Abuse Hotline   (117) 698-8381 (4-A-Child)  Sexual Assault Hotline   (879) 943-7444 (HOPE)  National Runaway Safeline   (702) 786-7996 (RUNAWAY)  All-Options Talkline   (571) 342-7857  Substance Abuse Referral   (312) 664-9827 (HELP)

## 2023-12-07 DIAGNOSIS — R79.89 ELEVATED LIVER FUNCTION TESTS: Primary | ICD-10-CM

## 2023-12-11 ENCOUNTER — TELEPHONE (OUTPATIENT)
Dept: FAMILY MEDICINE | Facility: CLINIC | Age: 43
End: 2023-12-11
Payer: COMMERCIAL

## 2023-12-11 NOTE — TELEPHONE ENCOUNTER
CSS called pt informing her that I faxed US order to [facility] and that they should be contacting her to schedule appt.

## 2023-12-11 NOTE — TELEPHONE ENCOUNTER
Order/Referral Request    Who is requesting: Patient    Orders being requested: Orders for liver scan    Reason service is needed/diagnosis: Pt called and stated she was suppose to call PCP if  hospital didn't call her to schedule a liver scan. Pt states she called the hospital and they don't have any orders for a liver scan. Pt would like to talk to PCP and see if she can resend the orders.    When are orders needed by: as soon as possible.    Has this been discussed with Provider: Yes    Does patient have a preference on a Group/Provider/Facility? [facility]    Does patient have an appointment scheduled?: No    Where to send orders: Fax to [facility]    Could we send this information to you in [place] or would you prefer to receive a phone call?:   Patient would prefer a phone call     Okay to leave a detailed message?: Yes at Home number on file [phone] (home)    Evi Appiah

## 2023-12-11 NOTE — TELEPHONE ENCOUNTER
Pt called and said that [facility] still does not have her referral.     Pt would like to have a call back from care team to discus her questions/concerns.    Evi Appiah

## 2023-12-12 ENCOUNTER — TRANSFERRED RECORDS (OUTPATIENT)
Dept: HEALTH INFORMATION MANAGEMENT | Facility: CLINIC | Age: 43
End: 2023-12-12
Payer: COMMERCIAL

## 2023-12-12 ENCOUNTER — TELEPHONE (OUTPATIENT)
Dept: FAMILY MEDICINE | Facility: CLINIC | Age: 43
End: 2023-12-12
Payer: COMMERCIAL

## 2023-12-12 NOTE — TELEPHONE ENCOUNTER
Order/Referral Request    Who is requesting: [facility]    Orders being requested: Needs clarification on Adult GI referral - They need to know what procedure needs to be done.    Reason service is needed/diagnosis: Blood in Stool    When are orders needed by:ASAP    Has this been discussed with Provider: Yes    Does patient have a preference on a Group/Provider/Facility? [facility]    Where to send orders: Place orders within [facility] and Fax to [facility] [phone]    Please Call [facility] back- [phone]

## 2023-12-12 NOTE — TELEPHONE ENCOUNTER
GI referral for colonoscopy printed and faxed to [phone].  Call with [facility] to notify referral has been faxed.

## 2023-12-13 ENCOUNTER — ALLIED HEALTH/NURSE VISIT (OUTPATIENT)
Dept: EDUCATION SERVICES | Facility: CLINIC | Age: 43
End: 2023-12-13
Attending: FAMILY MEDICINE
Payer: COMMERCIAL

## 2023-12-13 VITALS — BODY MASS INDEX: 31.93 KG/M2 | WEIGHT: 215.6 LBS | HEIGHT: 69 IN

## 2023-12-13 DIAGNOSIS — E11.9 TYPE 2 DIABETES MELLITUS WITHOUT COMPLICATION, WITHOUT LONG-TERM CURRENT USE OF INSULIN (H): Primary | ICD-10-CM

## 2023-12-13 PROCEDURE — G0108 DIAB MANAGE TRN  PER INDIV: HCPCS | Performed by: DIETITIAN, REGISTERED

## 2023-12-13 NOTE — PATIENT INSTRUCTIONS
Lantus start with 10 units taking it at the same time everyday     Morning goal check glucose every am goal 80 - 130mg/dL    Increase insulin or decrease insulin to maintain goal.    Increase by 1unit every day until you reach ~ 150   Then increase slower like 1 unit every 3 days and continue to increase or decrease         Goals:  Practice healthy stress management and mindful eating - think are you physically hungry or are you bored, stressed, emotional etc, make of list of things to do besides eat.    Try to get good quality sleep with a goal of 7-8 hours per night.  Stay physically active daily.  Recommend working up to a total of 30 minutes on 5 days/ week.  Recommend a fitness tracker.     Eat in a healthy way- eliminate trans fats, limit saturated fats and added sugars; follow the plate method - picture above.  Keep a food record (MyFitnessPal, Losekarthik).    A meal is 3 or more food groups; make it colorful for better nutrition.    Total Carbohydrates (in grams) = Breakfast  30    Lunch  30    Supper  30    If desired snacks 15

## 2023-12-13 NOTE — LETTER
12/13/2023         RE: Shannan [place]  [address]  [facility] [address]        Dear Colleague,    Thank you for referring your patient, Shannan [place], to the [facility]. Please see a copy of my visit note below.    Diabetes Self-Management Education & Support    Presents for: Type 2 diabetes, obesity    Type of Service: In Person Visit      ASSESSMENT:  11/3/2020 - Initial dx of diabetes 3/2018 pt's diabetes has been under excellent control since 1/2019 with dietary intervention.  Pt had been following a strict keto/ vegan (a few days/week) and weight was down to 164.2# 1/22/19 and slowly had been incorporating more complex carbs and some fruit.  Weight gain 22# from 1/22/19 to 1/22/2020.  Then due to extra stress with pandemic and was caring for additional people in household pt has continued to gain weight.      A1c 5.8% 3/2020     12/13/2023  Due to life stressors, depression patient states she would let her diabetes slip.  Patient stopped taking metformin due to loose stools.  Patient has ongoing nausea.  Patient is being evaluated for elevated liver function tests, GI referral for colonoscopy.      Patient has not been able to fill prescriptions.  Through calling insurance and pharmacy clarified what the issue was and patient is now able to fill prescriptions through [facility] in [place].  Patient will be picking up blood glucose monitor and test strips.    Patient's A1c elevated at 9.1%.  Blood glucose today during office visit 267 mg/dL.  Discussed medication options.  For patient's least side effects opted for long-acting insulin at this time likely to be temporary.  Insulin recommendation discussed with PCP and agrees with plan.  Patient is willing to make some dietary changes but due to living situation patient does not have control over what food is purchased.    Patient's most recent   Lab Results   Component Value Date    A1C 9.1 12/06/2023     is not meeting goal of  <7.0    Diabetes knowledge and skills assessment:   Patient is knowledgeable in diabetes management concepts related to: Education restarted today    Continue education with the following diabetes management concepts: Healthy Eating, Monitoring, Taking Medication, and Reducing Risks    Based on learning assessment above, most appropriate setting for further diabetes education would be: Individual setting.      PLAN  Lantus start with 10 units taking it at the same time everyday     Morning goal check glucose every am goal 80 - 130mg/dL    Increase insulin or decrease insulin to maintain goal.    Increase by 1unit every day until you reach ~ 150 mg/dL   Then adjust slower ~ 1 unit every 3 days and continue to increase or decrease     Total Carbohydrates (in grams) = Breakfast  30    Lunch  30    Supper  30    If desired snacks 15         Topics to cover at upcoming visits: Healthy Eating, Being Active, Monitoring, Taking Medication, Problem Solving, Reducing Risks, and Healthy Coping    Follow-up: 1 month    See Care Plan for co-developed, patient-state behavior change goals.  AVS provided for patient today.    Education Materials Provided:  Goals for Your Diabetes Care, BG Log Sheet, Lantus Insulin information, and Hypoglycemia Signs and Symptoms      SUBJECTIVE/OBJECTIVE:  Diabetes education in the past 24mo: No  Diabetes type: Type 2, Secondary Diabetes  Disease course: Other  How confident are you filling out medical forms by yourself:: Extremely  Diabetes management related comments/concerns: yes  Cultural Influences/Ethnic Background:  Not  or     Diabetes Symptoms & Complications:  Fatigue: Sometimes  Neuropathy: No  Polydipsia: Sometimes  Polyphagia: Yes  Polyuria: Sometimes  Visual change: Yes  Slow healing wounds: No  Autonomic neuropathy: No  CVA: No  Heart disease: No  Nephropathy: No  Peripheral neuropathy: No  Peripheral Vascular Disease: No  Retinopathy: No  Sexual dysfunction:  "No    Patient Problem List and Family Medical History reviewed for relevant medical history, current medical status, and diabetes risk factors.    Vitals:  Ht 1.765 m (5' 9.49\")   Wt 97.8 kg (215 lb 9.6 oz)   LMP 09/25/2019 (Approximate)   BMI 31.39 kg/m    Estimated body mass index is 31.39 kg/m  as calculated from the following:    Height as of this encounter: 1.765 m (5' 9.49\").    Weight as of this encounter: 97.8 kg (215 lb 9.6 oz).   Last 3 BP:   BP Readings from Last 3 Encounters:   12/06/23 (!) 156/84   10/31/23 127/86   10/17/23 122/76       History   Smoking Status     Former     Packs/day: 0.50     Types: Cigarettes     Quit date: 11/1/2023   Smokeless Tobacco     Never       Labs:  Lab Results   Component Value Date    A1C 9.1 12/06/2023     Lab Results   Component Value Date     12/06/2023    GLC 76 07/14/2021     04/15/2008     Lab Results   Component Value Date    LDL  08/30/2023      Comment:      Cannot estimate LDL when triglyceride exceeds 400 mg/dL     08/30/2023     Direct Measure HDL   Date Value Ref Range Status   08/30/2023 33 (L) >=50 mg/dL Final   ]  GFR Estimate   Date Value Ref Range Status   12/06/2023 >90 >60 mL/min/1.73m2 Final   04/15/2008 >90 >60 mL/min/1.7m2 Final     GFR Estimate If Black   Date Value Ref Range Status   04/15/2008 >90 >60 mL/min/1.7m2 Final     Lab Results   Component Value Date    CR 0.52 12/06/2023    CR 0.68 04/15/2008     No results found for: \"MICROALBUMIN\"    Healthy Eating:  Cultural/Yarsani diet restrictions?: No  Meal planning/habits: None  How many times a week on average do you eat food made away from home (restaurant/take-out)?: 1  Meals include: Dinner, Evening Snack  Beverages: Water, Tea    Being Active:  Barrier to exercise: Other    Monitoring:  Blood Glucose Meter: Other, Unknown  Times checking blood sugar at home (number): Never  Times checking blood sugar at home (per): Month  Blood glucose trend: Increasing    Blood " glucose 267 mg/dL in office    Taking Medications:  Diabetes Medication(s)       Insulin       insulin glargine (LANTUS PEN) 100 UNIT/ML pen    Starting at 10 units and increasing as directed up to max dose of 25 units            Current Treatments: None    Problem Solving:                 Reducing Risks:  CAD Risks: Diabetes Mellitus, Sedentary lifestyle, Stress, Tobacco exposure  Has dilated eye exam at least once a year?: Yes  Sees dentist every 6 months?: No  Feet checked by healthcare provider in the last year?: No    Healthy Coping:  Informal Support system:: None  Patient Activation Measure Survey Score:       No data to display                  Care Plan and Education Provided:  Insulin administration technique taught today. Patient verbalized understanding and was able to perform an accurate return demonstration of administration technique.  Care Plan: Diabetes   Updates made by Latasha Quiroz RD since 12/13/2023 12:00 AM        Problem: HbA1C Not In Goal         Goal: Establish Regular Follow-Ups with PCP         Task: Discuss with PCP the recommended timing for patient's next follow up visit(s)    Responsible User: Latasha Quiroz RD        Task: Discuss schedule for PCP visits with patient Completed 12/13/2023   Responsible User: Latasha Quiroz RD        Goal: Get HbA1C Level in Goal         Task: Educate patient on diabetes education self-management topics    Responsible User: Latasha Quiroz RD        Task: Educate patient on benefits of regular glucose monitoring Completed 12/13/2023   Responsible User: Latasha Quiroz RD        Task: Refer patient to appropriate extended care team member, as needed (Medication Therapy Management, Behavioral Health, Physical Therapy, etc.)    Responsible User: Latasha Quiroz RD        Task: Discuss diabetes treatment plan with patient Completed 12/13/2023   Responsible User: Latasha Quiroz RD        Problem: Diabetes Self-Management Education Needed to Optimize  Self-Care Behaviors         Goal: Understand diabetes pathophysiology and disease progression         Task: Provide education on diabetes pathophysiology and disease progression specfic to patient's diabetes type Completed 12/13/2023   Responsible User: Latasha Quiroz RD        Goal: Healthy Eating - follow a healthy eating pattern for diabetes         Task: Provide education on portion control and consistency in amount, composition and timing of food intake    Responsible User: Latasha Quiroz RD        Task: Provide education on managing carbohydrate intake (carbohydrate counting, plate planning method, etc.)    Responsible User: Latasha Quiroz RD        Task: Provide education on weight management    Responsible User: Latasha Quiroz RD        Task: Provide education on heart healthy eating    Responsible User: Latasha Quiroz RD        Task: Provide education on eating out    Responsible User: Latasha Quiroz RD        Task: Develop individualized healthy eating plan with patient    Responsible User: Latasha Quiroz RD        Goal: Being Active - get regular physical activity, working up to at least 150 minutes per week         Task: Provide education on relationship of activity to glucose and precautions to take if at risk for low glucose    Responsible User: Latasha Quiroz RD        Task: Discuss barriers to physical activity with patient    Responsible User: Latasha Quiroz RD        Task: Develop physical activity plan with patient    Responsible User: Latasha Quiroz RD        Task: Explore community resources including walking groups, assistance programs, and home videos    Responsible User: Latasha Quiroz RD        Goal: Monitoring - monitor glucose and ketones as directed         Task: Provide education on blood glucose monitoring (purpose, proper technique, frequency, glucose targets, interpreting results, when to use glucose control solution, sharps disposal) Completed 12/13/2023   Responsible  User: Latasha Quiroz RD        Task: Provide education on continuous glucose monitoring (sensor placement, use of yumiko or /reader, understanding glucose trends, alerts and alarms, differences between sensor glucose and blood glucose)    Responsible User: Latasha Quiroz RD        Task: Provide education on ketone monitoring (when to monitor, frequency, etc.)    Responsible User: Latasha Quiroz RD        Goal: Taking Medication - patient is consistently taking medications as directed         Task: Provide education on action of prescribed medication, including when to take and possible side effects Completed 12/13/2023   Responsible User: Latasha Quiroz RD        Task: Provide education on insulin and injectable diabetes medications, including administration, storage, site selection and rotation for injection sites Completed 12/13/2023   Responsible User: Latasha Quiroz RD        Task: Discuss barriers to medication adherence with patient and provide management technique ideas as appropriate    Responsible User: Latasha Quiroz RD        Task: Provide education on frequency and refill details of medications    Responsible User: Latasha Quiroz RD        Goal: Problem Solving - know how to prevent and manage short-term diabetes complications         Task: Provide education on high blood glucose - causes, signs/symptoms, prevention and treatment Completed 12/13/2023   Responsible User: Latasha Quiroz RD        Task: Provide education on low blood glucose - causes, signs/symptoms, prevention, treatment, carrying a carbohydrate source at all times, and medical identification Completed 12/13/2023   Responsible User: Latasha Quiroz RD        Task: Provide education on safe travel with diabetes    Responsible User: Latasha Quiroz RD        Task: Provide education on how to care for diabetes on sick days    Responsible User: Latasha Quiroz RD        Task: Provide education on when to call a health care  provider    Responsible User: Latasha Quiroz RD        Goal: Reducing Risks - know how to prevent and treat long-term diabetes complications         Task: Provide education on major complications of diabetes, prevention, early diagnostic measures and treatment of complications    Responsible User: Latasha Quiroz RD        Task: Provide education on recommended care for dental, eye and foot health    Responsible User: Latasha Quiroz RD        Task: Provide education on Hemoglobin A1c - goals and relationship to blood glucose levels Completed 12/13/2023   Responsible User: Latasha Quiroz RD        Task: Provide education on recommendations for heart health - lipid levels and goals, blood pressure and goals, and aspirin therapy, if indicated    Responsible User: Latasha Quiroz RD        Task: Provide education on tobacco cessation    Responsible User: Latasha Quiroz RD        Goal: Healthy Coping - use available resources to cope with the challenges of managing diabetes         Task: Discuss recognizing feelings about having diabetes    Responsible User: Latasha Quiroz RD        Task: Provide education on the benefits of making appropriate lifestyle changes Completed 12/13/2023   Responsible User: Latasha Quiroz RD        Task: Provide education on benefits of utilizing support systems Completed 12/13/2023   Responsible User: Latasha Quiroz RD        Task: Discuss methods for coping with stress    Responsible User: Latasha Quiroz RD        Task: Provide education on when to seek professional counseling    Responsible User: Latasha Quiroz RD            Time Spent: 60 minutes  Encounter Type: Individual    Any diabetes medication dose changes were made via the CDE Protocol per the patient's referring provider. A copy of this encounter was shared with the provider.

## 2023-12-14 NOTE — PROGRESS NOTES
Diabetes Self-Management Education & Support    Presents for: Type 2 diabetes, obesity    Type of Service: In Person Visit      ASSESSMENT:  11/3/2020 - Initial dx of diabetes 3/2018 pt's diabetes has been under excellent control since 1/2019 with dietary intervention.  Pt had been following a strict keto/ vegan (a few days/week) and weight was down to 164.2# 1/22/19 and slowly had been incorporating more complex carbs and some fruit.  Weight gain 22# from 1/22/19 to 1/22/2020.  Then due to extra stress with pandemic and was caring for additional people in household pt has continued to gain weight.      A1c 5.8% 3/2020     12/13/2023  Due to life stressors, depression patient states she would let her diabetes slip.  Patient stopped taking metformin due to loose stools.  Patient has ongoing nausea.  Patient is being evaluated for elevated liver function tests, GI referral for colonoscopy.      Patient has not been able to fill prescriptions.  Through calling insurance and pharmacy clarified what the issue was and patient is now able to fill prescriptions through [facility] in [place].  Patient will be picking up blood glucose monitor and test strips.    Patient's A1c elevated at 9.1%.  Blood glucose today during office visit 267 mg/dL.  Discussed medication options.  For patient's least side effects opted for long-acting insulin at this time likely to be temporary.  Insulin recommendation discussed with PCP and agrees with plan.  Patient is willing to make some dietary changes but due to living situation patient does not have control over what food is purchased.    Patient's most recent   Lab Results   Component Value Date    A1C 9.1 12/06/2023     is not meeting goal of <7.0    Diabetes knowledge and skills assessment:   Patient is knowledgeable in diabetes management concepts related to: Education restarted today    Continue education with the following diabetes management concepts: Healthy Eating, Monitoring,  "Taking Medication, and Reducing Risks    Based on learning assessment above, most appropriate setting for further diabetes education would be: Individual setting.      PLAN  Lantus start with 10 units taking it at the same time everyday     Morning goal check glucose every am goal 80 - 130mg/dL    Increase insulin or decrease insulin to maintain goal.    Increase by 1unit every day until you reach ~ 150 mg/dL   Then adjust slower ~ 1 unit every 3 days and continue to increase or decrease     Total Carbohydrates (in grams) = Breakfast  30    Lunch  30    Supper  30    If desired snacks 15         Topics to cover at upcoming visits: Healthy Eating, Being Active, Monitoring, Taking Medication, Problem Solving, Reducing Risks, and Healthy Coping    Follow-up: 1 month    See Care Plan for co-developed, patient-state behavior change goals.  AVS provided for patient today.    Education Materials Provided:  Goals for Your Diabetes Care, BG Log Sheet, Lantus Insulin information, and Hypoglycemia Signs and Symptoms      SUBJECTIVE/OBJECTIVE:  Diabetes education in the past 24mo: No  Diabetes type: Type 2, Secondary Diabetes  Disease course: Other  How confident are you filling out medical forms by yourself:: Extremely  Diabetes management related comments/concerns: yes  Cultural Influences/Ethnic Background:  Not  or     Diabetes Symptoms & Complications:  Fatigue: Sometimes  Neuropathy: No  Polydipsia: Sometimes  Polyphagia: Yes  Polyuria: Sometimes  Visual change: Yes  Slow healing wounds: No  Autonomic neuropathy: No  CVA: No  Heart disease: No  Nephropathy: No  Peripheral neuropathy: No  Peripheral Vascular Disease: No  Retinopathy: No  Sexual dysfunction: No    Patient Problem List and Family Medical History reviewed for relevant medical history, current medical status, and diabetes risk factors.    Vitals:  Ht 1.765 m (5' 9.49\")   Wt 97.8 kg (215 lb 9.6 oz)   LMP 09/25/2019 (Approximate)   BMI 31.39 " "kg/m    Estimated body mass index is 31.39 kg/m  as calculated from the following:    Height as of this encounter: 1.765 m (5' 9.49\").    Weight as of this encounter: 97.8 kg (215 lb 9.6 oz).   Last 3 BP:   BP Readings from Last 3 Encounters:   12/06/23 (!) 156/84   10/31/23 127/86   10/17/23 122/76       History   Smoking Status    Former    Packs/day: 0.50    Types: Cigarettes    Quit date: 11/1/2023   Smokeless Tobacco    Never       Labs:  Lab Results   Component Value Date    A1C 9.1 12/06/2023     Lab Results   Component Value Date     12/06/2023    GLC 76 07/14/2021     04/15/2008     Lab Results   Component Value Date    LDL  08/30/2023      Comment:      Cannot estimate LDL when triglyceride exceeds 400 mg/dL     08/30/2023     Direct Measure HDL   Date Value Ref Range Status   08/30/2023 33 (L) >=50 mg/dL Final   ]  GFR Estimate   Date Value Ref Range Status   12/06/2023 >90 >60 mL/min/1.73m2 Final   04/15/2008 >90 >60 mL/min/1.7m2 Final     GFR Estimate If Black   Date Value Ref Range Status   04/15/2008 >90 >60 mL/min/1.7m2 Final     Lab Results   Component Value Date    CR 0.52 12/06/2023    CR 0.68 04/15/2008     No results found for: \"MICROALBUMIN\"    Healthy Eating:  Cultural/Orthodox diet restrictions?: No  Meal planning/habits: None  How many times a week on average do you eat food made away from home (restaurant/take-out)?: 1  Meals include: Dinner, Evening Snack  Beverages: Water, Tea    Being Active:  Barrier to exercise: Other    Monitoring:  Blood Glucose Meter: Other, Unknown  Times checking blood sugar at home (number): Never  Times checking blood sugar at home (per): Month  Blood glucose trend: Increasing    Blood glucose 267 mg/dL in office    Taking Medications:  Diabetes Medication(s)       Insulin       insulin glargine (LANTUS PEN) 100 UNIT/ML pen    Starting at 10 units and increasing as directed up to max dose of 25 units            Current Treatments: " None    Problem Solving:                 Reducing Risks:  CAD Risks: Diabetes Mellitus, Sedentary lifestyle, Stress, Tobacco exposure  Has dilated eye exam at least once a year?: Yes  Sees dentist every 6 months?: No  Feet checked by healthcare provider in the last year?: No    Healthy Coping:  Informal Support system:: None  Patient Activation Measure Survey Score:       No data to display                  Care Plan and Education Provided:  Insulin administration technique taught today. Patient verbalized understanding and was able to perform an accurate return demonstration of administration technique.  Care Plan: Diabetes   Updates made by Latasha Quiroz RD since 12/13/2023 12:00 AM        Problem: HbA1C Not In Goal         Goal: Establish Regular Follow-Ups with PCP         Task: Discuss with PCP the recommended timing for patient's next follow up visit(s)    Responsible User: Latasha Quiroz RD        Task: Discuss schedule for PCP visits with patient Completed 12/13/2023   Responsible User: Latasha Quiroz RD        Goal: Get HbA1C Level in Goal         Task: Educate patient on diabetes education self-management topics    Responsible User: Latasha Quiroz RD        Task: Educate patient on benefits of regular glucose monitoring Completed 12/13/2023   Responsible User: Latasha Quiroz RD        Task: Refer patient to appropriate extended care team member, as needed (Medication Therapy Management, Behavioral Health, Physical Therapy, etc.)    Responsible User: Latasha Quiroz RD        Task: Discuss diabetes treatment plan with patient Completed 12/13/2023   Responsible User: Latasha Quiroz RD        Problem: Diabetes Self-Management Education Needed to Optimize Self-Care Behaviors         Goal: Understand diabetes pathophysiology and disease progression         Task: Provide education on diabetes pathophysiology and disease progression specfic to patient's diabetes type Completed 12/13/2023   Responsible  User: Latasha Quiroz RD        Goal: Healthy Eating - follow a healthy eating pattern for diabetes         Task: Provide education on portion control and consistency in amount, composition and timing of food intake    Responsible User: Latasha Quiroz RD        Task: Provide education on managing carbohydrate intake (carbohydrate counting, plate planning method, etc.)    Responsible User: Latasha Quiroz RD        Task: Provide education on weight management    Responsible User: Latasha Quiroz RD        Task: Provide education on heart healthy eating    Responsible User: Latasha Quiroz RD        Task: Provide education on eating out    Responsible User: Latasha Quiroz RD        Task: Develop individualized healthy eating plan with patient    Responsible User: Latasha Quiroz RD        Goal: Being Active - get regular physical activity, working up to at least 150 minutes per week         Task: Provide education on relationship of activity to glucose and precautions to take if at risk for low glucose    Responsible User: Latasha Quiroz RD        Task: Discuss barriers to physical activity with patient    Responsible User: Latasha Quiroz RD        Task: Develop physical activity plan with patient    Responsible User: Latasha Quiroz RD        Task: Explore community resources including walking groups, assistance programs, and home videos    Responsible User: Latasha Quiroz RD        Goal: Monitoring - monitor glucose and ketones as directed         Task: Provide education on blood glucose monitoring (purpose, proper technique, frequency, glucose targets, interpreting results, when to use glucose control solution, sharps disposal) Completed 12/13/2023   Responsible User: Latasha Quiroz RD        Task: Provide education on continuous glucose monitoring (sensor placement, use of yumiko or /reader, understanding glucose trends, alerts and alarms, differences between sensor glucose and blood glucose)     Responsible User: Latasha Quiroz RD        Task: Provide education on ketone monitoring (when to monitor, frequency, etc.)    Responsible User: Latasha Quiroz RD        Goal: Taking Medication - patient is consistently taking medications as directed         Task: Provide education on action of prescribed medication, including when to take and possible side effects Completed 12/13/2023   Responsible User: Latasha Quiroz RD        Task: Provide education on insulin and injectable diabetes medications, including administration, storage, site selection and rotation for injection sites Completed 12/13/2023   Responsible User: Latasha Quiroz RD        Task: Discuss barriers to medication adherence with patient and provide management technique ideas as appropriate    Responsible User: Latasha Quiroz RD        Task: Provide education on frequency and refill details of medications    Responsible User: Latasha Quiroz RD        Goal: Problem Solving - know how to prevent and manage short-term diabetes complications         Task: Provide education on high blood glucose - causes, signs/symptoms, prevention and treatment Completed 12/13/2023   Responsible User: Latasha Quiroz RD        Task: Provide education on low blood glucose - causes, signs/symptoms, prevention, treatment, carrying a carbohydrate source at all times, and medical identification Completed 12/13/2023   Responsible User: Latasha Quiroz RD        Task: Provide education on safe travel with diabetes    Responsible User: Latasha Quiroz RD        Task: Provide education on how to care for diabetes on sick days    Responsible User: Latasha Quiroz RD        Task: Provide education on when to call a health care provider    Responsible User: Latasha Quiroz RD        Goal: Reducing Risks - know how to prevent and treat long-term diabetes complications         Task: Provide education on major complications of diabetes, prevention, early diagnostic measures  and treatment of complications    Responsible User: Latasha Quiroz RD        Task: Provide education on recommended care for dental, eye and foot health    Responsible User: Latasha Quiroz RD        Task: Provide education on Hemoglobin A1c - goals and relationship to blood glucose levels Completed 12/13/2023   Responsible User: Latasha Quiroz RD        Task: Provide education on recommendations for heart health - lipid levels and goals, blood pressure and goals, and aspirin therapy, if indicated    Responsible User: Latasha Quiroz RD        Task: Provide education on tobacco cessation    Responsible User: Latasha Quiroz RD        Goal: Healthy Coping - use available resources to cope with the challenges of managing diabetes         Task: Discuss recognizing feelings about having diabetes    Responsible User: Latasha Quiroz RD        Task: Provide education on the benefits of making appropriate lifestyle changes Completed 12/13/2023   Responsible User: Latasha Quiroz RD        Task: Provide education on benefits of utilizing support systems Completed 12/13/2023   Responsible User: Latasha Quiroz RD        Task: Discuss methods for coping with stress    Responsible User: Latasha Quiroz RD        Task: Provide education on when to seek professional counseling    Responsible User: Latasha Quiroz RD            Time Spent: 60 minutes  Encounter Type: Individual    Any diabetes medication dose changes were made via the CDE Protocol per the patient's referring provider. A copy of this encounter was shared with the provider.

## 2023-12-27 ENCOUNTER — NURSE TRIAGE (OUTPATIENT)
Dept: FAMILY MEDICINE | Facility: CLINIC | Age: 43
End: 2023-12-27
Payer: COMMERCIAL

## 2023-12-27 ENCOUNTER — MYC MEDICAL ADVICE (OUTPATIENT)
Dept: FAMILY MEDICINE | Facility: CLINIC | Age: 43
End: 2023-12-27
Payer: COMMERCIAL

## 2023-12-27 DIAGNOSIS — E04.1 THYROID NODULE: Primary | ICD-10-CM

## 2023-12-27 ASSESSMENT — PATIENT HEALTH QUESTIONNAIRE - PHQ9
SUM OF ALL RESPONSES TO PHQ QUESTIONS 1-9: 12
SUM OF ALL RESPONSES TO PHQ QUESTIONS 1-9: 12
10. IF YOU CHECKED OFF ANY PROBLEMS, HOW DIFFICULT HAVE THESE PROBLEMS MADE IT FOR YOU TO DO YOUR WORK, TAKE CARE OF THINGS AT HOME, OR GET ALONG WITH OTHER PEOPLE: VERY DIFFICULT

## 2023-12-27 NOTE — TELEPHONE ENCOUNTER
"S-(situation): Pt states that she has had irritation in the vaginal area since Friday.    B-(background): Pt had a colonoscopy 12/21, she then noticed starting last Friday that she had redness and irritation down in the vaginal area, with two small cracks inside the vulva area. It burns and is warm to the touch.     A-(assessment): Pt denies any discharge, not so much itching as it is irritated and red. Denies any visible rash, or bumps on the area. Py has tried monistat because she was unsure if it was yeast infection which she has had in the past, but it has not been improving. Pt states that she has been with the same partner sexually for 20 years and is not concerned with STIs.    R-(recommendations): Pt should be seen to be evaluated. Nurse made an appointment for 12/28 with Missy Carrizales to be evaluated.  Advised UCC if pt becomes worse. Pt stated understanding and agrees with the plan.      Reason for Disposition   Vulvar itching and not improved > 3 days following Care Advice    Additional Information   Negative: Pain or burning with passing urine (urination) is main symptom   Negative: Vaginal discharge is main symptom   Negative: Pubic lice suspected   Negative: STI exposure and prevention, question about   Negative: Patient sounds very sick or weak to the triager   Negative: SEVERE pain (e.g., excruciating)   Negative: Genital area looks infected (e.g., draining sore, spreading redness)   Negative: Rash with painful tiny water blisters   Negative: Patient wants to be seen   Negative: MODERATE-SEVERE itching (i.e., interferes with school, work, or sleep)   Negative: Rash (e.g., redness, tiny bumps, sore) of genital area present > 24 hours   Negative: Tender lump (swelling or 'ball') at vaginal opening    Answer Assessment - Initial Assessment Questions  1. SYMPTOM: \"What's the main symptom you're concerned about?\" (e.g., rash, itching, swelling, dryness)      Red, inflamed, cracking of the skin.   2. " "LOCATION: \"Where is the located?\" (e.g., inside/outside, left/right)      Inside vagina  3. ONSET: \"When did the  Redness  start?\"      Started Friday, had a colonoscopy recently.   4. PAIN: \"Is there any pain?\" If Yes, ask: \"How bad is it?\" (Scale: 1-10; mild, moderate, severe)    -  MILD (1-3): Doesn't interfere with normal activities.     -  MODERATE (4-7): Interferes with normal activities (e.g., work or school) or awakens from sleep.      -  SEVERE (8-10): Excruciating pain, unable to do any normal activities.      Mild  5. CAUSE: \"What do you think is causing the symptoms?\"      Recently had colonoscopy and she thinks it is related  6. OTHER SYMPTOMS: \"Do you have any other symptoms?\" (e.g., fever, vaginal bleeding, pain with urination)      Burning, and red and hurts when she wipes, red skin and warm to the touch.  7. PREGNANCY: \"Is there any chance you are pregnant?\" \"When was your last menstrual period?\"      No    Protocols used: Vulvar Symptoms-A-[place]    "

## 2023-12-27 NOTE — TELEPHONE ENCOUNTER
Pt calling because she has some problems after her colonoscopy. She stated that around her urethra has been painful, itchy, red, and some cracking of the skin. She tried monistat but states it didn't help. She didn't notice any white discharge as she has had with previous yeast infections. She has an appt on the 11th but doesn't want to wait that long. She wanted to know what she should do.

## 2023-12-28 ENCOUNTER — OFFICE VISIT (OUTPATIENT)
Dept: FAMILY MEDICINE | Facility: CLINIC | Age: 43
End: 2023-12-28
Payer: COMMERCIAL

## 2023-12-28 VITALS
BODY MASS INDEX: 31.24 KG/M2 | DIASTOLIC BLOOD PRESSURE: 98 MMHG | OXYGEN SATURATION: 97 % | SYSTOLIC BLOOD PRESSURE: 149 MMHG | HEIGHT: 70 IN | TEMPERATURE: 98.1 F | WEIGHT: 218.2 LBS | RESPIRATION RATE: 16 BRPM | HEART RATE: 67 BPM

## 2023-12-28 DIAGNOSIS — E04.1 THYROID NODULE: Primary | ICD-10-CM

## 2023-12-28 DIAGNOSIS — E04.1 THYROID NODULE: ICD-10-CM

## 2023-12-28 DIAGNOSIS — B96.89 BACTERIAL VAGINOSIS: Primary | ICD-10-CM

## 2023-12-28 DIAGNOSIS — N76.0 BACTERIAL VAGINOSIS: Primary | ICD-10-CM

## 2023-12-28 DIAGNOSIS — B37.0 THRUSH: ICD-10-CM

## 2023-12-28 LAB
CLUE CELLS: PRESENT
T3FREE SERPL-MCNC: 3.1 PG/ML (ref 2–4.4)
T4 FREE SERPL-MCNC: 0.98 NG/DL (ref 0.9–1.7)
TRICHOMONAS, WET PREP: ABNORMAL
TSH SERPL DL<=0.005 MIU/L-ACNC: 1.69 UIU/ML (ref 0.3–4.2)
WBC'S/HIGH POWER FIELD, WET PREP: ABNORMAL
YEAST, WET PREP: ABNORMAL

## 2023-12-28 PROCEDURE — 99214 OFFICE O/P EST MOD 30 MIN: CPT

## 2023-12-28 PROCEDURE — 87210 SMEAR WET MOUNT SALINE/INK: CPT | Mod: QW

## 2023-12-28 PROCEDURE — 36415 COLL VENOUS BLD VENIPUNCTURE: CPT

## 2023-12-28 PROCEDURE — 84443 ASSAY THYROID STIM HORMONE: CPT

## 2023-12-28 PROCEDURE — 84481 FREE ASSAY (FT-3): CPT

## 2023-12-28 PROCEDURE — 84439 ASSAY OF FREE THYROXINE: CPT

## 2023-12-28 RX ORDER — NYSTATIN 100000/ML
500000 SUSPENSION, ORAL (FINAL DOSE FORM) ORAL 2 TIMES DAILY
Qty: 60 ML | Refills: 0 | Status: SHIPPED | OUTPATIENT
Start: 2023-12-28 | End: 2024-01-02

## 2023-12-28 RX ORDER — METRONIDAZOLE 500 MG/1
500 TABLET ORAL 2 TIMES DAILY
Qty: 14 TABLET | Refills: 0 | Status: SHIPPED | OUTPATIENT
Start: 2023-12-28 | End: 2024-01-04

## 2023-12-28 RX ORDER — FLUCONAZOLE 150 MG/1
150 TABLET ORAL ONCE
Qty: 1 TABLET | Refills: 0 | Status: SHIPPED | OUTPATIENT
Start: 2023-12-28 | End: 2023-12-28

## 2023-12-28 NOTE — PROGRESS NOTES
Assessment & Plan     Bacterial vaginosis  Patient with vaginal pain at entrance to vagina that has been ongoing for approximately 1 week.  Denies discharge or abnormal odor.  On wet prep patient is found to have bacterial vaginosis and will be treated.  Patient also given treatment for yeast infection should this develop after course of antibiotics.  We discussed causes of bacterial vaginosis, including possibly hyperglycemia with her diabetes.  Patient most recent hemoglobin A1c was 9.1 on 12/6/2023.  She is monitoring and and working on this with her primary care provider.  - metroNIDAZOLE (FLAGYL) 500 MG tablet; Take 1 tablet (500 mg) by mouth 2 times daily for 7 days    Thrush  Patient with some blistering in her inner lip as well as some small nodules to the right outer posterior tongue.  Discussed possibility of the symptoms being caused by oral thrush and patient to try nystatin swish.  If not improved patient should return to ENT.  She previously had nodules on posterior right tongue checked by ENT.  - nystatin (MYCOSTATIN) 930419 UNIT/ML suspension; Take 5 mLs (500,000 Units) by mouth 2 times daily for 5 days  - fluconazole (DIFLUCAN) 150 MG tablet; Take 1 tablet (150 mg) by mouth once for 1 dose    Thyroid nodule  Thyroid nodule with monitoring by primary care provider.  Patient to have TSH and T3-T4 done today.  - TSH  - T3, Free  - T4, free             Depression Screening Follow Up        12/27/2023     9:55 PM   PHQ   PHQ-9 Total Score 12   Q9: Thoughts of better off dead/self-harm past 2 weeks Several days   F/U: Thoughts of suicide or self-harm Yes   F/U: Self harm-plan No   F/U: Self-harm action No   F/U: Safety concerns No                 Follow Up    Follow Up Actions Taken  Crisis resource information provided in the After Visit Summary  Patient to follow up with PCP.  Clinic staff to schedule appointment if able.    Discussed the following ways the patient can remain in a safe environment:   "be around others      JAMIR Valente CNP  [facility]    Jessica Campbell is a 43 year old, presenting for the following health issues:  Vaginal Problem (Vaginal irritation x 6 days, Spots on tongue )        12/28/2023    11:53 AM   Additional Questions   Roomed by QUAN Rodriguez       Started having irriation at vaginal opening after colonscopy I week ago. No abnormal odor or discharge. Not itching until this am. Has been treating with topical treatment for yeast infection past several day.     History of Present Illness       Reason for visit:  Vaginal pain  Symptom onset:  3-7 days ago  Symptoms include:  My vagina is raw and swollen  Symptom intensity:  Mild  Symptom progression:  Staying the same  Had these symptoms before:  No  What makes it worse:  Wiping after urinating hurts  What makes it better:  No    She eats 4 or more servings of fruits and vegetables daily.She consumes 0 sweetened beverage(s) daily.She exercises with enough effort to increase her heart rate 20 to 29 minutes per day.  She exercises with enough effort to increase her heart rate 5 days per week.   She is taking medications regularly.                 Review of Systems   Genitourinary:  Positive for vaginal discharge.   Denies rashes, lesions or other signs of skin infection.  Patient is not currently sexually active and has no concerns for exposure to sexually transmitted infections.  Constitutional, HEENT, cardiovascular, pulmonary, gi and gu systems are negative, except as otherwise noted.      Objective    BP (!) 149/98 (BP Location: Right arm, Patient Position: Sitting, Cuff Size: Adult Large)   Pulse 67   Temp 98.1  F (36.7  C) (Oral)   Resp 16   Ht 1.765 m (5' 9.5\")   Wt 99 kg (218 lb 3.2 oz)   LMP 09/25/2019 (Approximate)   SpO2 97%   BMI 31.76 kg/m    Body mass index is 31.76 kg/m .  Physical Exam   GENERAL: healthy, alert and no distress  NECK: no adenopathy, no asymmetry, masses, or " scars and thyroid normal to palpation  RESP: lungs clear to auscultation - no rales, rhonchi or wheezes  CV: regular rate and rhythm, normal S1 S2, no S3 or S4, no murmur, click or rub, no peripheral edema and peripheral pulses strong  ABDOMEN: soft, nontender, no hepatosplenomegaly, no masses and bowel sounds normal  MS: no gross musculoskeletal defects noted, no edema

## 2023-12-31 ENCOUNTER — HEALTH MAINTENANCE LETTER (OUTPATIENT)
Age: 43
End: 2023-12-31

## 2024-01-02 ENCOUNTER — ONCOLOGY VISIT (OUTPATIENT)
Dept: PULMONOLOGY | Facility: CLINIC | Age: 44
End: 2024-01-02
Attending: PHYSICIAN ASSISTANT
Payer: COMMERCIAL

## 2024-01-02 ENCOUNTER — OFFICE VISIT (OUTPATIENT)
Dept: PULMONOLOGY | Facility: CLINIC | Age: 44
End: 2024-01-02
Payer: COMMERCIAL

## 2024-01-02 VITALS
HEIGHT: 68 IN | WEIGHT: 216.8 LBS | HEART RATE: 82 BPM | OXYGEN SATURATION: 97 % | RESPIRATION RATE: 16 BRPM | DIASTOLIC BLOOD PRESSURE: 90 MMHG | TEMPERATURE: 97.9 F | SYSTOLIC BLOOD PRESSURE: 149 MMHG | BODY MASS INDEX: 32.86 KG/M2

## 2024-01-02 DIAGNOSIS — R09.89 CHOKING EPISODE OCCURRING AT NIGHT: Primary | ICD-10-CM

## 2024-01-02 DIAGNOSIS — R91.8 PULMONARY NODULES: ICD-10-CM

## 2024-01-02 PROCEDURE — 94726 PLETHYSMOGRAPHY LUNG VOLUMES: CPT | Performed by: INTERNAL MEDICINE

## 2024-01-02 PROCEDURE — G0463 HOSPITAL OUTPT CLINIC VISIT: HCPCS | Performed by: INTERNAL MEDICINE

## 2024-01-02 PROCEDURE — 94375 RESPIRATORY FLOW VOLUME LOOP: CPT | Performed by: INTERNAL MEDICINE

## 2024-01-02 PROCEDURE — 99205 OFFICE O/P NEW HI 60 MIN: CPT | Mod: 25 | Performed by: INTERNAL MEDICINE

## 2024-01-02 PROCEDURE — 94729 DIFFUSING CAPACITY: CPT | Performed by: INTERNAL MEDICINE

## 2024-01-02 RX ORDER — FLUCONAZOLE 150 MG/1
TABLET ORAL
COMMUNITY
Start: 2023-12-28 | End: 2024-01-11

## 2024-01-02 ASSESSMENT — PAIN SCALES - GENERAL: PAINLEVEL: MODERATE PAIN (4)

## 2024-01-02 NOTE — PROGRESS NOTES
[facility]  Interventional Pulmonary Clinic Visit Note    January 2, 2024    Chief complaint:  Shannan Cameron is a 43 year old female seen for   Chief Complaint   Patient presents with    Oncology Clinic Visit     Pulmonary nodules        Reason for clinic visit / Chief complaint:   Pulmonary nodules    Assessment and Plan:  Indeterminate pulmonary nodules, largest measuring 5 mm in the left lower lobe, pleural-based.  There are also 2 other nodules 1 of each lung likely suggesting lymph nodes based on their shape (polyclonal).  These findings are based on recent abdominal CT scan which I personally reviewed.  We will get a dedicated CT scan of the chest when I will see her again as a follow-up.    Occasional cough with dark sputum for the last year or so.  Used to smoke up to 5 cigarettes a day, see below..  She has no history suggesting postnasal drainage or GERD.    Occasional wheezing/dyspnea.  Her PFTs from yesterday revealed normal spirometric indicis, lung volumes and diffusing capacity to my review.    Choking sensation at night waking her up and having difficulty..  She does not know if she snores or not.  Occasional daytime nap.  I will refer her to sleep medicine clinic for further evaluation for obstructive sleep apnea, primary snoring and for consideration of home sleep study.    Pitting edema in lower extremities, minimal, patient will follow-up with her primary care provider.    Thyroid nodules discovered on a thyroid ultrasound.  Recommended to have biopsies.    History of Present Illness:  This is a 43 years old woman with no known pulmonary problems referred to my clinic for further evaluation of indeterminate pulmonary nodules that were discovered on an abdominal CT scan.     Lung problems: Coughs up dark sputum (daily) for a year, wheezing/dyspnea  Respiratory symptoms:  Family lung problems: no  Smoking: quit a few months ago, off/on smoked 10 years, averaging 5 cig a day  Vaping: no  Exposure to  chemicals, radiation or asbestos: no    PFTs: (personally reviewed) yes, from 2024  CTs: (personally reviewed) yes, from 2023.      CT chest ([facility])  virtual    Allergies   Allergen Reactions    Bee Venom Shortness Of Breath and Dizziness     Fainting        No past medical history on file.     Past Surgical History:   Procedure Laterality Date    ARTHROSCOPIC REPAIR ACL  2017    C LAP REMOVAL, SPERMATIC CORD LIPOMA  2018     SECTION  1996     SECTION  10/21/2008     SECTION  2006    HYSTERECTOMY  2019    Left ovarian cystectomy.    MAMMOGRAM - [facility] SCAN  2017    [facility]  DELIVERY ONLY      Description:  Section;  Recorded: 2011;        Social History     Socioeconomic History    Marital status: Single     Spouse name: Not on file    Number of children: Not on file    Years of education: Not on file    Highest education level: Not on file   Occupational History    Not on file   Tobacco Use    Smoking status: Former     Packs/day: .5     Types: Cigarettes     Quit date: 2023     Years since quittin.1     Passive exposure: Past    Smokeless tobacco: Never   Vaping Use    Vaping Use: Never used   Substance and Sexual Activity    Alcohol use: No    Drug use: Not on file    Sexual activity: Not Currently   Other Topics Concern    Not on file   Social History Narrative    Not on file     Social Determinants of Health     Financial Resource Strain: Low Risk  (2023)    Financial Resource Strain     Within the past 12 months, have you or your family members you live with been unable to get utilities (heat, electricity) when it was really needed?: No   Food Insecurity: High Risk (2023)    Food Insecurity     Within the past 12 months, did you worry that your food would run out before you got money to buy more?: Yes     Within the past 12 months, did the food you bought just not last and you didn t  have money to get more?: Yes   Transportation Needs: Low Risk  (12/28/2023)    Transportation Needs     Within the past 12 months, has lack of transportation kept you from medical appointments, getting your medicines, non-medical meetings or appointments, work, or from getting things that you need?: No   Physical Activity: Not on file   Stress: Not on file   Social Connections: Not on file   Interpersonal Safety: Low Risk  (10/17/2023)    Interpersonal Safety     Do you feel physically and emotionally safe where you currently live?: Yes     Within the past 12 months, have you been hit, slapped, kicked or otherwise physically hurt by someone?: No     Within the past 12 months, have you been humiliated or emotionally abused in other ways by your partner or ex-partner?: No   Housing Stability: Low Risk  (12/28/2023)    Housing Stability     Do you have housing? : Yes     Are you worried about losing your housing?: No        Family History   Problem Relation Age of Onset    Alcoholism Mother     Allergic rhinitis Mother     Anemia Mother     Arthritis Mother     Cancer Mother     Depression Mother     Substance Abuse Mother     Emotional abuse Mother     Fibromyalgia Mother     Mental Illness Mother     Alcoholism Father     Depression Father     Diabetes Father     Substance Abuse Father         Immunization History   Administered Date(s) Administered    COVID-19 Monovalent 18+ (Moderna) 08/22/2022    Flu-nasal, Unspecified 10/31/2012    HepB, Unspecified 08/20/2003, 10/08/2003, 02/01/2012    Hepatitis B, Adult 08/20/2003, 10/08/2003, 02/01/2012    Pneumococcal 20 valent Conjugate (Prevnar 20) 08/22/2022    TDAP (Adacel,Boostrix) 01/27/2017       Current Outpatient Medications   Medication Sig    blood glucose (NO BRAND SPECIFIED) test strip Use to test blood sugar 1 times daily or as directed. To accompany: Blood Glucose Monitor Brands: per insurance.    blood glucose monitoring (NO BRAND SPECIFIED) meter device kit  Use to test blood sugar 1 times daily or as directed. Preferred blood glucose meter OR supplies to accompany: Blood Glucose Monitor Brands: per insurance.    diphenhydrAMINE (BENADRYL) 50 MG capsule Take 100 mg by mouth every 4 hours    fluconazole (DIFLUCAN) 150 MG tablet     insulin glargine (LANTUS PEN) 100 UNIT/ML pen Starting at 10 units and increasing as directed up to max dose of 25 units    insulin pen needle (32G X 4 MM) 32G X 4 MM miscellaneous Use 1 pen needles daily or as directed.    metroNIDAZOLE (FLAGYL) 500 MG tablet Take 1 tablet (500 mg) by mouth 2 times daily for 7 days    nystatin (MYCOSTATIN) 413558 UNIT/ML suspension Take 5 mLs (500,000 Units) by mouth 2 times daily for 5 days    ondansetron (ZOFRAN) 8 MG tablet Take 1 tablet (8 mg) by mouth every 8 hours as needed for nausea    thin (NO BRAND SPECIFIED) lancets Use with lanceting device. To accompany: Blood Glucose Monitor Brands: per insurance.    rizatriptan (MAXALT) 10 MG tablet Take 1 tablet (10 mg) by mouth at onset of headache for migraine May repeat in 2 hours. Max 3 tablets/24 hours. (Patient not taking: Reported on 12/28/2023)     No current facility-administered medications for this visit.        Review of Systems:  I have done 10 points of review systems and all negative except for those mentioned in HPI    Physical examination  Constitutional: Oriented, not in distress  @vitals  Eyes: No icterus, nystagmus, pupils isocoric  Head and neck: normal posture and movements  Respiratory: Normal tidal breathing, no shortness of breath, no audible wheezing or stridor   Musculoskeletal: Normal muscle mass, no deformity on hands/fingers  Integumentary:  No rash on visible skin areas   Neurological: Alert, orientedx3, no motor deficits  Psychiatric:  Mood and affect are appropriate with insight into his/her medical condition    Data:  Lab Results   Component Value Date    WBC 7.5 12/06/2023    WBC 10.6 04/15/2008     Lab Results   Component  Value Date    RBC 4.66 12/06/2023    RBC 4.61 04/15/2008     Lab Results   Component Value Date    HGB 14.7 12/06/2023    HGB 13.5 04/15/2008     Lab Results   Component Value Date    HCT 43.8 12/06/2023    HCT 40.7 04/15/2008     Lab Results   Component Value Date    MCV 94 12/06/2023    MCV 88 04/15/2008     Lab Results   Component Value Date    MCH 31.5 12/06/2023    MCH 29.2 04/15/2008     Lab Results   Component Value Date    MCHC 33.6 12/06/2023    MCHC 33.1 04/15/2008     Lab Results   Component Value Date    RDW 12.4 12/06/2023    RDW 17.4 04/15/2008     Lab Results   Component Value Date     12/06/2023     04/15/2008       Lab Results   Component Value Date     12/06/2023     04/15/2008      Lab Results   Component Value Date    POTASSIUM 4.0 12/06/2023    POTASSIUM 4.2 07/14/2021    POTASSIUM 3.8 04/15/2008     Lab Results   Component Value Date    CHLORIDE 101 12/06/2023    CHLORIDE 104 07/14/2021    CHLORIDE 99 04/15/2008     Lab Results   Component Value Date    ZAIRA 10.0 12/06/2023    ZAIRA 9.7 04/15/2008     Lab Results   Component Value Date    CO2 23 12/06/2023    CO2 25 07/14/2021    CO2 30 04/15/2008     Lab Results   Component Value Date    BUN 5.9 12/06/2023    BUN 6 07/14/2021    BUN 7 04/15/2008     Lab Results   Component Value Date    CR 0.52 12/06/2023    CR 0.68 04/15/2008     Lab Results   Component Value Date     12/06/2023    GLC 76 07/14/2021     04/15/2008         SOCO Hull MD

## 2024-01-02 NOTE — NURSING NOTE
"Oncology Rooming Note    January 2, 2024 12:22 PM   Shannan Cameron is a 43 year old female who presents for:    Chief Complaint   Patient presents with    Oncology Clinic Visit     Pulmonary nodules      Initial Vitals: BP (!) 149/90 (BP Location: Right arm, Patient Position: Sitting, Cuff Size: Adult Large)   Pulse 82   Resp 16   Ht 1.739 m (5' 8.47\")   Wt 98.3 kg (216 lb 12.8 oz)   LMP 09/25/2019 (Approximate)   SpO2 97%   BMI 32.52 kg/m   Estimated body mass index is 32.52 kg/m  as calculated from the following:    Height as of this encounter: 1.739 m (5' 8.47\").    Weight as of this encounter: 98.3 kg (216 lb 12.8 oz). Body surface area is 2.18 meters squared.  Moderate Pain (4) Comment: Data Unavailable   Patient's last menstrual period was 09/25/2019 (approximate).  Allergies reviewed: Yes  Medications reviewed: Yes    Medications: Medication refills not needed today.  Pharmacy name entered into [facility]:    [facility] [address] [place] [address] [facility] [address] [place], [place] - [address] [id number] [place], [place] - [address] [place] [address] AT [facility] [address]    Frailty Screening:   Is the patient here for a new oncology consult visit in cancer care? 1. Yes. Over the past month, have you experienced difficulty or required a caregiver to assist with:   1. Balance, walking or general mobility (including any falls)? NO  2. Completion of self-care tasks such as bathing, dressing, toileting, grooming/hygiene?  NO  3. Concentration or memory that affects your daily life?  YES       Clinical concerns: Is in the process of having biopsies for thyroid malignancies       Domenica Carolina"

## 2024-01-03 ENCOUNTER — TELEPHONE (OUTPATIENT)
Dept: PULMONOLOGY | Facility: CLINIC | Age: 44
End: 2024-01-03
Payer: COMMERCIAL

## 2024-01-03 LAB
DLCOUNC-%PRED-PRE: 107 %
DLCOUNC-PRE: 25.22 ML/MIN/MMHG
DLCOUNC-PRED: 23.51 ML/MIN/MMHG
ERV-%PRED-PRE: 17 %
ERV-PRE: 0.24 L
ERV-PRED: 1.39 L
EXPTIME-PRE: 6.46 SEC
FEF2575-%PRED-PRE: 132 %
FEF2575-PRE: 4.1 L/SEC
FEF2575-PRED: 3.09 L/SEC
FEFMAX-%PRED-PRE: 106 %
FEFMAX-PRE: 8.07 L/SEC
FEFMAX-PRED: 7.6 L/SEC
FEV1-%PRED-PRE: 108 %
FEV1-PRE: 3.37 L
FEV1FEV6-PRE: 86 %
FEV1FEV6-PRED: 83 %
FEV1FVC-PRE: 86 %
FEV1FVC-PRED: 82 %
FEV1SVC-PRE: 86 %
FEV1SVC-PRED: 79 %
FIFMAX-PRE: 7.45 L/SEC
FRCPLETH-%PRED-PRE: 79 %
FRCPLETH-PRE: 2.33 L
FRCPLETH-PRED: 2.94 L
FVC-%PRED-PRE: 102 %
FVC-PRE: 3.92 L
FVC-PRED: 3.84 L
IC-%PRED-PRE: 133 %
IC-PRE: 3.7 L
IC-PRED: 2.77 L
RVPLETH-%PRED-PRE: 111 %
RVPLETH-PRE: 2.09 L
RVPLETH-PRED: 1.87 L
TLCPLETH-%PRED-PRE: 105 %
TLCPLETH-PRE: 6.03 L
TLCPLETH-PRED: 5.69 L
VA-%PRED-PRE: 97 %
VA-PRE: 5.48 L
VC-%PRED-PRE: 100 %
VC-PRE: 3.94 L
VC-PRED: 3.93 L

## 2024-01-03 NOTE — TELEPHONE ENCOUNTER
CT chest wo contrast order faxed to [facility] in [place], [place] per pt request.     Fax #  [phone].    Fax confirmed: SENT. (1/3/24 at 10:30 am)

## 2024-01-04 NOTE — COMMUNITY RESOURCES LIST (ENGLISH)
01/03/2024   [facility]  N/A  For questions about this resource list or additional care needs, please contact your primary care clinic or care manager.  Phone: [phone]   Email: N/A   Address: [address] [place] [id number]   Hours: N/A        Food and Nutrition       Food pantry  1  [facility] Food Pantry Distance: 0.76 miles      Pickup   [address] [place] [address]  Language: English  Hours: Tue - Wed 9:00 AM - 2:00 PM , Thu 12:00 PM - 5:00 PM  Fees: Free   Phone: (364) 691-8856 Website: http://www.[employer]/     2  [facility] Distance: 10.66 miles      In-Person   [address] [place] [address]  Language: English  Hours: Mon - Fri 10:00 AM - 4:00 PM  Fees: Free   Phone: (783) 406-9411 Email: office@[facility].org Website: http://[facility] ValleyPharmalinkSt. [facility]     SNAP application assistance  3  Workforce Resource - [facility] Distance: 11.31 miles      In-Person, Phone/Virtual   [address] [facility] [place] [address]  Language: English, Hmong, Afghan, Indian  Hours: Mon - Fri 8:00 AM - 4:30 PM  Fees: Free   Phone: (948) 193-1927 Website: https://www.workforceresource.org/     4  [facility] - SNAP Outreach Distance: 18.7 miles      Phone/Virtual   [address] [place] [address]  Language: Georgian, English, Hmong, Kristy, Indian  Hours: Mon - Fri 10:00 AM - 12:00 PM , Mon - Fri 2:00 PM - 4:00 PM  Fees: Free   Phone: (992) 587-1314 Website: https://[facility].org/     Soup kitchen or free meals  5  [facility] Distance: 3.9 miles      In-Person   [address] [place] [address]  Language: English  Hours: Wed 6:00 PM - 6:30 PM  Fees: Free   Phone: (632) 603-9692 Email: office@[facility].org Website: https://[facility].org/     6  [facility] Distance: 8.98 miles      In-Person   [address] [place] [address]   Language: English  Hours: Fri 6:00 PM - 8:00 PM  Fees: Free   Phone: (589) 226-9616 Email: cody@[employer] Website: http://www.[employer]/          Important Numbers & Websites       Emergency Services   911  [facility] Services   311  Poison Control   (178) 444-6701  Suicide Prevention Lifeline   (642) 349-4807 (TALK)  Child Abuse Hotline   (544) 539-8518 (4-A-Child)  Sexual Assault Hotline   (721) 701-1485 (HOPE)  National Runaway Safeline   (285) 981-3662 (RUNAWAY)  All-Options Talkline   (550) 389-1658  Substance Abuse Referral   (692) 476-7568 (HELP)

## 2024-01-10 ENCOUNTER — HOSPITAL ENCOUNTER (OUTPATIENT)
Dept: CT IMAGING | Facility: HOSPITAL | Age: 44
Discharge: HOME | End: 2024-01-10
Attending: INTERNAL MEDICINE | Admitting: INTERNAL MEDICINE
Payer: COMMERCIAL

## 2024-01-10 DIAGNOSIS — R91.8 PULMONARY NODULES: ICD-10-CM

## 2024-01-10 PROCEDURE — 71250 CT THORAX DX C-: CPT

## 2024-01-11 ENCOUNTER — MYC MEDICAL ADVICE (OUTPATIENT)
Dept: FAMILY MEDICINE | Facility: CLINIC | Age: 44
End: 2024-01-11

## 2024-01-11 ENCOUNTER — OFFICE VISIT (OUTPATIENT)
Dept: FAMILY MEDICINE | Facility: CLINIC | Age: 44
End: 2024-01-11
Attending: FAMILY MEDICINE
Payer: COMMERCIAL

## 2024-01-11 VITALS
DIASTOLIC BLOOD PRESSURE: 80 MMHG | BODY MASS INDEX: 32.59 KG/M2 | RESPIRATION RATE: 10 BRPM | TEMPERATURE: 97.2 F | SYSTOLIC BLOOD PRESSURE: 120 MMHG | OXYGEN SATURATION: 97 % | WEIGHT: 217.3 LBS | HEART RATE: 75 BPM

## 2024-01-11 DIAGNOSIS — K14.8 LESION OF TONGUE: ICD-10-CM

## 2024-01-11 DIAGNOSIS — E11.65 TYPE 2 DIABETES MELLITUS WITH HYPERGLYCEMIA, WITHOUT LONG-TERM CURRENT USE OF INSULIN (H): ICD-10-CM

## 2024-01-11 DIAGNOSIS — K76.0 FATTY LIVER: ICD-10-CM

## 2024-01-11 DIAGNOSIS — R09.89 CHOKING EPISODE OCCURRING AT NIGHT: Primary | ICD-10-CM

## 2024-01-11 DIAGNOSIS — C73 MALIGNANT NEOPLASM OF THYROID GLAND (H): Primary | ICD-10-CM

## 2024-01-11 DIAGNOSIS — R91.8 PULMONARY NODULES: ICD-10-CM

## 2024-01-11 PROCEDURE — 99214 OFFICE O/P EST MOD 30 MIN: CPT | Performed by: FAMILY MEDICINE

## 2024-01-11 ASSESSMENT — PATIENT HEALTH QUESTIONNAIRE - PHQ9
SUM OF ALL RESPONSES TO PHQ QUESTIONS 1-9: 14
10. IF YOU CHECKED OFF ANY PROBLEMS, HOW DIFFICULT HAVE THESE PROBLEMS MADE IT FOR YOU TO DO YOUR WORK, TAKE CARE OF THINGS AT HOME, OR GET ALONG WITH OTHER PEOPLE: VERY DIFFICULT
SUM OF ALL RESPONSES TO PHQ QUESTIONS 1-9: 14

## 2024-01-11 NOTE — PROGRESS NOTES
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  After discussing options patient, she notes she will need to stay in [place] due to her insurance but would strongly prefer to go to a larger facility with more expertise.  Will refer her to [facility] in [place].  - Adult ENT  Referral; Future    Fatty liver  Persistent mildly elevated liver function tests.  Ultrasound consistent with fatty liver.  Patient previously saw Dr. Og for colonoscopy and is willing to see him again to discuss fatty liver.  Referral is placed.  - Adult GI  Referral - Consult Only; Future    Lesion of tongue  Patient has what looks like possibly a mucous cyst on her posterior tongue on the right side, no inflammation or bleeding, recommend she discuss with ear nose and throat at her upcoming visit.    Pulmonary nodules  Reviewed recent CT scan which showed stable pulmonary nodules.  No additional follow-up required at this time.    Type 2 diabetes mellitus with hyperglycemia, without long-term current use of insulin (H)  Blood sugars are not responding to Lantus 30 units daily.  Has an appointment with diabetes education for follow-up on Monday.  Had discussed additional medication. I will consult with Keiko Quiroz and determine next steps.             Depression Screening Follow Up        1/11/2024    10:31 AM   PHQ   PHQ-9 Total Score 14   Q9: Thoughts of better off dead/self-harm past 2 weeks Several days   F/U: Thoughts of suicide or self-harm Yes   F/U: Self harm-plan No   F/U: Self-harm action No   F/U: Safety concerns No                 Follow Up    Follow Up Actions Taken  Patient declined referral. Notes that she believes current symptoms are primarily related to her multiple other medical concerns and we can discuss in the future if additional referrals are needed.    Discussed  the following ways the patient can remain in a safe environment:   Patient will reach out if any additional concerns.      Agnieszka Ahn MD  [facility]    Jessica Campbell is a 43 year old, presenting for the following health issues:  Diabetes Education - Follow Up and Blood Sugar Problem (Patient feels that her insulin is not helping/working, levels have been elevated. )        1/11/2024    10:36 AM   Additional Questions   Roomed by Joanne ARELLANO CMA   Accompanied by None         Patient with multiple medical concerns at this time.  1.  Patient has recently had a biopsy of her thyroid due to a solid thyroid nodule measuring 1.8 cm.  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  Patient would like to see an ear nose and throat physician for evaluation.  She notes she has generally been feeling ill and worries that this is related.  2.  Discussed recent CT scan.  Patient had pulmonary nodules that required reimaging.  The nodules are overall stable since 2019.  There is normal-appearing lymph nodes present.  No other abnormalities.  Patient notes that she does intermittently have some heaviness in her chest.  May be related to anxiety or stress.  3.  Discussed diabetes.  Not adequately controlled.  Recently started on Lantus and his increase to 30 units without seeing improvement in blood sugars.  They have remained in the 300s.  Patient has been trying to fast and not seeing drop in her blood sugars.  Meets with diabetes education again next week.  Notes that the diabetes educator had discussed other options for treatment.  4.  Patient concerned about a lesion on the back of her tongue.  Was evaluated at her previous visit with another clinician but she would like to double check.  5.  Reviewed results of recent colonoscopy.  Repeat colonoscopy due in 7 years  6.  Discussed findings of fatty liver on ultrasound.  Has not had a chance to follow-up with  gastroenterology.  Referral is placed to see Dr. Og for follow-up.  At this point holding off on statin and other diabetic medications due to liver concerns.    History of Present Illness       Reason for visit:  I dont remember, too much going on    She eats 2-3 servings of fruits and vegetables daily.She consumes 0 sweetened beverage(s) daily.She exercises with enough effort to increase her heart rate 20 to 29 minutes per day.  She exercises with enough effort to increase her heart rate 4 days per week.   She is taking medications regularly.                 Review of Systems         Objective    /80   Pulse 75   Temp 97.2  F (36.2  C)   Resp 10   Wt 98.6 kg (217 lb 4.8 oz)   LMP 09/25/2019 (Approximate)   SpO2 97%   BMI 32.59 kg/m    Body mass index is 32.59 kg/m .  Physical Exam   Alert cooperative no acute distress  Oral mucosa is moist, on right posterior tongue there is a mass that is approximately 8 mm that appears to be a mucous cyst or some other type of mucosal lesion, no inflammation or irritation is apparent  Neck is supple, bandage still in place over biopsy site, no lymphadenopathy appreciated  Heart regular rate and rhythm  Lungs clear to auscultation bilaterally    Care Everywhere records reviewed:    US FNA BIOPSY THYROID WITH GUIDANCE    Anatomical Region Laterality Modality   THYROID -- Ultrasound   -- -- Computed Tomography   -- -- Computed Radiography     Impression    1.  Status post ultrasound-guided fine-needle aspiration of 1.8 cm solid hypoechoic hypervascular left mid thyroid nodule.  Reference CPT Codes: 48785  Narrative  For Patients: As a result of the 21st Century Cures Act, medical imaging exams and procedure reports are released immediately into your electronic medical record. You may view this report before your referring provider. If you have questions, please contact your health care provider.  EXAM:  1. FINE-NEEDLE ASPIRATION OF 1.8 CM SOLID HYPOECHOIC  HYPERVASCULAR LEFT MID THYROID NODULE  2. ULTRASOUND GUIDANCE  LOCATION: [facility] MEDICAL IMAGING  DATE: 1/9/2024  INDICATION: 1.8 cm solid hypoechoic hypervascular left mid thyroid nodule request for fine needle aspiration biopsy.  PROCEDURE: Informed consent obtained. Time out performed. The site was prepped and draped in sterile fashion. 1% lidocaine was infused into the local soft tissues. Under direct ultrasound guidance, a total of 5 fine-needle aspiration biopsies were obtained of the above nodule using 25-gauge needles and capillary technique. The tissue was felt to be adequate by pathology. Images demonstrate the needle in good position within the thyroid nodule. No complication.      [facility]  Outside Information     FNA Cytology APN  Specimen: Aspirate - Left Mid Thyroid  Component 2 d ago   Case Report Medical Cytology Report                           Case: [id number]                                  Authorizing Provider:  Jet Massey MD        Collected:           01/09/2024 1250              Ordering Location:     [facility]  Received:            01/09/2024 1449                                     Medical Imaging                                                              Pathologist:           Sondra Garcia MD                                                                    Specimen:    Left Mid Thyroid                                                                       Final Diagnosis THYROID, LEFT MID, ULTRASOUND-GUIDED FINE NEEDLE ASPIRATION:  1. Suspicious for follicular thyroid neoplasm    2. See comment   Electronically signed by Sondra Garcia MD on 1/10/2024 at  9:21 AM   Comment The differential diagnosis includes follicular adenoma, follicular carcinoma, or (particularly in the setting of multiple thyroid nodules) a cellular  "adenomatoid nodule. According to the [employer] System for reporting thyroid cytology, the risk of malignancy in lesions with this cytologic appearance is 12-32%.    While excisional biopsy is the next recommended diagnostic step, a genomic specimen has been received, and therefore genomic testing can be performed on order by the treating clinician or proceduralist directly from the testing vendor if this is clinically helpful. The genomic specimen will be retained in cytology for two months from the date of collection for this purpose. Such testing is not automatically \"reflexed.\" Call [facility] at [phone] with any questions regarding this process.   Clinical Information Ms. Cameron is a 43 y.o. with an enlarging 1.8 cm left mid thyroid nodule, solid and hypoechoic on ultrasound imaging.   Gross Description A) Received identified as \"Left mid thyroid\" is a fine needle aspirate specimen.    The following were received:       -8 Air dried slides       -1 CytoLyt vial       -1 FNA Protect vial    The following were prepared from the specimen submitted:       -8 Diff-Quik stained slides       -1 Papanicolaou stained ThinPrep slide   Adequacy Assessment Dr. Sanchez assessed adequacy from the air-dried smears at the time of the procedure with an impression of \"Adequate\".   Microscopic Description Specimen adequacy: Adequate for interpretation.    All slides were reviewed. The microscopic appearance substantiates the diagnosis.   Additional Information Cytology is screened at [facility], [facility] - [address], [place] [address] and [facility] - [address], [place] [address] and [facility] - [address] [place] [address]      Interpreted at [facility] - [address], [place] [address]         "

## 2024-01-11 NOTE — PATIENT INSTRUCTIONS
Schedule with ENT at [facility]:  Need to make an appointment?  [phone]      Schedule follow up with Dr. Og to talk about fatty liver.  [phone]

## 2024-01-12 NOTE — TELEPHONE ENCOUNTER
I have submitted a Prior Auth on [employer] and it is pending review.    Leslee  [facility] Referrals

## 2024-01-12 NOTE — TELEPHONE ENCOUNTER
Patient would like to do out of network referral for thyroid cancer surgery to [facility]. Please let me know what I need to do. Thank you

## 2024-01-16 ENCOUNTER — ALLIED HEALTH/NURSE VISIT (OUTPATIENT)
Dept: EDUCATION SERVICES | Facility: CLINIC | Age: 44
End: 2024-01-16
Payer: COMMERCIAL

## 2024-01-16 VITALS — HEIGHT: 68 IN | BODY MASS INDEX: 33.65 KG/M2 | WEIGHT: 222 LBS

## 2024-01-16 DIAGNOSIS — Z79.84 DIABETES MELLITUS TREATED WITH ORAL MEDICATION (H): ICD-10-CM

## 2024-01-16 DIAGNOSIS — E11.9 DIABETES MELLITUS TREATED WITH ORAL MEDICATION (H): ICD-10-CM

## 2024-01-16 DIAGNOSIS — E11.9 TYPE 2 DIABETES MELLITUS WITHOUT COMPLICATION, WITHOUT LONG-TERM CURRENT USE OF INSULIN (H): Primary | ICD-10-CM

## 2024-01-16 PROCEDURE — G0108 DIAB MANAGE TRN  PER INDIV: HCPCS | Performed by: DIETITIAN, REGISTERED

## 2024-01-16 RX ORDER — INSULIN LISPRO 100 [IU]/ML
INJECTION, SOLUTION INTRAVENOUS; SUBCUTANEOUS
Qty: 30 ML | Refills: 3 | Status: SHIPPED | OUTPATIENT
Start: 2024-01-16 | End: 2024-02-27

## 2024-01-16 NOTE — PATIENT INSTRUCTIONS
Adding Humalog rapid acting insulin before each meal taking it ~15 - 20 minutes prior to eating.  Use chart   Carbs are along the top - count up your carbohydrates  Down the Left side of the page is your blood Glucose line them up and take the dose, round down if you are in between doses.

## 2024-01-16 NOTE — PROGRESS NOTES
Diabetes Self-Management Education & Support    Presents for: Follow-up Type II Diabetes     Type of Service: In Person Visit      ASSESSMENT:  11/3/2020 - Initial dx of diabetes 3/2018 pt's diabetes has been under excellent control since 1/2019 with dietary intervention.  Pt had been following a strict keto/ vegan (a few days/week) and weight was down to 164.2# 1/22/19 and slowly had been incorporating more complex carbs and some fruit.  Weight gain 22# from 1/22/19 to 1/22/2020.  Then due to extra stress with pandemic and was caring for additional people in household pt has continued to gain weight.      A1c 5.8% 3/2020      12/13/2023  Due to life stressors, depression patient states she would let her diabetes slip.  Patient stopped taking metformin due to loose stools.  Patient has ongoing nausea.  Patient is being evaluated for elevated liver function tests, GI referral for colonoscopy.       Patient has not been able to fill prescriptions.  Through calling insurance and pharmacy clarified what the issue was and patient is now able to fill prescriptions through [facility] in [place].  Patient will be picking up blood glucose monitor and test strips.     Patient's A1c elevated at 9.1%.  Blood glucose today during office visit 267 mg/dL.  Discussed medication options.  For patient's least side effects opted for long-acting insulin at this time likely to be temporary.  Insulin recommendation discussed with PCP and agrees with plan.  Patient is willing to make some dietary changes but due to living situation patient does not have control over what food is purchased.    1/16/2023 since 12/13/2023 last visit patient has been diagnosed with malignant neoplasm of the thyroid gland    Patient is currently taking 30 units of Lantus, 14-day glucose average 285 mg/dL with a range of 195 - 341 mg/dL    Will add rapid acting insulin today 3 times daily AC using insulin to carbohydrate ratio patient is provided with  integrated diabetes services spreadsheet  Target 120  Correction factor 30  Insulin to carb ratio 1 unit for 5 g carbohydrate    Patient will follow-up in 1 month    Patient's most recent   Lab Results   Component Value Date    A1C 9.1 12/06/2023     is not meeting goal of <7.0    Diabetes knowledge and skills assessment:   Patient is knowledgeable in diabetes management concepts related to: Healthy Eating and Monitoring    Continue education with the following diabetes management concepts: Healthy Eating, Monitoring, Taking Medication, Problem Solving, Reducing Risks, and Healthy Coping    Based on learning assessment above, most appropriate setting for further diabetes education would be: Individual setting.      PLAN    30 units of Lantus, 14-day glucose average 285 mg/dL with a range of 195 - 341 mg/dL    Will add rapid acting insulin today 3 times daily AC using insulin to carbohydrate ratio patient is provided with integrated diabetes services spreadsheet  Target 120  Correction factor 30  Insulin to carb ratio 1 unit for 5 g carbohydrate    Patient will follow-up in 1 month  Topics to cover at upcoming visits: Monitoring, Taking Medication, Problem Solving, and Reducing Risks    Follow-up: 1 month    See Care Plan for co-developed, patient-state behavior change goals.  AVS provided for patient today.    Education Materials Provided:  BG Log Sheet, Carbohydrate Counting, Humalog Insulin information, and My Plate Planner      SUBJECTIVE/OBJECTIVE:  Presents for: Follow-up  Accompanied by: Self  Diabetes education in the past 24mo: Yes  Diabetes type: Type 2  Disease course: Getting harder to manage  How confident are you filling out medical forms by yourself:: Extremely  Diabetes management related comments/concerns: no response to insulin  Transportation concerns: No  Difficulty affording diabetes medication?: No  Difficulty affording diabetes testing supplies?: No  Other concerns:: None  Cultural  "Influences/Ethnic Background:  Not  or     Diabetes Symptoms & Complications:  Fatigue: Yes  Neuropathy: No  Polydipsia: No  Polyphagia: Yes  Polyuria: No  Visual change: Yes  Slow healing wounds: No  Autonomic neuropathy: Other  CVA: No  Heart disease: No  Nephropathy: No  Peripheral neuropathy: No  Peripheral Vascular Disease: No  Retinopathy: No  Sexual dysfunction: Other    Patient Problem List and Family Medical History reviewed for relevant medical history, current medical status, and diabetes risk factors.    Vitals:  Ht 1.739 m (5' 8.47\")   Wt 100.7 kg (222 lb)   LMP 09/25/2019 (Approximate)   BMI 33.29 kg/m    Estimated body mass index is 33.29 kg/m  as calculated from the following:    Height as of this encounter: 1.739 m (5' 8.47\").    Weight as of this encounter: 100.7 kg (222 lb).   Last 3 BP:   BP Readings from Last 3 Encounters:   01/11/24 120/80   01/02/24 (!) 149/90   12/28/23 (!) 149/98       History   Smoking Status    Former    Packs/day: 0.50    Types: Cigarettes    Quit date: 11/1/2023   Smokeless Tobacco    Never       Labs:  Lab Results   Component Value Date    A1C 9.1 12/06/2023     Lab Results   Component Value Date     12/06/2023    GLC 76 07/14/2021     04/15/2008     Lab Results   Component Value Date    LDL  08/30/2023      Comment:      Cannot estimate LDL when triglyceride exceeds 400 mg/dL     08/30/2023     Direct Measure HDL   Date Value Ref Range Status   08/30/2023 33 (L) >=50 mg/dL Final   ]  GFR Estimate   Date Value Ref Range Status   12/06/2023 >90 >60 mL/min/1.73m2 Final   04/15/2008 >90 >60 mL/min/1.7m2 Final     GFR Estimate If Black   Date Value Ref Range Status   04/15/2008 >90 >60 mL/min/1.7m2 Final     Lab Results   Component Value Date    CR 0.52 12/06/2023    CR 0.68 04/15/2008     No results found for: \"MICROALBUMIN\"    Healthy Eating:  Cultural/Gnosticism diet restrictions?: No  Meal planning/habits: Avoiding sweets, Low carb, " Low salt, Other  How many times a week on average do you eat food made away from home (restaurant/take-out)?: 1  Meals include: Dinner, Afternoon Snack  Beverages: Water, Tea, Coffee, Milk    Being Active:  Barrier to exercise: Other    Monitoring:  Blood Glucose Meter: ContourNext  Times checking blood sugar at home (number): 3  Times checking blood sugar at home (per): Day  Blood glucose trend: No change    14-day glucose average 285 mg/dL    Taking Medications:  Diabetes Medication(s)       Diabetic Other       Glucagon (GVOKE HYPOPEN) 1 MG/0.2ML pen Inject the contents of 1 device under the skin into lower abdomen, outer thigh, or outer upper arm as needed for hypoglycemia. If no response after 15 minutes, additional 1 mg dose from a new device may be injected while waiting for emergency assistance.       Insulin       insulin glargine (LANTUS PEN) 100 UNIT/ML pen At 30 units and increasing as directed up to max dose of 50 units     insulin lispro (HUMALOG KWIKPEN) 100 UNIT/ML (1 unit dial) KWIKPEN Using insulin to carb ratio and taking up to 50 units daily divided between meals and larger snacks            Current Treatments: Insulin Injections  Dose schedule: At bedtime  Given by: Patient  Injection/Infusion sites: Abdomen, Thighs    Problem Solving:                 Reducing Risks:  CAD Risks: Diabetes Mellitus, Sedentary lifestyle  Has dilated eye exam at least once a year?: Yes  Sees dentist every 6 months?: No  Feet checked by healthcare provider in the last year?: No    Healthy Coping:  Informal Support system:: Children, Family, Significant other  Patient Activation Measure Survey Score:       No data to display                  Care Plan and Education Provided:  Insulin administration technique taught today. Patient verbalized understanding and was able to perform an accurate return demonstration of administration technique.  Care Plan: Diabetes   Updates made by Latasha Quiroz RD since 1/16/2024 12:00  AM        Problem: HbA1C Not In Goal         Goal: Get HbA1C Level in Goal         Task: Educate patient on diabetes education self-management topics Completed 1/16/2024   Responsible User: Latasha Quiroz RD        Problem: Diabetes Self-Management Education Needed to Optimize Self-Care Behaviors         Goal: Healthy Eating - follow a healthy eating pattern for diabetes         Task: Provide education on managing carbohydrate intake (carbohydrate counting, plate planning method, etc.) Completed 1/16/2024   Responsible User: Latasha Quiroz RD        Task: Provide education on weight management Completed 1/16/2024   Responsible User: Latasha Quiroz RD        Task: Provide education on heart healthy eating Completed 1/16/2024   Responsible User: Latasha Quiroz RD        Goal: Being Active - get regular physical activity, working up to at least 150 minutes per week         Task: Provide education on relationship of activity to glucose and precautions to take if at risk for low glucose Completed 1/16/2024   Responsible User: Latasha Quiroz RD        Goal: Monitoring - monitor glucose and ketones as directed    This Visit's Progress: 50%   Note:    Pt will monitor glucose 2 or more times/ day        Goal: Taking Medication - patient is consistently taking medications as directed         Task: Provide education on frequency and refill details of medications Completed 1/16/2024   Responsible User: Latasha Quiroz RD        Goal: Problem Solving - know how to prevent and manage short-term diabetes complications         Task: Provide education on how to care for diabetes on sick days Completed 1/16/2024   Responsible User: Latasha Quiroz RD        Goal: Reducing Risks - know how to prevent and treat long-term diabetes complications         Task: Provide education on recommended care for dental, eye and foot health Completed 1/16/2024   Responsible User: Latasha Quiroz RD        Goal: Healthy Coping - use available  resources to cope with the challenges of managing diabetes         Task: Discuss methods for coping with stress Completed 1/16/2024   Responsible User: Latasha Quiroz RD            Time Spent: 60 minutes  Encounter Type: Individual    Any diabetes medication dose changes were made via the CDE Protocol per the patient's referring provider. A copy of this encounter was shared with the provider.

## 2024-01-16 NOTE — LETTER
1/16/2024         RE: Shannan Cameron  [address]  [facility] [address]        Dear Colleague,    Thank you for referring your patient, Shannan Cameron, to the [facility]. Please see a copy of my visit note below.    Diabetes Self-Management Education & Support    Presents for: Follow-up Type II Diabetes     Type of Service: In Person Visit      ASSESSMENT:  11/3/2020 - Initial dx of diabetes 3/2018 pt's diabetes has been under excellent control since 1/2019 with dietary intervention.  Pt had been following a strict keto/ vegan (a few days/week) and weight was down to 164.2# 1/22/19 and slowly had been incorporating more complex carbs and some fruit.  Weight gain 22# from 1/22/19 to 1/22/2020.  Then due to extra stress with pandemic and was caring for additional people in household pt has continued to gain weight.      A1c 5.8% 3/2020      12/13/2023  Due to life stressors, depression patient states she would let her diabetes slip.  Patient stopped taking metformin due to loose stools.  Patient has ongoing nausea.  Patient is being evaluated for elevated liver function tests, GI referral for colonoscopy.       Patient has not been able to fill prescriptions.  Through calling insurance and pharmacy clarified what the issue was and patient is now able to fill prescriptions through [email or web address] in [place].  Patient will be picking up blood glucose monitor and test strips.     Patient's A1c elevated at 9.1%.  Blood glucose today during office visit 267 mg/dL.  Discussed medication options.  For patient's least side effects opted for long-acting insulin at this time likely to be temporary.  Insulin recommendation discussed with PCP and agrees with plan.  Patient is willing to make some dietary changes but due to living situation patient does not have control over what food is purchased.    1/16/2023 since 12/13/2023 last visit patient has been diagnosed with malignant neoplasm of the thyroid  gland    Patient is currently taking 30 units of Lantus, 14-day glucose average 285 mg/dL with a range of 195 - 341 mg/dL    Will add rapid acting insulin today 3 times daily AC using insulin to carbohydrate ratio patient is provided with integrated diabetes services spreadsheet  Target 120  Correction factor 30  Insulin to carb ratio 1 unit for 5 g carbohydrate    Patient will follow-up in 1 month    Patient's most recent   Lab Results   Component Value Date    A1C 9.1 12/06/2023     is not meeting goal of <7.0    Diabetes knowledge and skills assessment:   Patient is knowledgeable in diabetes management concepts related to: Healthy Eating and Monitoring    Continue education with the following diabetes management concepts: Healthy Eating, Monitoring, Taking Medication, Problem Solving, Reducing Risks, and Healthy Coping    Based on learning assessment above, most appropriate setting for further diabetes education would be: Individual setting.      PLAN    30 units of Lantus, 14-day glucose average 285 mg/dL with a range of 195 - 341 mg/dL    Will add rapid acting insulin today 3 times daily AC using insulin to carbohydrate ratio patient is provided with integrated diabetes services spreadsheet  Target 120  Correction factor 30  Insulin to carb ratio 1 unit for 5 g carbohydrate    Patient will follow-up in 1 month  Topics to cover at upcoming visits: Monitoring, Taking Medication, Problem Solving, and Reducing Risks    Follow-up: 1 month    See Care Plan for co-developed, patient-state behavior change goals.  AVS provided for patient today.    Education Materials Provided:  BG Log Sheet, Carbohydrate Counting, Humalog Insulin information, and My Plate Planner      SUBJECTIVE/OBJECTIVE:  Presents for: Follow-up  Accompanied by: Self  Diabetes education in the past 24mo: Yes  Diabetes type: Type 2  Disease course: Getting harder to manage  How confident are you filling out medical forms by yourself::  "Extremely  Diabetes management related comments/concerns: no response to insulin  Transportation concerns: No  Difficulty affording diabetes medication?: No  Difficulty affording diabetes testing supplies?: No  Other concerns:: None  Cultural Influences/Ethnic Background:  Not  or     Diabetes Symptoms & Complications:  Fatigue: Yes  Neuropathy: No  Polydipsia: No  Polyphagia: Yes  Polyuria: No  Visual change: Yes  Slow healing wounds: No  Autonomic neuropathy: Other  CVA: No  Heart disease: No  Nephropathy: No  Peripheral neuropathy: No  Peripheral Vascular Disease: No  Retinopathy: No  Sexual dysfunction: Other    Patient Problem List and Family Medical History reviewed for relevant medical history, current medical status, and diabetes risk factors.    Vitals:  Ht 1.739 m (5' 8.47\")   Wt 100.7 kg (222 lb)   LMP 09/25/2019 (Approximate)   BMI 33.29 kg/m    Estimated body mass index is 33.29 kg/m  as calculated from the following:    Height as of this encounter: 1.739 m (5' 8.47\").    Weight as of this encounter: 100.7 kg (222 lb).   Last 3 BP:   BP Readings from Last 3 Encounters:   01/11/24 120/80   01/02/24 (!) 149/90   12/28/23 (!) 149/98       History   Smoking Status     Former     Packs/day: 0.50     Types: Cigarettes     Quit date: 11/1/2023   Smokeless Tobacco     Never       Labs:  Lab Results   Component Value Date    A1C 9.1 12/06/2023     Lab Results   Component Value Date     12/06/2023    GLC 76 07/14/2021     04/15/2008     Lab Results   Component Value Date    LDL  08/30/2023      Comment:      Cannot estimate LDL when triglyceride exceeds 400 mg/dL     08/30/2023     Direct Measure HDL   Date Value Ref Range Status   08/30/2023 33 (L) >=50 mg/dL Final   ]  GFR Estimate   Date Value Ref Range Status   12/06/2023 >90 >60 mL/min/1.73m2 Final   04/15/2008 >90 >60 mL/min/1.7m2 Final     GFR Estimate If Black   Date Value Ref Range Status   04/15/2008 >90 >60 " "mL/min/1.7m2 Final     Lab Results   Component Value Date    CR 0.52 12/06/2023    CR 0.68 04/15/2008     No results found for: \"MICROALBUMIN\"    Healthy Eating:  Cultural/Christian diet restrictions?: No  Meal planning/habits: Avoiding sweets, Low carb, Low salt, Other  How many times a week on average do you eat food made away from home (restaurant/take-out)?: 1  Meals include: Dinner, Afternoon Snack  Beverages: Water, Tea, Coffee, Milk    Being Active:  Barrier to exercise: Other    Monitoring:  Blood Glucose Meter: ContourNext  Times checking blood sugar at home (number): 3  Times checking blood sugar at home (per): Day  Blood glucose trend: No change    14-day glucose average 285 mg/dL    Taking Medications:  Diabetes Medication(s)       Diabetic Other       Glucagon (GVOKE HYPOPEN) 1 MG/0.2ML pen Inject the contents of 1 device under the skin into lower abdomen, outer thigh, or outer upper arm as needed for hypoglycemia. If no response after 15 minutes, additional 1 mg dose from a new device may be injected while waiting for emergency assistance.       Insulin       insulin glargine (LANTUS PEN) 100 UNIT/ML pen At 30 units and increasing as directed up to max dose of 50 units     insulin lispro (HUMALOG KWIKPEN) 100 UNIT/ML (1 unit dial) KWIKPEN Using insulin to carb ratio and taking up to 50 units daily divided between meals and larger snacks            Current Treatments: Insulin Injections  Dose schedule: At bedtime  Given by: Patient  Injection/Infusion sites: Abdomen, Thighs    Problem Solving:                 Reducing Risks:  CAD Risks: Diabetes Mellitus, Sedentary lifestyle  Has dilated eye exam at least once a year?: Yes  Sees dentist every 6 months?: No  Feet checked by healthcare provider in the last year?: No    Healthy Coping:  Informal Support system:: Children, Family, Significant other  Patient Activation Measure Survey Score:       No data to display                  Care Plan and Education " Provided:  Insulin administration technique taught today. Patient verbalized understanding and was able to perform an accurate return demonstration of administration technique.  Care Plan: Diabetes   Updates made by Latasha Quiroz RD since 1/16/2024 12:00 AM        Problem: HbA1C Not In Goal         Goal: Get HbA1C Level in Goal         Task: Educate patient on diabetes education self-management topics Completed 1/16/2024   Responsible User: Latasha Quiroz RD        Problem: Diabetes Self-Management Education Needed to Optimize Self-Care Behaviors         Goal: Healthy Eating - follow a healthy eating pattern for diabetes         Task: Provide education on managing carbohydrate intake (carbohydrate counting, plate planning method, etc.) Completed 1/16/2024   Responsible User: Latasha Quiroz RD        Task: Provide education on weight management Completed 1/16/2024   Responsible User: Latasha Quiroz RD        Task: Provide education on heart healthy eating Completed 1/16/2024   Responsible User: Latasha Quiroz RD        Goal: Being Active - get regular physical activity, working up to at least 150 minutes per week         Task: Provide education on relationship of activity to glucose and precautions to take if at risk for low glucose Completed 1/16/2024   Responsible User: Latasha Quiroz RD        Goal: Monitoring - monitor glucose and ketones as directed    This Visit's Progress: 50%   Note:    Pt will monitor glucose 2 or more times/ day        Goal: Taking Medication - patient is consistently taking medications as directed         Task: Provide education on frequency and refill details of medications Completed 1/16/2024   Responsible User: Latasha Quiroz RD        Goal: Problem Solving - know how to prevent and manage short-term diabetes complications         Task: Provide education on how to care for diabetes on sick days Completed 1/16/2024   Responsible User: Latasha Quiroz RD        Goal: Reducing  Risks - know how to prevent and treat long-term diabetes complications         Task: Provide education on recommended care for dental, eye and foot health Completed 1/16/2024   Responsible User: Latasha Quiroz RD        Goal: Healthy Coping - use available resources to cope with the challenges of managing diabetes         Task: Discuss methods for coping with stress Completed 1/16/2024   Responsible User: Latasha Quiroz RD            Time Spent: 60 minutes  Encounter Type: Individual    Any diabetes medication dose changes were made via the CDE Protocol per the patient's referring provider. A copy of this encounter was shared with the provider.

## 2024-01-18 NOTE — PROGRESS NOTES
"CHIEF COMPLAINT: Patient presents with:  tongue lesions: She has bumps in the back of tongue that popped up 3 weeks ago that are not painful and she states that she has bumps on her cheeks that make her bite her cheeks glands feels swollen she may have a salivary stone and her neck on her left side is swollen and her right side TM joint area painful today upon waking up         HISTORY OF PRESENT ILLNESS    Shannan was seen at the behest of Anna Marie Carrizales for tongue concern.   She states there are \"nodules' on her tongue that \"are the size of kidney beans\".  They are not painful.  They have been present for several weeks.  She has some irritation of both her cheeks, which she bites when chewing.  She has not seen her dentist.  She has not other ENT related concerns/                REVIEW OF SYSTEMS    Review of Systems as per HPI and PMHx, otherwise 10 system review system are negative.       ALLERGIES    Bee venom    CURRENT MEDICATIONS      Current Outpatient Medications:     blood glucose (NO BRAND SPECIFIED) test strip, Use to test blood sugar 3 times daily or as directed. To accompany: Blood Glucose Monitor Brands: per insurance., Disp: 300 strip, Rfl: 6    blood glucose monitoring (NO BRAND SPECIFIED) meter device kit, Use to test blood sugar 1 times daily or as directed. Preferred blood glucose meter OR supplies to accompany: Blood Glucose Monitor Brands: per insurance., Disp: 1 kit, Rfl: 0    chlorhexidine (PERIDEX) 0.12 % solution, Swish and spit 15 mLs in mouth 2 times daily for 10 days, Disp: 300 mL, Rfl: 0    insulin glargine (LANTUS PEN) 100 UNIT/ML pen, At 30 units and increasing as directed up to max dose of 50 units, Disp: 45 mL, Rfl: 3    insulin lispro (HUMALOG KWIKPEN) 100 UNIT/ML (1 unit dial) KWIKPEN, Using insulin to carb ratio and taking up to 50 units daily divided between meals and larger snacks, Disp: 30 mL, Rfl: 3    insulin pen needle (32G X 4 MM) 32G X 4 MM miscellaneous, Use 4 pen " needles daily or as directed., Disp: 400 each, Rfl: 3    ondansetron (ZOFRAN) 8 MG tablet, Take 1 tablet (8 mg) by mouth every 8 hours as needed for nausea, Disp: 12 tablet, Rfl: 1    thin (NO BRAND SPECIFIED) lancets, Use with lanceting device. To accompany: Blood Glucose Monitor Brands: per insurance., Disp: 100 each, Rfl: 6    diphenhydrAMINE (BENADRYL) 50 MG capsule, Take 100 mg by mouth every 4 hours (Patient not taking: Reported on 2024), Disp: , Rfl:     Glucagon (GVOKE HYPOPEN) 1 MG/0.2ML pen, Inject the contents of 1 device under the skin into lower abdomen, outer thigh, or outer upper arm as needed for hypoglycemia. If no response after 15 minutes, additional 1 mg dose from a new device may be injected while waiting for emergency assistance. (Patient not taking: Reported on 2024), Disp: 0.4 mL, Rfl: 0    rizatriptan (MAXALT) 10 MG tablet, Take 1 tablet (10 mg) by mouth at onset of headache for migraine May repeat in 2 hours. Max 3 tablets/24 hours. (Patient not taking: Reported on 2024), Disp: 10 tablet, Rfl: 1     PAST MEDICAL HISTORY    PAST MEDICAL HISTORY: No past medical history on file.    PAST SURGICAL HISTORY    PAST SURGICAL HISTORY:   Past Surgical History:   Procedure Laterality Date    ARTHROSCOPIC REPAIR ACL  2017    C LAP REMOVAL, SPERMATIC CORD LIPOMA  2018     SECTION  1996     SECTION  10/21/2008     SECTION  2006    HYSTERECTOMY  2019    Left ovarian cystectomy.    MAMMOGRAM - [facility] SCAN  2017    [facility] [[email or web address]  DELIVERY ONLY      Description:  Section;  Recorded: 2011;       FAMILY  HISTORY    FAMILY HISTORY:   Family History   Problem Relation Age of Onset    Alcoholism Mother     Allergic rhinitis Mother     Anemia Mother     Arthritis Mother     Cancer Mother     Depression Mother     Substance Abuse Mother     Emotional abuse Mother     Fibromyalgia Mother     Mental Illness Mother     Alcoholism  Father     Depression Father     Diabetes Father     Substance Abuse Father        SOCIAL HISTORY    SOCIAL HISTORY:   Social History     Tobacco Use    Smoking status: Former     Packs/day: .5     Types: Cigarettes     Quit date: 2023     Years since quittin.2     Passive exposure: Past    Smokeless tobacco: Never   Substance Use Topics    Alcohol use: No        PHYSICAL EXAM    HEAD: Normal appearance and symmetry:  No cutaneous lesions.      NECK:  supple     EARS:    Right:   TM intact   LEFT:   TM intact    EYES:  EOMI    CN VII/XII:  intact     NOSE:     Dorsum:   straight  Septum:  midline  Mucosa:  moist        ORAL CAVITY/OROPHARYNX:     Lips:  Normal.  Tongue: normal, midline  Mucosa:   slight irritation buccal mucosa (agata striae)     NECK:  Trachea:  midline.              Thyroid:  normal              Adenopathy:  none        NEURO:   Alert and Oriented     GAIT AND STATION:  normal     RESPIRATORY:   Symmetry and Respiratory effort     PSYCH:  Normal mood and affect     SKIN:   warm and dry         IMPRESSION:    Encounter Diagnoses   Name Primary?    Lichen planus Yes    Abrasion of cheek, initial encounter      Tried to reinsured apace and that the tongue anatomy is normal.  The bumps that she is seeing on her her normal circumvallate papillae of the tongue.  As far as the cheek irritation I think this is from possibly unconsciously biting her buccal mucosa.  She may want to follow-up with her dentist regarding this.  I think we can quiet this down with some Peridex mouthwash.     RECOMMENDATIONS:    Return as needed  [facility].

## 2024-01-19 ENCOUNTER — OFFICE VISIT (OUTPATIENT)
Dept: OTOLARYNGOLOGY | Facility: CLINIC | Age: 44
End: 2024-01-19
Payer: COMMERCIAL

## 2024-01-19 DIAGNOSIS — S00.81XA ABRASION OF CHEEK, INITIAL ENCOUNTER: ICD-10-CM

## 2024-01-19 DIAGNOSIS — L43.9 LICHEN PLANUS: Primary | ICD-10-CM

## 2024-01-19 PROCEDURE — 99203 OFFICE O/P NEW LOW 30 MIN: CPT | Performed by: OTOLARYNGOLOGY

## 2024-01-19 RX ORDER — CHLORHEXIDINE GLUCONATE ORAL RINSE 1.2 MG/ML
15 SOLUTION DENTAL 2 TIMES DAILY
Qty: 300 ML | Refills: 0 | Status: SHIPPED | OUTPATIENT
Start: 2024-01-19 | End: 2024-01-29

## 2024-01-19 NOTE — LETTER
"    1/19/2024         RE: Shannan Cameron  [address]  [facility] [facility] [address]        Dear Colleague,    Thank you for referring your patient, Shannan Cameron, to the [facility]. Please see a copy of my visit note below.    CHIEF COMPLAINT: Patient presents with:  tongue lesions: She has bumps in the back of tongue that popped up 3 weeks ago that are not painful and she states that she has bumps on her cheeks that make her bite her cheeks glands feels swollen she may have a salivary stone and her neck on her left side is swollen and her right side TM joint area painful today upon waking up         HISTORY OF PRESENT ILLNESS    Shannan was seen at the behest of Anna Marie Carrizales for tongue concern.   She states there are \"nodules' on her tongue that \"are the size of kidney beans\".  They are not painful.  They have been present for several weeks.  She has some irritation of both her cheeks, which she bites when chewing.  She has not seen her dentist.  She has not other ENT related concerns/                REVIEW OF SYSTEMS    Review of Systems as per HPI and PMHx, otherwise 10 system review system are negative.       ALLERGIES    Bee venom    CURRENT MEDICATIONS      Current Outpatient Medications:      blood glucose (NO BRAND SPECIFIED) test strip, Use to test blood sugar 3 times daily or as directed. To accompany: Blood Glucose Monitor Brands: per insurance., Disp: 300 strip, Rfl: 6     blood glucose monitoring (NO BRAND SPECIFIED) meter device kit, Use to test blood sugar 1 times daily or as directed. Preferred blood glucose meter OR supplies to accompany: Blood Glucose Monitor Brands: per insurance., Disp: 1 kit, Rfl: 0     chlorhexidine (PERIDEX) 0.12 % solution, Swish and spit 15 mLs in mouth 2 times daily for 10 days, Disp: 300 mL, Rfl: 0     insulin glargine (LANTUS PEN) 100 UNIT/ML pen, At 30 units and increasing as directed up to max dose of 50 units, Disp: 45 mL, Rfl: 3     insulin " lispro (HUMALOG KWIKPEN) 100 UNIT/ML (1 unit dial) KWIKPEN, Using insulin to carb ratio and taking up to 50 units daily divided between meals and larger snacks, Disp: 30 mL, Rfl: 3     insulin pen needle (32G X 4 MM) 32G X 4 MM miscellaneous, Use 4 pen needles daily or as directed., Disp: 400 each, Rfl: 3     ondansetron (ZOFRAN) 8 MG tablet, Take 1 tablet (8 mg) by mouth every 8 hours as needed for nausea, Disp: 12 tablet, Rfl: 1     thin (NO BRAND SPECIFIED) lancets, Use with lanceting device. To accompany: Blood Glucose Monitor Brands: per insurance., Disp: 100 each, Rfl: 6     diphenhydrAMINE (BENADRYL) 50 MG capsule, Take 100 mg by mouth every 4 hours (Patient not taking: Reported on 2024), Disp: , Rfl:      Glucagon (GVOKE HYPOPEN) 1 MG/0.2ML pen, Inject the contents of 1 device under the skin into lower abdomen, outer thigh, or outer upper arm as needed for hypoglycemia. If no response after 15 minutes, additional 1 mg dose from a new device may be injected while waiting for emergency assistance. (Patient not taking: Reported on 2024), Disp: 0.4 mL, Rfl: 0     rizatriptan (MAXALT) 10 MG tablet, Take 1 tablet (10 mg) by mouth at onset of headache for migraine May repeat in 2 hours. Max 3 tablets/24 hours. (Patient not taking: Reported on 2024), Disp: 10 tablet, Rfl: 1     PAST MEDICAL HISTORY    PAST MEDICAL HISTORY: No past medical history on file.    PAST SURGICAL HISTORY    PAST SURGICAL HISTORY:   Past Surgical History:   Procedure Laterality Date     ARTHROSCOPIC REPAIR ACL  2017     C LAP REMOVAL, SPERMATIC CORD LIPOMA  2018      SECTION  1996      SECTION  10/21/2008      SECTION  2006     HYSTERECTOMY  2019    Left ovarian cystectomy.     MAMMOGRAM - [facility] SCAN  2017     [facility]  DELIVERY ONLY      Description:  Section;  Recorded: 2011;       FAMILY  HISTORY    FAMILY HISTORY:   Family History   Problem Relation  Age of Onset     Alcoholism Mother      Allergic rhinitis Mother      Anemia Mother      Arthritis Mother      Cancer Mother      Depression Mother      Substance Abuse Mother      Emotional abuse Mother      Fibromyalgia Mother      Mental Illness Mother      Alcoholism Father      Depression Father      Diabetes Father      Substance Abuse Father        SOCIAL HISTORY    SOCIAL HISTORY:   Social History     Tobacco Use     Smoking status: Former     Packs/day: .5     Types: Cigarettes     Quit date: 2023     Years since quittin.2     Passive exposure: Past     Smokeless tobacco: Never   Substance Use Topics     Alcohol use: No        PHYSICAL EXAM    HEAD: Normal appearance and symmetry:  No cutaneous lesions.      NECK:  supple     EARS:    Right:   TM intact   LEFT:   TM intact    EYES:  EOMI    CN VII/XII:  intact     NOSE:     Dorsum:   straight  Septum:  midline  Mucosa:  moist        ORAL CAVITY/OROPHARYNX:     Lips:  Normal.  Tongue: normal, midline  Mucosa:   slight irritation buccal mucosa (agata striae)     NECK:  Trachea:  midline.              Thyroid:  normal              Adenopathy:  none        NEURO:   Alert and Oriented     GAIT AND STATION:  normal     RESPIRATORY:   Symmetry and Respiratory effort     PSYCH:  Normal mood and affect     SKIN:   warm and dry         IMPRESSION:    Encounter Diagnoses   Name Primary?     Lichen planus Yes     Abrasion of cheek, initial encounter      Tried to reinsured apace and that the tongue anatomy is normal.  The bumps that she is seeing on her her normal circumvallate papillae of the tongue.  As far as the cheek irritation I think this is from possibly unconsciously biting her buccal mucosa.  She may want to follow-up with her dentist regarding this.  I think we can quiet this down with some Peridex mouthwash.     RECOMMENDATIONS:    Return as needed  Follow up via mychart if cheek irritation has not resolved.       Again, thank you for allowing me  to participate in the care of your patient.        Sincerely,        Isael Toney MD

## 2024-01-22 DIAGNOSIS — Z79.84 DIABETES MELLITUS TREATED WITH ORAL MEDICATION (H): ICD-10-CM

## 2024-01-22 DIAGNOSIS — E11.9 DIABETES MELLITUS TREATED WITH ORAL MEDICATION (H): ICD-10-CM

## 2024-01-22 RX ORDER — LANCETS
EACH MISCELLANEOUS
Qty: 300 EACH | Refills: 1 | Status: SHIPPED | OUTPATIENT
Start: 2024-01-22

## 2024-02-13 ENCOUNTER — ALLIED HEALTH/NURSE VISIT (OUTPATIENT)
Dept: EDUCATION SERVICES | Facility: CLINIC | Age: 44
End: 2024-02-13
Payer: COMMERCIAL

## 2024-02-13 ENCOUNTER — MYC MEDICAL ADVICE (OUTPATIENT)
Dept: FAMILY MEDICINE | Facility: CLINIC | Age: 44
End: 2024-02-13

## 2024-02-13 VITALS — WEIGHT: 223 LBS | BODY MASS INDEX: 33.8 KG/M2 | HEIGHT: 68 IN

## 2024-02-13 DIAGNOSIS — E11.9 DIABETES MELLITUS TREATED WITH ORAL MEDICATION (H): ICD-10-CM

## 2024-02-13 DIAGNOSIS — Z79.84 DIABETES MELLITUS TREATED WITH ORAL MEDICATION (H): ICD-10-CM

## 2024-02-13 DIAGNOSIS — E66.9 OBESITY: ICD-10-CM

## 2024-02-13 DIAGNOSIS — N63.0 MASS OF BREAST, UNSPECIFIED LATERALITY: Primary | ICD-10-CM

## 2024-02-13 DIAGNOSIS — E11.9 TYPE 2 DIABETES, HBA1C GOAL < 7% (H): Primary | ICD-10-CM

## 2024-02-13 DIAGNOSIS — E11.9 TYPE 2 DIABETES MELLITUS WITHOUT COMPLICATION, WITHOUT LONG-TERM CURRENT USE OF INSULIN (H): ICD-10-CM

## 2024-02-13 PROCEDURE — G0108 DIAB MANAGE TRN  PER INDIV: HCPCS | Performed by: DIETITIAN, REGISTERED

## 2024-02-13 NOTE — PROGRESS NOTES
Diabetes Self-Management Education & Support    Presents for: Follow-up type 2 diabetes, obesity Body mass index is 33.44 kg/m .    Type of Service: In Person Visit      ASSESSMENT:  11/3/2020 - Initial dx of diabetes 3/2018 pt's diabetes has been under excellent control since 1/2019 with dietary intervention.  Pt had been following a strict keto/ vegan (a few days/week) and weight was down to 164.2# 1/22/19 and slowly had been incorporating more complex carbs and some fruit.  Weight gain 22# from 1/22/19 to 1/22/2020.  Then due to extra stress with pandemic and was caring for additional people in household pt has continued to gain weight.      A1c 5.8% 3/2020      12/13/2023  Due to life stressors, depression patient states she would let her diabetes slip.  Patient stopped taking metformin due to loose stools.  Patient has ongoing nausea.  Patient is being evaluated for elevated liver function tests, GI referral for colonoscopy.       Patient has not been able to fill prescriptions.  Through calling insurance and pharmacy clarified what the issue was and patient is now able to fill prescriptions through [facility] in [place].  Patient will be picking up blood glucose monitor and test strips.     Patient's A1c elevated at 9.1%.  Blood glucose today during office visit 267 mg/dL.  Discussed medication options.  For patient's least side effects opted for long-acting insulin at this time likely to be temporary.  Insulin recommendation discussed with PCP and agrees with plan.  Patient is willing to make some dietary changes but due to living situation patient does not have control over what food is purchased.     1/16/2024 since 12/13/2023 last visit patient has been diagnosed with malignant neoplasm of the thyroid gland     Patient is currently taking 30 units of Lantus, 14-day glucose average 285 mg/dL with a range of 195 - 341 mg/dL     Will add rapid acting insulin today 3 times daily AC using insulin to  carbohydrate ratio patient is provided with integrated diabetes services spreadsheet  Target 120  Correction factor 30  Insulin to carb ratio 1 unit for 5 g carbohydrate     Patient will follow-up in 1 month    2/13/2024 Patient will start meeting with specialists at [facility] today and over the next few days and is quite nervous and excited to get answers.  Patient did not bring meter or logbook.  Glucose during office visit 259 mg/dL.  Patient has not been taking insulin as directed.  Patient complains of pain and burning sensation, reviewed injection technique.  Education on importance of excellent glycemic control if surgery is in the near future.      Lantus currently at 30 units will increase to 34 units and continue to increase by 1 unit every day until a.m. glucose is between 80 and 130 mg/dL.  Patient is to use Humalog integrated diabetes services dosing chart 3 times daily AC.  If patient is not eating patient is to still use dosing chart for correction 3 times daily AC for example this a.m. with hyperglycemia patient to take Humalog based on 0 g of carbohydrate if not consuming breakfast    Target 120  Correction factor 30  Insulin to carb ratio 1 unit for 5 g carbohydrate    Patient's most recent   Lab Results   Component Value Date    A1C 9.1 12/06/2023     is not meeting goal of <7.0    Diabetes knowledge and skills assessment:   Patient is knowledgeable in diabetes management concepts related to: Healthy Eating and Monitoring    Continue education with the following diabetes management concepts: Being Active, Monitoring, Taking Medication, Problem Solving, Reducing Risks, and Healthy Coping    Based on learning assessment above, most appropriate setting for further diabetes education would be: Individual setting.      PLAN  Lantus currently at 30 units will increase to 34 units and continue to increase by 1 unit every day until a.m. glucose is between 80 and 130 mg/dL.  Patient is to use Humalog integrated  "diabetes services dosing chart 3 times daily AC.  If patient is not eating patient is to still use dosing chart for correction 3 times daily AC for example this a.m. with hyperglycemia patient to take Humalog based on 0 g of carbohydrate if not consuming breakfast    Target 120  Correction factor 30  Insulin to carb ratio 1 unit for 5 g carbohydrate    Topics to cover at upcoming visits: Taking Medication, Problem Solving, and Reducing Risks    Follow-up: 1 month    See Care Plan for co-developed, patient-state behavior change goals.  AVS provided for patient today.    Education Materials Provided:  Goals for Your Diabetes Care and BG Log Sheet      SUBJECTIVE/OBJECTIVE:  Presents for: Follow-up  Accompanied by: Self  Diabetes education in the past 24mo: Yes  Diabetes type: Type 2  Disease course: Getting harder to manage  How confident are you filling out medical forms by yourself:: Extremely  Diabetes management related comments/concerns: no response to insulin  Transportation concerns: No  Difficulty affording diabetes medication?: No  Difficulty affording diabetes testing supplies?: No  Other concerns:: None  Cultural Influences/Ethnic Background:  Not  or     Diabetes Symptoms & Complications:  Diabetes Related Symptoms: Fatigue, Visual change  Weight trend: Fluctuating  Symptom course: Worsening  Disease course: Getting harder to manage  Autonomic neuropathy: Other  CVA: No  Heart disease: No  Nephropathy: No  Peripheral neuropathy: No  Peripheral Vascular Disease: No  Retinopathy: No  Sexual dysfunction: Other    Patient Problem List and Family Medical History reviewed for relevant medical history, current medical status, and diabetes risk factors.    Vitals:  Ht 1.739 m (5' 8.47\")   Wt 101.2 kg (223 lb)   LMP 09/25/2019 (Approximate)   BMI 33.44 kg/m    Estimated body mass index is 33.44 kg/m  as calculated from the following:    Height as of this encounter: 1.739 m (5' 8.47\").    Weight as " "of this encounter: 101.2 kg (223 lb).   Last 3 BP:   BP Readings from Last 3 Encounters:   01/11/24 120/80   01/02/24 (!) 149/90   12/28/23 (!) 149/98       History   Smoking Status    Former    Packs/day: 0.50    Types: Cigarettes    Quit date: 11/1/2023   Smokeless Tobacco    Never       Labs:  Lab Results   Component Value Date    A1C 9.1 12/06/2023     Lab Results   Component Value Date     12/06/2023    GLC 76 07/14/2021     04/15/2008     Lab Results   Component Value Date    LDL  08/30/2023      Comment:      Cannot estimate LDL when triglyceride exceeds 400 mg/dL     08/30/2023     Direct Measure HDL   Date Value Ref Range Status   08/30/2023 33 (L) >=50 mg/dL Final   ]  GFR Estimate   Date Value Ref Range Status   12/06/2023 >90 >60 mL/min/1.73m2 Final   04/15/2008 >90 >60 mL/min/1.7m2 Final     GFR Estimate If Black   Date Value Ref Range Status   04/15/2008 >90 >60 mL/min/1.7m2 Final     Lab Results   Component Value Date    CR 0.52 12/06/2023    CR 0.68 04/15/2008     No results found for: \"MICROALBUMIN\"    Healthy Eating:  Healthy Eating Assessed Today: Yes  Cultural/Scientology diet restrictions?: No  Meal planning/habits: Avoiding sweets, Smaller portions, Keeps food records  Who cooks/prepares meals for you?: Self, Spouse  Who purchases food in  your home?: Self, Spouse  How many times a week on average do you eat food made away from home (restaurant/take-out)?: 1  Meals include: Dinner, Afternoon Snack  Beverages: Water, Tea, Coffee, Milk  Has patient met with a dietitian in the past?: Yes    Being Active:  Barrier to exercise: Other    Monitoring:  Blood Glucose Meter: ContourNext  Times checking blood sugar at home (number): 4  Times checking blood sugar at home (per): Day  Blood glucose trend: No change    No meter or log to review.  BG during office visit 259 mg/dL    Taking Medications:  Diabetes Medication(s)       Diabetic Other       Glucagon (GVOKE HYPOPEN) 1 MG/0.2ML pen " Inject the contents of 1 device under the skin into lower abdomen, outer thigh, or outer upper arm as needed for hypoglycemia. If no response after 15 minutes, additional 1 mg dose from a new device may be injected while waiting for emergency assistance.     Patient not taking: Reported on 1/19/2024       Insulin       insulin glargine (LANTUS PEN) 100 UNIT/ML pen At 30 units and increasing as directed up to max dose of 50 units     insulin lispro (HUMALOG KWIKPEN) 100 UNIT/ML (1 unit dial) KWIKPEN Using insulin to carb ratio and taking up to 50 units daily divided between meals and larger snacks            Current Treatments: Diet, Insulin Injections  Dose schedule: At bedtime  Given by: Patient  Injection/Infusion sites: Abdomen, Thighs    Problem Solving:  Patient carries a carbohydrate source: Yes    Hypoglycemia Symptoms  Hypoglycemia: Confusion, Dizziness/Lightheadedness, Sleepiness    Hypoglycemia Complications  Hypoglycemia Complications: None    Reducing Risks:  CAD Risks: Diabetes Mellitus, Sedentary lifestyle  Has dilated eye exam at least once a year?: Yes  Sees dentist every 6 months?: No  Feet checked by healthcare provider in the last year?: No    Healthy Coping:  Informal Support system:: Children, Family, Significant other  Patient Activation Measure Survey Score:       No data to display                  Care Plan and Education Provided:  Care Plan: Diabetes   Updates made by Latasha Quiroz RD since 2/13/2024 12:00 AM        Problem: HbA1C Not In Goal         Goal: Get HbA1C Level in Goal         Task: Refer patient to appropriate extended care team member, as needed (Medication Therapy Management, Behavioral Health, Physical Therapy, etc.) Completed 2/13/2024   Responsible User: Latasha Quiroz RD        Problem: Diabetes Self-Management Education Needed to Optimize Self-Care Behaviors         Goal: Healthy Eating - follow a healthy eating pattern for diabetes         Task: Provide education on  eating out Completed 2/13/2024   Responsible User: Latasha Quiroz RD        Task: Develop individualized healthy eating plan with patient Completed 2/13/2024   Responsible User: Latasha Quiroz RD        Goal: Being Active - get regular physical activity, working up to at least 150 minutes per week         Task: Discuss barriers to physical activity with patient Completed 2/13/2024   Responsible User: Latasha Quiroz RD        Goal: Monitoring - monitor glucose and ketones as directed    This Visit's Progress: 80%   Recent Progress: 50%   Note:    Pt will monitor glucose 2 or more times/ day        Goal: Taking Medication - patient is consistently taking medications as directed         Task: Discuss barriers to medication adherence with patient and provide management technique ideas as appropriate Completed 2/13/2024   Responsible User: Latasha Quiroz RD        Goal: Problem Solving - know how to prevent and manage short-term diabetes complications         Task: Provide education on when to call a health care provider Completed 2/13/2024   Responsible User: Latasha Quiroz RD        Goal: Reducing Risks - know how to prevent and treat long-term diabetes complications         Task: Provide education on recommendations for heart health - lipid levels and goals, blood pressure and goals, and aspirin therapy, if indicated Completed 2/13/2024   Responsible User: Latasha Quiroz RD        Goal: Healthy Coping - use available resources to cope with the challenges of managing diabetes         Task: Discuss recognizing feelings about having diabetes Completed 2/13/2024   Responsible User: Latasha Quiroz RD          Time Spent: 60 minutes  Encounter Type: Individual    Any diabetes medication dose changes were made via the CDE Protocol per the patient's referring provider. A copy of this encounter was shared with the provider.

## 2024-02-13 NOTE — PATIENT INSTRUCTIONS
Lantus increase to 34 units tonight and continue to increase 1 unit every day until am glucose is 80 - 130     Use the chart for Humalog dosing 3 times/ day am, noon and evening meals

## 2024-02-13 NOTE — LETTER
2/13/2024         RE: Shannan Cameron  [address]  [facility] [address]        Dear Colleague,    Thank you for referring your patient, Shannan Cameron, to the [facility]. Please see a copy of my visit note below.    Diabetes Self-Management Education & Support    Presents for: Follow-up type 2 diabetes, obesity Body mass index is 33.44 kg/m .    Type of Service: In Person Visit      ASSESSMENT:  11/3/2020 - Initial dx of diabetes 3/2018 pt's diabetes has been under excellent control since 1/2019 with dietary intervention.  Pt had been following a strict keto/ vegan (a few days/week) and weight was down to 164.2# 1/22/19 and slowly had been incorporating more complex carbs and some fruit.  Weight gain 22# from 1/22/19 to 1/22/2020.  Then due to extra stress with pandemic and was caring for additional people in household pt has continued to gain weight.      A1c 5.8% 3/2020      12/13/2023  Due to life stressors, depression patient states she would let her diabetes slip.  Patient stopped taking metformin due to loose stools.  Patient has ongoing nausea.  Patient is being evaluated for elevated liver function tests, GI referral for colonoscopy.       Patient has not been able to fill prescriptions.  Through calling insurance and pharmacy clarified what the issue was and patient is now able to fill prescriptions through [facility] in [place].  Patient will be picking up blood glucose monitor and test strips.     Patient's A1c elevated at 9.1%.  Blood glucose today during office visit 267 mg/dL.  Discussed medication options.  For patient's least side effects opted for long-acting insulin at this time likely to be temporary.  Insulin recommendation discussed with PCP and agrees with plan.  Patient is willing to make some dietary changes but due to living situation patient does not have control over what food is purchased.     1/16/2024 since 12/13/2023 last visit patient has been diagnosed  with malignant neoplasm of the thyroid gland     Patient is currently taking 30 units of Lantus, 14-day glucose average 285 mg/dL with a range of 195 - 341 mg/dL     Will add rapid acting insulin today 3 times daily AC using insulin to carbohydrate ratio patient is provided with integrated diabetes services spreadsheet  Target 120  Correction factor 30  Insulin to carb ratio 1 unit for 5 g carbohydrate     Patient will follow-up in 1 month    2/13/2024 Patient will start meeting with specialists at [facility] today and over the next few days and is quite nervous and excited to get answers.  Patient did not bring meter or logbook.  Glucose during office visit 259 mg/dL.  Patient has not been taking insulin as directed.  Patient complains of pain and burning sensation, reviewed injection technique.  Education on importance of excellent glycemic control if surgery is in the near future.      Lantus currently at 30 units will increase to 34 units and continue to increase by 1 unit every day until a.m. glucose is between 80 and 130 mg/dL.  Patient is to use Humalog integrated diabetes services dosing chart 3 times daily AC.  If patient is not eating patient is to still use dosing chart for correction 3 times daily AC for example this a.m. with hyperglycemia patient to take Humalog based on 0 g of carbohydrate if not consuming breakfast    Target 120  Correction factor 30  Insulin to carb ratio 1 unit for 5 g carbohydrate    Patient's most recent   Lab Results   Component Value Date    A1C 9.1 12/06/2023     is not meeting goal of <7.0    Diabetes knowledge and skills assessment:   Patient is knowledgeable in diabetes management concepts related to: Healthy Eating and Monitoring    Continue education with the following diabetes management concepts: Being Active, Monitoring, Taking Medication, Problem Solving, Reducing Risks, and Healthy Coping    Based on learning assessment above, most appropriate setting for further diabetes  education would be: Individual setting.      PLAN  Lantus currently at 30 units will increase to 34 units and continue to increase by 1 unit every day until a.m. glucose is between 80 and 130 mg/dL.  Patient is to use Humalog integrated diabetes services dosing chart 3 times daily AC.  If patient is not eating patient is to still use dosing chart for correction 3 times daily AC for example this a.m. with hyperglycemia patient to take Humalog based on 0 g of carbohydrate if not consuming breakfast    Target 120  Correction factor 30  Insulin to carb ratio 1 unit for 5 g carbohydrate    Topics to cover at upcoming visits: Taking Medication, Problem Solving, and Reducing Risks    Follow-up: 1 month    See Care Plan for co-developed, patient-state behavior change goals.  AVS provided for patient today.    Education Materials Provided:  Goals for Your Diabetes Care and BG Log Sheet      SUBJECTIVE/OBJECTIVE:  Presents for: Follow-up  Accompanied by: Self  Diabetes education in the past 24mo: Yes  Diabetes type: Type 2  Disease course: Getting harder to manage  How confident are you filling out medical forms by yourself:: Extremely  Diabetes management related comments/concerns: no response to insulin  Transportation concerns: No  Difficulty affording diabetes medication?: No  Difficulty affording diabetes testing supplies?: No  Other concerns:: None  Cultural Influences/Ethnic Background:  Not  or     Diabetes Symptoms & Complications:  Diabetes Related Symptoms: Fatigue, Visual change  Weight trend: Fluctuating  Symptom course: Worsening  Disease course: Getting harder to manage  Autonomic neuropathy: Other  CVA: No  Heart disease: No  Nephropathy: No  Peripheral neuropathy: No  Peripheral Vascular Disease: No  Retinopathy: No  Sexual dysfunction: Other    Patient Problem List and Family Medical History reviewed for relevant medical history, current medical status, and diabetes risk factors.    Vitals:  Ht  "1.739 m (5' 8.47\")   Wt 101.2 kg (223 lb)   LMP 09/25/2019 (Approximate)   BMI 33.44 kg/m    Estimated body mass index is 33.44 kg/m  as calculated from the following:    Height as of this encounter: 1.739 m (5' 8.47\").    Weight as of this encounter: 101.2 kg (223 lb).   Last 3 BP:   BP Readings from Last 3 Encounters:   01/11/24 120/80   01/02/24 (!) 149/90   12/28/23 (!) 149/98       History   Smoking Status     Former     Packs/day: 0.50     Types: Cigarettes     Quit date: 11/1/2023   Smokeless Tobacco     Never       Labs:  Lab Results   Component Value Date    A1C 9.1 12/06/2023     Lab Results   Component Value Date     12/06/2023    GLC 76 07/14/2021     04/15/2008     Lab Results   Component Value Date    LDL  08/30/2023      Comment:      Cannot estimate LDL when triglyceride exceeds 400 mg/dL     08/30/2023     Direct Measure HDL   Date Value Ref Range Status   08/30/2023 33 (L) >=50 mg/dL Final   ]  GFR Estimate   Date Value Ref Range Status   12/06/2023 >90 >60 mL/min/1.73m2 Final   04/15/2008 >90 >60 mL/min/1.7m2 Final     GFR Estimate If Black   Date Value Ref Range Status   04/15/2008 >90 >60 mL/min/1.7m2 Final     Lab Results   Component Value Date    CR 0.52 12/06/2023    CR 0.68 04/15/2008     No results found for: \"MICROALBUMIN\"    Healthy Eating:  Healthy Eating Assessed Today: Yes  Cultural/Voodoo diet restrictions?: No  Meal planning/habits: Avoiding sweets, Smaller portions, Keeps food records  Who cooks/prepares meals for you?: Self, Spouse  Who purchases food in  your home?: Self, Spouse  How many times a week on average do you eat food made away from home (restaurant/take-out)?: 1  Meals include: Dinner, Afternoon Snack  Beverages: Water, Tea, Coffee, Milk  Has patient met with a dietitian in the past?: Yes    Being Active:  Barrier to exercise: Other    Monitoring:  Blood Glucose Meter: [employer]  Times checking blood sugar at home (number): 4  Times checking " blood sugar at home (per): Day  Blood glucose trend: No change    No meter or log to review.  BG during office visit 259 mg/dL    Taking Medications:  Diabetes Medication(s)       Diabetic Other       Glucagon (GVOKE HYPOPEN) 1 MG/0.2ML pen Inject the contents of 1 device under the skin into lower abdomen, outer thigh, or outer upper arm as needed for hypoglycemia. If no response after 15 minutes, additional 1 mg dose from a new device may be injected while waiting for emergency assistance.     Patient not taking: Reported on 1/19/2024       Insulin       insulin glargine (LANTUS PEN) 100 UNIT/ML pen At 30 units and increasing as directed up to max dose of 50 units     insulin lispro (HUMALOG KWIKPEN) 100 UNIT/ML (1 unit dial) KWIKPEN Using insulin to carb ratio and taking up to 50 units daily divided between meals and larger snacks            Current Treatments: Diet, Insulin Injections  Dose schedule: At bedtime  Given by: Patient  Injection/Infusion sites: Abdomen, Thighs    Problem Solving:  Patient carries a carbohydrate source: Yes    Hypoglycemia Symptoms  Hypoglycemia: Confusion, Dizziness/Lightheadedness, Sleepiness    Hypoglycemia Complications  Hypoglycemia Complications: None    Reducing Risks:  CAD Risks: Diabetes Mellitus, Sedentary lifestyle  Has dilated eye exam at least once a year?: Yes  Sees dentist every 6 months?: No  Feet checked by healthcare provider in the last year?: No    Healthy Coping:  Informal Support system:: Children, Family, Significant other  Patient Activation Measure Survey Score:       No data to display                  Care Plan and Education Provided:  Care Plan: Diabetes   Updates made by Latasha Quiroz RD since 2/13/2024 12:00 AM        Problem: HbA1C Not In Goal         Goal: Get HbA1C Level in Goal         Task: Refer patient to appropriate extended care team member, as needed (Medication Therapy Management, Behavioral Health, Physical Therapy, etc.) Completed 2/13/2024    Responsible User: Latasha Quiroz RD        Problem: Diabetes Self-Management Education Needed to Optimize Self-Care Behaviors         Goal: Healthy Eating - follow a healthy eating pattern for diabetes         Task: Provide education on eating out Completed 2/13/2024   Responsible User: Latasha Quiroz RD        Task: Develop individualized healthy eating plan with patient Completed 2/13/2024   Responsible User: Latasha Quiroz RD        Goal: Being Active - get regular physical activity, working up to at least 150 minutes per week         Task: Discuss barriers to physical activity with patient Completed 2/13/2024   Responsible User: Latasha Quiroz RD        Goal: Monitoring - monitor glucose and ketones as directed    This Visit's Progress: 80%   Recent Progress: 50%   Note:    Pt will monitor glucose 2 or more times/ day        Goal: Taking Medication - patient is consistently taking medications as directed         Task: Discuss barriers to medication adherence with patient and provide management technique ideas as appropriate Completed 2/13/2024   Responsible User: Latasha Quiroz RD        Goal: Problem Solving - know how to prevent and manage short-term diabetes complications         Task: Provide education on when to call a health care provider Completed 2/13/2024   Responsible User: Latasha Quiroz RD        Goal: Reducing Risks - know how to prevent and treat long-term diabetes complications         Task: Provide education on recommendations for heart health - lipid levels and goals, blood pressure and goals, and aspirin therapy, if indicated Completed 2/13/2024   Responsible User: Latasha Quiroz RD        Goal: Healthy Coping - use available resources to cope with the challenges of managing diabetes         Task: Discuss recognizing feelings about having diabetes Completed 2/13/2024   Responsible User: Latasha Quiroz RD          Time Spent: 60 minutes  Encounter Type: Individual    Any diabetes  medication dose changes were made via the CDE Protocol per the patient's referring provider. A copy of this encounter was shared with the provider.

## 2024-02-17 DIAGNOSIS — Z79.84 DIABETES MELLITUS TREATED WITH ORAL MEDICATION (H): ICD-10-CM

## 2024-02-17 DIAGNOSIS — E11.9 DIABETES MELLITUS TREATED WITH ORAL MEDICATION (H): ICD-10-CM

## 2024-02-19 RX ORDER — BLOOD-GLUCOSE METER
EACH MISCELLANEOUS
OUTPATIENT
Start: 2024-02-19

## 2024-02-26 ENCOUNTER — TELEPHONE (OUTPATIENT)
Dept: FAMILY MEDICINE | Facility: CLINIC | Age: 44
End: 2024-02-26

## 2024-02-26 ENCOUNTER — OFFICE VISIT (OUTPATIENT)
Dept: FAMILY MEDICINE | Facility: CLINIC | Age: 44
End: 2024-02-26
Payer: COMMERCIAL

## 2024-02-26 VITALS
OXYGEN SATURATION: 98 % | HEIGHT: 68 IN | SYSTOLIC BLOOD PRESSURE: 146 MMHG | WEIGHT: 226.4 LBS | HEART RATE: 83 BPM | BODY MASS INDEX: 34.31 KG/M2 | DIASTOLIC BLOOD PRESSURE: 70 MMHG | TEMPERATURE: 97.2 F

## 2024-02-26 DIAGNOSIS — E11.65 TYPE 2 DIABETES MELLITUS WITH HYPERGLYCEMIA, WITH LONG-TERM CURRENT USE OF INSULIN (H): Primary | ICD-10-CM

## 2024-02-26 DIAGNOSIS — Z79.4 TYPE 2 DIABETES MELLITUS WITH HYPERGLYCEMIA, WITH LONG-TERM CURRENT USE OF INSULIN (H): Primary | ICD-10-CM

## 2024-02-26 DIAGNOSIS — E89.0 S/P PARTIAL THYROIDECTOMY: ICD-10-CM

## 2024-02-26 DIAGNOSIS — I10 BENIGN ESSENTIAL HYPERTENSION: ICD-10-CM

## 2024-02-26 DIAGNOSIS — Z11.4 SCREENING FOR HIV (HUMAN IMMUNODEFICIENCY VIRUS): ICD-10-CM

## 2024-02-26 DIAGNOSIS — C73 MALIGNANT NEOPLASM OF THYROID GLAND (H): ICD-10-CM

## 2024-02-26 LAB — HIV 1+2 AB+HIV1 P24 AG SERPL QL IA: NONREACTIVE

## 2024-02-26 PROCEDURE — 87389 HIV-1 AG W/HIV-1&-2 AB AG IA: CPT | Performed by: FAMILY MEDICINE

## 2024-02-26 PROCEDURE — 90480 ADMN SARSCOV2 VAC 1/ONLY CMP: CPT | Performed by: FAMILY MEDICINE

## 2024-02-26 PROCEDURE — 36415 COLL VENOUS BLD VENIPUNCTURE: CPT | Performed by: FAMILY MEDICINE

## 2024-02-26 PROCEDURE — 99215 OFFICE O/P EST HI 40 MIN: CPT | Mod: 25 | Performed by: FAMILY MEDICINE

## 2024-02-26 PROCEDURE — 91320 SARSCV2 VAC 30MCG TRS-SUC IM: CPT | Performed by: FAMILY MEDICINE

## 2024-02-26 RX ORDER — LOSARTAN POTASSIUM 25 MG/1
25 TABLET ORAL DAILY
Qty: 90 TABLET | Refills: 1 | Status: SHIPPED | OUTPATIENT
Start: 2024-02-26 | End: 2024-06-24

## 2024-02-26 RX ORDER — KETOROLAC TROMETHAMINE 30 MG/ML
1 INJECTION, SOLUTION INTRAMUSCULAR; INTRAVENOUS ONCE
Qty: 1 EACH | Refills: 0 | Status: SHIPPED | OUTPATIENT
Start: 2024-02-26 | End: 2024-02-26

## 2024-02-26 RX ORDER — BLOOD-GLUCOSE SENSOR
1 EACH MISCELLANEOUS
Qty: 6 EACH | Refills: 5 | Status: SHIPPED | OUTPATIENT
Start: 2024-02-26 | End: 2024-04-22

## 2024-02-26 RX ORDER — ROSUVASTATIN CALCIUM 10 MG/1
10 TABLET, COATED ORAL DAILY
Qty: 90 TABLET | Refills: 1 | Status: SHIPPED | OUTPATIENT
Start: 2024-02-26 | End: 2024-06-24

## 2024-02-26 ASSESSMENT — PATIENT HEALTH QUESTIONNAIRE - PHQ9
SUM OF ALL RESPONSES TO PHQ QUESTIONS 1-9: 7
10. IF YOU CHECKED OFF ANY PROBLEMS, HOW DIFFICULT HAVE THESE PROBLEMS MADE IT FOR YOU TO DO YOUR WORK, TAKE CARE OF THINGS AT HOME, OR GET ALONG WITH OTHER PEOPLE: SOMEWHAT DIFFICULT
SUM OF ALL RESPONSES TO PHQ QUESTIONS 1-9: 7

## 2024-02-26 NOTE — TELEPHONE ENCOUNTER
General Call    Contacts         Type Contact Phone/Fax    02/26/2024 02:12 PM CST Phone (Incoming) Kayla Cameron (Self) [phone] (M)          Reason for Call:  Pt says that [facility] will not renew medication refill for Fast Action Insulin Lispro until March 3rd. Requesting if Dr Ahn or Keiko Quiroz call the pharmacy to let them know its okay to renew medication so insurance can pay for it. Please advise.

## 2024-02-26 NOTE — PROGRESS NOTES
Assessment & Plan     Type 2 diabetes mellitus with hyperglycemia, with long-term current use of insulin (H)  Improving but not resolved with insulin adjustment.  Patient would benefit from continuous glucose monitor as documented below.  She is on insulin injections at least 4 times a day.  Add statin and address elevated blood pressure.  - rosuvastatin (CRESTOR) 10 MG tablet; Take 1 tablet (10 mg) by mouth daily  - Comprehensive metabolic panel (BMP + Alb, Alk Phos, ALT, AST, Total. Bili, TP); Future  - Lipid panel reflex to direct LDL Fasting; Future  - Continuous Blood Gluc  (FREESTYLE LAURA 3 READER) MARTIN; 1 each once for 1 dose Use to read blood sugars per 's instructions.  - Continuous Blood Gluc Sensor (FREESTYLE LAURA 3 SENSOR) MISC; 1 each every 14 days Use 1 sensor every 14 days. Use to read blood sugars per 's instructions.    Malignant neoplasm of thyroid gland (H)  Under care of [facility], no additional follow-up needed at this time.  Will be monitoring thyroid levels every 3 months.  - TSH; Standing  - T3, Free; Standing  - T4, free; Standing    S/P partial thyroidectomy  Recovering well after surgery.  Under care of [facility].  Continue to monitor thyroid levels.  - TSH; Standing  - T3, Free; Standing  - T4, free; Standing    Benign essential hypertension  Continues to be elevated, will start low-dose of losartan.  Increase in 2 to 3 weeks if blood pressures are still elevated.  - losartan (COZAAR) 25 MG tablet; Take 1 tablet (25 mg) by mouth daily  - Comprehensive metabolic panel (BMP + Alb, Alk Phos, ALT, AST, Total. Bili, TP); Future  - Lipid panel reflex to direct LDL Fasting; Future    Given sleep symptoms and recommendations from [facility] will have her set up to see sleep medicine here at the clinic.  I am not sure if she needs an at home or in lab sleep evaluation. STOP Bang Score: 3      MED REC REQUIRED  Post Medication Reconciliation Status:    "  Depression Screening Follow Up        2/26/2024     9:06 AM   PHQ   PHQ-9 Total Score 7   Q9: Thoughts of better off dead/self-harm past 2 weeks Several days   F/U: Thoughts of suicide or self-harm No   F/U: Safety concerns No           I spent a total of 52 minutes on the day of the visit.   Time spent by me doing chart review, reviewing outside records and imaging, history and exam, documentation and further activities per the note    MED REC REQUIRED  Post Medication Reconciliation Status: discharge medications reconciled and changed, per note/orders  BMI  Estimated body mass index is 33.96 kg/m  as calculated from the following:    Height as of this encounter: 1.739 m (5' 8.47\").    Weight as of this encounter: 102.7 kg (226 lb 6.4 oz).   Weight management plan: Meeting with diabetes educator and addressing diet and exercise regimen    Depression Screening Follow Up        2/26/2024     9:06 AM   PHQ   PHQ-9 Total Score 7   Q9: Thoughts of better off dead/self-harm past 2 weeks Several days   F/U: Thoughts of suicide or self-harm No   F/U: Safety concerns No               SUBJECTIVE:    Patient is here for follow-up after care at [facility].    Has malignant neoplasm of thyroid that was removed by partial thyroidectomy.  She is recovering well.  Initially developed some swelling in her neck.  That is slowly improving.    Also has been seen by internal medicine.  Has been referred to the diabetes center at [facility].  Will be seeing a diabetes educator there.  No significant changes made to her insulin regimen.  Overall blood sugars are slowly improvement on new regimen although patient also notes significant improvement happened postsurgery.    Reviewed patient's consultation with internal medicine.  Recommendation is to start treatment for hypertension and hyperlipidemia.  Patient is willing to proceed with that.  We discussed risk benefits and alternatives.    I have evaluated this patient in-person today " "and I am prescribing continuous glucose monitor for the following reasons:    -The patient has a diagnosis of diabetes mellitus.  -The patient has been using a blood glucose monitor to test 4+ times a day.  -The patient with highly fluctuating blood sugars  -The patient is using 4 or more insulin injections a day  -She is under my care for diabetes and is being seen every 3-4 months for follow up and is also seeing diabetes educator regularly  -The patient's insulin treatment regimen requires frequent adjustments due sliding scale, carb counting, and/or labile blood sugars.     Had thyroid tumor removed, showed thyroid cancer that typically is not aggressive, under care of [facility]. Blood sugars are starting to improve.    Having issues with breathing. Rare cough but gets short of breath frequently, even at rest.Worst overnight. Wakes her from sleep. Sleep medicine consult recommended by [place] but not covered at [facility].  Will be seeing pulmonary in 3 weeks.  We reviewed her recent CT scans in detail.              Objective    BP (!) 146/70   Pulse 83   Temp 97.2  F (36.2  C)   Ht 1.739 m (5' 8.47\")   Wt 102.7 kg (226 lb 6.4 oz)   LMP 09/25/2019 (Within Days)   SpO2 98%   BMI 33.96 kg/m    Body mass index is 33.96 kg/m .  Physical Exam   Alert cooperative no acute distress  TMs normal in appearance  Oral mucosa is moist  Neck with well-healing incision at the base, mild soft tissue swelling superior without redness or discoloration, no discrete borders  Heart regular rate and rhythm  Lungs clear to auscultation bilaterally            Signed Electronically by: Agnieszka Ahn MD    "

## 2024-02-27 DIAGNOSIS — E11.9 TYPE 2 DIABETES MELLITUS WITHOUT COMPLICATION, WITHOUT LONG-TERM CURRENT USE OF INSULIN (H): ICD-10-CM

## 2024-02-27 RX ORDER — INSULIN LISPRO 100 [IU]/ML
INJECTION, SOLUTION INTRAVENOUS; SUBCUTANEOUS
Qty: 60 ML | Refills: 3 | Status: SHIPPED | OUTPATIENT
Start: 2024-02-27 | End: 2024-03-18

## 2024-02-27 NOTE — PROGRESS NOTES
Received request to update humalog prescription to accurately reflect increased use of insulin. Updated Rx.

## 2024-02-28 ENCOUNTER — TELEPHONE (OUTPATIENT)
Dept: PULMONOLOGY | Facility: CLINIC | Age: 44
End: 2024-02-28
Payer: COMMERCIAL

## 2024-02-28 NOTE — TELEPHONE ENCOUNTER
Pt called back to schedule for sooner appt available on 3/4 with Dr Rojas. Pt is scheduled, and aware of date and location.

## 2024-02-28 NOTE — CONFIDENTIAL NOTE
RECORDS RECEIVED FROM: internal    DATE RECEIVED: 3.4.24   NOTES STATUS DETAILS   OFFICE NOTE from referring provider internal    Arnold Hull MD      OFFICE NOTE from other specialist     DISCHARGE SUMMARY from hospital     DISCHARGE REPORT from the ER     MEDICATION LIST internal     IMAGING  (NEED IMAGES AND REPORTS)     CT SCAN internal  internal - 1.10.24   CHEST XRAY (CXR)     TESTS     PULMONARY FUNCTION TESTING (PFT) internal  1.2.24

## 2024-03-04 ENCOUNTER — PRE VISIT (OUTPATIENT)
Dept: PULMONOLOGY | Facility: CLINIC | Age: 44
End: 2024-03-04
Payer: COMMERCIAL

## 2024-03-04 ENCOUNTER — OFFICE VISIT (OUTPATIENT)
Dept: PULMONOLOGY | Facility: CLINIC | Age: 44
End: 2024-03-04
Attending: INTERNAL MEDICINE
Payer: COMMERCIAL

## 2024-03-04 VITALS — HEART RATE: 88 BPM | OXYGEN SATURATION: 96 % | DIASTOLIC BLOOD PRESSURE: 88 MMHG | SYSTOLIC BLOOD PRESSURE: 157 MMHG

## 2024-03-04 DIAGNOSIS — R91.8 PULMONARY NODULES: Primary | ICD-10-CM

## 2024-03-04 DIAGNOSIS — R09.89 CHOKING EPISODE OCCURRING AT NIGHT: ICD-10-CM

## 2024-03-04 PROCEDURE — G0463 HOSPITAL OUTPT CLINIC VISIT: HCPCS | Performed by: STUDENT IN AN ORGANIZED HEALTH CARE EDUCATION/TRAINING PROGRAM

## 2024-03-04 PROCEDURE — 99215 OFFICE O/P EST HI 40 MIN: CPT | Performed by: STUDENT IN AN ORGANIZED HEALTH CARE EDUCATION/TRAINING PROGRAM

## 2024-03-04 NOTE — LETTER
3/4/2024         RE: Shannan Cameron  [address]  [facility] [address]        Dear Colleague,    Thank you for referring your patient, Shannan Cameron, to the [facility] FOR LUNG SCIENCE AND [facility]. Please see a copy of my visit note below.      Pulmonology Clinic Appointment     Shannan Cameron MRN# [id number]   Age: 44 year old YOB: 1980         Reason for consult:    Shannan Cameron is a 44 year old year old female who is being seen for General Visit (New pt)        Requesting physician:              Chief Complaint:   Dyspnea     History is obtained from the patient         History of Present Illness:   Shannan Cameron is a 44 year old year old female with recently diagnosed thyroid cancer status post thyroidectomy, prior smoker, with diabetes, referred by Dr. Hull due to respiratory/upper airway symptoms of choking/bronchospasm.    Shannan was referred by Dr. Hull due to this intermittent sense of spasm in her chest or choking episodes since October.  This would sometimes wake her up, be worse when she was lying flat.  She subsequently was found to have a thyroid tumor, though on imaging it does not appear very large that it would obviously cause airway compression.  However subsequent to her thyroidectomy she has not had any further of these episodes.  Additionally she notes that she has been experiencing dyspnea on exertion, which is new over the last several months.  She says she notices this when she hikes on an incline.  Lastly she is anxious about pulmonary nodules that she was following with Dr. Hull for, there is a CT scan from [place] in February that noted some what they called new lower lobe nodules but there comparison was a 2007 CT scan.  Our most recent CT scan of the chest was in January, which only showed stable sized right upper lobe, right middle lobe, and left lower lobe nodules, the left lower lobe nodule being unchanged in size since October, and  the others being unchanged since 2019.    Patient says she also is having issues with following up in the pulmonary clinic here due to insurance rejecting her follow-up with Dr. Hull.    Relevant Med/Soc Hx:  -smoked cigarettes from 19yo-42yo, 1/2ppd  - Diabetes  -  thyroidectomy     Again as previously noted patient has not had spasm/choking episodes since the thyroidectomy.  Of note she was also recently to have a nodule in her breast and is pending biopsy of breast nodules on US breast    Pulmonary function test performed in January was normal, without any evidence of obstruction or restriction nor if issues with gas diffusion.  As noted other than lung nodules there is no substantial parenchymal lung disease apparent.           Past Medical History:   No past medical history on file.         Past Surgical History:     Past Surgical History:   Procedure Laterality Date     ARTHROSCOPIC REPAIR ACL  2017     C LAP REMOVAL, SPERMATIC CORD LIPOMA  2018      SECTION  1996      SECTION  10/21/2008      SECTION  2006     HYSTERECTOMY  2019    Left ovarian cystectomy.     MAMMOGRAM - HIM SCAN  2017     [facility]  DELIVERY ONLY      Description:  Section;  Recorded: 2011;            Social History:     Social History     Socioeconomic History     Marital status: Single     Spouse name: Not on file     Number of children: Not on file     Years of education: Not on file     Highest education level: Not on file   Occupational History     Not on file   Tobacco Use     Smoking status: Former     Packs/day: .5     Types: Cigarettes     Quit date: 2023     Years since quittin.3     Passive exposure: Past     Smokeless tobacco: Never   Vaping Use     Vaping Use: Never used   Substance and Sexual Activity     Alcohol use: No     Drug use: Not on file     Sexual activity: Not Currently   Other Topics Concern     Not on file   Social History  Narrative     Not on file     Social Determinants of Health     Financial Resource Strain: Low Risk  (12/28/2023)    Financial Resource Strain      Within the past 12 months, have you or your family members you live with been unable to get utilities (heat, electricity) when it was really needed?: No   Food Insecurity: High Risk (12/28/2023)    Food Insecurity      Within the past 12 months, did you worry that your food would run out before you got money to buy more?: Yes      Within the past 12 months, did the food you bought just not last and you didn t have money to get more?: Yes   Transportation Needs: Low Risk  (12/28/2023)    Transportation Needs      Within the past 12 months, has lack of transportation kept you from medical appointments, getting your medicines, non-medical meetings or appointments, work, or from getting things that you need?: No   Physical Activity: Not on file   Stress: Not on file   Social Connections: Not on file   Interpersonal Safety: Low Risk  (10/17/2023)    Interpersonal Safety      Do you feel physically and emotionally safe where you currently live?: Yes      Within the past 12 months, have you been hit, slapped, kicked or otherwise physically hurt by someone?: No      Within the past 12 months, have you been humiliated or emotionally abused in other ways by your partner or ex-partner?: No   Housing Stability: Low Risk  (12/28/2023)    Housing Stability      Do you have housing? : Yes      Are you worried about losing your housing?: No             Family History:     Family History   Problem Relation Age of Onset     Alcoholism Mother      Allergic rhinitis Mother      Anemia Mother      Arthritis Mother      Cancer Mother      Depression Mother      Substance Abuse Mother      Emotional abuse Mother      Fibromyalgia Mother      Mental Illness Mother      Alcoholism Father      Depression Father      Diabetes Father      Substance Abuse Father            Immunizations:      Immunization History   Administered Date(s) Administered     COVID-19 12+ (2023-24) (Pfizer) 02/26/2024     COVID-19 Monovalent 18+ (Moderna) 08/22/2022     Flu-nasal, Unspecified 10/31/2012     HepB, Unspecified 08/20/2003, 10/08/2003, 02/01/2012     Hepatitis B, Adult 08/20/2003, 10/08/2003, 02/01/2012     Pneumococcal 20 valent Conjugate (Prevnar 20) 08/22/2022     TDAP (Adacel,Boostrix) 01/27/2017          Allergies:     Allergies   Allergen Reactions     Bee Venom Shortness Of Breath and Dizziness     Fainting          Medications:     Current Outpatient Medications   Medication     blood glucose (NO BRAND SPECIFIED) test strip     blood glucose monitoring (NO BRAND SPECIFIED) meter device kit     Continuous Blood Gluc Sensor (TicketbisSTYLE LAURA 3 SENSOR) MISC     insulin glargine (LANTUS PEN) 100 UNIT/ML pen     insulin lispro (HUMALOG KWIKPEN) 100 UNIT/ML (1 unit dial) KWIKPEN     insulin pen needle (32G X 4 MM) 32G X 4 MM miscellaneous     losartan (COZAAR) 25 MG tablet     Microlet Lancets MISC     ondansetron (ZOFRAN) 8 MG tablet     rizatriptan (MAXALT) 10 MG tablet     rosuvastatin (CRESTOR) 10 MG tablet     No current facility-administered medications for this visit.          Review of Systems:   The Review of Systems is negative other than noted in the HPI         Physical Exam:   BP (!) 157/88   Pulse 88   LMP 09/25/2019 (Within Days)   SpO2 96%   Patient declined being weighed.     GENERAL APPEARANCE:  alert, and in no apparent distress.  EYES: PERRL, EOMI  HENT: Nasal mucosa with no edema and no hyperemia. No nasal polyps.  MOUTH: Oral mucosa is moist, without any lesions, no tonsillar enlargement, no oropharyngeal exudate.  NECK: supple, no masses  LYMPHATICS: No palpable axillary, cervical, or supraclavicular nodes.  RESP: good air flow throughout.  No crackles. No rhonchi. No wheezes. Normal percussion. Normal chest expansion  CV: Normal S1, S2, regular rhythm, normal rate. No murmur.  No  rub. No gallop. No LE edema.   MS: extremities normal. No clubbing. No cyanosis.  SKIN: no rash on limited exam   NEURO: speech normal, normal strength and tone, normal gait and stance  PSYCH: normal mood and affect         Data:   All laboratory data reviewed  All cardiac studies reviewed by me.  All imaging studies reviewed by me.    Recent Results (from the past 4368 hour(s))   Eye Exam - HIM Scan    Collection Time: 10/04/23 12:00 AM   Result Value Ref Range    RETINOPATHY UNKNOWN    ESR: Erythrocyte sedimentation rate    Collection Time: 10/17/23  2:29 PM   Result Value Ref Range    Erythrocyte Sedimentation Rate 15 0 - 20 mm/hr   CRP, inflammation    Collection Time: 10/17/23  2:29 PM   Result Value Ref Range    CRP Inflammation 7.62 (H) <5.00 mg/L   Comprehensive metabolic panel (BMP + Alb, Alk Phos, ALT, AST, Total. Bili, TP)    Collection Time: 10/17/23  2:29 PM   Result Value Ref Range    Sodium 140 135 - 145 mmol/L    Potassium 3.9 3.4 - 5.3 mmol/L    Carbon Dioxide (CO2) 26 22 - 29 mmol/L    Anion Gap 10 7 - 15 mmol/L    Urea Nitrogen 7.1 6.0 - 20.0 mg/dL    Creatinine 0.62 0.51 - 0.95 mg/dL    GFR Estimate >90 >60 mL/min/1.73m2    Calcium 10.3 (H) 8.6 - 10.0 mg/dL    Chloride 104 98 - 107 mmol/L    Glucose 177 (H) 70 - 99 mg/dL    Alkaline Phosphatase 102 35 - 104 U/L    AST 87 (H) 0 - 45 U/L     (H) 0 - 50 U/L    Protein Total 7.7 6.4 - 8.3 g/dL    Albumin 4.8 3.5 - 5.2 g/dL    Bilirubin Total 0.3 <=1.2 mg/dL   CBC with platelets and differential    Collection Time: 10/17/23  2:29 PM   Result Value Ref Range    WBC Count 8.4 4.0 - 11.0 10e3/uL    RBC Count 5.05 3.80 - 5.20 10e6/uL    Hemoglobin 16.1 (H) 11.7 - 15.7 g/dL    Hematocrit 48.7 (H) 35.0 - 47.0 %    MCV 96 78 - 100 fL    MCH 31.9 26.5 - 33.0 pg    MCHC 33.1 31.5 - 36.5 g/dL    RDW 13.4 10.0 - 15.0 %    Platelet Count 275 150 - 450 10e3/uL    % Neutrophils 64 %    % Lymphocytes 30 %    % Monocytes 3 %    Mids % (Monos, Eos, Basos)       % Eosinophils 2 %    % Basophils 1 %    % Immature Granulocytes 0 %    Absolute Neutrophils 5.3 1.6 - 8.3 10e3/uL    Absolute Lymphocytes 2.5 0.8 - 5.3 10e3/uL    Absolute Monocytes 0.3 0.0 - 1.3 10e3/uL    Mids Abs (Monos, Eos, Basos)      Absolute Eosinophils 0.2 0.0 - 0.7 10e3/uL    Absolute Basophils 0.1 0.0 - 0.2 10e3/uL    Absolute Immature Granulocytes 0.0 <=0.4 10e3/uL   Comprehensive metabolic panel (BMP + Alb, Alk Phos, ALT, AST, Total. Bili, TP)    Collection Time: 12/06/23 11:28 AM   Result Value Ref Range    Sodium 137 135 - 145 mmol/L    Potassium 4.0 3.4 - 5.3 mmol/L    Carbon Dioxide (CO2) 23 22 - 29 mmol/L    Anion Gap 13 7 - 15 mmol/L    Urea Nitrogen 5.9 (L) 6.0 - 20.0 mg/dL    Creatinine 0.52 0.51 - 0.95 mg/dL    GFR Estimate >90 >60 mL/min/1.73m2    Calcium 10.0 8.6 - 10.0 mg/dL    Chloride 101 98 - 107 mmol/L    Glucose 239 (H) 70 - 99 mg/dL    Alkaline Phosphatase 94 40 - 150 U/L    AST 82 (H) 0 - 45 U/L     (H) 0 - 50 U/L    Protein Total 7.6 6.4 - 8.3 g/dL    Albumin 4.4 3.5 - 5.2 g/dL    Bilirubin Total 0.2 <=1.2 mg/dL   CBC with platelets    Collection Time: 12/06/23 11:28 AM   Result Value Ref Range    WBC Count 7.5 4.0 - 11.0 10e3/uL    RBC Count 4.66 3.80 - 5.20 10e6/uL    Hemoglobin 14.7 11.7 - 15.7 g/dL    Hematocrit 43.8 35.0 - 47.0 %    MCV 94 78 - 100 fL    MCH 31.5 26.5 - 33.0 pg    MCHC 33.6 31.5 - 36.5 g/dL    RDW 12.4 10.0 - 15.0 %    Platelet Count 288 150 - 450 10e3/uL   Hemoglobin A1c    Collection Time: 12/06/23 11:28 AM   Result Value Ref Range    Hemoglobin A1C 9.1 (H) 0.0 - 5.6 %   Wet prep - Clinic Collect    Collection Time: 12/28/23 12:16 PM    Specimen: Vagina; Swab   Result Value Ref Range    Trichomonas Absent Absent    Yeast Absent Absent    Clue Cells Present (A) Absent    WBCs/high power field 2+ (A) None   TSH    Collection Time: 12/28/23  1:14 PM   Result Value Ref Range    TSH 1.69 0.30 - 4.20 uIU/mL   T3, Free    Collection Time: 12/28/23  1:14 PM    Result Value Ref Range    T3 Free 3.1 2.0 - 4.4 pg/mL   T4, free    Collection Time: 12/28/23  1:14 PM   Result Value Ref Range    Free T4 0.98 0.90 - 1.70 ng/dL   General PFT Lab (Please always keep checked)    Collection Time: 01/02/24  9:38 AM   Result Value Ref Range    FVC-Pred 3.84 L    FVC-Pre 3.92 L    FVC-%Pred-Pre 102 %    FEV1-Pre 3.37 L    FEV1-%Pred-Pre 108 %    FEV1FVC-Pred 82 %    FEV1FVC-Pre 86 %    FEFMax-Pred 7.60 L/sec    FEFMax-Pre 8.07 L/sec    FEFMax-%Pred-Pre 106 %    FEF2575-Pred 3.09 L/sec    FEF2575-Pre 4.10 L/sec    BOG0138-%Pred-Pre 132 %    ExpTime-Pre 6.46 sec    FIFMax-Pre 7.45 L/sec    VC-Pred 3.93 L    VC-Pre 3.94 L    VC-%Pred-Pre 100 %    IC-Pred 2.77 L    IC-Pre 3.70 L    IC-%Pred-Pre 133 %    ERV-Pred 1.39 L    ERV-Pre 0.24 L    ERV-%Pred-Pre 17 %    FEV1FEV6-Pred 83 %    FEV1FEV6-Pre 86 %    FRCPleth-Pred 2.94 L    FRCPleth-Pre 2.33 L    FRCPleth-%Pred-Pre 79 %    RVPleth-Pred 1.87 L    RVPleth-Pre 2.09 L    RVPleth-%Pred-Pre 111 %    TLCPleth-Pred 5.69 L    TLCPleth-Pre 6.03 L    TLCPleth-%Pred-Pre 105 %    DLCOunc-Pred 23.51 ml/min/mmHg    DLCOunc-Pre 25.22 ml/min/mmHg    DLCOunc-%Pred-Pre 107 %    VA-Pre 5.48 L    VA-%Pred-Pre 97 %    FEV1SVC-Pred 79 %    FEV1SVC-Pre 86 %   HIV Antigen Antibody Combo    Collection Time: 02/26/24 10:33 AM   Result Value Ref Range    HIV Antigen Antibody Combo Nonreactive Nonreactive                       Assessment and Plan:     1.  Lung nodules  - Follow-up in interventional pulmonology clinic with Dr. Denser/lung nodule clinic  2.  Upper airway obstruction  - Symptoms suggestive of this have since resolved, though unclear what precipitated it, there may have been some issue with soft tissue swelling in the pretracheal fascia due to thyroid cancer, has resolved no further evaluation or follow-up to be done  3.  Dyspnea on exertion  Discussed at length that there is no clear pattern or trigger or diagnostic findings to suggest a parenchymal  lung disease, or airway disease, and this is likely secondary to deconditioning.  We discussed given my low estimated pretest probability of hyperactive airway disease, would hold off on bronchoprovocation testing at this point. Have discussed extensively the importance of regular graded exercise with increases in difficulty as tolerance builds.  Patient agrees is likely a large contributing factor and is determined to attempt increased physical activity.      I would be happy to see Shannan again as needed should any further concerns arise      I spent a total of 51 minutes on the day of the encounter dedicated to the office visit with Shannan Cameron.    This included review of vitals, labs, imaging, other diagnostic tests (I.e. PFTs, ECHO), face-to-face interaction, physical exam, counseling the patient +/- family members, and/or coordinating care.    Caleb Rojas MD                  Again, thank you for allowing me to participate in the care of your patient.        Sincerely,        Caleb Rojas MD

## 2024-03-04 NOTE — PROGRESS NOTES
Pulmonology Clinic Appointment     Shannan Cameron MRN# [id number]   Age: 44 year old YOB: 1980         Reason for consult:    Shannan Cameron is a 44 year old year old female who is being seen for General Visit (New pt)        Requesting physician:              Chief Complaint:   Dyspnea     History is obtained from the patient         History of Present Illness:   Shannan Cameron is a 44 year old year old female with recently diagnosed thyroid cancer status post thyroidectomy, prior smoker, with diabetes, referred by Dr. Hull due to respiratory/upper airway symptoms of choking/bronchospasm.    Shannan was referred by Dr. Hull due to this intermittent sense of spasm in her chest or choking episodes since October.  This would sometimes wake her up, be worse when she was lying flat.  She subsequently was found to have a thyroid tumor, though on imaging it does not appear very large that it would obviously cause airway compression.  However subsequent to her thyroidectomy she has not had any further of these episodes.  Additionally she notes that she has been experiencing dyspnea on exertion, which is new over the last several months.  She says she notices this when she hikes on an incline.  Lastly she is anxious about pulmonary nodules that she was following with Dr. Hull for, there is a CT scan from [place] in February that noted some what they called new lower lobe nodules but there comparison was a 2007 CT scan.  Our most recent CT scan of the chest was in January, which only showed stable sized right upper lobe, right middle lobe, and left lower lobe nodules, the left lower lobe nodule being unchanged in size since October, and the others being unchanged since 2019.    Patient says she also is having issues with following up in the pulmonary clinic here due to insurance rejecting her follow-up with Dr. Hull.    Relevant Med/Soc Hx:  -smoked cigarettes from 19yo-42yo, 1/2ppd  - Diabetes  - Feb 16  thyroidectomy     Again as previously noted patient has not had spasm/choking episodes since the thyroidectomy.  Of note she was also recently to have a nodule in her breast and is pending biopsy of breast nodules on US breast    Pulmonary function test performed in January was normal, without any evidence of obstruction or restriction nor if issues with gas diffusion.  As noted other than lung nodules there is no substantial parenchymal lung disease apparent.           Past Medical History:   No past medical history on file.         Past Surgical History:     Past Surgical History:   Procedure Laterality Date    ARTHROSCOPIC REPAIR ACL  2017    C LAP REMOVAL, SPERMATIC CORD LIPOMA  2018     SECTION  1996     SECTION  10/21/2008     SECTION  2006    HYSTERECTOMY  2019    Left ovarian cystectomy.    MAMMOGRAM - HIM SCAN  2017    [facility]  DELIVERY ONLY      Description:  Section;  Recorded: 2011;            Social History:     Social History     Socioeconomic History    Marital status: Single     Spouse name: Not on file    Number of children: Not on file    Years of education: Not on file    Highest education level: Not on file   Occupational History    Not on file   Tobacco Use    Smoking status: Former     Packs/day: .5     Types: Cigarettes     Quit date: 2023     Years since quittin.3     Passive exposure: Past    Smokeless tobacco: Never   Vaping Use    Vaping Use: Never used   Substance and Sexual Activity    Alcohol use: No    Drug use: Not on file    Sexual activity: Not Currently   Other Topics Concern    Not on file   Social History Narrative    Not on file     Social Determinants of Health     Financial Resource Strain: Low Risk  (2023)    Financial Resource Strain     Within the past 12 months, have you or your family members you live with been unable to get utilities (heat, electricity) when it was really  needed?: No   Food Insecurity: High Risk (12/28/2023)    Food Insecurity     Within the past 12 months, did you worry that your food would run out before you got money to buy more?: Yes     Within the past 12 months, did the food you bought just not last and you didn t have money to get more?: Yes   Transportation Needs: Low Risk  (12/28/2023)    Transportation Needs     Within the past 12 months, has lack of transportation kept you from medical appointments, getting your medicines, non-medical meetings or appointments, work, or from getting things that you need?: No   Physical Activity: Not on file   Stress: Not on file   Social Connections: Not on file   Interpersonal Safety: Low Risk  (10/17/2023)    Interpersonal Safety     Do you feel physically and emotionally safe where you currently live?: Yes     Within the past 12 months, have you been hit, slapped, kicked or otherwise physically hurt by someone?: No     Within the past 12 months, have you been humiliated or emotionally abused in other ways by your partner or ex-partner?: No   Housing Stability: Low Risk  (12/28/2023)    Housing Stability     Do you have housing? : Yes     Are you worried about losing your housing?: No             Family History:     Family History   Problem Relation Age of Onset    Alcoholism Mother     Allergic rhinitis Mother     Anemia Mother     Arthritis Mother     Cancer Mother     Depression Mother     Substance Abuse Mother     Emotional abuse Mother     Fibromyalgia Mother     Mental Illness Mother     Alcoholism Father     Depression Father     Diabetes Father     Substance Abuse Father            Immunizations:     Immunization History   Administered Date(s) Administered    COVID-19 12+ (2023-24) (Pfizer) 02/26/2024    COVID-19 Monovalent 18+ (Moderna) 08/22/2022    Flu-nasal, Unspecified 10/31/2012    HepB, Unspecified 08/20/2003, 10/08/2003, 02/01/2012    Hepatitis B, Adult 08/20/2003, 10/08/2003, 02/01/2012    Pneumococcal  20 valent Conjugate (Prevnar 20) 08/22/2022    TDAP (Adacel,Boostrix) 01/27/2017          Allergies:     Allergies   Allergen Reactions    Bee Venom Shortness Of Breath and Dizziness     Fainting          Medications:     Current Outpatient Medications   Medication    blood glucose (NO BRAND SPECIFIED) test strip    blood glucose monitoring (NO BRAND SPECIFIED) meter device kit    Continuous Blood Gluc Sensor (FREESTYLE LAURA 3 SENSOR) MISC    insulin glargine (LANTUS PEN) 100 UNIT/ML pen    insulin lispro (HUMALOG KWIKPEN) 100 UNIT/ML (1 unit dial) KWIKPEN    insulin pen needle (32G X 4 MM) 32G X 4 MM miscellaneous    losartan (COZAAR) 25 MG tablet    Microlet Lancets MISC    ondansetron (ZOFRAN) 8 MG tablet    rizatriptan (MAXALT) 10 MG tablet    rosuvastatin (CRESTOR) 10 MG tablet     No current facility-administered medications for this visit.          Review of Systems:   The Review of Systems is negative other than noted in the HPI         Physical Exam:   BP (!) 157/88   Pulse 88   LMP 09/25/2019 (Within Days)   SpO2 96%   Patient declined being weighed.     GENERAL APPEARANCE:  alert, and in no apparent distress.  EYES: PERRL, EOMI  HENT: Nasal mucosa with no edema and no hyperemia. No nasal polyps.  MOUTH: Oral mucosa is moist, without any lesions, no tonsillar enlargement, no oropharyngeal exudate.  NECK: supple, no masses  LYMPHATICS: No palpable axillary, cervical, or supraclavicular nodes.  RESP: good air flow throughout.  No crackles. No rhonchi. No wheezes. Normal percussion. Normal chest expansion  CV: Normal S1, S2, regular rhythm, normal rate. No murmur.  No rub. No gallop. No LE edema.   MS: extremities normal. No clubbing. No cyanosis.  SKIN: no rash on limited exam   NEURO: speech normal, normal strength and tone, normal gait and stance  PSYCH: normal mood and affect         Data:   All laboratory data reviewed  All cardiac studies reviewed by me.  All imaging studies reviewed by  me.    Recent Results (from the past 4368 hour(s))   Eye Exam - HIM Scan    Collection Time: 10/04/23 12:00 AM   Result Value Ref Range    RETINOPATHY UNKNOWN    ESR: Erythrocyte sedimentation rate    Collection Time: 10/17/23  2:29 PM   Result Value Ref Range    Erythrocyte Sedimentation Rate 15 0 - 20 mm/hr   CRP, inflammation    Collection Time: 10/17/23  2:29 PM   Result Value Ref Range    CRP Inflammation 7.62 (H) <5.00 mg/L   Comprehensive metabolic panel (BMP + Alb, Alk Phos, ALT, AST, Total. Bili, TP)    Collection Time: 10/17/23  2:29 PM   Result Value Ref Range    Sodium 140 135 - 145 mmol/L    Potassium 3.9 3.4 - 5.3 mmol/L    Carbon Dioxide (CO2) 26 22 - 29 mmol/L    Anion Gap 10 7 - 15 mmol/L    Urea Nitrogen 7.1 6.0 - 20.0 mg/dL    Creatinine 0.62 0.51 - 0.95 mg/dL    GFR Estimate >90 >60 mL/min/1.73m2    Calcium 10.3 (H) 8.6 - 10.0 mg/dL    Chloride 104 98 - 107 mmol/L    Glucose 177 (H) 70 - 99 mg/dL    Alkaline Phosphatase 102 35 - 104 U/L    AST 87 (H) 0 - 45 U/L     (H) 0 - 50 U/L    Protein Total 7.7 6.4 - 8.3 g/dL    Albumin 4.8 3.5 - 5.2 g/dL    Bilirubin Total 0.3 <=1.2 mg/dL   CBC with platelets and differential    Collection Time: 10/17/23  2:29 PM   Result Value Ref Range    WBC Count 8.4 4.0 - 11.0 10e3/uL    RBC Count 5.05 3.80 - 5.20 10e6/uL    Hemoglobin 16.1 (H) 11.7 - 15.7 g/dL    Hematocrit 48.7 (H) 35.0 - 47.0 %    MCV 96 78 - 100 fL    MCH 31.9 26.5 - 33.0 pg    MCHC 33.1 31.5 - 36.5 g/dL    RDW 13.4 10.0 - 15.0 %    Platelet Count 275 150 - 450 10e3/uL    % Neutrophils 64 %    % Lymphocytes 30 %    % Monocytes 3 %    Mids % (Monos, Eos, Basos)      % Eosinophils 2 %    % Basophils 1 %    % Immature Granulocytes 0 %    Absolute Neutrophils 5.3 1.6 - 8.3 10e3/uL    Absolute Lymphocytes 2.5 0.8 - 5.3 10e3/uL    Absolute Monocytes 0.3 0.0 - 1.3 10e3/uL    Mids Abs (Monos, Eos, Basos)      Absolute Eosinophils 0.2 0.0 - 0.7 10e3/uL    Absolute Basophils 0.1 0.0 - 0.2 10e3/uL     Absolute Immature Granulocytes 0.0 <=0.4 10e3/uL   Comprehensive metabolic panel (BMP + Alb, Alk Phos, ALT, AST, Total. Bili, TP)    Collection Time: 12/06/23 11:28 AM   Result Value Ref Range    Sodium 137 135 - 145 mmol/L    Potassium 4.0 3.4 - 5.3 mmol/L    Carbon Dioxide (CO2) 23 22 - 29 mmol/L    Anion Gap 13 7 - 15 mmol/L    Urea Nitrogen 5.9 (L) 6.0 - 20.0 mg/dL    Creatinine 0.52 0.51 - 0.95 mg/dL    GFR Estimate >90 >60 mL/min/1.73m2    Calcium 10.0 8.6 - 10.0 mg/dL    Chloride 101 98 - 107 mmol/L    Glucose 239 (H) 70 - 99 mg/dL    Alkaline Phosphatase 94 40 - 150 U/L    AST 82 (H) 0 - 45 U/L     (H) 0 - 50 U/L    Protein Total 7.6 6.4 - 8.3 g/dL    Albumin 4.4 3.5 - 5.2 g/dL    Bilirubin Total 0.2 <=1.2 mg/dL   CBC with platelets    Collection Time: 12/06/23 11:28 AM   Result Value Ref Range    WBC Count 7.5 4.0 - 11.0 10e3/uL    RBC Count 4.66 3.80 - 5.20 10e6/uL    Hemoglobin 14.7 11.7 - 15.7 g/dL    Hematocrit 43.8 35.0 - 47.0 %    MCV 94 78 - 100 fL    MCH 31.5 26.5 - 33.0 pg    MCHC 33.6 31.5 - 36.5 g/dL    RDW 12.4 10.0 - 15.0 %    Platelet Count 288 150 - 450 10e3/uL   Hemoglobin A1c    Collection Time: 12/06/23 11:28 AM   Result Value Ref Range    Hemoglobin A1C 9.1 (H) 0.0 - 5.6 %   Wet prep - Clinic Collect    Collection Time: 12/28/23 12:16 PM    Specimen: Vagina; Swab   Result Value Ref Range    Trichomonas Absent Absent    Yeast Absent Absent    Clue Cells Present (A) Absent    WBCs/high power field 2+ (A) None   TSH    Collection Time: 12/28/23  1:14 PM   Result Value Ref Range    TSH 1.69 0.30 - 4.20 uIU/mL   T3, Free    Collection Time: 12/28/23  1:14 PM   Result Value Ref Range    T3 Free 3.1 2.0 - 4.4 pg/mL   T4, free    Collection Time: 12/28/23  1:14 PM   Result Value Ref Range    Free T4 0.98 0.90 - 1.70 ng/dL   General PFT Lab (Please always keep checked)    Collection Time: 01/02/24  9:38 AM   Result Value Ref Range    FVC-Pred 3.84 L    FVC-Pre 3.92 L    FVC-%Pred-Pre 102 %     FEV1-Pre 3.37 L    FEV1-%Pred-Pre 108 %    FEV1FVC-Pred 82 %    FEV1FVC-Pre 86 %    FEFMax-Pred 7.60 L/sec    FEFMax-Pre 8.07 L/sec    FEFMax-%Pred-Pre 106 %    FEF2575-Pred 3.09 L/sec    FEF2575-Pre 4.10 L/sec    LEO8072-%Pred-Pre 132 %    ExpTime-Pre 6.46 sec    FIFMax-Pre 7.45 L/sec    VC-Pred 3.93 L    VC-Pre 3.94 L    VC-%Pred-Pre 100 %    IC-Pred 2.77 L    IC-Pre 3.70 L    IC-%Pred-Pre 133 %    ERV-Pred 1.39 L    ERV-Pre 0.24 L    ERV-%Pred-Pre 17 %    FEV1FEV6-Pred 83 %    FEV1FEV6-Pre 86 %    FRCPleth-Pred 2.94 L    FRCPleth-Pre 2.33 L    FRCPleth-%Pred-Pre 79 %    RVPleth-Pred 1.87 L    RVPleth-Pre 2.09 L    RVPleth-%Pred-Pre 111 %    TLCPleth-Pred 5.69 L    TLCPleth-Pre 6.03 L    TLCPleth-%Pred-Pre 105 %    DLCOunc-Pred 23.51 ml/min/mmHg    DLCOunc-Pre 25.22 ml/min/mmHg    DLCOunc-%Pred-Pre 107 %    VA-Pre 5.48 L    VA-%Pred-Pre 97 %    FEV1SVC-Pred 79 %    FEV1SVC-Pre 86 %   HIV Antigen Antibody Combo    Collection Time: 02/26/24 10:33 AM   Result Value Ref Range    HIV Antigen Antibody Combo Nonreactive Nonreactive                       Assessment and Plan:     1.  Lung nodules  - Follow-up in interventional pulmonology clinic with Dr. Rollins/lung nodule clinic  2.  Upper airway obstruction  - Symptoms suggestive of this have since resolved, though unclear what precipitated it, there may have been some issue with soft tissue swelling in the pretracheal fascia due to thyroid cancer, has resolved no further evaluation or follow-up to be done  3.  Dyspnea on exertion  Discussed at length that there is no clear pattern or trigger or diagnostic findings to suggest a parenchymal lung disease, or airway disease, and this is likely secondary to deconditioning.  We discussed given my low estimated pretest probability of hyperactive airway disease, would hold off on bronchoprovocation testing at this point. Have discussed extensively the importance of regular graded exercise with increases in difficulty as  tolerance builds.  Patient agrees is likely a large contributing factor and is determined to attempt increased physical activity.      I would be happy to see Shannan again as needed should any further concerns arise      I spent a total of 51 minutes on the day of the encounter dedicated to the office visit with Shannan Cameron.    This included review of vitals, labs, imaging, other diagnostic tests (I.e. PFTs, ECHO), face-to-face interaction, physical exam, counseling the patient +/- family members, and/or coordinating care.    Caleb Rojas MD

## 2024-03-04 NOTE — NURSING NOTE
Chief Complaint   Patient presents with    General Visit     New pt     Vitals were taken and medications were reconciled.     JAYESH Nagel

## 2024-03-06 ENCOUNTER — NURSE TRIAGE (OUTPATIENT)
Dept: NURSING | Facility: CLINIC | Age: 44
End: 2024-03-06
Payer: COMMERCIAL

## 2024-03-06 NOTE — TELEPHONE ENCOUNTER
"Pt calling re: heart palpitations.    Says the last couple of days has been having heart palpitations.   Recently had half of her thyroid removed and started on a new BP and cholesterol medication.    Not SOB, says she has chest pain, but not constant. Rates it as moderate. Feels the palpitations constantly \"feels like bubbles in my heart.\" Denies any dizziness or passing out.     Protocol recommends Go to ED Now. Advised pt she should go in for evaluation. Discussed that the nearest ED is in [facility]. Will call sister to see if she can take her. Advised to call 911 if begins to have respiratory difficulty, passes out or develops severe chest pain. Pt verbalized understanding.    Brittany Miles, RN, BSN  [facility]   Triage Nurse Advisor      Reason for Disposition   Chest pain   Major surgery in the past month    Additional Information   Negative: Passed out (i.e., lost consciousness, collapsed and was not responding)   Negative: Shock suspected (e.g., cold/pale/clammy skin, too weak to stand, low BP, rapid pulse)   Negative: Difficult to awaken or acting confused (e.g., disoriented, slurred speech)   Negative: Visible sweat on face or sweat dripping down face   Negative: Unable to walk, or can only walk with assistance (e.g., requires support)   Negative: Received SHOCK from implantable cardiac defibrillator and has persisting symptoms (i.e., palpitations, lightheadedness)   Negative: Dizziness, lightheadedness, or weakness and heart beating very rapidly (e.g., > 140 / minute)   Negative: Dizziness, lightheadedness, or weakness and heart beating very slowly (e.g., < 50 / minute)   Negative: Sounds like a life-threatening emergency to the triager   Negative: SEVERE difficulty breathing (e.g., struggling for each breath, speaks in single words)   Negative: Difficult to awaken or acting confused (e.g., disoriented, slurred speech)   Negative: Shock suspected (e.g., cold/pale/clammy skin, too weak to " stand, low BP, rapid pulse)   Negative: Passed out (i.e., lost consciousness, collapsed and was not responding)   Negative: Chest pain lasting longer than 5 minutes and ANY of the following:         Pain is crushing, pressure-like, or heavy         Over 44 years old          Over 30 years old and one cardiac risk factor (e.g diabetes, high blood pressure, high cholesterol, smoker, or family history of heart disease)         History of heart disease (e.g. angina, heart attack, heart failure, bypass surgery, takes nitroglycerin)   Negative: Followed an injury to chest   Negative: SEVERE chest pain   Negative: Pain also in shoulder(s) or arm(s) or jaw   Negative: Difficulty breathing   Negative: Cocaine use within last 3 days    Protocols used: Heart Rate and Heartbeat Aejalahzz-T-RX, Chest Pain-A-OH

## 2024-03-10 ENCOUNTER — HEALTH MAINTENANCE LETTER (OUTPATIENT)
Age: 44
End: 2024-03-10

## 2024-03-13 ENCOUNTER — PATIENT OUTREACH (OUTPATIENT)
Dept: ONCOLOGY | Facility: CLINIC | Age: 44
End: 2024-03-13
Payer: COMMERCIAL

## 2024-03-13 NOTE — PROGRESS NOTES
New Patient Oncology Nurse Navigator Note     Referring provider: Dr. Caleb Rojas    Referring Clinic/Organization: [facility]  Referred to: Interventional Pulmonology  Requested provider (if applicable): First available - did not specify   Referral Received: 03/13/24       Evaluation for :   Diagnosis   R91.8 (ICD-10-CM) - Pulmonary nodules      Additional Information:  referring back to Dr. Hull for planned follow up pertaining to lung nodules    Referral updates and Plan:     Pt is establised with Dr. Hull. Sending message to [facility] to assist with arranging follow up.     RIK WeeksN, RN, OCN  [facility] Oncology Nurse Navigator  [phone] / [phone]

## 2024-03-18 DIAGNOSIS — E11.9 TYPE 2 DIABETES MELLITUS WITHOUT COMPLICATION, WITHOUT LONG-TERM CURRENT USE OF INSULIN (H): ICD-10-CM

## 2024-03-18 NOTE — PROGRESS NOTES
Received fax from [employer] in [place] the patient is using more than her 70 units of insulin lispro per day.  I called her and left a voicemail asking her to contact the clinic and let us know how many units of insulin she is requiring.  Will update order when she calls back.

## 2024-03-19 ENCOUNTER — ALLIED HEALTH/NURSE VISIT (OUTPATIENT)
Dept: EDUCATION SERVICES | Facility: CLINIC | Age: 44
End: 2024-03-19
Payer: COMMERCIAL

## 2024-03-19 DIAGNOSIS — E11.9 TYPE 2 DIABETES MELLITUS WITHOUT COMPLICATION, WITHOUT LONG-TERM CURRENT USE OF INSULIN (H): Primary | ICD-10-CM

## 2024-03-19 DIAGNOSIS — E66.9 OBESITY: ICD-10-CM

## 2024-03-19 PROCEDURE — G0108 DIAB MANAGE TRN  PER INDIV: HCPCS | Performed by: DIETITIAN, REGISTERED

## 2024-03-19 RX ORDER — INSULIN LISPRO 100 [IU]/ML
INJECTION, SOLUTION INTRAVENOUS; SUBCUTANEOUS
Qty: 180 ML | Refills: 3 | Status: SHIPPED | OUTPATIENT
Start: 2024-03-19 | End: 2024-06-24

## 2024-03-19 NOTE — PROGRESS NOTES
Diabetes Self-Management Education & Support    Presents for: Follow-up Type II Diabetes, Obesity     Type of Service: In Person Visit      ASSESSMENT:  11/3/2020 - Initial dx of diabetes 3/2018 pt's diabetes has been under excellent control since 1/2019 with dietary intervention.  Pt had been following a strict keto/ vegan (a few days/week) and weight was down to 164.2# 1/22/19 and slowly had been incorporating more complex carbs and some fruit.  Weight gain 22# from 1/22/19 to 1/22/2020.  Then due to extra stress with pandemic and was caring for additional people in household pt has continued to gain weight.      A1c 5.8% 3/2020      12/13/2023  Due to life stressors, depression patient states she would let her diabetes slip.  Patient stopped taking metformin due to loose stools.  Patient has ongoing nausea.  Patient is being evaluated for elevated liver function tests, GI referral for colonoscopy.       Patient has not been able to fill prescriptions.  Through calling insurance and pharmacy clarified what the issue was and patient is now able to fill prescriptions through [facility] in [place].  Patient will be picking up blood glucose monitor and test strips.     Patient's A1c elevated at 9.1%.  Blood glucose today during office visit 267 mg/dL.  Discussed medication options.  For patient's least side effects opted for long-acting insulin at this time likely to be temporary.  Insulin recommendation discussed with PCP and agrees with plan.  Patient is willing to make some dietary changes but due to living situation patient does not have control over what food is purchased.     1/16/2024 since 12/13/2023 last visit patient has been diagnosed with malignant neoplasm of the thyroid gland     Patient is currently taking 30 units of Lantus, 14-day glucose average 285 mg/dL with a range of 195 - 341 mg/dL     Will add rapid acting insulin today 3 times daily AC using insulin to carbohydrate ratio patient is provided  with integrated diabetes services spreadsheet  Target 120  Correction factor 30  Insulin to carb ratio 1 unit for 5 g carbohydrate     Patient will follow-up in 1 month     2/13/2024 Patient will start meeting with specialists at [place] today and over the next few days and is quite nervous and excited to get answers.  Patient did not bring meter or logbook.  Glucose during office visit 259 mg/dL.  Patient has not been taking insulin as directed.  Patient complains of pain and burning sensation, reviewed injection technique.  Education on importance of excellent glycemic control if surgery is in the near future.       Lantus currently at 30 units will increase to 34 units and continue to increase by 1 unit every day until a.m. glucose is between 80 and 130 mg/dL.  Patient is to use Humalog integrated diabetes services dosing chart 3 times daily AC.  If patient is not eating patient is to still use dosing chart for correction 3 times daily AC for example this a.m. with hyperglycemia patient to take Humalog based on 0 g of carbohydrate if not consuming breakfast     Target 120  Correction factor 30  Insulin to carb ratio 1 unit for 5 g carbohydrate    3/19/2023 Pt did have L thyroidectomy lobectomy   Insulin needs increased  Lantus 60u daily   Humalog - pt has not been taking as directed, has been taking 60u at 7pm     14 day avg out of 43 readings 190mg/dL     Noted Freestyle Cynthia 3 order, pt states she has not started using it - pt went back home and picked up the freestyle cynthia 3 and came back to appt to have it started today during office visit.      Pt provided with detailed education again today on rapid acting insulin dosing to take it 20 min prior to each meal.  Target 120, Sensitivity factor 20, Carb to insulin ratio 4    Patient's most recent   Lab Results   Component Value Date    A1C 9.1 12/06/2023     is not meeting goal of <7.0    Diabetes knowledge and skills assessment:   Patient is knowledgeable in  diabetes management concepts related to: education again today in all areas of diabetes management     Continue education with the following diabetes management concepts: Healthy Eating, Monitoring, Taking Medication, Problem Solving, and Reducing Risks    Based on learning assessment above, most appropriate setting for further diabetes education would be: Individual setting.      PLAN  Lantus continue with 60u   Rapid acting take 20 min prior to meals use dosing chart  Target 120  Sensitivity 20  Carb: Insulin Ratio 4    Started Freestyle Cynthia 3 in office     Potential to transition from rapid acting to weekly GLP-1/GIP injectable and SGLT2 inhibitor while continuing with Lantus and evaluate glucose control but will confirm with PCP prior to any changes    Topics to cover at upcoming visits: Healthy Eating, Being Active, Monitoring, Taking Medication, Problem Solving, Reducing Risks, and Healthy Coping    Follow-up: 1 month    See Care Plan for co-developed, patient-state behavior change goals.  AVS provided for patient today.    Education Materials Provided:  Goals for Your Diabetes Care, Carbohydrate Counting, Hypoglycemia Signs and Symptoms, and My Plate Planner      SUBJECTIVE/OBJECTIVE:  Presents for: Follow-up  Accompanied by: Self  Diabetes education in the past 24mo: Yes  Diabetes type: Type 2  How confident are you filling out medical forms by yourself:: Extremely  Transportation concerns: No  Difficulty affording diabetes medication?: No  Difficulty affording diabetes testing supplies?: No  Other concerns:: None  Cultural Influences/Ethnic Background:  Not  or       Diabetes Symptoms & Complications:  Diabetes Related Symptoms: Fatigue  Weight trend: Fluctuating  Complications assessed today?: Yes  Autonomic neuropathy: Other  CVA: No  Heart disease: No  Nephropathy: No  Peripheral neuropathy: No  Peripheral Vascular Disease: No  Retinopathy: No  Sexual dysfunction: Other    Patient Problem  "List and Family Medical History reviewed for relevant medical history, current medical status, and diabetes risk factors.    Vitals:  Ht 1.739 m (5' 8.47\")   Wt 103 kg (227 lb)   LMP 09/25/2019 (Within Days)   BMI 34.04 kg/m    Estimated body mass index is 34.04 kg/m  as calculated from the following:    Height as of this encounter: 1.739 m (5' 8.47\").    Weight as of this encounter: 103 kg (227 lb).   Last 3 BP:   BP Readings from Last 3 Encounters:   03/04/24 (!) 157/88   02/26/24 (!) 146/70   01/11/24 120/80       History   Smoking Status    Former    Packs/day: 0.50    Types: Cigarettes    Quit date: 11/1/2023   Smokeless Tobacco    Never       Labs:  Lab Results   Component Value Date    A1C 9.1 12/06/2023     Lab Results   Component Value Date     12/06/2023    GLC 76 07/14/2021     04/15/2008     Lab Results   Component Value Date    LDL  08/30/2023      Comment:      Cannot estimate LDL when triglyceride exceeds 400 mg/dL     08/30/2023     Direct Measure HDL   Date Value Ref Range Status   08/30/2023 33 (L) >=50 mg/dL Final   ]  GFR Estimate   Date Value Ref Range Status   12/06/2023 >90 >60 mL/min/1.73m2 Final   04/15/2008 >90 >60 mL/min/1.7m2 Final     GFR Estimate If Black   Date Value Ref Range Status   04/15/2008 >90 >60 mL/min/1.7m2 Final     Lab Results   Component Value Date    CR 0.52 12/06/2023    CR 0.68 04/15/2008     No results found for: \"MICROALBUMIN\"    Healthy Eating:  Healthy Eating Assessed Today: Yes  Cultural/Sikhism diet restrictions?: No  Meal planning/habits: Avoiding sweets, Smaller portions, Keeps food records  Who cooks/prepares meals for you?: Self, Spouse  Who purchases food in  your home?: Self, Spouse  How many times a week on average do you eat food made away from home (restaurant/take-out)?: 1  Meals include: Dinner, Afternoon Snack  Beverages: Water, Tea, Coffee, Milk  Has patient met with a dietitian in the past?: Yes    Being Active:  Barrier to " exercise: Other    Monitoring:  Blood Glucose Meter: ContourNext  Times checking blood sugar at home (number): 4  Times checking blood sugar at home (per): Day  Blood glucose trend: No change    14-day average of 143 readings 190 mg/dL  Range 144 mg/dL -263 mg/dL    Taking Medications:  Diabetes Medication(s)       Insulin       insulin lispro (HUMALOG KWIKPEN) 100 UNIT/ML (1 unit dial) KWIKPEN Using insulin to carb ratio and taking up to 180 units daily divided between meals and larger snacks     insulin glargine (LANTUS PEN) 100 UNIT/ML pen At 30 units and increasing as directed up to max dose of 50 units            Current Treatments: Diet, Insulin Injections  Dose schedule: At bedtime  Given by: Patient  Injection/Infusion sites: Abdomen, Thighs    Problem Solving:  Patient carries a carbohydrate source: Yes    Hypoglycemia Symptoms  Hypoglycemia: Confusion, Dizziness/Lightheadedness, Sleepiness    Hypoglycemia Complications  Hypoglycemia Complications: None    Reducing Risks:  CAD Risks: Diabetes Mellitus, Sedentary lifestyle  Has dilated eye exam at least once a year?: Yes  Sees dentist every 6 months?: No  Feet checked by healthcare provider in the last year?: No    Healthy Coping:  Informal Support system:: Children, Family, Significant other  Patient Activation Measure Survey Score:      2/28/2024    10:47 AM   KIMANI Score (Last Two)   KIMANI Raw Score 29   Activation Score 52.9   KIMANI Level 2         Care Plan and Education Provided:  Care Plan: Diabetes   Updates made by Latasha Quiroz RD since 3/23/2024 12:00 AM        Problem: Diabetes Self-Management Education Needed to Optimize Self-Care Behaviors         Goal: Healthy Eating - follow a healthy eating pattern for diabetes         Task: Provide education on portion control and consistency in amount, composition and timing of food intake Completed 3/23/2024   Responsible User: Latasha Quiroz RD        Goal: Being Active - get regular physical activity,  working up to at least 150 minutes per week         Task: Develop physical activity plan with patient Completed 3/23/2024   Responsible User: Latasha Quiroz RD        Goal: Monitoring - monitor glucose and ketones as directed    This Visit's Progress: 100%   Recent Progress: 80%   Note:    Pt will monitor glucose 2 or more times/ day        Goal: Reducing Risks - know how to prevent and treat long-term diabetes complications         Task: Provide education on major complications of diabetes, prevention, early diagnostic measures and treatment of complications Completed 3/23/2024   Responsible User: Latasha Quiroz RD        Goal: Healthy Coping - use available resources to cope with the challenges of managing diabetes         Task: Provide education on when to seek professional counseling Completed 3/23/2024   Responsible User: Latasha Quiroz RD          CGM-specific education:   Freestyle Cynthia sensor: insertion technique, sensor site location and rotation, insulin administration in relation to sensor placement, sensor wear, reasons to remove sensor (MRI, CT, diathermy), Vitamin C & Aspirin effects on sensor, Cynthia [place]: frequency of scanning sensor, length of time data is visible, use of built in glucose meter, Precision X-tra test strips, Use of trends and graphs for pattern management and problem solving, and Dosing insulin based on sensor glucose results        Time Spent: 60 minutes  Encounter Type: Individual    Any diabetes medication dose changes were made via the CDE Protocol per the patient's referring provider. A copy of this encounter was shared with the provider.

## 2024-03-19 NOTE — PATIENT INSTRUCTIONS
Humalog use the chart above taking it 20 minutes BEFORE you eat each meal.      Lantus continue with 60u     Will potentially change from Humalog to Jardiance daily pill and Trulicity weekly injection

## 2024-03-19 NOTE — LETTER
3/19/2024         RE: Shannan Cameron  [address]  [place] [address]        Dear Colleague,    Thank you for referring your patient, Shannan Cameron, to the [facility]. Please see a copy of my visit note below.    Diabetes Self-Management Education & Support    Presents for: Follow-up Type II Diabetes, Obesity     Type of Service: In Person Visit      ASSESSMENT:  11/3/2020 - Initial dx of diabetes 3/2018 pt's diabetes has been under excellent control since 1/2019 with dietary intervention.  Pt had been following a strict keto/ vegan (a few days/week) and weight was down to 164.2# 1/22/19 and slowly had been incorporating more complex carbs and some fruit.  Weight gain 22# from 1/22/19 to 1/22/2020.  Then due to extra stress with pandemic and was caring for additional people in household pt has continued to gain weight.      A1c 5.8% 3/2020      12/13/2023  Due to life stressors, depression patient states she would let her diabetes slip.  Patient stopped taking metformin due to loose stools.  Patient has ongoing nausea.  Patient is being evaluated for elevated liver function tests, GI referral for colonoscopy.       Patient has not been able to fill prescriptions.  Through calling insurance and pharmacy clarified what the issue was and patient is now able to fill prescriptions through [facility] in [place].  Patient will be picking up blood glucose monitor and test strips.     Patient's A1c elevated at 9.1%.  Blood glucose today during office visit 267 mg/dL.  Discussed medication options.  For patient's least side effects opted for long-acting insulin at this time likely to be temporary.  Insulin recommendation discussed with PCP and agrees with plan.  Patient is willing to make some dietary changes but due to living situation patient does not have control over what food is purchased.     1/16/2024 since 12/13/2023 last visit patient has been diagnosed with malignant neoplasm of the  thyroid gland     Patient is currently taking 30 units of Lantus, 14-day glucose average 285 mg/dL with a range of 195 - 341 mg/dL     Will add rapid acting insulin today 3 times daily AC using insulin to carbohydrate ratio patient is provided with integrated diabetes services spreadsheet  Target 120  Correction factor 30  Insulin to carb ratio 1 unit for 5 g carbohydrate     Patient will follow-up in 1 month     2/13/2024 Patient will start meeting with specialists at [place] today and over the next few days and is quite nervous and excited to get answers.  Patient did not bring meter or logbook.  Glucose during office visit 259 mg/dL.  Patient has not been taking insulin as directed.  Patient complains of pain and burning sensation, reviewed injection technique.  Education on importance of excellent glycemic control if surgery is in the near future.       Lantus currently at 30 units will increase to 34 units and continue to increase by 1 unit every day until a.m. glucose is between 80 and 130 mg/dL.  Patient is to use Humalog integrated diabetes services dosing chart 3 times daily AC.  If patient is not eating patient is to still use dosing chart for correction 3 times daily AC for example this a.m. with hyperglycemia patient to take Humalog based on 0 g of carbohydrate if not consuming breakfast     Target 120  Correction factor 30  Insulin to carb ratio 1 unit for 5 g carbohydrate    3/19/2023 Pt did have L thyroidectomy lobectomy   Insulin needs increased  Lantus 60u daily   Humalog - pt has not been taking as directed, has been taking 60u at 7pm     14 day avg out of 43 readings 190mg/dL     Noted Freestyle Cynthia 3 order, pt states she has not started using it - pt went back home and picked up the freestyle cynthia 3 and came back to appt to have it started today during office visit.      Pt provided with detailed education again today on rapid acting insulin dosing to take it 20 min prior to each meal.  Target  120, Sensitivity factor 20, Carb to insulin ratio 4    Patient's most recent   Lab Results   Component Value Date    A1C 9.1 12/06/2023     is not meeting goal of <7.0    Diabetes knowledge and skills assessment:   Patient is knowledgeable in diabetes management concepts related to: education again today in all areas of diabetes management     Continue education with the following diabetes management concepts: Healthy Eating, Monitoring, Taking Medication, Problem Solving, and Reducing Risks    Based on learning assessment above, most appropriate setting for further diabetes education would be: Individual setting.      PLAN  Lantus continue with 60u   Rapid acting take 20 min prior to meals use dosing chart  Target 120  Sensitivity 20  Carb: Insulin Ratio 4    Started Freestyle Cynthia 3 in office     Potential to transition from rapid acting to weekly GLP-1/GIP injectable and SGLT2 inhibitor while continuing with Lantus and evaluate glucose control but will confirm with PCP prior to any changes    Topics to cover at upcoming visits: Healthy Eating, Being Active, Monitoring, Taking Medication, Problem Solving, Reducing Risks, and Healthy Coping    Follow-up: 1 month    See Care Plan for co-developed, patient-state behavior change goals.  AVS provided for patient today.    Education Materials Provided:  Goals for Your Diabetes Care, Carbohydrate Counting, Hypoglycemia Signs and Symptoms, and My Plate Planner      SUBJECTIVE/OBJECTIVE:  Presents for: Follow-up  Accompanied by: Self  Diabetes education in the past 24mo: Yes  Diabetes type: Type 2  How confident are you filling out medical forms by yourself:: Extremely  Transportation concerns: No  Difficulty affording diabetes medication?: No  Difficulty affording diabetes testing supplies?: No  Other concerns:: None  Cultural Influences/Ethnic Background:  Not  or       Diabetes Symptoms & Complications:  Diabetes Related Symptoms: Fatigue  Weight trend:  "Fluctuating  Complications assessed today?: Yes  Autonomic neuropathy: Other  CVA: No  Heart disease: No  Nephropathy: No  Peripheral neuropathy: No  Peripheral Vascular Disease: No  Retinopathy: No  Sexual dysfunction: Other    Patient Problem List and Family Medical History reviewed for relevant medical history, current medical status, and diabetes risk factors.    Vitals:  Ht 1.739 m (5' 8.47\")   Wt 103 kg (227 lb)   LMP 09/25/2019 (Within Days)   BMI 34.04 kg/m    Estimated body mass index is 34.04 kg/m  as calculated from the following:    Height as of this encounter: 1.739 m (5' 8.47\").    Weight as of this encounter: 103 kg (227 lb).   Last 3 BP:   BP Readings from Last 3 Encounters:   03/04/24 (!) 157/88   02/26/24 (!) 146/70   01/11/24 120/80       History   Smoking Status     Former     Packs/day: 0.50     Types: Cigarettes     Quit date: 11/1/2023   Smokeless Tobacco     Never       Labs:  Lab Results   Component Value Date    A1C 9.1 12/06/2023     Lab Results   Component Value Date     12/06/2023    GLC 76 07/14/2021     04/15/2008     Lab Results   Component Value Date    LDL  08/30/2023      Comment:      Cannot estimate LDL when triglyceride exceeds 400 mg/dL     08/30/2023     Direct Measure HDL   Date Value Ref Range Status   08/30/2023 33 (L) >=50 mg/dL Final   ]  GFR Estimate   Date Value Ref Range Status   12/06/2023 >90 >60 mL/min/1.73m2 Final   04/15/2008 >90 >60 mL/min/1.7m2 Final     GFR Estimate If Black   Date Value Ref Range Status   04/15/2008 >90 >60 mL/min/1.7m2 Final     Lab Results   Component Value Date    CR 0.52 12/06/2023    CR 0.68 04/15/2008     No results found for: \"MICROALBUMIN\"    Healthy Eating:  Healthy Eating Assessed Today: Yes  Cultural/Yarsanism diet restrictions?: No  Meal planning/habits: Avoiding sweets, Smaller portions, Keeps food records  Who cooks/prepares meals for you?: Self, Spouse  Who purchases food in  your home?: Self, Spouse  How " many times a week on average do you eat food made away from home (restaurant/take-out)?: 1  Meals include: Dinner, Afternoon Snack  Beverages: Water, Tea, Coffee, Milk  Has patient met with a dietitian in the past?: Yes    Being Active:  Barrier to exercise: Other    Monitoring:  Blood Glucose Meter: ContourNext  Times checking blood sugar at home (number): 4  Times checking blood sugar at home (per): Day  Blood glucose trend: No change    14-day average of 143 readings 190 mg/dL  Range 144 mg/dL -263 mg/dL    Taking Medications:  Diabetes Medication(s)       Insulin       insulin lispro (HUMALOG KWIKPEN) 100 UNIT/ML (1 unit dial) KWIKPEN Using insulin to carb ratio and taking up to 180 units daily divided between meals and larger snacks     insulin glargine (LANTUS PEN) 100 UNIT/ML pen At 30 units and increasing as directed up to max dose of 50 units            Current Treatments: Diet, Insulin Injections  Dose schedule: At bedtime  Given by: Patient  Injection/Infusion sites: Abdomen, Thighs    Problem Solving:  Patient carries a carbohydrate source: Yes    Hypoglycemia Symptoms  Hypoglycemia: Confusion, Dizziness/Lightheadedness, Sleepiness    Hypoglycemia Complications  Hypoglycemia Complications: None    Reducing Risks:  CAD Risks: Diabetes Mellitus, Sedentary lifestyle  Has dilated eye exam at least once a year?: Yes  Sees dentist every 6 months?: No  Feet checked by healthcare provider in the last year?: No    Healthy Coping:  Informal Support system:: Children, Family, Significant other  Patient Activation Measure Survey Score:      2/28/2024    10:47 AM   KIMANI Score (Last Two)   KIMANI Raw Score 29   Activation Score 52.9   KIMANI Level 2         Care Plan and Education Provided:  Care Plan: Diabetes   Updates made by Latasha Quiroz RD since 3/23/2024 12:00 AM        Problem: Diabetes Self-Management Education Needed to Optimize Self-Care Behaviors         Goal: Healthy Eating - follow a healthy eating pattern  for diabetes         Task: Provide education on portion control and consistency in amount, composition and timing of food intake Completed 3/23/2024   Responsible User: Latasha Quiroz RD        Goal: Being Active - get regular physical activity, working up to at least 150 minutes per week         Task: Develop physical activity plan with patient Completed 3/23/2024   Responsible User: Latasha Quiroz RD        Goal: Monitoring - monitor glucose and ketones as directed    This Visit's Progress: 100%   Recent Progress: 80%   Note:    Pt will monitor glucose 2 or more times/ day        Goal: Reducing Risks - know how to prevent and treat long-term diabetes complications         Task: Provide education on major complications of diabetes, prevention, early diagnostic measures and treatment of complications Completed 3/23/2024   Responsible User: Latasha Quiroz RD        Goal: Healthy Coping - use available resources to cope with the challenges of managing diabetes         Task: Provide education on when to seek professional counseling Completed 3/23/2024   Responsible User: Latasha Quiroz RD          CGM-specific education:   Freestyle Cynthia sensor: insertion technique, sensor site location and rotation, insulin administration in relation to sensor placement, sensor wear, reasons to remove sensor (MRI, CT, diathermy), Vitamin C & Aspirin effects on sensor, Cynthia [place]: frequency of scanning sensor, length of time data is visible, use of built in glucose meter, Precision X-tra test strips, Use of trends and graphs for pattern management and problem solving, and Dosing insulin based on sensor glucose results        Time Spent: 60 minutes  Encounter Type: Individual    Any diabetes medication dose changes were made via the CDE Protocol per the patient's referring provider. A copy of this encounter was shared with the provider.

## 2024-03-21 ENCOUNTER — NURSE TRIAGE (OUTPATIENT)
Dept: FAMILY MEDICINE | Facility: CLINIC | Age: 44
End: 2024-03-21
Payer: COMMERCIAL

## 2024-03-21 NOTE — TELEPHONE ENCOUNTER
S-(situation):   Patient called with concerns of Blood Sugars continuing to elevate.    B-(background):   Patient was in the process of changing her Lantus from PM to AM and forgot dose Wednesday am, so took at 3pm.    Patient has not taken Lantus today.  Patient has taken 20 unit(s) on lispro today.    A-(assessment):   Patient reports headache.  BS at 400     R-(recommendations):   Patient with anxiety and unsure what to do to monitor BS and to increase dosing.    Advised patient to go to ED for evaluation/treatment.    Patient verbalizes agreement with [facility].      [place], [place]  [phone]

## 2024-03-23 VITALS — BODY MASS INDEX: 34.4 KG/M2 | HEIGHT: 68 IN | WEIGHT: 227 LBS

## 2024-03-29 ENCOUNTER — TELEPHONE (OUTPATIENT)
Dept: FAMILY MEDICINE | Facility: CLINIC | Age: 44
End: 2024-03-29
Payer: COMMERCIAL

## 2024-03-29 DIAGNOSIS — E11.9 TYPE 2 DIABETES MELLITUS WITHOUT COMPLICATION, WITHOUT LONG-TERM CURRENT USE OF INSULIN (H): Primary | ICD-10-CM

## 2024-03-29 NOTE — TELEPHONE ENCOUNTER
Patient called Friday, 3/29 requesting that Jardiance and Trulicity be sent in. She said she has been waiting and thought that Keiko was going to talk to Dr. Ahn about switching to these medications. RN does not see where these have been sent in, but see that it was discussed to potentially switch from Humalog.   Routing to Keiko Quiroz to advise on this switch. RN advised that it is end of day Friday and will route, and get back to her as soon as possible on Monday.     From Visit note on 3/19/24 with Keiko Quiroz

## 2024-04-01 NOTE — TELEPHONE ENCOUNTER
Keiko,  Let me know if your plan was to do the switch with starting both at the lowest dose at the same time.  Happy to send them in once I receive your recommendation.  Agnieszka Ahn MD

## 2024-04-07 DIAGNOSIS — E11.9 TYPE 2 DIABETES MELLITUS WITHOUT COMPLICATION, WITHOUT LONG-TERM CURRENT USE OF INSULIN (H): ICD-10-CM

## 2024-04-08 RX ORDER — INSULIN GLARGINE 100 [IU]/ML
INJECTION, SOLUTION SUBCUTANEOUS
Qty: 135 ML | Refills: 1 | Status: SHIPPED | OUTPATIENT
Start: 2024-04-08 | End: 2024-04-24

## 2024-04-11 ENCOUNTER — TELEPHONE (OUTPATIENT)
Dept: FAMILY MEDICINE | Facility: CLINIC | Age: 44
End: 2024-04-11
Payer: COMMERCIAL

## 2024-04-11 NOTE — TELEPHONE ENCOUNTER
New Medication Request    Contacts         Type Contact Phone/Fax    04/11/2024 04:29 PM CDT Phone (Incoming) Kayla Cameron (Self) [phone] (M)            What medication are you requesting?: pt checking in on medications discussed with juan and dr river    Reason for medication request: per doctor order    Have you taken this medication before?: No    Controlled Substance Agreement on file:   CSA -- Patient Level:    CSA: None found at the patient level.         Patient offered an appointment? No    Preferred Pharmacy:       [id number] [address] AT [facility] [address]  [address] [facility] [address]  Phone: [phone] Fax: [phone]          Could we send this information to you in [facility] or would you prefer to receive a phone call?:   Patient would prefer a phone call   Okay to leave a detailed message?: Yes at Home number on file [phone] (home)

## 2024-04-12 ENCOUNTER — MYC MEDICAL ADVICE (OUTPATIENT)
Dept: FAMILY MEDICINE | Facility: CLINIC | Age: 44
End: 2024-04-12

## 2024-04-12 ENCOUNTER — TELEPHONE (OUTPATIENT)
Dept: FAMILY MEDICINE | Facility: CLINIC | Age: 44
End: 2024-04-12

## 2024-04-12 ENCOUNTER — OFFICE VISIT (OUTPATIENT)
Dept: FAMILY MEDICINE | Facility: CLINIC | Age: 44
End: 2024-04-12
Payer: COMMERCIAL

## 2024-04-12 VITALS
WEIGHT: 233.9 LBS | TEMPERATURE: 97.7 F | HEART RATE: 84 BPM | DIASTOLIC BLOOD PRESSURE: 90 MMHG | BODY MASS INDEX: 34.64 KG/M2 | OXYGEN SATURATION: 96 % | HEIGHT: 69 IN | RESPIRATION RATE: 14 BRPM | SYSTOLIC BLOOD PRESSURE: 147 MMHG

## 2024-04-12 DIAGNOSIS — R60.1 GENERALIZED EDEMA: ICD-10-CM

## 2024-04-12 DIAGNOSIS — Z79.84 DIABETES MELLITUS TREATED WITH ORAL MEDICATION (H): ICD-10-CM

## 2024-04-12 DIAGNOSIS — R60.1 GENERALIZED EDEMA: Primary | ICD-10-CM

## 2024-04-12 DIAGNOSIS — E11.9 DIABETES MELLITUS TREATED WITH ORAL MEDICATION (H): ICD-10-CM

## 2024-04-12 PROCEDURE — 99214 OFFICE O/P EST MOD 30 MIN: CPT

## 2024-04-12 RX ORDER — HYDROCHLOROTHIAZIDE 12.5 MG/1
12.5 TABLET ORAL DAILY
Qty: 30 TABLET | Refills: 0 | Status: SHIPPED | OUTPATIENT
Start: 2024-04-12 | End: 2024-04-22

## 2024-04-12 RX ORDER — HYDROCHLOROTHIAZIDE 12.5 MG/1
12.5 TABLET ORAL DAILY
Qty: 90 TABLET | OUTPATIENT
Start: 2024-04-12

## 2024-04-12 ASSESSMENT — PATIENT HEALTH QUESTIONNAIRE - PHQ9: SUM OF ALL RESPONSES TO PHQ QUESTIONS 1-9: 24

## 2024-04-12 NOTE — TELEPHONE ENCOUNTER
Patient is checking in on the medications that she thought were going to be ordered by Dr. Ahn. Message was routed to Keiko Quiroz on 4/1/24, by Dr. Ahn. Routing to provider to advise on these medications.

## 2024-04-12 NOTE — TELEPHONE ENCOUNTER
S-(situation):   Patient calling with multiple symptoms/concerns    B-(background):   Nursing friend advised to be see (today)    A-(assessment):   Puffy  Agitated  Concerned with Thyroid levels    R-(recommendations):   Appointment booked today at 10:40 pm    Patient instructed to call back if symptoms change or if new symptoms present. Patient verbalizes agreement with plan.      [place], [place]  [phone]

## 2024-04-12 NOTE — PROGRESS NOTES
While in lab after visit patient Developed chest pain, rapid heart rate near syncope and feeling of palpitations while in lab and is advised to go to the emergency department.  Patient declines ambulance and has a  here that can take her by private car.  Patient plans to head directly to her [facility].  Patient is taken via wheelchair and helped into private vehicle.  Report called to [facility] emergency department    Assessment & Plan     Generalized edema  Patient in clinic with complaints of generalized edema, she notes that most in her hands and right leg, also feels that she has mild puffiness to her face and around eyes.  She reports this has been coming on slowly.  She also reports fatigue.  She wonders if this has anything to do with her thyroid.  She did have thyroid cancer removed in February 2024.  She is also undergoing evaluation for lung nodules that have been changing and increasing over the past few months.  She is also been told that she has cysts in her liver that are also currently being evaluated.  Will check thyroid today as well as labs to check electrolyte balance, kidney function, and liver function.  Will start patient on a low-dose of hydrochlorothiazide to see if we can diurese some of this fluid and his pressure is slightly elevated today.  Patient scheduled for return follow-up visit in 6 days.  - TSH with free T4 reflex; Future  - CBC with platelets; Future  - Comprehensive metabolic panel (BMP + Alb, Alk Phos, ALT, AST, Total. Bili, TP); Future  - CRP, inflammation; Future  - hydroCHLOROthiazide 12.5 MG tablet; Take 1 tablet (12.5 mg) by mouth daily  - T3, Free; Future    Diabetes mellitus treated with oral medication (H)  Patient reports blood glucose has been fairly controlled.  Hemoglobin A1c 1 month ago was 9.5.  Patient is taking insulin daily.  She is also working with diabetes education.  There is plan between diabetes education and her primary  "care provider to switch her from insulin to Trulicity and Jardiance per her report.  Message sent to her primary care provider to get an update on this.    Has appt to see Keiko (diabetes education) on 4/24, appt with Endo on 5/21            Jessica Garza is a 44 year old, presenting for the following health issues:  Thyroid Problem (Thyroid levels checked)      4/12/2024    10:57 AM   Additional Questions   Roomed by QUAN Rodriguez     Had cancerous nodule of thyroid removed in Feb, now undergoing evaluation for lung nodules through oncology. Also has some liver cysts new in Feb from Jan.    Patient is having swelling in hands and right leg. Patient also feels very agitated and that eyes and face are puffy.     Has continuous blood glucose monitor, patient BG is 145 if she doesn't eat. She reports anytime she eats and occasionally upon waking her blood glucose spikes to above 300. She also has a lot of nausea.                        Objective    BP (!) 147/90 (BP Location: Right arm, Patient Position: Sitting, Cuff Size: Adult Large)   Pulse 84   Temp 97.7  F (36.5  C) (Oral)   Resp 14   Ht 1.74 m (5' 8.5\")   Wt 106.1 kg (233 lb 14.4 oz)   LMP 09/25/2019 (Within Days)   SpO2 96%   BMI 35.05 kg/m    Body mass index is 35.05 kg/m .  Physical Exam  HENT:      Head: Normocephalic.      Mouth/Throat:      Mouth: Mucous membranes are moist.      Pharynx: Oropharynx is clear.   Eyes:      Extraocular Movements: Extraocular movements intact.      Conjunctiva/sclera: Conjunctivae normal.   Cardiovascular:      Rate and Rhythm: Normal rate and regular rhythm.   Pulmonary:      Effort: Pulmonary effort is normal.      Breath sounds: Normal breath sounds. No wheezing, rhonchi or rales.   Musculoskeletal:      Cervical back: Normal range of motion.      Right lower leg: Edema present.      Left lower leg: Edema present.      Comments: Nonpitting mild edema to lower extremities.  Right may be slightly more apparent than " left but both very mild and pedal pulses brisk and equal bilaterally.  No areas of calf or thigh tenderness.    Mild edema to hands, CMS intact.  Radial pulses equal and brisk.   Skin:     General: Skin is warm and dry.      Capillary Refill: Capillary refill takes less than 2 seconds.   Neurological:      Mental Status: She is alert and oriented to person, place, and time.   Psychiatric:         Behavior: Behavior normal.         Thought Content: Thought content normal.                    Signed Electronically by: JAMIR Valente CNP

## 2024-04-12 NOTE — TELEPHONE ENCOUNTER
Please let patient know that Keiko Quiroz and I will discuss next week and one of us will get back to her.

## 2024-04-17 ENCOUNTER — TELEPHONE (OUTPATIENT)
Dept: FAMILY MEDICINE | Facility: CLINIC | Age: 44
End: 2024-04-17

## 2024-04-17 RX ORDER — DULAGLUTIDE 0.75 MG/.5ML
0.75 INJECTION, SOLUTION SUBCUTANEOUS
Qty: 2 ML | Refills: 0 | Status: SHIPPED | OUTPATIENT
Start: 2024-04-17 | End: 2024-05-15

## 2024-04-17 RX ORDER — DULAGLUTIDE 1.5 MG/.5ML
1.5 INJECTION, SOLUTION SUBCUTANEOUS
Qty: 2 ML | Refills: 1 | Status: SHIPPED | OUTPATIENT
Start: 2024-05-15 | End: 2024-06-24

## 2024-04-17 NOTE — TELEPHONE ENCOUNTER
Left voicemail for Shannan that we are needing to either change her appointment tomorrow with Missy Carrizales to virtual or reschedule to a different day/time.

## 2024-04-22 ENCOUNTER — TRANSCRIBE ORDERS (OUTPATIENT)
Dept: ONCOLOGY | Facility: CLINIC | Age: 44
End: 2024-04-22

## 2024-04-22 ENCOUNTER — OFFICE VISIT (OUTPATIENT)
Dept: FAMILY MEDICINE | Facility: CLINIC | Age: 44
End: 2024-04-22
Payer: COMMERCIAL

## 2024-04-22 ENCOUNTER — PATIENT OUTREACH (OUTPATIENT)
Dept: ONCOLOGY | Facility: CLINIC | Age: 44
End: 2024-04-22

## 2024-04-22 VITALS
DIASTOLIC BLOOD PRESSURE: 90 MMHG | RESPIRATION RATE: 16 BRPM | WEIGHT: 233.5 LBS | SYSTOLIC BLOOD PRESSURE: 136 MMHG | OXYGEN SATURATION: 94 % | HEIGHT: 69 IN | TEMPERATURE: 97.5 F | BODY MASS INDEX: 34.58 KG/M2 | HEART RATE: 96 BPM

## 2024-04-22 DIAGNOSIS — R60.1 GENERALIZED EDEMA: ICD-10-CM

## 2024-04-22 DIAGNOSIS — I10 BENIGN ESSENTIAL HYPERTENSION: ICD-10-CM

## 2024-04-22 DIAGNOSIS — E11.65 TYPE 2 DIABETES MELLITUS WITH HYPERGLYCEMIA, WITH LONG-TERM CURRENT USE OF INSULIN (H): Primary | ICD-10-CM

## 2024-04-22 DIAGNOSIS — G43.109 MIGRAINE WITH AURA AND WITHOUT STATUS MIGRAINOSUS, NOT INTRACTABLE: ICD-10-CM

## 2024-04-22 DIAGNOSIS — Z79.4 TYPE 2 DIABETES MELLITUS WITH HYPERGLYCEMIA, WITH LONG-TERM CURRENT USE OF INSULIN (H): Primary | ICD-10-CM

## 2024-04-22 DIAGNOSIS — Q89.2 THYROGLOSSAL DUCT CYST: ICD-10-CM

## 2024-04-22 DIAGNOSIS — C73 MALIGNANT NEOPLASM OF THYROID GLAND (H): ICD-10-CM

## 2024-04-22 DIAGNOSIS — E89.0 S/P PARTIAL THYROIDECTOMY: Primary | ICD-10-CM

## 2024-04-22 DIAGNOSIS — E89.0 S/P PARTIAL THYROIDECTOMY: ICD-10-CM

## 2024-04-22 LAB
ALBUMIN SERPL BCG-MCNC: 4.7 G/DL (ref 3.5–5.2)
ALP SERPL-CCNC: 121 U/L (ref 40–150)
ALT SERPL W P-5'-P-CCNC: 105 U/L (ref 0–50)
ANION GAP SERPL CALCULATED.3IONS-SCNC: 14 MMOL/L (ref 7–15)
AST SERPL W P-5'-P-CCNC: 96 U/L (ref 0–45)
BILIRUB SERPL-MCNC: 0.5 MG/DL
BUN SERPL-MCNC: 9 MG/DL (ref 6–20)
CALCIUM SERPL-MCNC: 10.1 MG/DL (ref 8.6–10)
CHLORIDE SERPL-SCNC: 101 MMOL/L (ref 98–107)
CHOLEST SERPL-MCNC: 185 MG/DL
CREAT SERPL-MCNC: 0.49 MG/DL (ref 0.51–0.95)
DEPRECATED HCO3 PLAS-SCNC: 25 MMOL/L (ref 22–29)
EGFRCR SERPLBLD CKD-EPI 2021: >90 ML/MIN/1.73M2
FASTING STATUS PATIENT QL REPORTED: YES
GLUCOSE SERPL-MCNC: 159 MG/DL (ref 70–99)
HBA1C MFR BLD: 8.8 % (ref 0–5.6)
HDLC SERPL-MCNC: 39 MG/DL
LDLC SERPL CALC-MCNC: ABNORMAL MG/DL
LDLC SERPL DIRECT ASSAY-MCNC: 97 MG/DL
NONHDLC SERPL-MCNC: 146 MG/DL
POTASSIUM SERPL-SCNC: 3.8 MMOL/L (ref 3.4–5.3)
PROT SERPL-MCNC: 8 G/DL (ref 6.4–8.3)
SODIUM SERPL-SCNC: 140 MMOL/L (ref 135–145)
T3FREE SERPL-MCNC: 4 PG/ML (ref 2–4.4)
T4 FREE SERPL-MCNC: 0.92 NG/DL (ref 0.9–1.7)
TRIGL SERPL-MCNC: 429 MG/DL
TSH SERPL DL<=0.005 MIU/L-ACNC: 5.23 UIU/ML (ref 0.3–4.2)

## 2024-04-22 PROCEDURE — 83721 ASSAY OF BLOOD LIPOPROTEIN: CPT | Mod: 59

## 2024-04-22 PROCEDURE — 36415 COLL VENOUS BLD VENIPUNCTURE: CPT

## 2024-04-22 PROCEDURE — 80061 LIPID PANEL: CPT

## 2024-04-22 PROCEDURE — 84439 ASSAY OF FREE THYROXINE: CPT

## 2024-04-22 PROCEDURE — 84481 FREE ASSAY (FT-3): CPT

## 2024-04-22 PROCEDURE — 83036 HEMOGLOBIN GLYCOSYLATED A1C: CPT

## 2024-04-22 PROCEDURE — 80053 COMPREHEN METABOLIC PANEL: CPT

## 2024-04-22 PROCEDURE — 84443 ASSAY THYROID STIM HORMONE: CPT

## 2024-04-22 PROCEDURE — 99214 OFFICE O/P EST MOD 30 MIN: CPT

## 2024-04-22 RX ORDER — RIZATRIPTAN BENZOATE 10 MG/1
10 TABLET ORAL
Qty: 10 TABLET | Refills: 1 | Status: SHIPPED | OUTPATIENT
Start: 2024-04-22

## 2024-04-22 RX ORDER — BLOOD-GLUCOSE SENSOR
1 EACH MISCELLANEOUS
Qty: 6 EACH | Refills: 5 | Status: SHIPPED | OUTPATIENT
Start: 2024-04-22

## 2024-04-22 RX ORDER — ONDANSETRON 8 MG/1
8 TABLET, FILM COATED ORAL EVERY 8 HOURS PRN
Qty: 12 TABLET | Refills: 1 | Status: SHIPPED | OUTPATIENT
Start: 2024-04-22

## 2024-04-22 RX ORDER — AMOXICILLIN 500 MG/1
500 CAPSULE ORAL 3 TIMES DAILY
COMMUNITY
Start: 2024-04-17 | End: 2024-06-24

## 2024-04-22 RX ORDER — HYDROCHLOROTHIAZIDE 12.5 MG/1
12.5 TABLET ORAL DAILY
Qty: 30 TABLET | Refills: 5 | Status: SHIPPED | OUTPATIENT
Start: 2024-04-22

## 2024-04-22 NOTE — PROGRESS NOTES
I rec'd a referral for pt to be seen by medical oncology.  Since oncology does not routinely see thyroid cancers (normally managed by ENT for surgery and then endocrinology), or unless metastatic, I will reach out to one of our medical oncologists for guidance.    In review of chart I noted:     2/16/2024:  Biopsy   [facility]  FINAL DIAGNOSIS     A.  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 Extrathyroidal extension is   absent.  All surgical resection margins are negative for   tumor. See synoptic report.      2.  02/22/2024  Katia Wahl M.D  Division of Endocrine and Metabolic Surgery    [address] [place] [id number]    I left a message with her pathology report which confirms an encapsulated follicular variant of papillary thyroid carcinoma which was minimally invasive. I informed her that this generally behaves benign and she should do very well.  Electronically signed by Katia Wahl M.D. at 02/22/2024 4:15 PM CST    3.  Patient was seen by PCP today:  Missy Carrizales APRN CNPRvfl //[facility]  Referring pt for:  thyroid cancer hx with lingual thyroid  Thyroglossal duct cyst  Patient has cysts in posterior tongue and she feels at thyrohyoid.  She is concerned that these may be metastasis after her recent thyroid cancer with partial thyroidectomy.  She has not previously seen oncology as a requesting oncology consult.

## 2024-04-22 NOTE — PROGRESS NOTES
"I rec'd a message back from Dr. Diego and he said that for now patient should be following up with Endocrinology.    I called the patient and spoke to her at length. I explained that I had one of our medical oncologists review her chart to be sure I am not missing anything but the doctor agreed that at this point there is no reason to meet with oncology, but she should follow up with endocrinology.    She went on to explain how she has \"golf-ball\" size spot on her neck and has had as large as a softball size,  She has \"pillars\" coming off the back of her tongue.  She is part of an on-line support group of people with similar cancer diagnosis and she is sure she has metastatics thyroid cancer. I explained I do not have any recent imaging that shows this and verified with her that I have everything.  I explained that she could see ENT to see if they can determine why she has these issues with her neck and back of tongue as perhaps she has an infection or something unrelated to her thyroid issue. I did finally offer to have her meet with Dr. Diego to go over her health history and make recommendations but she said after speaking with me that probably would not be beneficial.    I did say if ENT would feel she needs to see oncology they would place an order as would Endocrinology.  She sees endocrinology in may at the [facility].  I have placed an ENT consult per her request.    I will let her referring physician know what has been decided and cancel the oncology referral.    Of note patient has done a lot of research on line as well.  "

## 2024-04-22 NOTE — PROGRESS NOTES
Assessment & Plan     Type 2 diabetes mellitus with hyperglycemia, with long-term current use of insulin (H)  Patient with type 2 diabetes she will be stopping her short acting insulin and starting Jardiance and Trulicity today.  She is working with diabetes education and her primary care provider regarding this.  Will do comprehensive metabolic panel today as previously ordered as well as hemoglobin A1c recheck.  Patient needs new sensors for her continuous blood glucose monitoring, and these were ordered today.  Patient encouraged to continue working with diabetes education and we discussed side effects of Jardiance and Trulicity.  - Continuous Blood Gluc Sensor (FREESTYLE LAURA 3 SENSOR) MISC; 1 each every 14 days Use 1 sensor every 14 days. Use to read blood sugars per 's instructions.  - Comprehensive metabolic panel (BMP + Alb, Alk Phos, ALT, AST, Total. Bili, TP)  - Lipid panel reflex to direct LDL Fasting    Generalized edema  Patient was seen recently in clinic for increasing edema in face and hands and legs.  At that time she also experienced some chest pain and near syncope while in lab after her visit.  She was then sent to the emergency department where she had EKG, chest x-ray, lab workup and everything was within normal limits.  Since that time she has started the hydrochlorothiazide as advised in the visit and edema has decreased, no longer present in the face, and much more mild in face and legs.  Patient instructed to continue taking this medication and may be titrated up after she adjusts to Jardiance and Trulicity.  - Basic metabolic panel  (Ca, Cl, CO2, Creat, Gluc, K, Na, BUN); Future  - hydroCHLOROthiazide 12.5 MG tablet; Take 1 tablet (12.5 mg) by mouth daily    Thyroglossal duct cyst  Patient has cysts/polyps on posterior tongue and she feels at thyrohyoid.  Patient has seen ENT and dentist for this previously per her report.  She is concerned that these may be metastasis after  her recent thyroid cancer with partial thyroidectomy.  She has not previously seen oncology as a requesting oncology consult.  - Adult Oncology/Hematology  Referral; Future    S/P partial thyroidectomy for Malignant neoplasm of thyroid gland (H)  Recheck thyroid and T3-T4 today.  - TSH  - T3, Free  - T4, free    Migraine with aura and without status migrainosus, not intractable  Have been well-controlled and patient requests refill of rizatriptan.  She is also been having nausea that does not seem to be associated with migraines but I think is more closely related to the fact that she is not eating regularly.  She is encouraged to eat regular small meals to see if this helps her nausea and Zofran refilled.  - ondansetron (ZOFRAN) 8 MG tablet; Take 1 tablet (8 mg) by mouth every 8 hours as needed for nausea  - rizatriptan (MAXALT) 10 MG tablet; Take 1 tablet (10 mg) by mouth at onset of headache for migraine May repeat in 2 hours. Max 3 tablets/24 hours.    Benign essential hypertension  Seems to be moderately well-controlled with current losartan and hydrochlorothiazide.  Will continue to monitor with addition of Jardiance and Trulicity and if still continues to be borderline high would consider increasing the hydrochlorothiazide.  - Comprehensive metabolic panel (BMP + Alb, Alk Phos, ALT, AST, Total. Bili, TP)  - Lipid panel reflex to direct LDL Fasting                  Subjective   Kayla is a 44 year old, presenting for the following health issues:  RECHECK (Follow up swelling /Labs)        4/22/2024     9:43 AM   Additional Questions   Roomed by QUAN Rodriguez     Needs to schedule follow up with Dr. Og    Has endocrinology follow-up    History of Present Illness       Diabetes:   She presents for follow up of diabetes.   She is checking home blood glucose with a continuous glucose monitor.   She checks blood glucose before meals, after meals, before and after meals and at bedtime.  Blood glucose is  "sometimes over 200 and sometimes under 70. She is aware of hypoglycemia symptoms including weakness.   She is concerned about blood sugar frequently over 200.   She is having blurry vision and weight gain.            Hyperlipidemia:  She presents for follow up of hyperlipidemia.   She is taking medication to lower cholesterol. She is having myalgia or other side effects to statin medications.    Hypertension: She presents for follow up of hypertension.  She does not check blood pressure  regularly outside of the clinic. Outside blood pressures have been over 140/90. She follows a low salt diet.     Reason for visit:  Check up and fasting labs and full panel labs    She eats 2-3 servings of fruits and vegetables daily.She consumes 0 sweetened beverage(s) daily.She exercises with enough effort to increase her heart rate 20 to 29 minutes per day.  She exercises with enough effort to increase her heart rate 3 or less days per week.   She is taking medications regularly.                     Objective    BP (!) 135/90 (BP Location: Right arm, Patient Position: Sitting, Cuff Size: Adult Large)   Pulse 96   Temp 97.5  F (36.4  C) (Oral)   Resp 16   Ht 1.74 m (5' 8.5\")   Wt 105.9 kg (233 lb 8 oz)   LMP 2019 (Within Days)   SpO2 94%   BMI 34.99 kg/m    Body mass index is 34.99 kg/m .  Physical Exam  HENT:      Mouth/Throat:      Mouth: Mucous membranes are moist. No lacerations or angioedema.      Pharynx: Uvula midline. Pharyngeal swelling present.      Tonsils: No tonsillar abscesses. 1+ on the right. 1+ on the left.      Comments: Psoterior polyps to tongue  Cardiovascular:      Comments: Mild edema to hands, 1+ non pitting  Musculoskeletal:      Right lower le+ Pitting Edema present.      Left lower le+ Pitting Edema present.        GENERAL: alert and no distress  EYES: Eyes grossly normal to inspection, PERRL and conjunctivae and sclerae normal  RESP: lungs clear to auscultation - no rales, rhonchi " or wheezes  CV: regular rate and rhythm, normal S1 S2, no S3 or S4, no murmur, click or rub, no peripheral edema  SKIN: no suspicious lesions or rashes  NEURO: Normal strength and tone, mentation intact and speech normal  PSYCH: mentation appears normal, affect normal/bright            Signed Electronically by: JAMIR Valente CNP

## 2024-04-24 ENCOUNTER — ALLIED HEALTH/NURSE VISIT (OUTPATIENT)
Dept: EDUCATION SERVICES | Facility: CLINIC | Age: 44
End: 2024-04-24
Payer: COMMERCIAL

## 2024-04-24 VITALS — SYSTOLIC BLOOD PRESSURE: 133 MMHG | BODY MASS INDEX: 35.32 KG/M2 | WEIGHT: 235.7 LBS | DIASTOLIC BLOOD PRESSURE: 75 MMHG

## 2024-04-24 DIAGNOSIS — E11.9 TYPE 2 DIABETES MELLITUS WITHOUT COMPLICATION, WITHOUT LONG-TERM CURRENT USE OF INSULIN (H): Primary | ICD-10-CM

## 2024-04-24 PROCEDURE — G0108 DIAB MANAGE TRN  PER INDIV: HCPCS | Performed by: DIETITIAN, REGISTERED

## 2024-04-24 RX ORDER — DULAGLUTIDE 4.5 MG/.5ML
4.5 INJECTION, SOLUTION SUBCUTANEOUS
Qty: 2 ML | Refills: 6 | Status: SHIPPED | OUTPATIENT
Start: 2024-07-11 | End: 2024-07-08 | Stop reason: ALTCHOICE

## 2024-04-24 RX ORDER — DULAGLUTIDE 3 MG/.5ML
3 INJECTION, SOLUTION SUBCUTANEOUS
Qty: 2 ML | Refills: 0 | Status: SHIPPED | OUTPATIENT
Start: 2024-06-11 | End: 2024-07-08 | Stop reason: ALTCHOICE

## 2024-04-24 RX ORDER — INSULIN GLARGINE 100 [IU]/ML
INJECTION, SOLUTION SUBCUTANEOUS
Qty: 225 ML | Refills: 1 | Status: SHIPPED | OUTPATIENT
Start: 2024-04-24

## 2024-04-24 NOTE — LETTER
4/24/2024         RE: Shannan Cameron  [address]  [facility] [address]        Dear Colleague,    Thank you for referring your patient, Shannan Cameron, to the [facility]. Please see a copy of my visit note below.    Diabetes Self-Management Education & Support    Presents for: Follow-up for type 2 diabetes, obesity Body mass index is 35.32 kg/m .     Type of Service: In Person Visit      ASSESSMENT:  11/3/2020 - Initial dx of diabetes 3/2018 pt's diabetes has been under excellent control since 1/2019 with dietary intervention.  Pt had been following a strict keto/ vegan (a few days/week) and weight was down to 164.2# 1/22/19 and slowly had been incorporating more complex carbs and some fruit.  Weight gain 22# from 1/22/19 to 1/22/2020.  Then due to extra stress with pandemic and was caring for additional people in household pt has continued to gain weight.      A1c 5.8% 3/2020      12/13/2023  Due to life stressors, depression patient states she would let her diabetes slip.  Patient stopped taking metformin due to loose stools.  Patient has ongoing nausea.  Patient is being evaluated for elevated liver function tests, GI referral for colonoscopy.       Patient has not been able to fill prescriptions.  Through calling insurance and pharmacy clarified what the issue was and patient is now able to fill prescriptions through [facility] in [place].  Patient will be picking up blood glucose monitor and test strips.     Patient's A1c elevated at 9.1%.  Blood glucose today during office visit 267 mg/dL.  Discussed medication options.  For patient's least side effects opted for long-acting insulin at this time likely to be temporary.  Insulin recommendation discussed with PCP and agrees with plan.  Patient is willing to make some dietary changes but due to living situation patient does not have control over what food is purchased.     1/16/2024 since 12/13/2023 last visit patient has been  diagnosed with malignant neoplasm of the thyroid gland     Patient is currently taking 30 units of Lantus, 14-day glucose average 285 mg/dL with a range of 195 - 341 mg/dL     Will add rapid acting insulin today 3 times daily AC using insulin to carbohydrate ratio patient is provided with integrated diabetes services spreadsheet  Target 120  Correction factor 30  Insulin to carb ratio 1 unit for 5 g carbohydrate     Patient will follow-up in 1 month     2/13/2024 Patient will start meeting with specialists at [place] today and over the next few days and is quite nervous and excited to get answers.  Patient did not bring meter or logbook.  Glucose during office visit 259 mg/dL.  Patient has not been taking insulin as directed.  Patient complains of pain and burning sensation, reviewed injection technique.  Education on importance of excellent glycemic control if surgery is in the near future.       Lantus currently at 30 units will increase to 34 units and continue to increase by 1 unit every day until a.m. glucose is between 80 and 130 mg/dL.  Patient is to use Humalog integrated diabetes services dosing chart 3 times daily AC.  If patient is not eating patient is to still use dosing chart for correction 3 times daily AC for example this a.m. with hyperglycemia patient to take Humalog based on 0 g of carbohydrate if not consuming breakfast     Target 120  Correction factor 30  Insulin to carb ratio 1 unit for 5 g carbohydrate     3/19/2023 Pt did have L thyroidectomy lobectomy   Insulin needs increased  Lantus 60u daily   Humalog - pt has not been taking as directed, has been taking 60u at 7pm      14 day avg out of 43 readings 190mg/dL      Noted Freestyle Cynthia 3 order, pt states she has not started using it - pt went back home and picked up the freestyle cynthia 3 and came back to appt to have it started today during office visit.       Pt provided with detailed education again today on rapid acting insulin dosing to  take it 20 min prior to each meal.  Target 120, Sensitivity factor 20, Carb to insulin ratio 4    4/24/2024 Patient is now utilizing freestyle rayne 3.  Diabetes continues to be uncontrolled with A1c 8.8%.  Patient has just started taking Trulicity and Jardiance over the past 2 days.  Patient continues with Lantus 60 units daily will be splitting Lantus and increasing dose to 33 units twice daily.  Patient has stopped rapid acting insulin.  Historically patient had been on metformin but 12/2023 patient stopped taking metformin due to loose stools, patient is willing to try to restart metformin and evaluate for any side effects.  Patient believes that her loose stools were not likely due to metformin.  Patient reports will be seeing ENT and Endo    Patient's most recent   Lab Results   Component Value Date    A1C 8.8 04/22/2024     is not meeting goal of <7.0    Diabetes knowledge and skills assessment:   Patient is knowledgeable in diabetes management concepts related to: Monitoring and Taking Medication    Continue education with the following diabetes management concepts: Healthy Eating, Being Active, Monitoring, Taking Medication, Problem Solving, Reducing Risks, and Healthy Coping    Based on learning assessment above, most appropriate setting for further diabetes education would be: Individual setting.      PLAN  Trulicity  0.75 mg weekly x 4 weeks - current dose  1.50 mg weekly x 4 weeks   3.00 mg weekly x 4 weeks   4.50 mg weekly - goal dose gave refills     Jardiance 10 mg daily     Trial to restart metformin from supply at home increasing slowly starting with one tablet with evening meal and increasing by 1 tablet weekly until taking 2 tablets twice a day as patient had been taking previously    Lantus split dosing taking 33 am and 33 pm and if readings continue to be over 180's increase by 1unit each time     Topics to cover at upcoming visits: Healthy Eating, Monitoring, and Taking  "Medication    Follow-up: 9 weeks    See Care Plan for co-developed, patient-state behavior change goals.  AVS provided for patient today.    Education Materials Provided:  No new materials provided today      SUBJECTIVE/OBJECTIVE:  Diabetes education in the past 24mo: Yes  Diabetes type: Type 2  Disease course: Worsening  How confident are you filling out medical forms by yourself:: Quite a bit  Diabetes management related comments/concerns: how am i supposed to get my BG stabalized, when it is not due to sickness and its stress on my body?  Cultural Influences/Ethnic Background:  Not  or     Diabetes Symptoms & Complications:  Diabetes Related Symptoms: Fatigue, Polydipsia (increased thirst), Visual change  Weight trend: Increasing  Symptom course: Worsening  Disease course: Worsening       Patient Problem List and Family Medical History reviewed for relevant medical history, current medical status, and diabetes risk factors.    Vitals:  /75   Wt 106.9 kg (235 lb 11.2 oz)   LMP 09/25/2019 (Within Days)   BMI 35.32 kg/m    Estimated body mass index is 35.32 kg/m  as calculated from the following:    Height as of 4/22/24: 1.74 m (5' 8.5\").    Weight as of this encounter: 106.9 kg (235 lb 11.2 oz).   Last 3 BP:   BP Readings from Last 3 Encounters:   04/24/24 133/75   04/22/24 (!) 136/90   04/12/24 (!) 147/90       History   Smoking Status     Former     Packs/day: 0.50     Types: Cigarettes     Quit date: 11/1/2023   Smokeless Tobacco     Never       Labs:  Lab Results   Component Value Date    A1C 8.8 04/22/2024     Lab Results   Component Value Date     04/22/2024    GLC 76 07/14/2021     04/15/2008     Lab Results   Component Value Date    LDL  04/22/2024      Comment:      Cannot estimate LDL when triglyceride exceeds 400 mg/dL    LDL 97 04/22/2024     Direct Measure HDL   Date Value Ref Range Status   04/22/2024 39 (L) >=50 mg/dL Final   ]  GFR Estimate   Date Value Ref Range " "Status   04/22/2024 >90 >60 mL/min/1.73m2 Final   04/15/2008 >90 >60 mL/min/1.7m2 Final     GFR Estimate If Black   Date Value Ref Range Status   04/15/2008 >90 >60 mL/min/1.7m2 Final     Lab Results   Component Value Date    CR 0.49 04/22/2024    CR 0.68 04/15/2008     No results found for: \"MICROALBUMIN\"    Healthy Eating:  Healthy Eating Assessed Today: Yes  Cultural/Sikhism diet restrictions?: No  How many times a week on average do you eat food made away from home (restaurant/take-out)?: 1  Meals include: Dinner  Breakfast: skipping  Lunch: skipping  Dinner: lentil stew with potato and carrots  Beverages: Water, Tea  Has patient met with a dietitian in the past?: Yes    Being Active:  Being Active Assessed Today: Yes  Exercise:: Currently not exercising  Barrier to exercise: None    Monitoring:  Monitoring Assessed Today: Yes  Did patient bring glucose meter to appointment? : Yes  Blood Glucose Meter: CGM  Times checking blood sugar at home (number): 5+  Times checking blood sugar at home (per): Day  Blood glucose trend: Increasing          Taking Medications:  Diabetes Medication(s)       Insulin       insulin glargine (LANTUS SOLOSTAR) 100 UNIT/ML pen INJECT 33 units twice a day and continue to increase up to 40 units twice a day     insulin lispro (HUMALOG KWIKPEN) 100 UNIT/ML (1 unit dial) KWIKPEN Using insulin to carb ratio and taking up to 180 units daily divided between meals and larger snacks     Patient not taking: Reported on 4/22/2024       Sodium-Glucose Co-Transporter 2 (SGLT2) Inhibitors       empagliflozin (JARDIANCE) 10 MG TABS tablet Take 1 tablet (10 mg) by mouth daily       Incretin Mimetic Agents       dulaglutide (TRULICITY) 0.75 MG/0.5ML pen Inject 0.75 mg Subcutaneous every 7 days for 28 days     Dulaglutide (TRULICITY) 3 MG/0.5ML SOPN Inject 3 mg Subcutaneous every 7 days     Dulaglutide (TRULICITY) 4.5 MG/0.5ML SOPN Inject 4.5 mg Subcutaneous every 7 days     dulaglutide (TRULICITY) " 1.5 MG/0.5ML pen Inject 1.5 mg Subcutaneous every 7 days     Patient not taking: Reported on 4/22/2024            Current Treatments: Insulin Injections, Non-insulin Injectables, Oral Medication (taken by mouth)    Problem Solving:  Patient aware of signs and symptoms of hypoglycemia and treatment protocol              Reducing Risks:  Has dilated eye exam at least once a year?: Yes  Sees dentist every 6 months?: No  Feet checked by healthcare provider in the last year?: Yes    Healthy Coping:  Informal Support system:: None, Significant other  Patient Activation Measure Survey Score:      2/28/2024    10:47 AM   KIMANI Score (Last Two)   KIMANI Raw Score 29   Activation Score 52.9   KIMANI Level 2         Care Plan and Education Provided:  Care Plan: Diabetes   Updates made by Latasha Quiroz RD since 4/24/2024 12:00 AM        Problem: Diabetes Self-Management Education Needed to Optimize Self-Care Behaviors         Goal: Being Active - get regular physical activity, working up to at least 150 minutes per week         Task: Explore community resources including walking groups, assistance programs, and home videos Completed 4/24/2024   Responsible User: Latasha Quiroz RD        Goal: Monitoring - monitor glucose and ketones as directed    This Visit's Progress: 100%   Recent Progress: 100%   Note:    Pt will use glucose sensor and BG monitoring as a backup        Task: Provide education on continuous glucose monitoring (sensor placement, use of yumiko or /reader, understanding glucose trends, alerts and alarms, differences between sensor glucose and blood glucose) Completed 4/24/2024   Responsible User: Latasha Quiroz RD          Time Spent: 60 minutes  Encounter Type: Individual    Any diabetes medication dose changes were made via the CDE Protocol per the patient's referring provider. A copy of this encounter was shared with the provider.

## 2024-04-24 NOTE — PATIENT INSTRUCTIONS
Trulicity  0.75 mg weekly x 4 weeks - current dose  1.50 mg weekly x 4 weeks   3.00 mg weekly x 4 weeks   4.50 mg weekly - goal dose gave refills     Jardiance 10 mg daily     Trial to restart metformin from supply at home increasing slowly starting with one tablet with evening meal and increasing by 1 tablet weekly until taking 2 tablets twice a day as patient had been taking previously    Follow-up 9 weeks follow up if not sooner     Lantus split dosing taking 33 am and 33 pm and if readings continue to be over 180's increase by 1unit each time         Goals:  Practice healthy stress management and mindful eating - think are you physically hungry or are you bored, stressed, emotional etc, make of list of things to do besides eat.    Try to get good quality sleep with a goal of 7-8 hours per night.  Stay physically active daily.  Recommend working up to a total of 30 minutes on 5 days/ week.  Recommend a fitness tracker.     Eat in a healthy way- eliminate trans fats, limit saturated fats and added sugars; follow the plate method - picture above.  Keep a food record (MyFitnessPal, Loseit).    A meal is 3 or more food groups; make it colorful for better nutrition.    Total Carbohydrates (in grams) = Breakfast  30    Lunch  30    Supper  30    If desired snacks 15

## 2024-04-24 NOTE — PROGRESS NOTES
Diabetes Self-Management Education & Support    Presents for: Follow-up for type 2 diabetes, obesity Body mass index is 35.32 kg/m .     Type of Service: In Person Visit      ASSESSMENT:  11/3/2020 - Initial dx of diabetes 3/2018 pt's diabetes has been under excellent control since 1/2019 with dietary intervention.  Pt had been following a strict keto/ vegan (a few days/week) and weight was down to 164.2# 1/22/19 and slowly had been incorporating more complex carbs and some fruit.  Weight gain 22# from 1/22/19 to 1/22/2020.  Then due to extra stress with pandemic and was caring for additional people in household pt has continued to gain weight.      A1c 5.8% 3/2020      12/13/2023  Due to life stressors, depression patient states she would let her diabetes slip.  Patient stopped taking metformin due to loose stools.  Patient has ongoing nausea.  Patient is being evaluated for elevated liver function tests, GI referral for colonoscopy.       Patient has not been able to fill prescriptions.  Through calling insurance and pharmacy clarified what the issue was and patient is now able to fill prescriptions through [facility] in [place].  Patient will be picking up blood glucose monitor and test strips.     Patient's A1c elevated at 9.1%.  Blood glucose today during office visit 267 mg/dL.  Discussed medication options.  For patient's least side effects opted for long-acting insulin at this time likely to be temporary.  Insulin recommendation discussed with PCP and agrees with plan.  Patient is willing to make some dietary changes but due to living situation patient does not have control over what food is purchased.     1/16/2024 since 12/13/2023 last visit patient has been diagnosed with malignant neoplasm of the thyroid gland     Patient is currently taking 30 units of Lantus, 14-day glucose average 285 mg/dL with a range of 195 - 341 mg/dL     Will add rapid acting insulin today 3 times daily AC using insulin to  carbohydrate ratio patient is provided with integrated diabetes services spreadsheet  Target 120  Correction factor 30  Insulin to carb ratio 1 unit for 5 g carbohydrate     Patient will follow-up in 1 month     2/13/2024 Patient will start meeting with specialists at [place] today and over the next few days and is quite nervous and excited to get answers.  Patient did not bring meter or logbook.  Glucose during office visit 259 mg/dL.  Patient has not been taking insulin as directed.  Patient complains of pain and burning sensation, reviewed injection technique.  Education on importance of excellent glycemic control if surgery is in the near future.       Lantus currently at 30 units will increase to 34 units and continue to increase by 1 unit every day until a.m. glucose is between 80 and 130 mg/dL.  Patient is to use Humalog integrated diabetes services dosing chart 3 times daily AC.  If patient is not eating patient is to still use dosing chart for correction 3 times daily AC for example this a.m. with hyperglycemia patient to take Humalog based on 0 g of carbohydrate if not consuming breakfast     Target 120  Correction factor 30  Insulin to carb ratio 1 unit for 5 g carbohydrate     3/19/2023 Pt did have L thyroidectomy lobectomy   Insulin needs increased  Lantus 60u daily   Humalog - pt has not been taking as directed, has been taking 60u at 7pm      14 day avg out of 43 readings 190mg/dL      Noted Freestyle Cynthia 3 order, pt states she has not started using it - pt went back home and picked up the freestyle cynthia 3 and came back to appt to have it started today during office visit.       Pt provided with detailed education again today on rapid acting insulin dosing to take it 20 min prior to each meal.  Target 120, Sensitivity factor 20, Carb to insulin ratio 4    4/24/2024 Patient is now utilizing freestyle cynthia 3.  Diabetes continues to be uncontrolled with A1c 8.8%.  Patient has just started taking  Trulicity and Jardiance over the past 2 days.  Patient continues with Lantus 60 units daily will be splitting Lantus and increasing dose to 33 units twice daily.  Patient has stopped rapid acting insulin.  Historically patient had been on metformin but 12/2023 patient stopped taking metformin due to loose stools, patient is willing to try to restart metformin and evaluate for any side effects.  Patient believes that her loose stools were not likely due to metformin.  Patient reports will be seeing ENT and Endo    Patient's most recent   Lab Results   Component Value Date    A1C 8.8 04/22/2024     is not meeting goal of <7.0    Diabetes knowledge and skills assessment:   Patient is knowledgeable in diabetes management concepts related to: Monitoring and Taking Medication    Continue education with the following diabetes management concepts: Healthy Eating, Being Active, Monitoring, Taking Medication, Problem Solving, Reducing Risks, and Healthy Coping    Based on learning assessment above, most appropriate setting for further diabetes education would be: Individual setting.      PLAN  Trulicity  0.75 mg weekly x 4 weeks - current dose  1.50 mg weekly x 4 weeks   3.00 mg weekly x 4 weeks   4.50 mg weekly - goal dose gave refills     Jardiance 10 mg daily     Trial to restart metformin from supply at home increasing slowly starting with one tablet with evening meal and increasing by 1 tablet weekly until taking 2 tablets twice a day as patient had been taking previously    Lantus split dosing taking 33 am and 33 pm and if readings continue to be over 180's increase by 1unit each time     Topics to cover at upcoming visits: Healthy Eating, Monitoring, and Taking Medication    Follow-up: 9 weeks    See Care Plan for co-developed, patient-state behavior change goals.  AVS provided for patient today.    Education Materials Provided:  No new materials provided today      SUBJECTIVE/OBJECTIVE:  Diabetes education in the past  "24mo: Yes  Diabetes type: Type 2  Disease course: Worsening  How confident are you filling out medical forms by yourself:: Quite a bit  Diabetes management related comments/concerns: how am i supposed to get my BG stabalized, when it is not due to sickness and its stress on my body?  Cultural Influences/Ethnic Background:  Not  or     Diabetes Symptoms & Complications:  Diabetes Related Symptoms: Fatigue, Polydipsia (increased thirst), Visual change  Weight trend: Increasing  Symptom course: Worsening  Disease course: Worsening       Patient Problem List and Family Medical History reviewed for relevant medical history, current medical status, and diabetes risk factors.    Vitals:  /75   Wt 106.9 kg (235 lb 11.2 oz)   LMP 09/25/2019 (Within Days)   BMI 35.32 kg/m    Estimated body mass index is 35.32 kg/m  as calculated from the following:    Height as of 4/22/24: 1.74 m (5' 8.5\").    Weight as of this encounter: 106.9 kg (235 lb 11.2 oz).   Last 3 BP:   BP Readings from Last 3 Encounters:   04/24/24 133/75   04/22/24 (!) 136/90   04/12/24 (!) 147/90       History   Smoking Status    Former    Packs/day: 0.50    Types: Cigarettes    Quit date: 11/1/2023   Smokeless Tobacco    Never       Labs:  Lab Results   Component Value Date    A1C 8.8 04/22/2024     Lab Results   Component Value Date     04/22/2024    GLC 76 07/14/2021     04/15/2008     Lab Results   Component Value Date    LDL  04/22/2024      Comment:      Cannot estimate LDL when triglyceride exceeds 400 mg/dL    LDL 97 04/22/2024     Direct Measure HDL   Date Value Ref Range Status   04/22/2024 39 (L) >=50 mg/dL Final   ]  GFR Estimate   Date Value Ref Range Status   04/22/2024 >90 >60 mL/min/1.73m2 Final   04/15/2008 >90 >60 mL/min/1.7m2 Final     GFR Estimate If Black   Date Value Ref Range Status   04/15/2008 >90 >60 mL/min/1.7m2 Final     Lab Results   Component Value Date    CR 0.49 04/22/2024    CR 0.68 04/15/2008 " "    No results found for: \"MICROALBUMIN\"    Healthy Eating:  Healthy Eating Assessed Today: Yes  Cultural/Shinto diet restrictions?: No  How many times a week on average do you eat food made away from home (restaurant/take-out)?: 1  Meals include: Dinner  Breakfast: skipping  Lunch: skipping  Dinner: lentil stew with potato and carrots  Beverages: Water, Tea  Has patient met with a dietitian in the past?: Yes    Being Active:  Being Active Assessed Today: Yes  Exercise:: Currently not exercising  Barrier to exercise: None    Monitoring:  Monitoring Assessed Today: Yes  Did patient bring glucose meter to appointment? : Yes  Blood Glucose Meter: CGM  Times checking blood sugar at home (number): 5+  Times checking blood sugar at home (per): Day  Blood glucose trend: Increasing          Taking Medications:  Diabetes Medication(s)       Insulin       insulin glargine (LANTUS SOLOSTAR) 100 UNIT/ML pen INJECT 33 units twice a day and continue to increase up to 40 units twice a day     insulin lispro (HUMALOG KWIKPEN) 100 UNIT/ML (1 unit dial) KWIKPEN Using insulin to carb ratio and taking up to 180 units daily divided between meals and larger snacks     Patient not taking: Reported on 4/22/2024       Sodium-Glucose Co-Transporter 2 (SGLT2) Inhibitors       empagliflozin (JARDIANCE) 10 MG TABS tablet Take 1 tablet (10 mg) by mouth daily       Incretin Mimetic Agents       dulaglutide (TRULICITY) 0.75 MG/0.5ML pen Inject 0.75 mg Subcutaneous every 7 days for 28 days     Dulaglutide (TRULICITY) 3 MG/0.5ML SOPN Inject 3 mg Subcutaneous every 7 days     Dulaglutide (TRULICITY) 4.5 MG/0.5ML SOPN Inject 4.5 mg Subcutaneous every 7 days     dulaglutide (TRULICITY) 1.5 MG/0.5ML pen Inject 1.5 mg Subcutaneous every 7 days     Patient not taking: Reported on 4/22/2024            Current Treatments: Insulin Injections, Non-insulin Injectables, Oral Medication (taken by mouth)    Problem Solving:  Patient aware of signs and symptoms " of hypoglycemia and treatment protocol              Reducing Risks:  Has dilated eye exam at least once a year?: Yes  Sees dentist every 6 months?: No  Feet checked by healthcare provider in the last year?: Yes    Healthy Coping:  Informal Support system:: None, Significant other  Patient Activation Measure Survey Score:      2/28/2024    10:47 AM   KIMANI Score (Last Two)   KIMANI Raw Score 29   Activation Score 52.9   KIMANI Level 2         Care Plan and Education Provided:  Care Plan: Diabetes   Updates made by Latasha Quiroz RD since 4/24/2024 12:00 AM        Problem: Diabetes Self-Management Education Needed to Optimize Self-Care Behaviors         Goal: Being Active - get regular physical activity, working up to at least 150 minutes per week         Task: Explore community resources including walking groups, assistance programs, and home videos Completed 4/24/2024   Responsible User: Latasha Quiroz RD        Goal: Monitoring - monitor glucose and ketones as directed    This Visit's Progress: 100%   Recent Progress: 100%   Note:    Pt will use glucose sensor and BG monitoring as a backup        Task: Provide education on continuous glucose monitoring (sensor placement, use of yumiko or /reader, understanding glucose trends, alerts and alarms, differences between sensor glucose and blood glucose) Completed 4/24/2024   Responsible User: Latasha Quiroz RD          Time Spent: 60 minutes  Encounter Type: Individual    Any diabetes medication dose changes were made via the CDE Protocol per the patient's referring provider. A copy of this encounter was shared with the provider.

## 2024-04-29 ENCOUNTER — NURSE TRIAGE (OUTPATIENT)
Dept: FAMILY MEDICINE | Facility: CLINIC | Age: 44
End: 2024-04-29
Payer: COMMERCIAL

## 2024-04-29 ENCOUNTER — NURSE TRIAGE (OUTPATIENT)
Dept: NURSING | Facility: CLINIC | Age: 44
End: 2024-04-29
Payer: COMMERCIAL

## 2024-04-29 ENCOUNTER — TELEPHONE (OUTPATIENT)
Dept: FAMILY MEDICINE | Facility: CLINIC | Age: 44
End: 2024-04-29

## 2024-04-29 DIAGNOSIS — C73 MALIGNANT NEOPLASM OF THYROID GLAND (H): ICD-10-CM

## 2024-04-29 DIAGNOSIS — E11.65 TYPE 2 DIABETES MELLITUS WITH HYPERGLYCEMIA, WITHOUT LONG-TERM CURRENT USE OF INSULIN (H): ICD-10-CM

## 2024-04-29 DIAGNOSIS — R79.89 ELEVATED TSH: Primary | ICD-10-CM

## 2024-04-29 PROCEDURE — 99207 E-CONSULT TO ENDOCRINOLOGY (ADULT OUTPT PROVIDER TO SPECIALIST WRITTEN QUESTION & RESPONSE): CPT | Performed by: FAMILY MEDICINE

## 2024-04-29 NOTE — TELEPHONE ENCOUNTER
"Nurse Triage SBAR    Is this a 2nd Level Triage? NO, FYI    Situation:   Patient stated she was \"drying off\" and got a sharp pain in her neck and it is a 8/10    Background:   Assessment:  Really bad pain head and neck area.  Patient has full range of neck but feels like she \"threw out her neck\"  \"I do not feel like her life is threatened\"  Does not have numbness n hads or legs   No weakness in hands or legs, but had had weakness in right arm for last 2 days  Has nausea really bad at baseline, but no vomiting   Denies vision changes   8/10 pain    Protocol Recommended Disposition:   Go To Office Now    Recommendation:  No appts at clinic, triager could hear patient in pain on phone. RN recommended patient go to UC/ED what ever is closer to her house    Routed to provider    Does the patient meet one of the following criteria for ADS visit consideration? No   Reason for Disposition   SEVERE pain (e.g., excruciating, unable to do any normal activities)    Additional Information   Negative: Shock suspected (e.g., cold/pale/clammy skin, too weak to stand, low BP, rapid pulse)   Negative: Similar pain previously and it was from 'heart attack'   Negative: Similar pain previously from 'angina' and not relieved by nitroglycerin   Negative: Difficult to awaken or acting confused (e.g., disoriented, slurred speech)   Negative: Sounds like a life-threatening emergency to the triager   Negative: Followed an injury to neck (e.g., MVA, sports, impact or collision)   Negative: Chest pain   Negative: Lymph node in the neck is swollen or painful to the touch   Negative: Sore throat is main symptom   Negative: Difficulty breathing or unusual sweating (e.g., sweating without exertion)   Negative: Chest pain lasting longer than 5 minutes   Negative: Stiff neck (can't touch chin to chest) and has headache   Negative: Stiff neck (can't touch chin to chest) and fever   Negative: Weakness of an arm or hand   Negative: Problems with bowel " or bladder control   Negative: Patient sounds very sick or weak to the triager    Protocols used: Neck Pain or [place]-W-RMBRENDON Jiang RN  [facility]  [phone]    [facility]   Monday  - Thursday 7 AM - 6 PM    Friday  7 AM - 5 PM     -Please call your clinic for assistance from a nurse after hours.

## 2024-04-29 NOTE — TELEPHONE ENCOUNTER
Please call patient.  There were mild abnormalities noted in her thyroid labs.  Often after thyroid surgery patients are scheduled to see an endocrinologist.  In order to get a quick response from an endocrinologist my recommendation would be for me to do an E consult.  That we will have them review the records and the labs and give us recommendations about following the thyroid levels.  I can also based on their response to place an in person referral for the future.    Please let me know if she is agreeable to an E consult.  According to our documentation E consult are covered by her insurance but it is possible that she could be billed up to $120.

## 2024-04-29 NOTE — TELEPHONE ENCOUNTER
FUTURE VISIT INFORMATION      FUTURE VISIT INFORMATION:  Date:  6/20/24  Time: 1:20pm  Location: [facility]  REFERRAL INFORMATION:  Referring provider:    Referring providers clinic:    Reason for visit/diagnosis  per pt, refd Dr Diego, S/P partial thyroidectomy/swollen lymph nodes, thyroidectomy, pulm nodules, CSC confd     RECORDS REQUESTED FROM:       Clinic name Comments Records Status Imaging Status   [facility] fall 4/22/24- OV Missy Carrizales APRN CNP  Epic      [facility]  4/29/24- ED  3/23/24- ED     Labs:  1/9/24- FNA     Images:  1/9/24- US FNA Biopsy Thyroid   12/27/23- US Thyroid   9/28/21- Us Neck or Head  Care everywhere  6/10/24- PENDING REQ    PACS   [facility] 2/15/24- OV Coretta Phillips M.D   2/13/24- OV Benson Dotson M.D.      Images:  2/8/24- Us Thyroid  Care everywhere  6/10/24- pending req    PACS   [facility]  11/4/21- OV Solis Harrison MD     Images:  7/17/21- CT Neck   7/17/21- XR Chest - PACS Care everywhere  6/10/24- Pending req    PACS   [facility]   [address] [place] [id number]  2/16/24- case#: [id number] - thyroid left  Care everywhere             Marcia 10, 2024 2:01 PM - Faxed a request to [facility] to push Image to [place] PACS- Eda   June 12, 2024 2:29 PM - Received image in pacs and attached it to patient- Eda

## 2024-04-29 NOTE — TELEPHONE ENCOUNTER
Prior Authorization Request   Who s requesting:  Pharmacy  Pharmacy Name and Location: [facility]  Medication Name: Trulicity 4.5 mg/0.5mL  [facility] [place] [facility]  Key: None

## 2024-04-29 NOTE — TELEPHONE ENCOUNTER
Called and gave message below from Dr. Ahn. Patient would like Dr. Ahn to move forward with the E consult. Patient did state that she is getting new insurance coming up, ,and will call once she receives her new insurance cards to update.

## 2024-04-29 NOTE — TELEPHONE ENCOUNTER
"Nurse Triage SBAR    Is this a 2nd Level Triage? NO  Request for Endo Referral    Situation:   Pt calls seeking specialty appt with [facility].  States \"Dr Ahn left me a message, telling me I need to get in with Endo.\"    Background:   Pt had thought to switch to [facility] (from [facility]) for her Endo treatment for purposes of having her care under one system.  However states \"When calling Endo, was told there's a lengthy wait list.\"    Current Endo availability:  Now conference-calling [facility]  (Cinda) for advice....   states \"We are scheduling into December 2024 and January 2025 due to a nationwide shortage of Endo providers.\"  This appears unacceptable per pt's symptoms.   states \"We need an Endo referral from PCP.\"  \"PCP should place a \"priority\" status on the referral.\"  \"Perhaps PCP can facilitate an earlier eval.\"    Pt also intends to contact ENT scheduling for a consult appt.  States \"I found some research on lingual involvement which I have symptoms of, and which appear to indicate metastasis, and I don't want to be boxed into other assumptions.\"    Pt's callback # -> [phone]     Thank you-    Bethany Victoria RN  [facility] Nurse Advisor     Reason for Disposition   [1] Follow-up call from patient regarding patient's clinical status AND [2] information urgent     Regarding recent lab results and next steps ....    Protocols used: PCP Call - No Triage-A-AH    "

## 2024-04-30 NOTE — TELEPHONE ENCOUNTER
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 Also history of type 2 diabetes mellitus currently uncontrolled but working with diabetes educator and improvement of blood sugars noted.     Had seen [facility] end[facility]inologist 2/15/24 prior to surgery. No follow up since per patient.     TSH now slightly elevated, T3 and T4 normal. TSH was normal at [facility] earlier this month.    Wondering how frequently to monitor levels, what labs are recommended, and at what point to start levothyroxine.    Patient has noted fatigue and weight gain but that predates these results.    Goal is to refer her to endocrinology for long term management but currently no available appointments for months.

## 2024-05-02 ENCOUNTER — E-CONSULT (OUTPATIENT)
Dept: ENDOCRINOLOGY | Facility: CLINIC | Age: 44
End: 2024-05-02
Payer: COMMERCIAL

## 2024-05-02 PROCEDURE — 99451 NTRPROF PH1/NTRNET/EHR 5/>: CPT

## 2024-05-03 NOTE — PROGRESS NOTES
5/2/2024     E-Consult has been accepted.    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 Also history of type 2 diabetes mellitus currently uncontrolled but working with diabetes educator and improvement of blood sugars noted.   Had seen [facility] endocrinologist 2/15/24 prior to surgery. No follow up since per patient.   TSH now slightly elevated, T3 and T4 normal. TSH was normal at [facility] earlier this month.   Wondering how frequently to monitor levels, what labs are recommended, and at what point to start levothyroxine.   Patient has noted fatigue and weight gain but that predates these results.   Goal is to refer her to endocrinology for long term management but currently no available appointments for months.     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      Current Outpatient Medications   Medication Sig Dispense Refill    amoxicillin (AMOXIL) 500 MG capsule Take 500 mg by mouth 3 times daily      blood glucose (NO BRAND SPECIFIED) test strip Use to test blood sugar 3 times daily or as directed. To accompany: Blood Glucose Monitor Brands: per insurance. 300 strip 6    blood glucose monitoring (NO BRAND SPECIFIED) meter device kit Use to test blood sugar 1 times daily or as  directed. Preferred blood glucose meter OR supplies to accompany: Blood Glucose Monitor Brands: per insurance. 1 kit 0    Continuous Blood Gluc Sensor (FREESTYLE LAURA 3 SENSOR) [facility] 1 each every 14 days Use 1 sensor every 14 days. Use to read blood sugars per 's instructions. 6 each 5    dulaglutide (TRULICITY) 0.75 MG/0.5ML pen Inject 0.75 mg Subcutaneous every 7 days for 28 days 2 mL 0    [START ON 5/15/2024] dulaglutide (TRULICITY) 1.5 MG/0.5ML pen Inject 1.5 mg Subcutaneous every 7 days (Patient not taking: Reported on 4/22/2024) 2 mL 1    [START ON 6/11/2024] Dulaglutide (TRULICITY) 3 MG/0.5ML SOPN Inject 3 mg Subcutaneous every 7 days 2 mL 0    [START ON 7/11/2024] Dulaglutide (TRULICITY) 4.5 MG/0.5ML SOPN Inject 4.5 mg Subcutaneous every 7 days 2 mL 6    empagliflozin (JARDIANCE) 10 MG TABS tablet Take 1 tablet (10 mg) by mouth daily 90 tablet 1    hydroCHLOROthiazide 12.5 MG tablet Take 1 tablet (12.5 mg) by mouth daily 30 tablet 5    insulin glargine (LANTUS SOLOSTAR) 100 UNIT/ML pen INJECT 33 units twice a day and continue to increase up to 40 units twice a day 225 mL 1    insulin lispro (HUMALOG KWIKPEN) 100 UNIT/ML (1 unit dial) KWIKPEN Using insulin to carb ratio and taking up to 180 units daily divided between meals and larger snacks (Patient not taking: Reported on 4/22/2024) 180 mL 3    insulin pen needle (32G X 4 MM) 32G X 4 MM miscellaneous Use 4 pen needles daily or as directed. 400 each 3    losartan (COZAAR) 25 MG tablet Take 1 tablet (25 mg) by mouth daily 90 tablet 1    Microlet Lancets MISC USE TO TEST ONCE DAILY 300 each 1    ondansetron (ZOFRAN) 8 MG tablet Take 1 tablet (8 mg) by mouth every 8 hours as needed for nausea 12 tablet 1    rizatriptan (MAXALT) 10 MG tablet Take 1 tablet (10 mg) by mouth at onset of headache for migraine May repeat in 2 hours. Max 3 tablets/24 hours. 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 Endocrine referral is not on the EMR- Recommend you place referral to help get her scheduled in endocrine  Although with time the TFTS may normalize, given the thyroid cancer, would start treatment with levothyorxine 100 mcg/day now and treat to target TSH < 1. Repeat labs in 2 months including  TSH, free T4, thyroglobulin send out to [facility]  (lab test code 3768)    The recommendations provided in this E-Consult are based on a review of clinical data pertinent to the clinical question presented, without a review of the patient's complete medical record or, the benefit of a comprehensive in-person or virtual patient evaluation. This consultation should not replace the clinical judgement and evaluation of the provider ordering this E-Consult. Any new clinical issues, or changes in patient status since the filing of this E-Consult will need to be taken into account when assessing these recommendations. Please contact me if you have further questions.    My total time spent reviewing clinical information and formulating assessment was 20 minutes.        Kasia Silvestre MD

## 2024-05-06 DIAGNOSIS — E03.9 HYPOTHYROIDISM, UNSPECIFIED TYPE: ICD-10-CM

## 2024-05-06 DIAGNOSIS — C73 MALIGNANT NEOPLASM OF THYROID GLAND (H): Primary | ICD-10-CM

## 2024-05-06 RX ORDER — LEVOTHYROXINE SODIUM 100 UG/1
100 TABLET ORAL DAILY
Qty: 90 TABLET | Refills: 1 | Status: SHIPPED | OUTPATIENT
Start: 2024-05-06

## 2024-05-14 ENCOUNTER — TELEPHONE (OUTPATIENT)
Dept: EDUCATION SERVICES | Facility: CLINIC | Age: 44
End: 2024-05-14
Payer: COMMERCIAL

## 2024-05-14 DIAGNOSIS — E11.9 TYPE 2 DIABETES MELLITUS WITHOUT COMPLICATION, WITHOUT LONG-TERM CURRENT USE OF INSULIN (H): ICD-10-CM

## 2024-05-14 RX ORDER — DULAGLUTIDE 0.75 MG/.5ML
0.75 INJECTION, SOLUTION SUBCUTANEOUS
Qty: 2 ML | Refills: 0 | Status: CANCELLED | OUTPATIENT
Start: 2024-05-14

## 2024-05-14 RX ORDER — DULAGLUTIDE 0.75 MG/.5ML
0.75 INJECTION, SOLUTION SUBCUTANEOUS
Qty: 2 ML | Refills: 0 | OUTPATIENT
Start: 2024-05-14

## 2024-05-14 NOTE — TELEPHONE ENCOUNTER
General Call      Reason for Call:  Insurance in denying her Trulicity. She is not sure how to move forward. Would like a call back from Keiko Quiroz    What are your questions or concerns:      Date of last appointment with provider:     Could we send this information to you in [employer] or would you prefer to receive a phone call?:   Patient would prefer a phone call   Okay to leave a detailed message?: Yes at Home number on file [phone] (home)

## 2024-05-14 NOTE — TELEPHONE ENCOUNTER
Central Prior Authorization Team   Phone: [phone]    PA Initiation    Medication: Trulicity 4.5 mg/0.5mL  Insurance Company: [employer] ([facility]) - Phone [phone] Fax [phone]  Pharmacy Filling the Rx: [id number] - [address]  Filling Pharmacy Phone: [phone]  Filling Pharmacy Fax: [phone]  Start Date: 5/14/2024    Be Advised: Pharmacy stated that they are processing 1.5 MG/0.5 ML Dose as patient as last dose was 0.75 mg/ 0.5 ml.          Sent to pharmacy to complete their part and have them send it to [employer] to complete process on PA. Awaiting on outcome.

## 2024-05-16 DIAGNOSIS — I10 BENIGN ESSENTIAL HYPERTENSION: ICD-10-CM

## 2024-05-16 RX ORDER — LOSARTAN POTASSIUM 25 MG/1
25 TABLET ORAL DAILY
Qty: 90 TABLET | Refills: 1 | OUTPATIENT
Start: 2024-05-16

## 2024-05-16 NOTE — TELEPHONE ENCOUNTER
Prior Authorization Not Needed per Insurance    Medication: Trulicity 4.5 mg/0.5mL-PA NOT NEEDED   Insurance Company: [employer] ([facility]) - Phone [phone] Fax [phone]  Expected CoPay:      Pharmacy Filling the Rx: [id number] - [place], [place] - [facility]  [address]  Pharmacy Notified:  Yes  Patient Notified:  No    Pharmacy stated that they are able to get medication to process to patients insurance; however, medication is currently on back order with no release date. Requested pharmacy to contact patient on medication coverage and medication on back order. Pharmacy stated that they will notify the patient on medication and back order on medication. Pharmacy also stated that patient can get medication filled at different pharmacy that takes patients insurance and has medication in stock to fill medication for patient, if patient choose to.

## 2024-05-29 ENCOUNTER — OFFICE VISIT (OUTPATIENT)
Dept: FAMILY MEDICINE | Facility: CLINIC | Age: 44
End: 2024-05-29
Payer: COMMERCIAL

## 2024-05-29 VITALS
DIASTOLIC BLOOD PRESSURE: 85 MMHG | HEIGHT: 69 IN | SYSTOLIC BLOOD PRESSURE: 130 MMHG | OXYGEN SATURATION: 97 % | RESPIRATION RATE: 16 BRPM | HEART RATE: 88 BPM | WEIGHT: 228.7 LBS | BODY MASS INDEX: 33.87 KG/M2 | TEMPERATURE: 98.7 F

## 2024-05-29 DIAGNOSIS — E11.65 TYPE 2 DIABETES MELLITUS WITH HYPERGLYCEMIA, WITHOUT LONG-TERM CURRENT USE OF INSULIN (H): ICD-10-CM

## 2024-05-29 DIAGNOSIS — L73.9 FOLLICULITIS: Primary | ICD-10-CM

## 2024-05-29 DIAGNOSIS — Z12.4 CERVICAL CANCER SCREENING: ICD-10-CM

## 2024-05-29 PROCEDURE — 99213 OFFICE O/P EST LOW 20 MIN: CPT

## 2024-05-29 RX ORDER — MUPIROCIN 20 MG/G
OINTMENT TOPICAL 2 TIMES DAILY
Qty: 1 G | Refills: 0 | Status: SHIPPED | OUTPATIENT
Start: 2024-05-29 | End: 2024-08-07

## 2024-05-29 ASSESSMENT — PATIENT HEALTH QUESTIONNAIRE - PHQ9
SUM OF ALL RESPONSES TO PHQ QUESTIONS 1-9: 15
SUM OF ALL RESPONSES TO PHQ QUESTIONS 1-9: 15
10. IF YOU CHECKED OFF ANY PROBLEMS, HOW DIFFICULT HAVE THESE PROBLEMS MADE IT FOR YOU TO DO YOUR WORK, TAKE CARE OF THINGS AT HOME, OR GET ALONG WITH OTHER PEOPLE: EXTREMELY DIFFICULT

## 2024-05-29 NOTE — PROGRESS NOTES
Assessment & Plan     Folliculitis  Patient in clinic to assess lump on scalp.  There is small skin colored, pimple-like bump on anterior scalp on left side.  There is no purulent drainage.  Discussed that this is likely a folliculitis.  Patient given topical medications in liquid to help prevent further spread.  Patient verbalizes understanding will return to clinic if not improving  - mupirocin (BACTROBAN) 2 % external ointment; Apply topically 2 times daily  - benzoyl peroxide 5 % external liquid; Wash with liquid 2-3x/week to affected areas.    Cervical cancer screening  Last Pap done in September 2019, patient aware that next recommended in September of this year.    Type 2 diabetes mellitus with hyperglycemia, without long-term current use of insulin (H)  Patient has recently had increase in her dose of dulaglutide, Jardiance, and Lantus.  She is experiencing some fatigue, some's feelings of anxiety, and some nausea.  We discussed that these may be due to lowered blood glucose or side effects of the medication.  Patient is encouraged to continue on new doses of medication and we will continue to monitor.  Once blood glucose is under control and patient has adjusted to new doses of medications can further evaluate symptoms.                Jessica Garza is a 44 year old, presenting for the following health issues:  Derm Problem (Spots on scalp concern /Abdominal pain )        5/29/2024     3:07 PM   Additional Questions   Roomed by QUAN Rodriguez     History of Present Illness       Reason for visit:  Growth on scalp  Symptom onset:  More than a month  Symptoms include:  Itchy scalp severe stamach pain constopation severe shortness of breath  Symptom intensity:  Moderate  Symptom progression:  Worsening  Had these symptoms before:  Yes  Has tried/received treatment for these symptoms:  No  What makes it worse:  Not resting  What makes it better:  No    She eats 2-3 servings of fruits and vegetables  "daily.She consumes 0 sweetened beverage(s) daily.She exercises with enough effort to increase her heart rate 20 to 29 minutes per day.  She exercises with enough effort to increase her heart rate 3 or less days per week.   She is taking medications regularly.                     Objective    /85 (BP Location: Right arm, Patient Position: Sitting, Cuff Size: Adult Large)   Pulse 88   Temp 98.7  F (37.1  C) (Oral)   Resp 16   Ht 1.74 m (5' 8.5\")   Wt 103.7 kg (228 lb 11.2 oz)   LMP 09/25/2019 (Within Days)   SpO2 97%   BMI 34.27 kg/m    Body mass index is 34.27 kg/m .  Physical Exam  HENT:      Mouth/Throat:      Mouth: Mucous membranes are moist.      Pharynx: Oropharynx is clear.   Eyes:      Extraocular Movements: Extraocular movements intact.      Conjunctiva/sclera: Conjunctivae normal.   Cardiovascular:      Rate and Rhythm: Normal rate.   Pulmonary:      Effort: Pulmonary effort is normal.   Musculoskeletal:      Cervical back: Normal range of motion.   Skin:     General: Skin is warm and dry.      Capillary Refill: Capillary refill takes less than 2 seconds.   Neurological:      Mental Status: She is alert and oriented to person, place, and time.   Psychiatric:         Behavior: Behavior normal.         Thought Content: Thought content normal.                    Signed Electronically by: JAMIR Valente CNP    "

## 2024-06-06 ENCOUNTER — TELEPHONE (OUTPATIENT)
Dept: EDUCATION SERVICES | Facility: CLINIC | Age: 44
End: 2024-06-06
Payer: COMMERCIAL

## 2024-06-06 NOTE — TELEPHONE ENCOUNTER
Called and discussed with patient.  Patient did see [facility] and medications were increased and updated in orders 5/21/2024    Trulicity 4.5 mg injected every 7 days  Jardiance 25 mg daily  Insulin glargine 40 units twice daily    Call pharmacy to ensure correct orders have been placed and due to patient's insurance she may need to wait a few days until being filled patient does have adequate insulin at home.     Danita Espinoza)  Plastic Surgery  [address], [address]  [place] [address]  Phone: [phone]  Fax: [phone]  Follow Up Time:

## 2024-06-06 NOTE — TELEPHONE ENCOUNTER
[facility] Call Center    Phone Message    May a detailed message be left on voicemail: yes     Reason for Call: Patient calling to discuss medication changes with Keiko. Patient went to the [facility] and the dr increased her medication and now she can't get the medication at the pharmacy due to the medication being refill to soon. She is out of the Jardiance and will need the of Trulicity and Lantus very soon.     Action Taken: Message routed to:  [facility] ([facility]): Diab Ed    Travel Screening: Not Applicable     Date of Service:

## 2024-06-12 ENCOUNTER — NURSE TRIAGE (OUTPATIENT)
Dept: FAMILY MEDICINE | Facility: CLINIC | Age: 44
End: 2024-06-12
Payer: COMMERCIAL

## 2024-06-12 NOTE — TELEPHONE ENCOUNTER
"Nurse Triage SBAR    Is this a 2nd Level Triage? YES, LICENSED PRACTITIONER REVIEW IS REQUIRED    Situation: Patient was cold transferred via priority line red line to this writer. Patient calls to get appointment with her primary care provider.     Background: Patient has history of thyroid cancer and nodules in her lungs from October 2023. Patient follows pulmonology and found lung nodules in October 2023. Next scan with pulmonology is in August. Patient says she has reached her limit with scans for radiation experience     Assessment: Patient reports shortness of breath, faintness and lethargic. Patient reports she fell/ stumbled due to weakness about 30 minutes ago. No loss of consciousness or injuries..     Patient reports spells of shortness of breath intermittent for 6 months, but getting worse over the last month. Patient reports some chest tightness, especially yesterday. She reports heart was \"pounding out my chest\" yesterday while grocery shopping.     Patient reports some nausea (not new). Patient reports headache since yesterday that is 3/10. Patient reports brain fog as well that's been going on for a while, but seems worse today. Patient thinks she had shakiness and dizziness yesterday, but no today.     Patient says her oxygen levels during last two hospital visits have been 93%-94% and patient worried about levels being low. She doesn't monitor oxygen at home.    Blood sugar readings today are 150-200. Patient started on Trulicity and Jardiance 25 mg daily since April 2024.     Patient denies abdominal pain, vomiting, fevers.    Protocol Recommended Disposition:   GO TO ED/UCC NOW (OR TO OFFICE WITH PCP APPROVAL):     Recommendation: Recommended emergency room due to weakness and shortness of breath worsening, but patient reluctant and wants primary care provider input.     Routed to provider    Does the patient meet one of the following criteria for ADS visit consideration? 16+ years old, with an " [facility] PCP     TIP  Providers, please consider if this condition is appropriate for management at one of our Acute and Diagnostic Services sites.     If patient is a good candidate, please use dotphrase <dot>triageresponse and select Refer to ADS to document.      Reason for Disposition   Patient sounds very sick or weak to the triager    Additional Information   Negative: SEVERE difficulty breathing (e.g., struggling for each breath, speaks in single words)   Negative: Shock suspected (e.g., cold/pale/clammy skin, too weak to stand, low BP, rapid pulse)   Negative: Difficult to awaken or acting confused (e.g., disoriented, slurred speech)   Negative: Fainted > 15 minutes ago and still feels too weak or dizzy to stand   Negative: SEVERE weakness (i.e., unable to walk or barely able to walk, requires support) and new-onset or worsening   Negative: Sounds like a life-threatening emergency to the triager   Negative: Weakness of the face, arm or leg on one side of the body   Negative: Has diabetes mellitus and weakness from low blood sugar (i.e., < 60 mg/dL or 3.5 mmol/L)   Negative: Recent heat exposure, suspected cause of weakness   Negative: Vomiting is main symptom   Negative: Diarrhea is main symptom   Negative: Difficulty breathing   Negative: Heart beating < 50 beats per minute OR > 140 beats per minute   Negative: Extra heartbeats, irregular heart beating, or heart is beating very fast (i.e., 'palpitations')   Negative: Follows large amount of bleeding (e.g., from vomiting, rectum, vagina) (Exception: Small transient weakness from sight of a small amount blood.)   Negative: Black or tarry bowel movements   Negative: MODERATE weakness or fatigue from poor fluid intake with no improvement after 2 hours of rest and fluids   Negative: Drinking very little and dehydration suspected (e.g., no urine > 12 hours, very dry mouth, very lightheaded)    Answer Assessment - Initial Assessment Questions  1. DESCRIPTION:  "\"Describe how you are feeling.\"      Feels weak    2. SEVERITY: \"How bad is it?\"  \"Can you stand and walk?\"    - MILD (0-3): Feels weak or tired, but does not interfere with work, school or normal activities.    - MODERATE (4-7): Able to stand and walk; weakness interferes with work, school, or normal activities.    - SEVERE (8-10): Unable to stand or walk; unable to do usual activities.      Moderate to severe as patient lost her footing    3. ONSET: \"When did these symptoms begin?\" (e.g., hours, days, weeks, months)      Today.    4. CAUSE: \"What do you think is causing the weakness or fatigue?\" (e.g., not drinking enough fluids, medical problem, trouble sleeping)      Not sure.    5. NEW MEDICINES:  \"Have you started on any new medicines recently?\" (e.g., opioid pain medicines, benzodiazepines, muscle relaxants, antidepressants, antihistamines, neuroleptics, beta blockers)      Trulicity and Jardiance 25 mg daily since April 2024.     6. OTHER SYMPTOMS: \"Do you have any other symptoms?\" (e.g., chest pain, fever, cough, SOB, vomiting, diarrhea, bleeding, other areas of pain)      Shortness of breath ongoing for 6 months, worse in the last month.    7. PREGNANCY: \"Is there any chance you are pregnant?\" \"When was your last menstrual period?\"      N/A.    Protocols used: Weakness (Generalized) and Fatigue-A-[place]    "

## 2024-06-12 NOTE — TELEPHONE ENCOUNTER
Given shortness of breath and weakness would recommend emergency room visit.  She needs to be evaluated with urgent lab work which would not be available to us here in a clinic setting.

## 2024-06-19 ENCOUNTER — MYC MEDICAL ADVICE (OUTPATIENT)
Dept: FAMILY MEDICINE | Facility: CLINIC | Age: 44
End: 2024-06-19
Payer: COMMERCIAL

## 2024-06-19 ENCOUNTER — TELEPHONE (OUTPATIENT)
Dept: OTOLARYNGOLOGY | Facility: CLINIC | Age: 44
End: 2024-06-19
Payer: COMMERCIAL

## 2024-06-19 NOTE — TELEPHONE ENCOUNTER
Called waitlist patient and offered an appointment with Nacho on this date 6/20 & time n/a at this location virtual.     Please note there is no guarantee this appointment will be available as it is first come first serve.

## 2024-06-20 ENCOUNTER — VIRTUAL VISIT (OUTPATIENT)
Dept: OTOLARYNGOLOGY | Facility: CLINIC | Age: 44
End: 2024-06-20
Payer: COMMERCIAL

## 2024-06-20 DIAGNOSIS — E89.0 S/P PARTIAL THYROIDECTOMY: ICD-10-CM

## 2024-06-20 PROCEDURE — 99203 OFFICE O/P NEW LOW 30 MIN: CPT | Mod: 95 | Performed by: SURGERY

## 2024-06-20 NOTE — TELEPHONE ENCOUNTER
VM left for pt, requested return call for triage, review when to go to ER and schedule OV with PCP if appropriate.

## 2024-06-20 NOTE — PROGRESS NOTES
Due to the COVID 19 pandemic this visit was a video visit in order to help prevent spread of infection in this high risk patient and the general population. The patient gave verbal consent for the visit today.    Start time 1:20pm  Stop time 1:40pm  Total time 20 minutes face to face conversation video   Chart prep , review of images, results , start note prior to appt 20 minutes   Total 40 minutes     SURGERY CLINIC CONSULTATION    REASON FOR CONSULTATION:  Shannan Cameron was referred by Dr. Diego for evaluation and discussion of treatment options for h/o thyroid surgery for FVPTC     HISTORY OF PRESENT ILLNESS:  Shannan Cameron is a 44 year old female who had a left thyroid lobectomy at [facility] in May 2024.  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  Her postoperative thyroid function test have been normal.  And no further surgery or workup for this thyroid nodule was recommended by [facility].    The patient was referred to me because she had concerns about pulmonary nodules that have been followed by Dr. Aranda since 2019 and are benign.  She has concerned that these thyroid cancer had spread to the lung nodules.  She also had some concern about the surgery and not feeling well.  She has had an extensive workup by endocrinology both through Dr. Diego as well as [facility] that included adrenal pituitary axes workup.  All of her workup to date has been benign no cause for concern.    She admits to feeling fatigued, feels as if she has nodules in her neck, is anxious.  She denied any problems with voice quality breathing or swallowing following her thyroid surgery.      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    Generalized Anxiety Disorder    Hirsutism    Pelvic Pain    Menorrhagia    PTSD (post-traumatic stress disorder)     Diabetes mellitus treated with oral medication (H)    Moderate major depression (H)    Polycystic ovary syndrome    Type 2 diabetes mellitus (H)    Malignant neoplasm of thyroid gland (H)    S/P partial thyroidectomy    Benign essential hypertension    Migraine with aura and without status migrainosus, not intractable       Past Surgical History:   Procedure Laterality Date    ARTHROSCOPIC REPAIR ACL  2017    C LAP REMOVAL, SPERMATIC CORD LIPOMA  2018     SECTION  1996     SECTION  10/21/2008     SECTION  2006    HYSTERECTOMY  2019    Left ovarian cystectomy.    MAMMOGRAM - HIM SCAN  2017    [facility]  DELIVERY ONLY      Description:  Section;  Recorded: 2011;       Allergies   Allergen Reactions    Adhesive Tape Rash, Blisters and Itching    Bee Venom Shortness Of Breath and Dizziness     Fainting       Medications reviewed in the EMR        Family History   Problem Relation Age of Onset    Alcoholism Mother     Allergic rhinitis Mother     Anemia Mother     Arthritis Mother     Cancer Mother     Depression Mother     Substance Abuse Mother     Emotional abuse Mother     Fibromyalgia Mother     Mental Illness Mother     Alcoholism Father     Depression Father     Diabetes Father     Substance Abuse Father         I personally reviewed the radiographic images laboratory data.  ASSESSMENT:   1. S/P partial thyroidectomy        PLAN:   I agree with me as planned that a 1.4 cm single nodule follicular variant of papillary thyroid carcinoma requires no further surgery and no further.  Regarding her pulmonary nodules I defer to the management with Dr. Cross her pulmonologist.  If she has any questions or concerns about her thyroid surgery or her thyroid hereafter I recommend she continue following up with [facility].  In my opinion the patient needs no further intervention for her small thyroid carcinoma    Shelli Griffith MD

## 2024-06-20 NOTE — LETTER
6/20/2024       RE: Shannan Cameron  [address]  [facility] [address]     Dear Colleague,    Thank you for referring your patient, Shannan Cameron, to the [facility] EAR NOSE AND THROAT CLINIC [place] at [facility]. Please see a copy of my visit note below.    Due to the COVID 19 pandemic this visit was a video visit in order to help prevent spread of infection in this high risk patient and the general population. The patient gave verbal consent for the visit today.    Start time 1:20pm  Stop time 1:40pm  Total time 20 minutes face to face conversation video   Chart prep , review of images, results , start note prior to appt 20 minutes   Total 40 minutes     SURGERY CLINIC CONSULTATION    REASON FOR CONSULTATION:  Shannan Cameron was referred by Dr. Diego for evaluation and discussion of treatment options for h/o thyroid surgery for FVPTC     HISTORY OF PRESENT ILLNESS:  Shannan Cameron is a 44 year old female who had a left thyroid lobectomy at [facility] in May 2024.  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  Her postoperative thyroid function test have been normal.  And no further surgery or workup for this thyroid nodule was recommended by [facility].    The patient was referred to me because she had concerns about pulmonary nodules that have been followed by Dr. Aranda since 2019 and are benign.  She has concerned that these thyroid cancer had spread to the lung nodules.  She also had some concern about the surgery and not feeling well.  She has had an extensive workup by endocrinology both through Dr. Diego as well as [facility] that included adrenal pituitary axes workup.  All of her workup to date has been benign no cause for concern.    She admits to feeling fatigued, feels as if she has nodules in her neck, is anxious.  She denied any problems with voice quality breathing or swallowing following her  thyroid surgery.      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     Generalized Anxiety Disorder     Hirsutism     Pelvic Pain     Menorrhagia     PTSD (post-traumatic stress disorder)     Diabetes mellitus treated with oral medication (H)     Moderate major depression (H)     Polycystic ovary syndrome     Type 2 diabetes mellitus (H)     Malignant neoplasm of thyroid gland (H)     S/P partial thyroidectomy     Benign essential hypertension     Migraine with aura and without status migrainosus, not intractable       Past Surgical History:   Procedure Laterality Date     ARTHROSCOPIC REPAIR ACL  2017     C LAP REMOVAL, SPERMATIC CORD LIPOMA  2018      SECTION  1996      SECTION  10/21/2008      SECTION  2006     HYSTERECTOMY  2019    Left ovarian cystectomy.     MAMMOGRAM - [facility] SCAN  2017     [facility]  DELIVERY ONLY      Description:  Section;  Recorded: 2011;       Allergies   Allergen Reactions     Adhesive Tape Rash, Blisters and Itching     Bee Venom Shortness Of Breath and Dizziness     Fainting       Medications reviewed in the EMR        Family History   Problem Relation Age of Onset     Alcoholism Mother      Allergic rhinitis Mother      Anemia Mother      Arthritis Mother      Cancer Mother      Depression Mother      Substance Abuse Mother      Emotional abuse Mother      Fibromyalgia Mother      Mental Illness Mother      Alcoholism Father      Depression Father      Diabetes Father      Substance Abuse Father         I personally reviewed the radiographic images laboratory data.  ASSESSMENT:   1. S/P partial thyroidectomy        PLAN:   I agree with me as planned that a 1.4 cm single nodule follicular variant of papillary thyroid carcinoma requires no further surgery and no further.   Regarding her pulmonary nodules I defer to the management with Dr. Cross her pulmonologist.  If she has any questions or concerns about her thyroid surgery or her thyroid hereafter I recommend she continue following up with [facility].  In my opinion the patient needs no further intervention for her small thyroid carcinoma    Shelli Griffith MD

## 2024-06-20 NOTE — TELEPHONE ENCOUNTER
Pt returned call; visit scheduled; discussed when to go to ER for symptoms. Pt will call back if questions.

## 2024-06-24 ENCOUNTER — ORDERS ONLY (AUTO-RELEASED) (OUTPATIENT)
Dept: FAMILY MEDICINE | Facility: CLINIC | Age: 44
End: 2024-06-24

## 2024-06-24 ENCOUNTER — OFFICE VISIT (OUTPATIENT)
Dept: FAMILY MEDICINE | Facility: CLINIC | Age: 44
End: 2024-06-24
Payer: COMMERCIAL

## 2024-06-24 VITALS
HEART RATE: 84 BPM | BODY MASS INDEX: 34.61 KG/M2 | OXYGEN SATURATION: 97 % | WEIGHT: 233.7 LBS | HEIGHT: 69 IN | DIASTOLIC BLOOD PRESSURE: 76 MMHG | SYSTOLIC BLOOD PRESSURE: 124 MMHG

## 2024-06-24 DIAGNOSIS — R00.2 PALPITATIONS: ICD-10-CM

## 2024-06-24 DIAGNOSIS — I10 BENIGN ESSENTIAL HYPERTENSION: ICD-10-CM

## 2024-06-24 DIAGNOSIS — R53.83 OTHER FATIGUE: ICD-10-CM

## 2024-06-24 DIAGNOSIS — C73 MALIGNANT NEOPLASM OF THYROID GLAND (H): Primary | ICD-10-CM

## 2024-06-24 DIAGNOSIS — Z79.4 TYPE 2 DIABETES MELLITUS WITH HYPERGLYCEMIA, WITH LONG-TERM CURRENT USE OF INSULIN (H): ICD-10-CM

## 2024-06-24 DIAGNOSIS — R22.1 NECK SWELLING: ICD-10-CM

## 2024-06-24 DIAGNOSIS — E11.65 TYPE 2 DIABETES MELLITUS WITH HYPERGLYCEMIA, WITH LONG-TERM CURRENT USE OF INSULIN (H): ICD-10-CM

## 2024-06-24 DIAGNOSIS — R49.0 HOARSENESS: ICD-10-CM

## 2024-06-24 PROCEDURE — 99214 OFFICE O/P EST MOD 30 MIN: CPT | Performed by: FAMILY MEDICINE

## 2024-06-24 RX ORDER — LOSARTAN POTASSIUM 25 MG/1
25 TABLET ORAL DAILY
Qty: 90 TABLET | Refills: 4 | Status: SHIPPED | OUTPATIENT
Start: 2024-06-24

## 2024-06-24 RX ORDER — ROSUVASTATIN CALCIUM 10 MG/1
10 TABLET, COATED ORAL DAILY
Qty: 90 TABLET | Refills: 4 | Status: SHIPPED | OUTPATIENT
Start: 2024-06-24

## 2024-06-24 NOTE — PROGRESS NOTES
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  Patient continues to be concerned about abnormal thyroid function.  She is on levothyroxine.  Recent TSH was within range.  She is returning for follow-up thyroid levels in July.  Will repeat an ultrasound of her remaining thyroid following her left thyroid lobectomy in August which is 6 months after her diagnosis.  - US Thyroid; Future    Hoarseness  Patient with worsening hoarseness, sensation of difficulty swallowing, enlarged papillae on the back of her tongue.  Notes progression of the symptoms.  Does not feel like she has had a thorough evaluation.  Reports that only evaluation has been having someone look in her mouth while her mouth is open.  Discussed options for ENT referral and she would like to get another opinion.  Referral placed.  - Adult ENT  Referral; Future    Benign essential hypertension  Blood pressures under excellent control, continue current medication  - losartan (COZAAR) 25 MG tablet; Take 1 tablet (25 mg) by mouth daily    Palpitations  Intermittent episodes of palpitations with flushing sensation and fatigue.  Unclear etiology.  EKG done at the emergency room recently was overall normal without arrhythmia or acute changes.  Will do Zio patch  - ZIO PATCH MAIL OUT; Future    Type 2 diabetes mellitus with hyperglycemia, with long-term current use of insulin (H)  Has been well-controlled on current regimen, tolerating Trulicity well, no changes at this time  - rosuvastatin (CRESTOR) 10 MG tablet; Take 1 tablet (10 mg) by mouth daily    Other fatigue  Fatigue likely is a combination of recent health issues, thyroid changes, diabetes mellitus, anxiety, but also reasonable to rule out cardiac contributing factors.  Zio patch is pending.  Patient notes intermittent episodes of low oxygen at  home but oxygen levels here are normal and exam is normal.  Reassurance provided  - ZIO PATCH MAIL OUT; Future    Neck swelling  Unclear etiology as patient notes it tends to fluctuate and not noticeable today.  Will plan on a 6-month follow-up ultrasound in August as ordered.  - US Thyroid; Future                Subjective   Kayla is a 44 year old, presenting for the following health issues:  Oxygen Levels (88% in the mornings - ) and EKG Results        6/24/2024     2:08 PM   Additional Questions   Roomed by Joanne ARELLANO CMA   Accompanied by None     History of Present Illness       Reason for visit:  Abnormal EKG, shortness of breath, low oxygen, confusion    She eats 2-3 servings of fruits and vegetables daily.She consumes 0 sweetened beverage(s) daily.She exercises with enough effort to increase her heart rate 30 to 60 minutes per day.  She exercises with enough effort to increase her heart rate 7 days per week.   She is taking medications regularly.    Patient with general feelings of malaise and fatigue.  Has not felt well since before her diagnosis of thyroid cancer.  She was found to have a very slow-growing cancer that behaves benign.  She did have a partial thyroidectomy and is on levothyroxine.  Her TSH levels have been within range.  Ongoing issues with hoarseness.  Notes sensation of globus with swallowing.  Feels like food gets stuck.  Papillae at the back of her tongue have felt enlarged.  She was trialed on a treatment for swish and swallow but it made symptoms worse.  This has been an ongoing issue since at least 2017 but continuing to get worse.    Fatigue has been more prominent.  She is working hard to get diabetes under better control.  Overall feels like that is making significant progress.    Has had episodes at home of low oxygen levels oxygen levels here today are normal.  Has felt very overwhelmed.  Has been more easily tearful.                Objective    /76   Pulse 84   Ht 1.74 m  "(5' 8.5\")   Wt 106 kg (233 lb 11.2 oz)   LMP 09/25/2019 (Within Days)   SpO2 97%   BMI 35.01 kg/m    Body mass index is 35.01 kg/m .  Physical Exam     Alert cooperative no acute distress  HEENT normocephalic atraumatic mucous membranes moist            Signed Electronically by: Agnieszka Ahn MD    "

## 2024-07-01 ENCOUNTER — PRE VISIT (OUTPATIENT)
Dept: OTOLARYNGOLOGY | Facility: CLINIC | Age: 44
End: 2024-07-01

## 2024-07-08 ENCOUNTER — MYC MEDICAL ADVICE (OUTPATIENT)
Dept: FAMILY MEDICINE | Facility: CLINIC | Age: 44
End: 2024-07-08
Payer: COMMERCIAL

## 2024-07-08 DIAGNOSIS — E11.8 DIABETES MELLITUS TYPE 2 WITH COMPLICATIONS (H): Primary | ICD-10-CM

## 2024-07-10 ENCOUNTER — TELEPHONE (OUTPATIENT)
Dept: FAMILY MEDICINE | Facility: CLINIC | Age: 44
End: 2024-07-10
Payer: COMMERCIAL

## 2024-07-10 DIAGNOSIS — E11.65 TYPE 2 DIABETES MELLITUS WITH HYPERGLYCEMIA, WITHOUT LONG-TERM CURRENT USE OF INSULIN (H): Primary | ICD-10-CM

## 2024-07-10 NOTE — TELEPHONE ENCOUNTER
Prior Authorization Retail Medication Request    Medication/Dose: tirzepatide (MOUNJARO) 2.5 MG/0.5ML pen  Diagnosis and ICD code (if different than what is on RX):    New/renewal/insurance change PA/secondary ins. PA:  Previously Tried and Failed:    Rationale:      Insurance   Primary:   Insurance ID:      Secondary (if applicable):  Insurance ID:      Pharmacy Information (if different than what is on RX)  Name:    Phone:    Fax:

## 2024-07-16 NOTE — TELEPHONE ENCOUNTER
PA Initiation    Medication: MOUNJARO 2.5 MG/0.5ML SC SOPN  Insurance Company: [place] Medicaid ([facility]) - Phone [phone] Fax [phone]  Pharmacy Filling the Rx: [facility] - [place], [place] - [address]  Filling Pharmacy Phone: [phone]  Filling Pharmacy Fax:    Start Date: 7/16/2024  Retail Pharmacy Prior Authorization Team   Phone: [phone]  FAXED PA FORM TO [facility] IN [place] FAX OK

## 2024-07-18 NOTE — TELEPHONE ENCOUNTER
FUTURE VISIT INFORMATION      FUTURE VISIT INFORMATION:  Date: 7/22/2024  Time: 7:45AM   [place] [facility] 4th floor   REFERRAL INFORMATION:  Referring provider:  Agnieszka Ahn MD   Referring providers clinic:  [facility]   Reason for visit/diagnosis  Hoarseness     RECORDS REQUESTED FROM:       Clinic name Comments Records Status Imaging Status    [place]- Thyroid biopsy 2/16/2024    [facility]- neg results     US Thyroid 2/8/2024, 1/9/2024 In PACS     OV with Dr. Ahn (Malignant Neoplasm of Thyroid gland) Hoarseness  In EPIC

## 2024-07-22 ENCOUNTER — OFFICE VISIT (OUTPATIENT)
Dept: OTOLARYNGOLOGY | Facility: CLINIC | Age: 44
End: 2024-07-22
Payer: COMMERCIAL

## 2024-07-22 ENCOUNTER — TELEPHONE (OUTPATIENT)
Dept: OTOLARYNGOLOGY | Facility: CLINIC | Age: 44
End: 2024-07-22

## 2024-07-22 ENCOUNTER — PRE VISIT (OUTPATIENT)
Dept: OTOLARYNGOLOGY | Facility: CLINIC | Age: 44
End: 2024-07-22

## 2024-07-22 ENCOUNTER — MYC MEDICAL ADVICE (OUTPATIENT)
Dept: OTOLARYNGOLOGY | Facility: CLINIC | Age: 44
End: 2024-07-22

## 2024-07-22 VITALS
HEART RATE: 81 BPM | HEIGHT: 68 IN | SYSTOLIC BLOOD PRESSURE: 140 MMHG | WEIGHT: 230 LBS | DIASTOLIC BLOOD PRESSURE: 88 MMHG | BODY MASS INDEX: 34.86 KG/M2

## 2024-07-22 DIAGNOSIS — R49.0 HOARSENESS: ICD-10-CM

## 2024-07-22 DIAGNOSIS — K14.8 LESION OF TONGUE: ICD-10-CM

## 2024-07-22 DIAGNOSIS — E89.0 S/P PARTIAL THYROIDECTOMY: Primary | ICD-10-CM

## 2024-07-22 LAB
LAB DIRECTOR COMMENTS: NORMAL
LAB DIRECTOR DISCLAIMER: NORMAL
LAB DIRECTOR INTERPRETATION: NORMAL
LAB DIRECTOR METHODOLOGY: NORMAL
LAB DIRECTOR RESULTS: NORMAL
SPECIMEN DESCRIPTION: NORMAL

## 2024-07-22 PROCEDURE — 99214 OFFICE O/P EST MOD 30 MIN: CPT | Mod: 25 | Performed by: PHYSICIAN ASSISTANT

## 2024-07-22 PROCEDURE — 88305 TISSUE EXAM BY PATHOLOGIST: CPT | Mod: 26 | Performed by: STUDENT IN AN ORGANIZED HEALTH CARE EDUCATION/TRAINING PROGRAM

## 2024-07-22 PROCEDURE — 87529 HSV DNA AMP PROBE: CPT | Performed by: PHYSICIAN ASSISTANT

## 2024-07-22 PROCEDURE — 87624 HPV HI-RISK TYP POOLED RSLT: CPT | Performed by: PHYSICIAN ASSISTANT

## 2024-07-22 PROCEDURE — 31575 DIAGNOSTIC LARYNGOSCOPY: CPT | Mod: 51 | Performed by: PHYSICIAN ASSISTANT

## 2024-07-22 PROCEDURE — 88364 INSITU HYBRIDIZATION (FISH): CPT | Mod: 26 | Performed by: STUDENT IN AN ORGANIZED HEALTH CARE EDUCATION/TRAINING PROGRAM

## 2024-07-22 PROCEDURE — G0452 MOLECULAR PATHOLOGY INTERPR: HCPCS | Mod: 26 | Performed by: PATHOLOGY

## 2024-07-22 PROCEDURE — 88365 INSITU HYBRIDIZATION (FISH): CPT | Mod: 26 | Performed by: STUDENT IN AN ORGANIZED HEALTH CARE EDUCATION/TRAINING PROGRAM

## 2024-07-22 PROCEDURE — 88342 IMHCHEM/IMCYTCHM 1ST ANTB: CPT | Mod: 26 | Performed by: STUDENT IN AN ORGANIZED HEALTH CARE EDUCATION/TRAINING PROGRAM

## 2024-07-22 PROCEDURE — 87075 CULTR BACTERIA EXCEPT BLOOD: CPT | Performed by: PHYSICIAN ASSISTANT

## 2024-07-22 PROCEDURE — 99000 SPECIMEN HANDLING OFFICE-LAB: CPT | Performed by: PATHOLOGY

## 2024-07-22 PROCEDURE — 87188 SC STD MACROBROTH DIL METH: CPT | Performed by: PHYSICIAN ASSISTANT

## 2024-07-22 PROCEDURE — 88341 IMHCHEM/IMCYTCHM EA ADD ANTB: CPT | Mod: 26 | Performed by: STUDENT IN AN ORGANIZED HEALTH CARE EDUCATION/TRAINING PROGRAM

## 2024-07-22 PROCEDURE — 11106 INCAL BX SKN SINGLE LES: CPT | Performed by: PHYSICIAN ASSISTANT

## 2024-07-22 PROCEDURE — 87070 CULTURE OTHR SPECIMN AEROBIC: CPT | Performed by: PHYSICIAN ASSISTANT

## 2024-07-22 PROCEDURE — 88312 SPECIAL STAINS GROUP 1: CPT | Mod: 26 | Performed by: STUDENT IN AN ORGANIZED HEALTH CARE EDUCATION/TRAINING PROGRAM

## 2024-07-22 PROCEDURE — 87102 FUNGUS ISOLATION CULTURE: CPT | Performed by: PHYSICIAN ASSISTANT

## 2024-07-22 PROCEDURE — 88360 TUMOR IMMUNOHISTOCHEM/MANUAL: CPT | Mod: 26 | Performed by: STUDENT IN AN ORGANIZED HEALTH CARE EDUCATION/TRAINING PROGRAM

## 2024-07-22 PROCEDURE — 88342 IMHCHEM/IMCYTCHM 1ST ANTB: CPT | Mod: TC | Performed by: PHYSICIAN ASSISTANT

## 2024-07-22 NOTE — LETTER
7/22/2024       RE: Shannan Cameron  [address]  [place] [address]     Dear Colleague,    Thank you for referring your patient, Shannan Cameron, to the [facility] EAR NOSE AND THROAT CLINIC [place] at [facility]. Please see a copy of my visit note below.      [facility] EAR NOSE AND THROAT CLINIC [address]  4TH FLOOR  [facility] [address]  Phone: [phone]  Fax: [phone]    Patient:  Shannan Cameron, Date of birth 1980  Date of Visit:  07/22/2024  Referring Provider Referred Self      Assessment & Plan     S/P partial thyroidectomy  Hoarseness  Scope exam is reassuring. Obtained topical cultures from tongue and will communicate results to patient.    - Fungal or Yeast Culture Routine  - CT Soft tissue neck w contrast  - LARYNGOSCOPY FLEX FIBEROPTIC, DIAGNOSTIC    Lesion of tongue  Lesions could certainly be inflamed taste buds but they are significantly enlarged. Biopsies taken for path and bacterial/fungal cultures. Results will be communicated with patient and next steps decided at that time.      - Anaerobic Bacterial Culture Routine  - Swab Aerobic Bacterial Culture Routine Without Gram Stain  - Fungal or Yeast Culture Routine  - Herpes Simplex Virus 1&2 by PCR  - HPV Scr w REF to Nadia Anal PAP or Tissue  - Surgical Pathology Exam  - Fungal or Yeast Culture Routine  - LARYNGOSCOPY FLEX FIBEROPTIC, DIAGNOSTIC  - NH INCISIONAL BIOPSY OF SKIN, FIRST/SINGLE LESION          Review of external notes as documented elsewhere in note  45 minutes spent by me on the date of the encounter doing chart review, history and exam, documentation and further activities per the note    History of Present Illness    Pertinent history obtain from: chart review and patient    Patient has a history of left thyroid lobectomy in May 2024 which came back as papillary thyroid carcinoma.  Per primary note on 6/24/2024 patient reported  "difficulty swallowing and large lumps on the back of her tongue.  She also discussed neck swelling in the current plan with her primary care provider is a repeat ultrasound in 6 months (August).  Patient reports hoarseness feels better this week.  She noticed the nodules on the right back of her tongue in January.  She is initially concerning for EBV.  She saw Dr. Toney in January of this year where he prescribed Peridex for enlarged papillae.  Patient feels she developed black hairy tongue from this and stopped.  Patient reports she now feels nodules on the left side of her tongue as well.  She also mentions throat swelling under her mandible.  This is increased that she cannot turn her neck without it feeling tight.  She also reports starting to spit up blood and pain with swallowing.  Of note her diabetes is uncontrolled and she has been working on this since December 2023.  She does have a significant history of smoking but stopped in November of last year.  She reports choking when eating and drinking and spice agitates her tongue lesions.      PHYSICAL EXAM:  BP (!) 140/88 (BP Location: Right arm, Patient Position: Sitting, Cuff Size: Adult Large)   Pulse 81   Ht 1.727 m (5' 8\")   Wt 104.3 kg (230 lb)   LMP 09/25/2019 (Within Days)   BMI 34.97 kg/m    Constitutional:  The patient was unaccompanied, well-groomed, and in no acute distress.     Skin: Normal:  warm and pink without rash    Neurologic: Alert and oriented x 3.  Voice normal.    Psychiatric: The patient's affect was calm, cooperative, and appropriate.     Communication:  Normal; communicates verbally, normal voice quality.    Respiratory: Breathing comfortably without stridor or exertion of accessory muscles.    Head/Face:  Normocephalic and atraumatic.  No lesions or scars.    Eyes: Extraocular movement intact. Clear sclera.   Ears: Pinnae and tragus non-tender. No external deformity, hearing normal to conversational voice    Nose: No anterior " drainage, no external deformity   Oral Cavity: Normal tongue, adequate dentition, moist.   Neck: Supple with normal laryngeal and tracheal landmarks. Decreased rotational range of motion. There is firm swelling in the submandibular space.          FIBEROPTIC LARYNGOSCOPY:  Due to hoarseness, dysphagia, and tongue lesions, fiberoptic laryngoscopy was indicated. After obtaining verbal consent, the nose was topically decongested and anesthetized. The fiberoptic laryngoscope was passed under endoscopic vision through the right nasal passage. The turbinates were normal. The inferior and middle meati were clear bilaterally without purulence, masses, or polyps. The nasopharynx was clear. The eustachian tubes were clear. The soft palate appeared normal with good mobility. The epiglottis was sharp, and the visualized portion of the vallecula was clear. The larynx was clear with mobile cords. The arytenoids were clear, and there was no pooling in the hypopharynx. No abnormal lesions or areas of bleeding.    PROCEDURE:  After obtaining informed consent from Ms. Cameron, we put Ms. Cameron in a comfortable position.  I injected the biopsy site with 1% lidocaine.  Once anesthesia was ascertained, I used a cup forceps to sample the abnormal tissue on the right posterolateral tongue.  3 samples were taken and sent for permanent pathology, tissue culture and HPV.  Hemostasis was easily assured with pressure.  The patient tolerated the procedure well.        Most recent imaging:   EXAM: CT NECK SOFT TISSUE W IV CONT   LOCATION: [facility]   DATE/TIME: 7/17/2021  IMPRESSION:   1.  Prominent lucency surrounds roots of tooth #31 without definite evidence of adjacent drainable fluid collection.   2.  Mildly enlarged and mildly heterogeneous submandibular glands bilaterally with slight inflammatory changes in proximity. This is nonspecific, suggestive of an inflammatory/infectious process.     Irma Negron PA-C    Otolaryngology-Head & Neck Surgery               Again, thank you for allowing me to participate in the care of your patient.      Sincerely,    Irma Negron PA-C

## 2024-07-22 NOTE — TELEPHONE ENCOUNTER
Left Voicemail (1st Attempt) for the patient to call back and schedule the following:    Appointment type: CT of Neck and Cultures/Biopsies  Provider: Irma Negron  Return date: Patients convenience  Specialty phone number: [phone]  Additional appointment(s) needed: CT of Neck and Cultures/Biopsies  Additonal Notes: No

## 2024-07-22 NOTE — TELEPHONE ENCOUNTER
Anaerobic culture was not going to be run. Provider was ok with this.No further questions.    Valerie Miles LPN

## 2024-07-22 NOTE — PATIENT INSTRUCTIONS
You were seen in the ENT Clinic today by Irma Negron. If you have any questions or concerns after your appointment, please contact us (see below)    The following has been recommended for you based upon your appointment today:  CT neck  Cultures/biopsies- will call with results      How to Contact Us:  Send a [employer] Connect message to your provider. Our team will respond to you via [employer] Connect. Occasionally, we will need to call you to get further information.  For urgent matters (Monday-Friday), call the ENT Clinic: [phone] and speak with a call center team member - they will route your call appropriately.   If you'd like to speak directly with a nurse, please find our contact information below. We do our best to check voicemail frequently throughout the day, and will work to call you back within 1-2 days. For urgent matters, please use the general clinic phone numbers listed above.      Valerie PORTER LPN  Direct: [phone]

## 2024-07-22 NOTE — PROGRESS NOTES
[facility] EAR NOSE AND THROAT CLINIC [address] [address]  Phone: [phone]  Fax: [phone]    Patient:  Shannan Cameron, Date of birth 1980  Date of Visit:  07/22/2024  Referring Provider Referred Self      Assessment & Plan      S/P partial thyroidectomy  Hoarseness  Scope exam is reassuring. Obtained topical cultures from tongue and will communicate results to patient.    - Fungal or Yeast Culture Routine  - CT Soft tissue neck w contrast  - LARYNGOSCOPY FLEX FIBEROPTIC, DIAGNOSTIC    Lesion of tongue  Lesions could certainly be inflamed taste buds but they are significantly enlarged. Biopsies taken for path and bacterial/fungal cultures. Results will be communicated with patient and next steps decided at that time.      - Anaerobic Bacterial Culture Routine  - Swab Aerobic Bacterial Culture Routine Without Gram Stain  - Fungal or Yeast Culture Routine  - Herpes Simplex Virus 1&2 by PCR  - HPV Scr w REF to Nadia Anal PAP or Tissue  - Surgical Pathology Exam  - Fungal or Yeast Culture Routine  - LARYNGOSCOPY FLEX FIBEROPTIC, DIAGNOSTIC  - ME INCISIONAL BIOPSY OF SKIN, FIRST/SINGLE LESION          Review of external notes as documented elsewhere in note  45 minutes spent by me on the date of the encounter doing chart review, history and exam, documentation and further activities per the note    History of Present Illness     Pertinent history obtain from: chart review and patient    Patient has a history of left thyroid lobectomy in May 2024 which came back as papillary thyroid carcinoma.  Per primary note on 6/24/2024 patient reported difficulty swallowing and large lumps on the back of her tongue.  She also discussed neck swelling in the current plan with her primary care provider is a repeat ultrasound in 6 months (August).  Patient reports hoarseness feels better this week.  She noticed the nodules on the right back of her tongue in January.   "She is initially concerning for EBV.  She saw Dr. Toney in January of this year where he prescribed Peridex for enlarged papillae.  Patient feels she developed black hairy tongue from this and stopped.  Patient reports she now feels nodules on the left side of her tongue as well.  She also mentions throat swelling under her mandible.  This is increased that she cannot turn her neck without it feeling tight.  She also reports starting to spit up blood and pain with swallowing.  Of note her diabetes is uncontrolled and she has been working on this since December 2023.  She does have a significant history of smoking but stopped in November of last year.  She reports choking when eating and drinking and spice agitates her tongue lesions.      PHYSICAL EXAM:  BP (!) 140/88 (BP Location: Right arm, Patient Position: Sitting, Cuff Size: Adult Large)   Pulse 81   Ht 1.727 m (5' 8\")   Wt 104.3 kg (230 lb)   LMP 09/25/2019 (Within Days)   BMI 34.97 kg/m    Constitutional:  The patient was unaccompanied, well-groomed, and in no acute distress.     Skin: Normal:  warm and pink without rash    Neurologic: Alert and oriented x 3.  Voice normal.    Psychiatric: The patient's affect was calm, cooperative, and appropriate.     Communication:  Normal; communicates verbally, normal voice quality.    Respiratory: Breathing comfortably without stridor or exertion of accessory muscles.    Head/Face:  Normocephalic and atraumatic.  No lesions or scars.    Eyes: Extraocular movement intact. Clear sclera.   Ears: Pinnae and tragus non-tender. No external deformity, hearing normal to conversational voice    Nose: No anterior drainage, no external deformity   Oral Cavity: Normal tongue, adequate dentition, moist.   Neck: Supple with normal laryngeal and tracheal landmarks. Decreased rotational range of motion. There is firm swelling in the submandibular space.          FIBEROPTIC LARYNGOSCOPY:  Due to hoarseness, dysphagia, and tongue " lesions, fiberoptic laryngoscopy was indicated. After obtaining verbal consent, the nose was topically decongested and anesthetized. The fiberoptic laryngoscope was passed under endoscopic vision through the right nasal passage. The turbinates were normal. The inferior and middle meati were clear bilaterally without purulence, masses, or polyps. The nasopharynx was clear. The eustachian tubes were clear. The soft palate appeared normal with good mobility. The epiglottis was sharp, and the visualized portion of the vallecula was clear. The larynx was clear with mobile cords. The arytenoids were clear, and there was no pooling in the hypopharynx. No abnormal lesions or areas of bleeding.    PROCEDURE:  After obtaining informed consent from Ms. Cameron, we put Ms. Cameron in a comfortable position.  I injected the biopsy site with 1% lidocaine.  Once anesthesia was ascertained, I used a cup forceps to sample the abnormal tissue on the right posterolateral tongue.  3 samples were taken and sent for permanent pathology, tissue culture and HPV.  Hemostasis was easily assured with pressure.  The patient tolerated the procedure well.        Most recent imaging:   EXAM: CT NECK SOFT TISSUE W IV CONT   LOCATION: [facility]   DATE/TIME: 7/17/2021  IMPRESSION:   1.  Prominent lucency surrounds roots of tooth #31 without definite evidence of adjacent drainable fluid collection.   2.  Mildly enlarged and mildly heterogeneous submandibular glands bilaterally with slight inflammatory changes in proximity. This is nonspecific, suggestive of an inflammatory/infectious process.     Irma Negron PA-C   Otolaryngology-Head & Neck Surgery

## 2024-07-22 NOTE — TELEPHONE ENCOUNTER
[facility] Call Center    Phone Message    May a detailed message be left on voicemail: yes     Reason for Call: Other: Lab calling with questions regarding a culture. Please call to discuss. Thanks.     Action Taken: Other: ENT    Travel Screening: Not Applicable     Date of Service:

## 2024-07-23 ENCOUNTER — TELEPHONE (OUTPATIENT)
Dept: OTOLARYNGOLOGY | Facility: CLINIC | Age: 44
End: 2024-07-23
Payer: COMMERCIAL

## 2024-07-23 LAB
HSV1 DNA SPEC QL NAA+PROBE: NOT DETECTED
HSV2 DNA SPEC QL NAA+PROBE: NOT DETECTED

## 2024-07-24 ENCOUNTER — MYC MEDICAL ADVICE (OUTPATIENT)
Dept: FAMILY MEDICINE | Facility: CLINIC | Age: 44
End: 2024-07-24
Payer: COMMERCIAL

## 2024-07-24 LAB — BACTERIA SPEC CULT: NORMAL

## 2024-07-25 ENCOUNTER — TELEPHONE (OUTPATIENT)
Dept: FAMILY MEDICINE | Facility: CLINIC | Age: 44
End: 2024-07-25
Payer: COMMERCIAL

## 2024-07-25 DIAGNOSIS — G43.109 MIGRAINE WITH AURA AND WITHOUT STATUS MIGRAINOSUS, NOT INTRACTABLE: Primary | ICD-10-CM

## 2024-07-25 NOTE — TELEPHONE ENCOUNTER
Patient spoke with Kim, neurology referral will be placed and signed by Dr. Wilson further imaging requested by Pulmonology will need to be ordered from their office. Patient verbalized understanding.

## 2024-07-25 NOTE — TELEPHONE ENCOUNTER
Pt following up on status of PA. Pt states their pharmacy told them their PA was denied.    Also noted on 07/25/2024 telephone encounter.    Evangelina Wooten RN

## 2024-07-25 NOTE — TELEPHONE ENCOUNTER
Patient called stating she is scheduled for CT of Neck on 7/29/24.  Per patient, Pulmonologist wanted repeat Chest CT done that first week of August. Advised patient to contact Pulmonology to request this order be entered so she can complete this on 7/29/24.    Patient also requesting CT of head following discussion with Jose regarding Migraine headaches. She states she saw Ophthalmology who recommended Neuro as the next step.    Discussed with Dr. Wilson who was agreeable to  referring to Neuro but declined to enter imaging order as they may want to have an MR of head instead of CT.     Patient informed and expressed understanding.

## 2024-07-25 NOTE — TELEPHONE ENCOUNTER
S-(situation):   Pt reporting they were contacted by their pharmacy stating their PA was denied for their Mounjaro.     B-(background):   07/10/2024: PA submitted for Mounjaro. No on status in encounter.    A-(assessment):   Pt notes it was denied because pt was not on the highest dose of Trulicity 4.5 mg for 4 months or longer to see if they benefit from this medication prior to trying Mounjaro. Insurance is recommending pt try Ozempic instead of Mounjaro. Pt would like PCP input on Ozempic instead of Mounjaro.     R-(recommendations):   Pt would like PCP and RD input on Ozempic instead of Mounjaro to best balance BG.     Writer sent message on 07/10/2024 PA encounter to PA team requesting status update.    Evangelina Wooten RN

## 2024-07-26 LAB — BACTERIA SPEC CULT: NO GROWTH

## 2024-07-26 NOTE — TELEPHONE ENCOUNTER
PRIOR AUTHORIZATION DENIED    Medication: MOUNJARO 2.5 MG/0.5ML SC SOPN  Insurance Company: [place] [employer] [facility] [employer] - Phone [phone] Fax [phone]  Denial Date: 7/24/2024  Denial Reason(s):     Appeal Information:     Patient Notified: The clinic should notify the patient of the denial.

## 2024-07-26 NOTE — TELEPHONE ENCOUNTER
PRIOR AUTHORIZATION DENIED    Medication: MOUNJARO 2.5 MG/0.5ML SC SOPN  Insurance Company: [place] Medicaid [facility] [employer]) - Phone [phone] Fax [phone]  Denial Date:    Denial Reason(s):   Appeal Information:   Patient Notified:

## 2024-07-28 ENCOUNTER — HEALTH MAINTENANCE LETTER (OUTPATIENT)
Age: 44
End: 2024-07-28

## 2024-07-29 ENCOUNTER — HOSPITAL ENCOUNTER (OUTPATIENT)
Dept: CT IMAGING | Facility: HOSPITAL | Age: 44
Discharge: HOME | End: 2024-07-29
Attending: PHYSICIAN ASSISTANT | Admitting: PHYSICIAN ASSISTANT
Payer: COMMERCIAL

## 2024-07-29 DIAGNOSIS — R49.0 HOARSENESS: ICD-10-CM

## 2024-07-29 LAB
CREAT BLD-MCNC: 0.4 MG/DL (ref 0.6–1.1)
EGFRCR SERPLBLD CKD-EPI 2021: >60 ML/MIN/1.73M2
PATH REPORT.ADDENDUM SPEC: NORMAL
PATH REPORT.COMMENTS IMP SPEC: NORMAL
PATH REPORT.FINAL DX SPEC: NORMAL
PATH REPORT.GROSS SPEC: NORMAL
PATH REPORT.MICROSCOPIC SPEC OTHER STN: NORMAL
PATH REPORT.RELEVANT HX SPEC: NORMAL
PHOTO IMAGE: NORMAL

## 2024-07-29 PROCEDURE — 70491 CT SOFT TISSUE NECK W/DYE: CPT

## 2024-07-29 PROCEDURE — 999N000248 HC STATISTIC IV INSERT WITH US BY RN

## 2024-07-29 PROCEDURE — 250N000011 HC RX IP 250 OP 636: Performed by: PHYSICIAN ASSISTANT

## 2024-07-29 PROCEDURE — 82565 ASSAY OF CREATININE: CPT

## 2024-07-29 RX ORDER — IOPAMIDOL 755 MG/ML
90 INJECTION, SOLUTION INTRAVASCULAR ONCE
Status: COMPLETED | OUTPATIENT
Start: 2024-07-29 | End: 2024-07-29

## 2024-07-29 RX ADMIN — IOPAMIDOL 90 ML: 755 INJECTION, SOLUTION INTRAVENOUS at 16:18

## 2024-08-05 ENCOUNTER — HOSPITAL ENCOUNTER (OUTPATIENT)
Dept: ULTRASOUND IMAGING | Facility: CLINIC | Age: 44
Discharge: HOME | End: 2024-08-05
Attending: FAMILY MEDICINE | Admitting: FAMILY MEDICINE
Payer: COMMERCIAL

## 2024-08-05 DIAGNOSIS — C73 MALIGNANT NEOPLASM OF THYROID GLAND (H): ICD-10-CM

## 2024-08-05 DIAGNOSIS — R22.1 NECK SWELLING: ICD-10-CM

## 2024-08-05 PROCEDURE — 76536 US EXAM OF HEAD AND NECK: CPT

## 2024-08-05 NOTE — TELEPHONE ENCOUNTER
Sending to you in case there is something else you think I should do to get Mounjaro covered for her.

## 2024-08-06 ASSESSMENT — PATIENT HEALTH QUESTIONNAIRE - PHQ9
SUM OF ALL RESPONSES TO PHQ QUESTIONS 1-9: 16
10. IF YOU CHECKED OFF ANY PROBLEMS, HOW DIFFICULT HAVE THESE PROBLEMS MADE IT FOR YOU TO DO YOUR WORK, TAKE CARE OF THINGS AT HOME, OR GET ALONG WITH OTHER PEOPLE: VERY DIFFICULT
SUM OF ALL RESPONSES TO PHQ QUESTIONS 1-9: 16

## 2024-08-07 ENCOUNTER — OFFICE VISIT (OUTPATIENT)
Dept: FAMILY MEDICINE | Facility: CLINIC | Age: 44
End: 2024-08-07
Payer: COMMERCIAL

## 2024-08-07 VITALS
DIASTOLIC BLOOD PRESSURE: 93 MMHG | OXYGEN SATURATION: 97 % | TEMPERATURE: 97.9 F | WEIGHT: 228.2 LBS | HEART RATE: 75 BPM | HEIGHT: 68 IN | RESPIRATION RATE: 16 BRPM | SYSTOLIC BLOOD PRESSURE: 142 MMHG | BODY MASS INDEX: 34.59 KG/M2

## 2024-08-07 DIAGNOSIS — E83.52 HYPERCALCEMIA: ICD-10-CM

## 2024-08-07 DIAGNOSIS — Z79.84 DIABETES MELLITUS TREATED WITH ORAL MEDICATION (H): ICD-10-CM

## 2024-08-07 DIAGNOSIS — E11.9 DIABETES MELLITUS TREATED WITH ORAL MEDICATION (H): ICD-10-CM

## 2024-08-07 DIAGNOSIS — F43.10 PTSD (POST-TRAUMATIC STRESS DISORDER): ICD-10-CM

## 2024-08-07 DIAGNOSIS — G43.009 MIGRAINE WITHOUT AURA AND WITHOUT STATUS MIGRAINOSUS, NOT INTRACTABLE: Primary | ICD-10-CM

## 2024-08-07 DIAGNOSIS — E89.0 S/P PARTIAL THYROIDECTOMY: ICD-10-CM

## 2024-08-07 LAB
ANION GAP SERPL CALCULATED.3IONS-SCNC: 12 MMOL/L (ref 7–15)
BUN SERPL-MCNC: 12.7 MG/DL (ref 6–20)
CALCIUM SERPL-MCNC: 9.6 MG/DL (ref 8.8–10.4)
CHLORIDE SERPL-SCNC: 99 MMOL/L (ref 98–107)
CREAT SERPL-MCNC: 0.58 MG/DL (ref 0.51–0.95)
EGFRCR SERPLBLD CKD-EPI 2021: >90 ML/MIN/1.73M2
GLUCOSE SERPL-MCNC: 180 MG/DL (ref 70–99)
HBA1C MFR BLD: 8.9 % (ref 0–5.6)
HCO3 SERPL-SCNC: 25 MMOL/L (ref 22–29)
POTASSIUM SERPL-SCNC: 4 MMOL/L (ref 3.4–5.3)
SODIUM SERPL-SCNC: 136 MMOL/L (ref 135–145)
T3FREE SERPL-MCNC: 2.8 PG/ML (ref 2–4.4)
T4 FREE SERPL-MCNC: 1.19 NG/DL (ref 0.9–1.7)
TSH SERPL DL<=0.005 MIU/L-ACNC: 1.22 UIU/ML (ref 0.3–4.2)

## 2024-08-07 PROCEDURE — 80048 BASIC METABOLIC PNL TOTAL CA: CPT

## 2024-08-07 PROCEDURE — 99214 OFFICE O/P EST MOD 30 MIN: CPT

## 2024-08-07 PROCEDURE — 99000 SPECIMEN HANDLING OFFICE-LAB: CPT

## 2024-08-07 PROCEDURE — 84481 FREE ASSAY (FT-3): CPT

## 2024-08-07 PROCEDURE — 84432 ASSAY OF THYROGLOBULIN: CPT | Mod: 90

## 2024-08-07 PROCEDURE — 36415 COLL VENOUS BLD VENIPUNCTURE: CPT

## 2024-08-07 PROCEDURE — 86800 THYROGLOBULIN ANTIBODY: CPT | Mod: 90

## 2024-08-07 PROCEDURE — 83036 HEMOGLOBIN GLYCOSYLATED A1C: CPT

## 2024-08-07 PROCEDURE — 84439 ASSAY OF FREE THYROXINE: CPT

## 2024-08-07 PROCEDURE — 84443 ASSAY THYROID STIM HORMONE: CPT

## 2024-08-07 RX ORDER — TOPIRAMATE 25 MG/1
TABLET, FILM COATED ORAL
Qty: 107 TABLET | Refills: 0 | Status: SHIPPED | OUTPATIENT
Start: 2024-08-07 | End: 2024-10-03

## 2024-08-07 ASSESSMENT — ENCOUNTER SYMPTOMS: HEADACHES: 1

## 2024-08-07 NOTE — PROGRESS NOTES
Assessment & Plan     Migraine without aura and without status migrainosus, not intractable  Patient in clinic for prolonged headaches, most current episode has been approximately 4 weeks.  She does have triptan medication originally aborted headache approximately 1 year ago but with this episode has somewhat lessened symptoms but has not resolved them.  Discussed a maintenance medication to see if this helps to improve symptoms.  Discussed topiramate and propranolol initially.  Patient has taken propranolol in the past and would like to try topiramate.  Discussed side effects of this medication and patient to notify clinic if not improving.  - topiramate (TOPAMAX) 25 MG tablet; Take 1 tablet (25 mg) by mouth daily for 7 days, THEN 2 tablets (50 mg) daily for 50 days.    Diabetes mellitus treated with oral medication (H)  Will recheck hemoglobin A1c, patient was to start taking Mounjaro but was not covered by insurance and instead was switched to Ozempic.  Patient has some questions about this medication which were answered.  Patient plans to start medication.  - HEMOGLOBIN A1C; Future  - HEMOGLOBIN A1C    PTSD (post-traumatic stress disorder)  Patient would like referral for mental health professional with experience and/or specialty and see PTSD.  She thinks that a psychotherapist is recommended retreatment if this but is unsure.  Will place mental health referral with request for someone who can treat see PTSD.  - Adult Mental Health  Referral; Future    Hypercalcemia  Chronic hypercalcemia, reports has had previous normal PTH.  If continues to be high should follow-up with primary care provider and if desired referred to endocrinology.  Patient reports was previously seen by endocrinology and was referred back to primary care and was told by primary care provider that they felt comfortable managing  - Basic metabolic panel  (Ca, Cl, CO2, Creat, Gluc, K, Na, BUN); Future  - Basic metabolic panel  (Ca,  "Cl, CO2, Creat, Gluc, K, Na, BUN)    S/P partial thyroidectomy  Will recheck thyroid function today.  - TSH  - T3, Free  - T4, free          Depression Screening Follow Up        8/6/2024    10:49 PM   PHQ   PHQ-9 Total Score 16   Q9: Thoughts of better off dead/self-harm past 2 weeks Several days   F/U: Thoughts of suicide or self-harm Yes   F/U: Self harm-plan No   F/U: Self-harm action No   F/U: Safety concerns No                     Follow Up Actions Taken  Crisis resource information provided in the After Visit Summary  Mental Health Referral placed    Discussed the following ways the patient can remain in a safe environment:  be around others        Jessica Garza is a 44 year old, presenting for the following health issues:  Headache (Migraines x 1 month )        8/7/2024     9:29 AM   Additional Questions   Roomed by QUAN Rodriguez   Accompanied by SonJean Pierre     When first started getting migraines last fall it lasted for 10 weeks. Saw provider here and was referred to eye doctor and was cleared there. She was then referred to neurology and has plans to see them in Sept. She is having difficulty reading  due to \"floaties in her vision.\" She also reports that she is having difficulty with contrast, and shadows make things difficult to see. She is having difficulty seeing detail in a picture find game that she used to be able to do.     She was given medication for migraine which helped in March. She got another one around May that was less receptive to medication. Now has a migraine that has lasted for approx 4 weeks. This episode came on suddenly and was debilitated and unable to move from floor. She took rescue medication and took 3 tablets. Symptoms on left frontal and to posterior next. Patient gets blurry vision, spots and floaters. She can't watch tv or go on phone during that time. She sees movement in peripheral vision that is not there. Does not have any aura, but pain in neck does often " "precipitate episodes. Does get some nausea with headaches also     Mood has improved after start of levothyroxine. Did have some suicidal ideation approx 4 years ago and had a plan but does not have a plan at this time.     History of Present Illness       Diabetes:   She presents for follow up of diabetes.   She is checking home blood glucose with a continuous glucose monitor.   She checks blood glucose before and after meals and at bedtime.  Blood glucose is sometimes over 200 and sometimes under 70. She is aware of hypoglycemia symptoms including other.   She is concerned about blood sugar frequently over 200 and low blood sugar, several less than 70 in the past few weeks.   She is having numbness in feet, excessive thirst and blurry vision.            Hypothyroidism:     Since last visit, patient describes the following symptoms::  Anxiety, Constipation, Dry skin, Fatigue, Hair loss, Loose stools and Tremors    Headaches:   Since the patient's last clinic visit, headaches are: worsened  The patient is getting headaches:  Constant headache with manageable pain currently-with continued vision distortion.  Despite medication i suffered Severe pain and vision loss for over three weeks.  She is not able to do normal daily activities when she has a migraine.  The patient is taking the following rescue/relief medications:  Other   Patient states \"The relief is inconsistent\" from the rescue/relief medications.   The patient is taking the following medications to prevent migraines:  No medications to prevent migraines  In the past 4 weeks, the patient has gone to an Urgent Care or Emergency Room 0 times times due to headaches.    She eats 4 or more servings of fruits and vegetables daily.She consumes 0 sweetened beverage(s) daily.She exercises with enough effort to increase her heart rate 30 to 60 minutes per day.  She exercises with enough effort to increase her heart rate 4 days per week.   She is taking medications " "regularly.         Migraine   Since your last clinic visit, how have your headaches changed?  Worsened  How often are you getting headaches or migraines? Every few months   Are you able to do normal daily activities when you have a migraine? No  Are you taking rescue/relief medications? (Select all that apply) sumatriptan (Imitrex)  How helpful is your rescue/relief medication?  I get only a small amount of relief  Are you taking any medications to prevent migraines? (Select all that apply)  No and Other: Has taken propanolol in the past for anxiety  In the past 4 weeks, how often have you gone to urgent care or the emergency room because of your headaches?  0  How many servings of fruits and vegetables do you eat daily?  2-3  On average, how many sweetened beverages do you drink each day (Examples: soda, juice, sweet tea, etc.  Do NOT count diet or artificially sweetened beverages)?   0  How many days per week do you exercise enough to make your heart beat faster? 6  How many minutes a day do you exercise enough to make your heart beat faster? 30 - 60  How many days per week do you miss taking your medication? 0            Objective    BP (!) 142/93 (BP Location: Right arm, Patient Position: Sitting, Cuff Size: Adult Large)   Pulse 75   Temp 97.9  F (36.6  C) (Oral)   Resp 16   Ht 1.727 m (5' 8\")   Wt 103.5 kg (228 lb 3.2 oz)   LMP 09/25/2019 (Within Days)   SpO2 97%   BMI 34.70 kg/m    Body mass index is 34.7 kg/m .  Physical Exam   GENERAL: alert and no distress  NECK: no adenopathy, no asymmetry, masses, or scars  RESP: lungs clear to auscultation - no rales, rhonchi or wheezes  CV: regular rate and rhythm, normal S1 S2, no S3 or S4, no murmur, click or rub, no peripheral edema  ABDOMEN: soft, nontender, no hepatosplenomegaly, no masses and bowel sounds normal  MS: no gross musculoskeletal defects noted, no edema            Signed Electronically by: JAMIR Valente CNP    "

## 2024-08-12 ENCOUNTER — LAB (OUTPATIENT)
Dept: LAB | Facility: CLINIC | Age: 44
End: 2024-08-12
Payer: COMMERCIAL

## 2024-08-12 ENCOUNTER — OFFICE VISIT (OUTPATIENT)
Dept: OTOLARYNGOLOGY | Facility: CLINIC | Age: 44
End: 2024-08-12
Payer: COMMERCIAL

## 2024-08-12 VITALS
HEIGHT: 68 IN | WEIGHT: 232 LBS | DIASTOLIC BLOOD PRESSURE: 80 MMHG | TEMPERATURE: 96.4 F | SYSTOLIC BLOOD PRESSURE: 152 MMHG | HEART RATE: 86 BPM | OXYGEN SATURATION: 97 % | BODY MASS INDEX: 35.16 KG/M2

## 2024-08-12 DIAGNOSIS — K14.8 LESION OF TONGUE: ICD-10-CM

## 2024-08-12 DIAGNOSIS — A31.9 MYCOBACTERIUM INFECTION: ICD-10-CM

## 2024-08-12 DIAGNOSIS — A31.9 MYCOBACTERIUM INFECTION: Primary | ICD-10-CM

## 2024-08-12 PROCEDURE — 99000 SPECIMEN HANDLING OFFICE-LAB: CPT | Performed by: PATHOLOGY

## 2024-08-12 PROCEDURE — 88185 FLOWCYTOMETRY/TC ADD-ON: CPT | Performed by: PHYSICIAN ASSISTANT

## 2024-08-12 PROCEDURE — 11104 PUNCH BX SKIN SINGLE LESION: CPT | Performed by: PHYSICIAN ASSISTANT

## 2024-08-12 PROCEDURE — 87102 FUNGUS ISOLATION CULTURE: CPT | Performed by: PHYSICIAN ASSISTANT

## 2024-08-12 PROCEDURE — 86481 TB AG RESPONSE T-CELL SUSP: CPT | Performed by: PHYSICIAN ASSISTANT

## 2024-08-12 PROCEDURE — 36415 COLL VENOUS BLD VENIPUNCTURE: CPT | Performed by: PATHOLOGY

## 2024-08-12 PROCEDURE — 87070 CULTURE OTHR SPECIMN AEROBIC: CPT | Performed by: PHYSICIAN ASSISTANT

## 2024-08-12 PROCEDURE — 88189 FLOWCYTOMETRY/READ 16 & >: CPT | Performed by: STUDENT IN AN ORGANIZED HEALTH CARE EDUCATION/TRAINING PROGRAM

## 2024-08-12 PROCEDURE — 99214 OFFICE O/P EST MOD 30 MIN: CPT | Mod: 25 | Performed by: PHYSICIAN ASSISTANT

## 2024-08-12 PROCEDURE — 88184 FLOWCYTOMETRY/ TC 1 MARKER: CPT | Performed by: STUDENT IN AN ORGANIZED HEALTH CARE EDUCATION/TRAINING PROGRAM

## 2024-08-12 ASSESSMENT — PAIN SCALES - GENERAL: PAINLEVEL: MODERATE PAIN (5)

## 2024-08-12 NOTE — LETTER
8/12/2024       RE: Shannan Cameron  [address]  [facility] [address]     Dear Colleague,    Thank you for referring your patient, Shannan Cameron, to the [facility] [place] [facility]. Please see a copy of my visit note below.      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 Have placed ID referral as well as quantiferon gold test.   - Adult Infectious Disease  Referral  - Quantiferon TB Gold Plus    Lesion of tongue  Repeat biopsy of right tongue was taken today and sent for flow cytometry and repeat bacterial tissue culture. Results of all testing will be communicated to patient.  - Flow Cytometry  - Tissue Aerobic Bacterial Culture Routine Without Gram Stain  - Fungal or Yeast Culture Routine      Review of external notes as documented elsewhere in note  30 minutes spent by me on the date of the encounter doing chart review, history and exam, documentation and further activities per the note      History of Present Illness    Pertinent history obtain from: chart review and patient    Last Visit by myself 7/22 for tongue swelling and hoarseness. At that time a biopsy was taken of the right tongue. Tissue culture resulted positive for mycobacterium. Pathology was negative however there was a comment about repeat biopsy with flow cytometry recommended due to inability to fully evaluate B and T cells. Patient was seen at [facility] 8/8 and provider recommended against repeat biopsy with follow up in 3 months. Patient returns today for repeat biopsy of  "tongue. She reports a significant family history of lymphoma. A previous internal medicine provider had suggested she may have indolent lymphoma. 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 She endorses a cough and previous night sweats, but denies current night sweats. She denies sustained unexplained weight loss.       Physical Exam    Vital signs:  BP (!) 152/80   Pulse 86   Temp (!) 96.4  F (35.8  C)   Ht 1.727 m (5' 8\")   Wt 105.2 kg (232 lb)   LMP 09/25/2019 (Within Days)   SpO2 97%   BMI 35.28 kg/m      PHYSICAL EXAM:  BP (!) 152/80   Pulse 86   Temp (!) 96.4  F (35.8  C)   Ht 1.727 m (5' 8\")   Wt 105.2 kg (232 lb)   LMP 09/25/2019 (Within Days)   SpO2 97%   BMI 35.28 kg/m    Constitutional:  The patient was unaccompanied, well-groomed, and in no acute distress.     Skin: Normal: warm and pink without rash    Neurologic: Alert and oriented x 3.  Voice normal.    Psychiatric: The patient's affect was calm, cooperative, and appropriate.     Communication:  Normal; communicates verbally, normal voice quality.    Respiratory: Breathing comfortably without stridor or exertion of accessory muscles.    Head/Face:  Normocephalic and atraumatic.  No lesions or scars.    Eyes: Extraocular movement intact. Clear sclera.   Ears: Pinnae and tragus non-tender. No external deformity, hearing normal to conversational voice    Nose: No anterior drainage, no external deformity   Oral Cavity: Normal tongue, adequate dentition, moist. Bumps on the posterior tongue continue to appear benign however there are raised bumps across the posterior tongue. There are areas of inflammatory tissue along bilateral lateral tongue. Prior biopsy site has healed well. Tongue is soft to palpation.   Neck: Supple with normal laryngeal and tracheal landmarks. Normal range of motion.       PROCEDURE:  After obtaining informed consent from Ms. Cameron, we put Ms. Cameron in a " comfortable position.  I injected the biopsy site with 1% lidocaine with 1:100,000 epinephrine.  Once anesthesia was ascertained, I used a 4mm punch biopsy to sample the abnormal tissue.  2 samples were taken and sent for flow cytometry and tissue culture.  Hemostasis was easily assured with pressure and 1 silver nitrate stick.  The patient tolerated the procedure well.          Irma Negron PA-C   Otolaryngology-Head & Neck Surgery                Again, thank you for allowing me to participate in the care of your patient.      Sincerely,    Irma Negron PA-C

## 2024-08-12 NOTE — PROGRESS NOTES
[facility] [address] [address]  Phone: [phone]  Fax: [phone]    Patient:  Shannan Cameron, Date of birth 1980  Date of Visit:  08/12/2024  Referring Provider Referred Self      Assessment & Plan      Mycobacterium infection  Tissue culture from right tongue biopsy 7/22 grew Mycobacteroides (Mycobacterium) immunogenum. Have placed ID referral as well as quantiferon gold test.   - Adult Infectious Disease  Referral  - Quantiferon TB Gold Plus    Lesion of tongue  Repeat biopsy of right tongue was taken today and sent for flow cytometry and repeat bacterial tissue culture. Results of all testing will be communicated to patient.  - Flow Cytometry  - Tissue Aerobic Bacterial Culture Routine Without Gram Stain  - Fungal or Yeast Culture Routine      Review of external notes as documented elsewhere in note  30 minutes spent by me on the date of the encounter doing chart review, history and exam, documentation and further activities per the note      History of Present Illness     Pertinent history obtain from: chart review and patient    Last Visit by myself 7/22 for tongue swelling and hoarseness. At that time a biopsy was taken of the right tongue. Tissue culture resulted positive for mycobacterium. Pathology was negative however there was a comment about repeat biopsy with flow cytometry recommended due to inability to fully evaluate B and T cells. Patient was seen at [facility] 8/8 and provider recommended against repeat biopsy with follow up in 3 months. Patient returns today for repeat biopsy of tongue. She reports a significant family history of lymphoma. A previous internal medicine provider had suggested she may have indolent lymphoma. 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 She endorses a cough and previous night sweats, but denies  "current night sweats. She denies sustained unexplained weight loss.       Physical Exam     Vital signs:  BP (!) 152/80   Pulse 86   Temp (!) 96.4  F (35.8  C)   Ht 1.727 m (5' 8\")   Wt 105.2 kg (232 lb)   LMP 09/25/2019 (Within Days)   SpO2 97%   BMI 35.28 kg/m      PHYSICAL EXAM:  BP (!) 152/80   Pulse 86   Temp (!) 96.4  F (35.8  C)   Ht 1.727 m (5' 8\")   Wt 105.2 kg (232 lb)   LMP 09/25/2019 (Within Days)   SpO2 97%   BMI 35.28 kg/m    Constitutional:  The patient was unaccompanied, well-groomed, and in no acute distress.     Skin: Normal: warm and pink without rash    Neurologic: Alert and oriented x 3.  Voice normal.    Psychiatric: The patient's affect was calm, cooperative, and appropriate.     Communication:  Normal; communicates verbally, normal voice quality.    Respiratory: Breathing comfortably without stridor or exertion of accessory muscles.    Head/Face:  Normocephalic and atraumatic.  No lesions or scars.    Eyes: Extraocular movement intact. Clear sclera.   Ears: Pinnae and tragus non-tender. No external deformity, hearing normal to conversational voice    Nose: No anterior drainage, no external deformity   Oral Cavity: Normal tongue, adequate dentition, moist. Bumps on the posterior tongue continue to appear benign however there are raised bumps across the posterior tongue. There are areas of inflammatory tissue along bilateral lateral tongue. Prior biopsy site has healed well. Tongue is soft to palpation.   Neck: Supple with normal laryngeal and tracheal landmarks. Normal range of motion.       PROCEDURE:  After obtaining informed consent from Ms. Cameron, we put Ms. Cameron in a comfortable position.  I injected the biopsy site with 1% lidocaine with 1:100,000 epinephrine.  Once anesthesia was ascertained, I used a 4mm punch biopsy to sample the abnormal tissue.  2 samples were taken and sent for flow cytometry and tissue culture.  Hemostasis was easily assured with pressure and 1 " silver nitrate stick.  The patient tolerated the procedure well.          Irma Negron PA-C   Otolaryngology-Head & Neck Surgery

## 2024-08-12 NOTE — PROGRESS NOTES
New Referral Triage: NTM infection    What site is involved: right tongue        Have cultures been identified by name:  in process cultures done 8/12/24    --If a respiratory infection and no cultures have been done- then order 3 AFB stain and sputum cultures on 3 different days.      Transplant patient:  No     Scheduling protocol:  -If patient is pre-transplant or involves blood- Schedule as urgent.  -If cultures have not yet been identified by name- Schedule as routine.

## 2024-08-12 NOTE — PATIENT INSTRUCTIONS
You were seen in the ENT Clinic today by Irma Negron. If you have any questions or concerns after your appointment, please contact us (see below)    The following has been recommended for you based upon your appointment today:  Referral to infectious disease for mycobacterium  Quant Gold test for TB  Results will be communicated by phone or MyChart    Please return to clinic as needed    How to Contact Us:  Send a [employer] message to your provider. Our team will respond to you via [employer]. Occasionally, we will need to call you to get further information.  For urgent matters (Monday-Friday), call the ENT Clinic: [phone] and speak with a call center team member - they will route your call appropriately.   If you'd like to speak directly with a nurse, please find our contact information below. We do our best to check voicemail frequently throughout the day, and will work to call you back within 1-2 days. For urgent matters, please use the general clinic phone numbers listed above.      Valerie PORTER LPN  Direct: [phone]

## 2024-08-12 NOTE — NURSING NOTE
"Chief Complaint   Patient presents with    RECHECK     biopsy     Blood pressure (!) 152/80, pulse 86, temperature (!) 96.4  F (35.8  C), height 1.727 m (5' 8\"), weight 105.2 kg (232 lb), last menstrual period 09/25/2019, SpO2 97%.  Juan Manuel Singh LPN    "

## 2024-08-13 LAB
GAMMA INTERFERON BACKGROUND BLD IA-ACNC: 0.02 IU/ML
M TB IFN-G BLD-IMP: NEGATIVE
M TB IFN-G CD4+ BCKGRND COR BLD-ACNC: 9.98 IU/ML
MITOGEN IGNF BCKGRD COR BLD-ACNC: 0.01 IU/ML
MITOGEN IGNF BCKGRD COR BLD-ACNC: 0.01 IU/ML
PATH REPORT.COMMENTS IMP SPEC: NORMAL
PATH REPORT.FINAL DX SPEC: NORMAL
PATH REPORT.MICROSCOPIC SPEC OTHER STN: NORMAL
PATH REPORT.RELEVANT HX SPEC: NORMAL
QUANTIFERON MITOGEN: 10 IU/ML
QUANTIFERON NIL TUBE: 0.02 IU/ML
QUANTIFERON TB1 TUBE: 0.03 IU/ML
QUANTIFERON TB2 TUBE: 0.03

## 2024-08-14 LAB — BACTERIA TISS BX CULT: NORMAL

## 2024-08-15 ENCOUNTER — OFFICE VISIT (OUTPATIENT)
Dept: INFECTIOUS DISEASES | Facility: CLINIC | Age: 44
End: 2024-08-15
Attending: PHYSICIAN ASSISTANT
Payer: COMMERCIAL

## 2024-08-15 VITALS
DIASTOLIC BLOOD PRESSURE: 60 MMHG | BODY MASS INDEX: 34.9 KG/M2 | HEART RATE: 81 BPM | SYSTOLIC BLOOD PRESSURE: 124 MMHG | WEIGHT: 229.5 LBS | OXYGEN SATURATION: 98 %

## 2024-08-15 DIAGNOSIS — A31.9 MYCOBACTERIUM INFECTION: ICD-10-CM

## 2024-08-15 PROCEDURE — 99204 OFFICE O/P NEW MOD 45 MIN: CPT | Performed by: INTERNAL MEDICINE

## 2024-08-15 RX ORDER — AZITHROMYCIN 500 MG/1
500 TABLET, FILM COATED ORAL DAILY
Status: ACTIVE | OUTPATIENT
Start: 2024-08-15 | End: 2024-11-13

## 2024-08-15 RX ORDER — DOXYCYCLINE 100 MG/1
100 CAPSULE ORAL DAILY
Qty: 90 CAPSULE | Refills: 0 | Status: SHIPPED | OUTPATIENT
Start: 2024-08-15 | End: 2024-09-15

## 2024-08-15 NOTE — PROGRESS NOTES
"Infectious Disease Consultation:  Requesting MD:  Reason for consult:      HISTORY:  Pt with hx thyroid cancer (surgery only, as cure), DM and migraine.    Throat/neck swelling and elevated taste buds January 2024 onset.    Bx of a swollen area R tonsil with M Immunogenem (like actino, like chelonae)  Sent for eval.     No meds yet.    Has had prior care with [place].  QFT negative.     A second bx this past week is pending as far as mycobacterial culture goes.  So far no lymphoma, but she is very worried about lymphoma d/t fam hx         Pertinent past history, past infectious disease history:  As above and in tabulated review    Medications:  Reviewed prior to admission meds as applicable in chart review.  Current meds are reviewed in the EMR listed MAR.     ANTIBIOTICS:    Current:   Prior:   Allergy to:    SH/FH and  travel history(if applicable to consult):  FH is + for many illnesses, see listed  SH quit smoking 2023    REVIEW OF SYSTEMS:  All other systems negative    EXAMINATION:  /60   Pulse 81   Wt 104.1 kg (229 lb 8 oz)   LMP 09/25/2019 (Within Days)   SpO2 98%   BMI 34.90 kg/m    Alert, awake  Vitals tabulated above, reviewed  HEENT:unable to see lesions, no clear LAD  Neck supple without lymphadenopathy  Sclera clear  CARDIOVASCULAR regular rate and rhythm, no murmur  Lungs CLEAR TO AUSCULTATION   Abdomen soft, NT/ND, absent HEPATOSPLENOMEGALY  Skin normal  Joints normal  Neurologic exam non focal  Wound:  NA        CLINICAL DATABASE FOR---LAB/MICRO/CULTURES/IMAGING STUDIES:  Lab Results   Component Value Date    WBC 7.5 12/06/2023    WBC 10.6 04/15/2008     Lab Results   Component Value Date    RBC 4.66 12/06/2023    RBC 4.61 04/15/2008     Lab Results   Component Value Date    HGB 14.7 12/06/2023    HGB 13.5 04/15/2008     Lab Results   Component Value Date    HCT 43.8 12/06/2023    HCT 40.7 04/15/2008     No components found for: \"MCT\"  Lab Results   Component Value Date    MCV 94 12/06/2023 "    MCV 88 04/15/2008     Lab Results   Component Value Date    MCH 31.5 12/06/2023    MCH 29.2 04/15/2008     Lab Results   Component Value Date    MCHC 33.6 12/06/2023    MCHC 33.1 04/15/2008     Lab Results   Component Value Date    RDW 12.4 12/06/2023    RDW 17.4 04/15/2008     Lab Results   Component Value Date     12/06/2023     04/15/2008       Last Comprehensive Metabolic Panel:  Sodium   Date Value Ref Range Status   08/07/2024 136 135 - 145 mmol/L Final   04/15/2008 141 133 - 144 mmol/L Final     Potassium   Date Value Ref Range Status   08/07/2024 4.0 3.4 - 5.3 mmol/L Final   07/14/2021 4.2 3.5 - 5.3 mmol/L Final     Comment:     Lab test performed by:  Lab Mnemonic:  [facility]  [address] [place] [address]  Carlyle Islas M.D.  QUEST Specimen received date and time: 15-JUL-2021 07:26:00.00     04/15/2008 3.8 3.4 - 5.3 mmol/L Final     Chloride   Date Value Ref Range Status   08/07/2024 99 98 - 107 mmol/L Final   07/14/2021 104 98 - 110 mmol/L Final     Comment:     Lab test performed by:  Lab Mnem[facility]:  [facility]  [address] [place] [address]  Carlyle Islas M.D.  QUEST Specimen received date and time: 15-JUL-2021 07:26:00.00     04/15/2008 99 94 - 109 mmol/L Final     Carbon Dioxide   Date Value Ref Range Status   04/15/2008 30 20 - 32 mmol/L Final     Carbon Dioxide (CO2)   Date Value Ref Range Status   08/07/2024 25 22 - 29 mmol/L Final   07/14/2021 25 20 - 32 mmol/L Final     Comment:     Lab test performed by:  Lab Mnem[facility]:  [facility]  [address] [place] [address]  Carlyle Islas M.D.  QUEST Specimen received date and time: 15-JUL-2021 07:26:00.00       Anion Gap   Date Value Ref Range Status   08/07/2024 12 7 - 15 mmol/L Final   04/15/2008 11 6 - 17 mmol/L Final     Glucose   Date Value Ref Range Status   08/07/2024 180 (H) 70 - 99 mg/dL Final   07/14/2021 76 65 - 99 mg/dL  Final     Comment:                   Fasting reference interval     Lab test performed by:  Lab Mnem[facility]:  [facility]  [address] [place] [address]  Carlyle Islas M.D.  QUEST Specimen received date and time: 15-JUL-2021 07:26:00.00     04/15/2008 109 (H) 60 - 99 mg/dL Final     Urea Nitrogen   Date Value Ref Range Status   08/07/2024 12.7 6.0 - 20.0 mg/dL Final   07/14/2021 6 (L) 7 - 25 mg/dL Final     Comment:     Lab test performed by:  [facility]  [facility]  [address] [place] [address]  Carlyle Islas M.D.  QUEST Specimen received date and time: 15-JUL-2021 07:26:00.00     04/15/2008 7 5 - 24 mg/dL Final     Creatinine   Date Value Ref Range Status   08/07/2024 0.58 0.51 - 0.95 mg/dL Final   04/15/2008 0.68 0.60 - 1.30 mg/dL Final     GFR Estimate   Date Value Ref Range Status   08/07/2024 >90 >60 mL/min/1.73m2 Final     Comment:     eGFR calculated using 2021 CKD-EPI equation.   04/15/2008 >90 >60 mL/min/1.7m2 Final     GFR, ESTIMATED POCT   Date Value Ref Range Status   07/29/2024 >60 >60 mL/min/1.73m2 Final     Calcium   Date Value Ref Range Status   08/07/2024 9.6 8.8 - 10.4 mg/dL Final     Comment:     Reference intervals for this test were updated on 7/16/2024 to reflect our healthy population more accurately. There may be differences in the flagging of prior results with similar values performed with this method. Those prior results can be interpreted in the context of the updated reference intervals.   04/15/2008 9.7 8.5 - 10.4 mg/dL Final       Liver Function Studies -   Recent Labs   Lab Test 04/22/24  1050   PROTTOTAL 8.0   ALBUMIN 4.7   BILITOTAL 0.5   ALKPHOS 121   AST 96*   *       Erythrocyte Sedimentation Rate   Date Value Ref Range Status   10/17/2023 15 0 - 20 mm/hr Final           Status: Preliminary result       Visible to patient: No (not released)       Dx: Hoarseness; Lesion of tongue    Specimen  Information: Tongue; Tissue   0 Result Notes  Culture Mycobacteroides (Mycobacterium) immunogenum Abnormal          Susceptibility testing requested by Bibi Gasca RN on the [facility]. Please do ID and Sens.        Resulting Agency: [facility]                       IMPRESSION:  NTM growth from oropharnygeal site, unusual with sparse literature on review  No clear immunocompromise  Thyroid cancer post removal no xrt, no chemo  DM    PLAN:  Azith 500 daily  Doxy 100 daily  If better do 6-12 months  If not, [facility]        SANTOS FRAUSTO MD  [facility]  Office [phone]

## 2024-08-16 ENCOUNTER — TELEPHONE (OUTPATIENT)
Dept: INFECTIOUS DISEASES | Facility: CLINIC | Age: 44
End: 2024-08-16
Payer: COMMERCIAL

## 2024-08-16 DIAGNOSIS — A31.9 MYCOBACTERIUM INFECTION: Primary | ICD-10-CM

## 2024-08-16 LAB — SCANNED LAB RESULT: NORMAL

## 2024-08-16 RX ORDER — AZITHROMYCIN 250 MG/1
500 TABLET, FILM COATED ORAL DAILY
Qty: 180 TABLET | Refills: 0 | Status: SHIPPED | OUTPATIENT
Start: 2024-08-16 | End: 2024-09-15

## 2024-08-16 NOTE — TELEPHONE ENCOUNTER
[facility] Call Center    Phone Message    May a detailed message be left on voicemail: yes     Reason for Call: Other: Pt requesting call back, Pt is needing to discuss her antibiotic medications that were prescribed yesterday. Pt thought she was supposed to be on a couple medications and only one was sent to her pharmacy

## 2024-08-16 NOTE — TELEPHONE ENCOUNTER
I called the pt, the azithromycin was sent incorrectly, so did not make it to the pharmacy, I have sent correctly. Other questions answered.

## 2024-08-19 LAB
BACTERIA TISS BX CULT: ABNORMAL
BACTERIA TISS BX CULT: ABNORMAL

## 2024-08-26 ENCOUNTER — MYC MEDICAL ADVICE (OUTPATIENT)
Dept: FAMILY MEDICINE | Facility: CLINIC | Age: 44
End: 2024-08-26
Payer: COMMERCIAL

## 2024-08-26 DIAGNOSIS — A31.9 MYCOBACTERIUM INFECTION: ICD-10-CM

## 2024-08-26 DIAGNOSIS — K14.8 LESION OF TONGUE: Primary | ICD-10-CM

## 2024-08-27 NOTE — TELEPHONE ENCOUNTER
Please see [employer] messages on updated symptoms. Routing to provider per patient requesting.

## 2024-08-31 LAB — ORGANISM (ARUP): ABNORMAL

## 2024-09-09 LAB — BACTERIA TISS BX CULT: NO GROWTH

## 2024-09-14 ASSESSMENT — ANXIETY QUESTIONNAIRES
GAD7 TOTAL SCORE: 12
8. IF YOU CHECKED OFF ANY PROBLEMS, HOW DIFFICULT HAVE THESE MADE IT FOR YOU TO DO YOUR WORK, TAKE CARE OF THINGS AT HOME, OR GET ALONG WITH OTHER PEOPLE?: EXTREMELY DIFFICULT
GAD7 TOTAL SCORE: 12
GAD7 TOTAL SCORE: 12
7. FEELING AFRAID AS IF SOMETHING AWFUL MIGHT HAPPEN: SEVERAL DAYS

## 2024-09-15 RX ORDER — INSULIN LISPRO 100 [IU]/ML
33 INJECTION, SOLUTION INTRAVENOUS; SUBCUTANEOUS
COMMUNITY

## 2024-09-15 RX ORDER — MULTIVITAMIN,THERAPEUTIC
1 TABLET ORAL DAILY
COMMUNITY

## 2024-09-16 ENCOUNTER — VIRTUAL VISIT (OUTPATIENT)
Dept: PSYCHOLOGY | Facility: CLINIC | Age: 44
End: 2024-09-16
Payer: COMMERCIAL

## 2024-09-16 DIAGNOSIS — F43.10 PTSD (POST-TRAUMATIC STRESS DISORDER): ICD-10-CM

## 2024-09-16 PROCEDURE — 90834 PSYTX W PT 45 MINUTES: CPT | Mod: 95

## 2024-09-16 ASSESSMENT — PATIENT HEALTH QUESTIONNAIRE - PHQ9
SUM OF ALL RESPONSES TO PHQ QUESTIONS 1-9: 19
10. IF YOU CHECKED OFF ANY PROBLEMS, HOW DIFFICULT HAVE THESE PROBLEMS MADE IT FOR YOU TO DO YOUR WORK, TAKE CARE OF THINGS AT HOME, OR GET ALONG WITH OTHER PEOPLE: EXTREMELY DIFFICULT
SUM OF ALL RESPONSES TO PHQ QUESTIONS 1-9: 19
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
## 2024-09-16 NOTE — PROGRESS NOTES
Pre-visit planning and chart review completed.     RETURN patient appointment:    9/17 with Dr. Hull  7/29 CT soft tissue neck     - Patient request for follow-up.  - No anticoagulation.  - Former smoker.    CE updated. Medications, allergies, problem list, and immunizations reconciled.

## 2024-09-17 ENCOUNTER — VIRTUAL VISIT (OUTPATIENT)
Dept: PULMONOLOGY | Facility: CLINIC | Age: 44
End: 2024-09-17
Attending: INTERNAL MEDICINE
Payer: COMMERCIAL

## 2024-09-17 DIAGNOSIS — R91.8 PULMONARY NODULES: ICD-10-CM

## 2024-09-17 DIAGNOSIS — G47.30 SLEEP-DISORDERED BREATHING: Primary | ICD-10-CM

## 2024-09-17 PROCEDURE — 99214 OFFICE O/P EST MOD 30 MIN: CPT | Mod: 95 | Performed by: INTERNAL MEDICINE

## 2024-09-17 NOTE — PROGRESS NOTES
Virtual Visit Details    Type of service:  Video Visit   Video Start/End Time: 2:13 - 2:33    Originating Location (pt. Location): Home    Distant Location (provider location):  On-site  Platform used for Video Visit: [facility] CANCER [facility]  [address] [address]  Phone: [phone]  Fax: [phone]    Interventional Pulmonary Clinic Note    September 17, 2024        Chief complaint/Reason for Clinic Visit:  Shannan Cameron is a 44 year old female seen for   Chief Complaint   Patient presents with    RECHECK       Referred by or PCP: Agnieszka Ahn    Reason for clinic visit / Chief complaint:   Pulmonary nodules     Assessment and Plan:  Indeterminate pulmonary nodules, largest measuring 5x6 mm in the RML, stable compared to CTs from Jan 2024 to Sept 2019.  There are also 2 other nodules 1 of each lung likely suggesting lymph nodes based on their shape (polygonal).  Recently diagnosed of mycobacterium infection with ongoing dyspnea on exertion. Will repeat CT chest.      Occasional cough with dark sputum for the last year or so.  Used to smoke up to 5 cigarettes a day, see below..  She has no history suggesting postnasal drainage or GERD. Recently found to have a tongue base growth and initial biopsy showed B cells on flow. Seen by an ENT provider at [facility] and planned to have biopsy/surgery in October 2024. 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 She stated that she was started on treatment then discontinued by [place] providers recently. While on the treatment, her dyspnea was improved.       Occasional wheezing/dyspnea.  Her PFTs from Jan 2024 revealed normal spirometric indicis, lung volumes and diffusing capacity to my review. If her dyspnea continues and reported (by her) hypoxemia, we will consider cardiopulm exercise testing.      Choking sensation at night waking her up and having difficulty..  She does not know if she snores or  not.  Occasional daytime nap.  I will refer her to sleep medicine clinic for further evaluation for obstructive sleep apnea, primary snoring and for consideration of home sleep study.      Thyroid nodules discovered on a thyroid ultrasound.  S/p left lobectomy. 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        History of Present Illness:  This is a 44 years old woman with no known pulmonary problems referred to my clinic for further evaluation of indeterminate pulmonary nodules that were discovered on an abdominal CT scan.     Lung problems: Coughs up dark sputum (daily) for a year, wheezing/dyspnea  Respiratory symptoms:  Family lung problems: no  Smoking: quit a few months ago, off/on smoked 10 years, averaging 5 cig a day  Vaping: no  Exposure to chemicals, radiation or asbestos: no     PFTs: (personally reviewed) yes, from 2024  CTs: (personally reviewed) yes, from 2023.        CT chest ([facility])  virtual      Allergies   Allergen Reactions    Adhesive Tape Rash, Blisters and Itching    Bee Venom Shortness Of Breath and Dizziness     Fainting        Past Medical History:   Diagnosis Date    History of partial thyroidectomy     Nephrolithiasis 2007    Papillary adenocarcinoma, follicular variant (H)     Pulmonary nodules     up to 6 mm        Past Surgical History:   Procedure Laterality Date    ARTHROSCOPIC REPAIR ACL  2017    C LAP REMOVAL, SPERMATIC CORD LIPOMA  2018     SECTION  1996     SECTION  10/21/2008     SECTION  2006    HYSTERECTOMY  2019    Left ovarian cystectomy.    MAMMOGRAM - HIM SCAN  2017    [facility]  DELIVERY ONLY      Description:  Section;  Recorded: 2011;        Social History     Socioeconomic History    Marital  status: Single     Spouse name: Not on file    Number of children: Not on file    Years of education: Not on file    Highest education level: Not on file   Occupational History    Not on file   Tobacco Use    Smoking status: Former     Current packs/day: 0.00     Types: Cigarettes     Quit date: 2023     Years since quittin.8     Passive exposure: Past    Smokeless tobacco: Never   Vaping Use    Vaping status: Never Used   Substance and Sexual Activity    Alcohol use: No    Drug use: Not on file    Sexual activity: Not Currently   Other Topics Concern    Not on file   Social History Narrative    Not on file     Social Determinants of Health     Financial Resource Strain: Low Risk  (2023)    Financial Resource Strain     Within the past 12 months, have you or your family members you live with been unable to get utilities (heat, electricity) when it was really needed?: No   Food Insecurity: Food Insecurity Present (2024)    Received from [facility]    Hunger Vital Sign     Worried About Running Out of Food in the Last Year: Sometimes true     Ran Out of Food in the Last Year: Sometimes true   Transportation Needs: No Transportation Needs (2024)    Received from [facility]    PRAPARE - Transportation     Lack of Transportation (Medical): No     Lack of Transportation (Non-Medical): No   Physical Activity: Sufficiently Active (2024)    Received from [facility]    Exercise Vital Sign     Days of Exercise per Week: 2 days     Minutes of Exercise per Session: 120 min   Stress: Not on file   Social Connections: Unknown (2022)    [facility]    Social Connections     Frequency of Communication with Friends and Family: Not on file   Interpersonal Safety: Low Risk  (2024)    Interpersonal Safety     Do you feel physically and emotionally safe where you  currently live?: Yes     Within the past 12 months, have you been hit, slapped, kicked or otherwise physically hurt by someone?: No     Within the past 12 months, have you been humiliated or emotionally abused in other ways by your partner or ex-partner?: No   Housing Stability: Low Risk  (2/12/2024)    Received from [facility], [facility]    Housing Stability     What is your living situation today?: I have a steady place to live        Family History   Problem Relation Age of Onset    Alcoholism Mother     Allergic rhinitis Mother     Anemia Mother     Arthritis Mother     Cancer Mother     Depression Mother     Substance Abuse Mother     Emotional abuse Mother     Fibromyalgia Mother     Mental Illness Mother     Alcoholism Father     Depression Father     Diabetes Father     Substance Abuse Father         Immunization History   Administered Date(s) Administered    COVID-19 12+ (Pfizer) 02/26/2024    COVID-19 Monovalent 18+ (Moderna) 08/22/2022    Flu-nasal, Unspecified 10/31/2012    HepB, Unspecified 08/20/2003, 10/08/2003, 02/01/2012    Hepatitis B, Adult 08/20/2003, 10/08/2003, 02/01/2012    Pneumococcal 20 valent Conjugate (Prevnar 20) 08/22/2022    TDAP (Adacel,Boostrix) 01/27/2017       Current Outpatient Medications   Medication Sig Dispense Refill    blood glucose (NO BRAND SPECIFIED) test strip Use to test blood sugar 3 times daily or as directed. To accompany: Blood Glucose Monitor Brands: per insurance. 300 strip 6    blood glucose monitoring (NO BRAND SPECIFIED) meter device kit Use to test blood sugar 1 times daily or as directed. Preferred blood glucose meter OR supplies to accompany: Blood Glucose Monitor Brands: per insurance. 1 kit 0    Continuous Blood Gluc Sensor (FREESTYLE LAURA 3 SENSOR) MISC 1 each every 14 days Use 1 sensor every 14 days. Use to read blood sugars per 's instructions. 6 each 5    empagliflozin (JARDIANCE) 10 MG TABS tablet Take 1 tablet (10 mg) by mouth daily  (Patient taking differently: Take 20 mg by mouth daily) 90 tablet 1    hydroCHLOROthiazide 12.5 MG tablet Take 1 tablet (12.5 mg) by mouth daily 30 tablet 5    insulin glargine (LANTUS SOLOSTAR) 100 UNIT/ML pen INJECT 33 units twice a day and continue to increase up to 40 units twice a day 225 mL 1    insulin lispro (HUMALOG KWIKPEN) 100 UNIT/ML (1 unit dial) KWIKPEN Inject 33 Units subcutaneously.      insulin pen needle (32G X 4 MM) 32G X 4 MM miscellaneous Use 4 pen needles daily or as directed. 400 each 3    levothyroxine (SYNTHROID/LEVOTHROID) 100 MCG tablet Take 1 tablet (100 mcg) by mouth daily 90 tablet 1    losartan (COZAAR) 25 MG tablet Take 1 tablet (25 mg) by mouth daily 90 tablet 4    Microlet Lancets MISC USE TO TEST ONCE DAILY 300 each 1    Multiple Vitamins-Minerals (ONCOVITE) TABS Take 1 tablet by mouth daily.      ondansetron (ZOFRAN) 8 MG tablet Take 1 tablet (8 mg) by mouth every 8 hours as needed for nausea 12 tablet 1    rizatriptan (MAXALT) 10 MG tablet Take 1 tablet (10 mg) by mouth at onset of headache for migraine May repeat in 2 hours. Max 3 tablets/24 hours. 10 tablet 1    rosuvastatin (CRESTOR) 10 MG tablet Take 1 tablet (10 mg) by mouth daily 90 tablet 4    [START ON 10/2/2024] Semaglutide, 1 MG/DOSE, (OZEMPIC) 4 MG/3ML pen Inject 1 mg subcutaneously every 7 days 3 mL 0    tiZANidine (ZANAFLEX) 4 MG tablet Take 4 mg by mouth as needed      topiramate (TOPAMAX) 25 MG tablet Take 1 tablet (25 mg) by mouth daily for 7 days, THEN 2 tablets (50 mg) daily for 50 days. 107 tablet 0     Current Facility-Administered Medications   Medication Dose Route Frequency Provider Last Rate Last Admin    azithromycin (ZITHROMAX) tablet 500 mg  500 mg Oral Daily             Review of Systems:  I have done 10 points of review systems and all negative except for those mentioned in HPI    Physical examination:  Constitutional: Oriented, not in distress  Vitals: unavailable  Eyes: No icterus, nystagmus, pupils  isocoric  Head and neck: normal posture and movements  Respiratory: Normal tidal breathing, no shortness of breath, no audible wheezing or stridor over the phone or video visit  Musculoskeletal: Normal muscle mass, no deformity on hands/fingers  Integumentary:  No rash on visible skin areas on video visit  Neurological: Alert, orientedx3, no motor deficits  Psychiatric:  Mood and affect are appropriate with insight into his/her medical condition    Data:  Lab Results   Component Value Date    WBC 7.5 12/06/2023    WBC 10.6 04/15/2008     Lab Results   Component Value Date    RBC 4.66 12/06/2023    RBC 4.61 04/15/2008     Lab Results   Component Value Date    HGB 14.7 12/06/2023    HGB 13.5 04/15/2008     Lab Results   Component Value Date    HCT 43.8 12/06/2023    HCT 40.7 04/15/2008     Lab Results   Component Value Date    MCV 94 12/06/2023    MCV 88 04/15/2008     Lab Results   Component Value Date    MCH 31.5 12/06/2023    MCH 29.2 04/15/2008     Lab Results   Component Value Date    MCHC 33.6 12/06/2023    MCHC 33.1 04/15/2008     Lab Results   Component Value Date    RDW 12.4 12/06/2023    RDW 17.4 04/15/2008     Lab Results   Component Value Date     12/06/2023     04/15/2008       Lab Results   Component Value Date     08/07/2024     04/15/2008      Lab Results   Component Value Date    POTASSIUM 4.0 08/07/2024    POTASSIUM 4.2 07/14/2021    POTASSIUM 3.8 04/15/2008     Lab Results   Component Value Date    CHLORIDE 99 08/07/2024    CHLORIDE 104 07/14/2021    CHLORIDE 99 04/15/2008     Lab Results   Component Value Date    ZAIRA 9.6 08/07/2024    ZAIRA 9.7 04/15/2008     Lab Results   Component Value Date    CO2 25 08/07/2024    CO2 25 07/14/2021    CO2 30 04/15/2008     Lab Results   Component Value Date    BUN 12.7 08/07/2024    BUN 6 07/14/2021    BUN 7 04/15/2008     Lab Results   Component Value Date    CR 0.58 08/07/2024    CR 0.68 04/15/2008     Lab Results   Component Value Date      08/07/2024    GLC 76 07/14/2021     04/15/2008         SOCO Hull MD

## 2024-09-17 NOTE — LETTER
9/17/2024       RE: Shannan Cameron  [address]  [place] [address]     Dear Colleague,    Thank you for referring your patient, Shannan Cameron, to the [facility]IC at [facility]. Please see a copy of my visit note below.    [facility]  [address] [address]  Phone: [phone]  Fax: [phone]    Interventional Pulmonary Clinic Note    September 17, 2024        Chief complaint/Reason for Clinic Visit:  Shannan Cameron is a 44 year old female seen for   Chief Complaint   Patient presents with    RECHECK       Referred by or PCP: Agnieszka Ahn    Reason for clinic visit / Chief complaint:   Pulmonary nodules     Assessment and Plan:  Indeterminate pulmonary nodules, largest measuring 5x6 mm in the RML, stable compared to CTs from Jan 2024 to Sept 2019.  There are also 2 other nodules 1 of each lung likely suggesting lymph nodes based on their shape (polygonal).  Recently diagnosed of mycobacterium infection with ongoing dyspnea on exertion. Will repeat CT chest.      Occasional cough with dark sputum for the last year or so.  Used to smoke up to 5 cigarettes a day, see below..  She has no history suggesting postnasal drainage or GERD. Recently found to have a tongue base growth and initial biopsy showed B cells on flow. Seen by an ENT provider at [facility] and planned to have biopsy/surgery in October 2024. 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 She stated that she was started on treatment then discontinued by [place] providers recently. While on the treatment, her dyspnea was improved.       Occasional wheezing/dyspnea.  Her PFTs from Jan 2024 revealed normal spirometric indicis, lung volumes and diffusing capacity to my review. If her dyspnea continues and reported (by her) hypoxemia, we will consider cardiopulm exercise testing.      Choking sensation at night waking her up  and having difficulty..  She does not know if she snores or not.  Occasional daytime nap.  I will refer her to sleep medicine clinic for further evaluation for obstructive sleep apnea, primary snoring and for consideration of home sleep study.      Thyroid nodules discovered on a thyroid ultrasound.  S/p left lobectomy. 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        History of Present Illness:  This is a 44 years old woman with no known pulmonary problems referred to my clinic for further evaluation of indeterminate pulmonary nodules that were discovered on an abdominal CT scan.     Lung problems: Coughs up dark sputum (daily) for a year, wheezing/dyspnea  Respiratory symptoms:  Family lung problems: no  Smoking: quit a few months ago, off/on smoked 10 years, averaging 5 cig a day  Vaping: no  Exposure to chemicals, radiation or asbestos: no     PFTs: (personally reviewed) yes, from 2024  CTs: (personally reviewed) yes, from 2023.        CT chest ([facility])  virtual      Allergies   Allergen Reactions    Adhesive Tape Rash, Blisters and Itching    Bee Venom Shortness Of Breath and Dizziness     Fainting        Past Medical History:   Diagnosis Date    History of partial thyroidectomy     Nephrolithiasis 2007    Papillary adenocarcinoma, follicular variant (H)     Pulmonary nodules     up to 6 mm        Past Surgical History:   Procedure Laterality Date    ARTHROSCOPIC REPAIR ACL  2017    C LAP REMOVAL, SPERMATIC CORD LIPOMA  2018     SECTION  1996     SECTION  10/21/2008     SECTION  2006    HYSTERECTOMY  2019    Left ovarian cystectomy.    MAMMOGRAM - HIM SCAN  2017    [facility]  DELIVERY ONLY      Description:  Section;  Recorded: 2011;         Social History     Socioeconomic History    Marital status: Single     Spouse name: Not on file    Number of children: Not on file    Years of education: Not on file    Highest education level: Not on file   Occupational History    Not on file   Tobacco Use    Smoking status: Former     Current packs/day: 0.00     Types: Cigarettes     Quit date: 2023     Years since quittin.8     Passive exposure: Past    Smokeless tobacco: Never   Vaping Use    Vaping status: Never Used   Substance and Sexual Activity    Alcohol use: No    Drug use: Not on file    Sexual activity: Not Currently   Other Topics Concern    Not on file   Social History Narrative    Not on file     Social Determinants of Health     Financial Resource Strain: Low Risk  (2023)    Financial Resource Strain     Within the past 12 months, have you or your family members you live with been unable to get utilities (heat, electricity) when it was really needed?: No   Food Insecurity: Food Insecurity Present (2024)    Received from [facility]    Hunger Vital Sign     Worried About Running Out of Food in the Last Year: Sometimes true     Ran Out of Food in the Last Year: Sometimes true   Transportation Needs: No Transportation Needs (2024)    Received from [facility]    PRAPARE - Transportation     Lack of Transportation (Medical): No     Lack of Transportation (Non-Medical): No   Physical Activity: Sufficiently Active (2024)    Received from [facility]    Exercise Vital Sign     Days of Exercise per Week: 2 days     Minutes of Exercise per Session: 120 min   Stress: Not on file   Social Connections: Unknown (2022)    [facility]    Social Connections     Frequency of Communication with Friends and Family: Not on file   Interpersonal Safety: Low Risk  (2024)    Interpersonal Safety      Do you feel physically and emotionally safe where you currently live?: Yes     Within the past 12 months, have you been hit, slapped, kicked or otherwise physically hurt by someone?: No     Within the past 12 months, have you been humiliated or emotionally abused in other ways by your partner or ex-partner?: No   Housing Stability: Low Risk  (2/12/2024)    Received from [facility]    Housing Stability     What is your living situation today?: I have a steady place to live        Family History   Problem Relation Age of Onset    Alcoholism Mother     Allergic rhinitis Mother     Anemia Mother     Arthritis Mother     Cancer Mother     Depression Mother     Substance Abuse Mother     Emotional abuse Mother     Fibromyalgia Mother     Mental Illness Mother     Alcoholism Father     Depression Father     Diabetes Father     Substance Abuse Father         Immunization History   Administered Date(s) Administered    COVID-19 12+ (Pfizer) 02/26/2024    COVID-19 Monovalent 18+ (Moderna) 08/22/2022    Flu-nasal, Unspecified 10/31/2012    HepB, Unspecified 08/20/2003, 10/08/2003, 02/01/2012    Hepatitis B, Adult 08/20/2003, 10/08/2003, 02/01/2012    Pneumococcal 20 valent Conjugate (Prevnar 20) 08/22/2022    TDAP (Adacel,Boostrix) 01/27/2017       Current Outpatient Medications   Medication Sig Dispense Refill    blood glucose (NO BRAND SPECIFIED) test strip Use to test blood sugar 3 times daily or as directed. To accompany: Blood Glucose Monitor Brands: per insurance. 300 strip 6    blood glucose monitoring (NO BRAND SPECIFIED) meter device kit Use to test blood sugar 1 times daily or as directed. Preferred blood glucose meter OR supplies to accompany: Blood Glucose Monitor Brands: per insurance. 1 kit 0    Continuous Blood Gluc Sensor (FREESTYLE LAURA 3 SENSOR) [facility] 1 each every 14 days Use 1 sensor every 14 days. Use to read blood sugars per 's instructions. 6 each 5    empagliflozin (JARDIANCE)  10 MG TABS tablet Take 1 tablet (10 mg) by mouth daily (Patient taking differently: Take 20 mg by mouth daily) 90 tablet 1    hydroCHLOROthiazide 12.5 MG tablet Take 1 tablet (12.5 mg) by mouth daily 30 tablet 5    insulin glargine (LANTUS SOLOSTAR) 100 UNIT/ML pen INJECT 33 units twice a day and continue to increase up to 40 units twice a day 225 mL 1    insulin lispro (HUMALOG KWIKPEN) 100 UNIT/ML (1 unit dial) KWIKPEN Inject 33 Units subcutaneously.      insulin pen needle (32G X 4 MM) 32G X 4 MM miscellaneous Use 4 pen needles daily or as directed. 400 each 3    levothyroxine (SYNTHROID/LEVOTHROID) 100 MCG tablet Take 1 tablet (100 mcg) by mouth daily 90 tablet 1    losartan (COZAAR) 25 MG tablet Take 1 tablet (25 mg) by mouth daily 90 tablet 4    Microlet Lancets MISC USE TO TEST ONCE DAILY 300 each 1    Multiple Vitamins-Minerals (ONCOVITE) TABS Take 1 tablet by mouth daily.      ondansetron (ZOFRAN) 8 MG tablet Take 1 tablet (8 mg) by mouth every 8 hours as needed for nausea 12 tablet 1    rizatriptan (MAXALT) 10 MG tablet Take 1 tablet (10 mg) by mouth at onset of headache for migraine May repeat in 2 hours. Max 3 tablets/24 hours. 10 tablet 1    rosuvastatin (CRESTOR) 10 MG tablet Take 1 tablet (10 mg) by mouth daily 90 tablet 4    [START ON 10/2/2024] Semaglutide, 1 MG/DOSE, (OZEMPIC) 4 MG/3ML pen Inject 1 mg subcutaneously every 7 days 3 mL 0    tiZANidine (ZANAFLEX) 4 MG tablet Take 4 mg by mouth as needed      topiramate (TOPAMAX) 25 MG tablet Take 1 tablet (25 mg) by mouth daily for 7 days, THEN 2 tablets (50 mg) daily for 50 days. 107 tablet 0     Current Facility-Administered Medications   Medication Dose Route Frequency Provider Last Rate Last Admin    azithromycin (ZITHROMAX) tablet 500 mg  500 mg Oral Daily             Review of Systems:  I have done 10 points of review systems and all negative except for those mentioned in HPI    Physical examination:  Constitutional: Oriented, not in  distress  Vitals: unavailable  Eyes: No icterus, nystagmus, pupils isocoric  Head and neck: normal posture and movements  Respiratory: Normal tidal breathing, no shortness of breath, no audible wheezing or stridor over the phone or video visit  Musculoskeletal: Normal muscle mass, no deformity on hands/fingers  Integumentary:  No rash on visible skin areas on video visit  Neurological: Alert, orientedx3, no motor deficits  Psychiatric:  Mood and affect are appropriate with insight into his/her medical condition    Data:  Lab Results   Component Value Date    WBC 7.5 12/06/2023    WBC 10.6 04/15/2008     Lab Results   Component Value Date    RBC 4.66 12/06/2023    RBC 4.61 04/15/2008     Lab Results   Component Value Date    HGB 14.7 12/06/2023    HGB 13.5 04/15/2008     Lab Results   Component Value Date    HCT 43.8 12/06/2023    HCT 40.7 04/15/2008     Lab Results   Component Value Date    MCV 94 12/06/2023    MCV 88 04/15/2008     Lab Results   Component Value Date    MCH 31.5 12/06/2023    MCH 29.2 04/15/2008     Lab Results   Component Value Date    MCHC 33.6 12/06/2023    MCHC 33.1 04/15/2008     Lab Results   Component Value Date    RDW 12.4 12/06/2023    RDW 17.4 04/15/2008     Lab Results   Component Value Date     12/06/2023     04/15/2008       Lab Results   Component Value Date     08/07/2024     04/15/2008      Lab Results   Component Value Date    POTASSIUM 4.0 08/07/2024    POTASSIUM 4.2 07/14/2021    POTASSIUM 3.8 04/15/2008     Lab Results   Component Value Date    CHLORIDE 99 08/07/2024    CHLORIDE 104 07/14/2021    CHLORIDE 99 04/15/2008     Lab Results   Component Value Date    ZAIRA 9.6 08/07/2024    ZAIRA 9.7 04/15/2008     Lab Results   Component Value Date    CO2 25 08/07/2024    CO2 25 07/14/2021    CO2 30 04/15/2008     Lab Results   Component Value Date    BUN 12.7 08/07/2024    BUN 6 07/14/2021    BUN 7 04/15/2008     Lab Results   Component Value Date    CR 0.58  08/07/2024    CR 0.68 04/15/2008     Lab Results   Component Value Date     08/07/2024    GLC 76 07/14/2021     04/15/2008         SOCO Hull MD       Again, thank you for allowing me to participate in the care of your patient.      Sincerely,    Arnold Hull MD

## 2024-09-17 NOTE — NURSING NOTE
Current patient location: [address] [place] [id number]    Is the patient currently in the state of [place]? NO **Provider licensed to see pts in the state [facility]**    Visit mode:VIDEO    If the visit is dropped, the patient can be reconnected by: VIDEO VISIT: Text to cell phone:   Telephone Information:   Mobile [phone]       Will anyone else be joining the visit? NO  (If patient encounters technical issues they should call [phone]    How would you like to obtain your AVS? MyChart    Are changes needed to the allergy or medication list?  Pt states she is now taking Ozempic.Pt states she is no longer taking doxycycline and Azithromycin.    Are refills needed on medications prescribed by this physician? NO    Rooming Documentation:  Questionnaire(s) completed      Reason for visit: RECHECK    Vasiliy MAURICE

## 2024-09-18 ENCOUNTER — MYC MEDICAL ADVICE (OUTPATIENT)
Dept: FAMILY MEDICINE | Facility: CLINIC | Age: 44
End: 2024-09-18
Payer: COMMERCIAL

## 2024-09-23 ENCOUNTER — OFFICE VISIT (OUTPATIENT)
Dept: FAMILY MEDICINE | Facility: CLINIC | Age: 44
End: 2024-09-23
Payer: COMMERCIAL

## 2024-09-23 VITALS
SYSTOLIC BLOOD PRESSURE: 126 MMHG | HEART RATE: 77 BPM | OXYGEN SATURATION: 97 % | TEMPERATURE: 97.8 F | BODY MASS INDEX: 34.57 KG/M2 | HEIGHT: 68 IN | WEIGHT: 228.1 LBS | RESPIRATION RATE: 18 BRPM | DIASTOLIC BLOOD PRESSURE: 76 MMHG

## 2024-09-23 DIAGNOSIS — F43.21 GRIEF REACTION: ICD-10-CM

## 2024-09-23 DIAGNOSIS — N89.8 VAGINAL IRRITATION: Primary | ICD-10-CM

## 2024-09-23 DIAGNOSIS — L98.9 SCALP LESION: ICD-10-CM

## 2024-09-23 LAB
CLUE CELLS: PRESENT
TRICHOMONAS, WET PREP: ABNORMAL
WBC'S/HIGH POWER FIELD, WET PREP: ABNORMAL
YEAST, WET PREP: PRESENT

## 2024-09-23 PROCEDURE — 99213 OFFICE O/P EST LOW 20 MIN: CPT | Mod: 25 | Performed by: PHYSICIAN ASSISTANT

## 2024-09-23 PROCEDURE — 11104 PUNCH BX SKIN SINGLE LESION: CPT | Performed by: PHYSICIAN ASSISTANT

## 2024-09-23 PROCEDURE — 88305 TISSUE EXAM BY PATHOLOGIST: CPT | Performed by: PATHOLOGY

## 2024-09-23 PROCEDURE — 87210 SMEAR WET MOUNT SALINE/INK: CPT | Mod: QW | Performed by: PHYSICIAN ASSISTANT

## 2024-09-23 RX ORDER — METRONIDAZOLE 500 MG/1
500 TABLET ORAL 2 TIMES DAILY
Qty: 14 TABLET | Refills: 0 | Status: SHIPPED | OUTPATIENT
Start: 2024-09-23

## 2024-09-23 RX ORDER — FLUCONAZOLE 150 MG/1
150 TABLET ORAL
Qty: 3 TABLET | Refills: 0 | Status: SHIPPED | OUTPATIENT
Start: 2024-09-23 | End: 2024-09-30

## 2024-09-23 RX ORDER — CLONAZEPAM 0.5 MG/1
0.5 TABLET ORAL 2 TIMES DAILY PRN
Qty: 10 TABLET | Refills: 0 | Status: SHIPPED | OUTPATIENT
Start: 2024-09-23 | End: 2024-10-01
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
## 2024-09-25 ENCOUNTER — VIRTUAL VISIT (OUTPATIENT)
Dept: PSYCHOLOGY | Facility: OTHER | Age: 44
End: 2024-09-25
Payer: COMMERCIAL

## 2024-09-25 ENCOUNTER — MYC MEDICAL ADVICE (OUTPATIENT)
Dept: FAMILY MEDICINE | Facility: CLINIC | Age: 44
End: 2024-09-25
Payer: COMMERCIAL

## 2024-09-25 DIAGNOSIS — F43.10 PTSD (POST-TRAUMATIC STRESS DISORDER): Primary | ICD-10-CM

## 2024-09-25 LAB
PATH REPORT.COMMENTS IMP SPEC: NORMAL
PATH REPORT.COMMENTS IMP SPEC: NORMAL
PATH REPORT.FINAL DX SPEC: NORMAL
PATH REPORT.GROSS SPEC: NORMAL
PATH REPORT.MICROSCOPIC SPEC OTHER STN: NORMAL
PATH REPORT.RELEVANT HX SPEC: NORMAL
PHOTO IMAGE: NORMAL

## 2024-09-25 PROCEDURE — 90837 PSYTX W PT 60 MINUTES: CPT | Mod: 95 | Performed by: MARRIAGE & FAMILY THERAPIST

## 2024-09-25 ASSESSMENT — COLUMBIA-SUICIDE SEVERITY RATING SCALE - C-SSRS
5. HAVE YOU STARTED TO WORK OUT OR WORKED OUT THE DETAILS OF HOW TO KILL YOURSELF? DO YOU INTEND TO CARRY OUT THIS PLAN?: NO
4. HAVE YOU HAD THESE THOUGHTS AND HAD SOME INTENTION OF ACTING ON THEM?: NO
4. HAVE YOU HAD THESE THOUGHTS AND HAD SOME INTENTION OF ACTING ON THEM?: NO
5. HAVE YOU STARTED TO WORK OUT OR WORKED OUT THE DETAILS OF HOW TO KILL YOURSELF? DO YOU INTEND TO CARRY OUT THIS PLAN?: NO
2. HAVE YOU ACTUALLY HAD ANY THOUGHTS OF KILLING YOURSELF?: YES
1. IN THE PAST MONTH, HAVE YOU WISHED YOU WERE DEAD OR WISHED YOU COULD GO TO SLEEP AND NOT WAKE UP?: NO
1. HAVE YOU WISHED YOU WERE DEAD OR WISHED YOU COULD GO TO SLEEP AND NOT WAKE UP?: YES
ATTEMPT LIFETIME: NO
3. HAVE YOU BEEN THINKING ABOUT HOW YOU MIGHT KILL YOURSELF?: NO
REASONS FOR IDEATION PAST MONTH: DOES NOT APPLY
REASONS FOR IDEATION LIFETIME: MOSTLY TO END OR STOP THE PAIN (YOU COULDN'T GO ON LIVING WITH THE PAIN OR HOW YOU WERE FEELING)
2. HAVE YOU ACTUALLY HAD ANY THOUGHTS OF KILLING YOURSELF?: YES

## 2024-09-25 NOTE — PATIENT INSTRUCTIONS
Patient's treatment goals may include:    Improving functionality and relationships  Coping with grief of unexpectedly losing her father last week  Processing trauma and determining what further treatment and/or level of care may be helpful  Identify feelings related to health concerns and upcoming surgery

## 2024-09-26 ENCOUNTER — MYC MEDICAL ADVICE (OUTPATIENT)
Dept: FAMILY MEDICINE | Facility: CLINIC | Age: 44
End: 2024-09-26
Payer: COMMERCIAL

## 2024-09-27 ENCOUNTER — OFFICE VISIT (OUTPATIENT)
Dept: FAMILY MEDICINE | Facility: CLINIC | Age: 44
End: 2024-09-27
Payer: COMMERCIAL

## 2024-09-27 VITALS
HEIGHT: 68 IN | BODY MASS INDEX: 34.27 KG/M2 | OXYGEN SATURATION: 98 % | HEART RATE: 85 BPM | DIASTOLIC BLOOD PRESSURE: 80 MMHG | SYSTOLIC BLOOD PRESSURE: 124 MMHG | TEMPERATURE: 98.1 F | RESPIRATION RATE: 16 BRPM | WEIGHT: 226.1 LBS

## 2024-09-27 DIAGNOSIS — M79.89 SOFT TISSUE MASS: Primary | ICD-10-CM

## 2024-09-27 PROCEDURE — 99213 OFFICE O/P EST LOW 20 MIN: CPT | Performed by: PHYSICIAN ASSISTANT

## 2024-09-27 NOTE — TELEPHONE ENCOUNTER
Please triage. Recommend visit. If she wants to see me, OK to use a same day spot next week. May need to see one of my partners today

## 2024-09-27 NOTE — TELEPHONE ENCOUNTER
S-(situation): Groin lump    B-(background): Pt being treated for yeast infection and BV.     A-(assessment): Pt stated yesterday she noticed a lump in the groin. Palm sized. Firm lump. Lump felt in the left groin fold. No pain or fever. Pt feels the lump when walking.    R-(recommendations): Pt requesting OV for evaluation. OV scheduled with Jose Elizabeth today. Follow up visit scheduled with PCP next week.

## 2024-09-27 NOTE — PROGRESS NOTES
"  Assessment & Plan     (M79.89) Soft tissue mass  (primary encounter diagnosis)  Comment: Feels inferior to the inguinal chain could be adenopathy  Plan: Recommend continue her therapy for her vaginal symptoms I packed this area and tub soak no soap or bubbles she is scheduled to see someone next week for a pelvic encouraged her to keep that appointment follow-up prior as needed urgent care this weekend if indicated          BMI  Estimated body mass index is 34.38 kg/m  as calculated from the following:    Height as of this encounter: 1.727 m (5' 8\").    Weight as of this encounter: 102.6 kg (226 lb 1.6 oz).             Jessica Garza is a 44 year old, presenting for the following health issues:  Derm Problem (swollen lump in left upper thigh/groin area, still having ongoing vaginal concerns, currently on medications )      9/27/2024     2:42 PM   Additional Questions   Roomed by LYLE Arce     Patient (clinic with complaint of swelling in the left anterior medial thigh she noticed it last night while taking a bath  She currently is being treated for a yeast vaginitis and bacterial vaginosis her vaginal tissue and vulva are still uncomfortable but slightly improved  He does have history of adenopathy and actually is going to [facility] for further testing in the near future  She has an appointment with a female provider here to check her chidi area next week her blood sugars have been stable she has had no fever or chills he still is having a little vaginal spotting although that is improved    History of Present Illness       Reason for visit:  Lump  Symptom onset:  1-3 days ago  Symptoms include:  Pain  Symptom intensity:  Moderate  Symptom progression:  Worsening  Had these symptoms before:  Yes  Has tried/received treatment for these symptoms:  Yes  Previous treatment was successful:  Yes  Prior treatment description:  Medicine  What makes it worse:  Clothes and movememt  What makes it better:  Airing out   She " Patient calling. She wants this rx faxed to her pharmacy. Please call and let know.    "is taking medications regularly.                   Objective    /80 (BP Location: Right arm, Patient Position: Sitting, Cuff Size: Adult Large)   Pulse 85   Temp 98.1  F (36.7  C) (Tympanic)   Resp 16   Ht 1.727 m (5' 8\")   Wt 102.6 kg (226 lb 1.6 oz)   LMP 09/25/2019 (Within Days)   SpO2 98%   BMI 34.38 kg/m    Body mass index is 34.38 kg/m .  Physical Exam chaperone present her left inguinal region is unremarkable right inguinal region is unremarkable this area on the anterior medial right thigh she has 1 freely mobile but slightly firm lesion the size of a grape and 1 on the size of a pea that is inferior to the other 1              Signed Electronically by: YUNI Reeder    "

## 2024-10-01 DIAGNOSIS — F43.21 GRIEF REACTION: ICD-10-CM

## 2024-10-01 RX ORDER — CLONAZEPAM 0.5 MG/1
TABLET ORAL
Qty: 10 TABLET | Refills: 0 | Status: SHIPPED | OUTPATIENT
Start: 2024-10-01

## 2024-10-02 ENCOUNTER — VIRTUAL VISIT (OUTPATIENT)
Dept: PSYCHOLOGY | Facility: OTHER | Age: 44
End: 2024-10-02
Payer: COMMERCIAL

## 2024-10-02 DIAGNOSIS — F43.10 PTSD (POST-TRAUMATIC STRESS DISORDER): Primary | ICD-10-CM

## 2024-10-02 DIAGNOSIS — F33.1 MAJOR DEPRESSIVE DISORDER, RECURRENT EPISODE, MODERATE WITH ANXIOUS DISTRESS (H): ICD-10-CM

## 2024-10-02 PROCEDURE — 90791 PSYCH DIAGNOSTIC EVALUATION: CPT | Mod: 95 | Performed by: MARRIAGE & FAMILY THERAPIST

## 2024-10-02 ASSESSMENT — COLUMBIA-SUICIDE SEVERITY RATING SCALE - C-SSRS
1. SINCE LAST CONTACT, HAVE YOU WISHED YOU WERE DEAD OR WISHED YOU COULD GO TO SLEEP AND NOT WAKE UP?: NO
REASONS FOR IDEATION SINCE LAST CONTACT: DOES NOT APPLY
SUICIDE, SINCE LAST CONTACT: NO
ATTEMPT SINCE LAST CONTACT: NO
TOTAL  NUMBER OF INTERRUPTED ATTEMPTS SINCE LAST CONTACT: NO
6. HAVE YOU EVER DONE ANYTHING, STARTED TO DO ANYTHING, OR PREPARED TO DO ANYTHING TO END YOUR LIFE?: NO
2. HAVE YOU ACTUALLY HAD ANY THOUGHTS OF KILLING YOURSELF?: YES
TOTAL  NUMBER OF ABORTED OR SELF INTERRUPTED ATTEMPTS SINCE LAST CONTACT: NO
5. HAVE YOU STARTED TO WORK OUT OR WORKED OUT THE DETAILS OF HOW TO KILL YOURSELF? DO YOU INTEND TO CARRY OUT THIS PLAN?: NO
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
## 2024-10-03 ENCOUNTER — MYC MEDICAL ADVICE (OUTPATIENT)
Dept: FAMILY MEDICINE | Facility: CLINIC | Age: 44
End: 2024-10-03

## 2024-10-03 ENCOUNTER — OFFICE VISIT (OUTPATIENT)
Dept: FAMILY MEDICINE | Facility: CLINIC | Age: 44
End: 2024-10-03
Payer: COMMERCIAL

## 2024-10-03 VITALS
RESPIRATION RATE: 16 BRPM | OXYGEN SATURATION: 96 % | DIASTOLIC BLOOD PRESSURE: 84 MMHG | BODY MASS INDEX: 34.72 KG/M2 | HEIGHT: 68 IN | HEART RATE: 79 BPM | SYSTOLIC BLOOD PRESSURE: 133 MMHG | TEMPERATURE: 97.3 F | WEIGHT: 229.1 LBS

## 2024-10-03 DIAGNOSIS — N76.0 VAGINITIS AND VULVOVAGINITIS: Primary | ICD-10-CM

## 2024-10-03 DIAGNOSIS — E11.65 TYPE 2 DIABETES MELLITUS WITH HYPERGLYCEMIA, WITHOUT LONG-TERM CURRENT USE OF INSULIN (H): Primary | ICD-10-CM

## 2024-10-03 DIAGNOSIS — K14.8 LESION OF TONGUE: ICD-10-CM

## 2024-10-03 DIAGNOSIS — R22.42 MASS OF LEFT LOWER EXTREMITY: ICD-10-CM

## 2024-10-03 DIAGNOSIS — L83 ACANTHOSIS: ICD-10-CM

## 2024-10-03 DIAGNOSIS — E11.9 DIABETES MELLITUS TREATED WITH ORAL MEDICATION (H): ICD-10-CM

## 2024-10-03 DIAGNOSIS — Z79.84 DIABETES MELLITUS TREATED WITH ORAL MEDICATION (H): ICD-10-CM

## 2024-10-03 LAB
CLUE CELLS: NORMAL
TRICHOMONAS, WET PREP: NORMAL
WBC'S/HIGH POWER FIELD, WET PREP: NORMAL
YEAST, WET PREP: NORMAL

## 2024-10-03 PROCEDURE — 99214 OFFICE O/P EST MOD 30 MIN: CPT | Performed by: FAMILY MEDICINE

## 2024-10-03 PROCEDURE — 82043 UR ALBUMIN QUANTITATIVE: CPT | Performed by: FAMILY MEDICINE

## 2024-10-03 PROCEDURE — 82570 ASSAY OF URINE CREATININE: CPT | Performed by: FAMILY MEDICINE

## 2024-10-03 PROCEDURE — 87210 SMEAR WET MOUNT SALINE/INK: CPT | Mod: QW | Performed by: FAMILY MEDICINE

## 2024-10-03 NOTE — PROGRESS NOTES
"  Assessment & Plan     Vaginitis and vulvovaginitis  Wet prep is negative, recommend continuing to monitor. No changes at this time. May need to consider stopping jardiance in the future if persistent  - Wet prep - Clinic Collect    Mass of left lower extremity  Patient with tenderness along inner left upper thigh below inguinal region with an area of firmness that is likely a tight tendon but patient notes concerns about mass given discomfort and increasing size.  - US Lower Extremity Non Vascular Left; Future    Diabetes mellitus treated with oral medication (H)  Continuing on current regimen for diabetes, A1c done August 20, 2024 was 8.8.  Slowly trending down from high of 9.51-year ago but still above goal.  Given other health concerns diabetes has not been a focus.  May need to discontinue Jardiance given recurrent vaginal infections.  - Albumin Random Urine Quantitative with Creat Ratio; Future  - Albumin Random Urine Quantitative with Creat Ratio    Lesion of tongue  Patient will be seen at [facility] by ENT.  Anticipating biopsy with likely culture to rule out mycoplasma persistent infection.  Patient does note them since stopping the antibiotics she is feeling significantly worse.    Acanthosis  Biopsy on the top of the head showed squamous acanthosis with atypia.  Noting some other skin findings.  Plan is to continue to monitor and consider dermatology follow-up depending on results of other workup that is ongoing.          BMI  Estimated body mass index is 34.83 kg/m  as calculated from the following:    Height as of this encounter: 1.727 m (5' 8\").    Weight as of this encounter: 103.9 kg (229 lb 1.6 oz).             Jessica Garza is a 44 year old, presenting for the following health issues:  Follow Up (Pt saw Jose last week and he diagnosed her with adenopathy in the groin area. ) and Tongue Lesion(S) (Tongue lesions since January. She is wanting a biopsy done.)        10/3/2024     1:24 PM " "  Additional Questions   Roomed by Jerad     Patient with several ongoing concerns.    Vaginitis continues to be an issue.  She has had drainage and discomfort in the vaginal area.  Notes that she was seen previously and treated with Diflucan and metronidazole.  Symptoms seem to get worse.  Intermittent vaginal bleeding.  She is status post hysterectomy.  There is no bleeding at this time.    Also ongoing concerns about tongue lesions.  She has a follow-up with ENT.  Notes that there was reportedly concern about proliferation of cells that could be consistent with lymphoma.  Primary reason for follow-up with ENT is because biopsy at [facility] showed a mycoplasma infection.  She did note that she was seeing improvement in her symptoms while on a prolonged course of antibiotics but those antibiotics were recommended to be continued while awaiting further follow-up.  She has noticed worsening of symptoms.  She feels more fatigued and rundown.  The lesions on her tongue are growing.  Follow-up is pending.    History of Present Illness       Reason for visit:  Lump  Symptom onset:  1-3 days ago  Symptoms include:  Pain  Symptom intensity:  Moderate  Symptom progression:  Worsening  Had these symptoms before:  Yes  Has tried/received treatment for these symptoms:  Yes  Previous treatment was successful:  Yes  Prior treatment description:  Medicine  What makes it worse:  Clothes and movememt  What makes it better:  Airing out   She is taking medications regularly.                     Objective    /84 (BP Location: Right arm, Patient Position: Sitting, Cuff Size: Adult Large)   Pulse 79   Temp 97.3  F (36.3  C) (Tympanic)   Resp 16   Ht 1.727 m (5' 8\")   Wt 103.9 kg (229 lb 1.6 oz)   LMP 09/25/2019 (Within Days)   SpO2 96%   BMI 34.83 kg/m    Body mass index is 34.83 kg/m .  Physical Exam   GENERAL: alert and no distress   (female): normal female external genitalia, normal urethral meatus , " vaginal mucosa pink, moist, well rugated, vaginal discharge - copious and white, and vaginal vault is normal without evidence of lesions or bleeding            Signed Electronically by: Agnieszka Ahn MD

## 2024-10-04 ENCOUNTER — HOSPITAL ENCOUNTER (OUTPATIENT)
Dept: ULTRASOUND IMAGING | Facility: HOSPITAL | Age: 44
Discharge: HOME | End: 2024-10-04
Attending: FAMILY MEDICINE | Admitting: FAMILY MEDICINE
Payer: COMMERCIAL

## 2024-10-04 DIAGNOSIS — R22.42 MASS OF LEFT LOWER EXTREMITY: ICD-10-CM

## 2024-10-04 LAB
CREAT UR-MCNC: 125 MG/DL
MICROALBUMIN UR-MCNC: <12 MG/L
MICROALBUMIN/CREAT UR: NORMAL MG/G{CREAT}

## 2024-10-04 PROCEDURE — 76882 US LMTD JT/FCL EVL NVASC XTR: CPT | Mod: LT

## 2024-10-08 ENCOUNTER — VIRTUAL VISIT (OUTPATIENT)
Dept: PSYCHOLOGY | Facility: CLINIC | Age: 44
End: 2024-10-08
Payer: COMMERCIAL

## 2024-10-08 DIAGNOSIS — F33.1 MAJOR DEPRESSIVE DISORDER, RECURRENT EPISODE, MODERATE WITH ANXIOUS DISTRESS (H): ICD-10-CM

## 2024-10-08 DIAGNOSIS — F43.10 PTSD (POST-TRAUMATIC STRESS DISORDER): Primary | ICD-10-CM

## 2024-10-08 PROCEDURE — 90834 PSYTX W PT 45 MINUTES: CPT | Mod: 95 | Performed by: MARRIAGE & FAMILY THERAPIST

## 2024-10-08 NOTE — PATIENT INSTRUCTIONS
Patient reports that her goals for next few days include:    Continue to heal out loud  May use BIFF communication with older sister (keep it brief, informative, friendly, and firm)  Find out results from Friday's biopsy
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
## 2024-10-10 DIAGNOSIS — G43.009 MIGRAINE WITHOUT AURA AND WITHOUT STATUS MIGRAINOSUS, NOT INTRACTABLE: ICD-10-CM

## 2024-10-10 NOTE — TELEPHONE ENCOUNTER
Clinic RN: Please investigate patient's chart or contact patient if the information cannot be found because the last prescription was a taper dose (not in RN protocol). We need to know what dose patient is taking. Determine the current dose, then route to provider and ask provider to update the SIG and approve or deny the prescription.      Shivani CASTELLANOS RN

## 2024-10-11 ENCOUNTER — VIRTUAL VISIT (OUTPATIENT)
Dept: PSYCHOLOGY | Facility: OTHER | Age: 44
End: 2024-10-11
Payer: COMMERCIAL

## 2024-10-11 DIAGNOSIS — F43.10 PTSD (POST-TRAUMATIC STRESS DISORDER): Primary | ICD-10-CM

## 2024-10-11 DIAGNOSIS — F33.1 MAJOR DEPRESSIVE DISORDER, RECURRENT EPISODE, MODERATE WITH ANXIOUS DISTRESS (H): ICD-10-CM

## 2024-10-11 PROCEDURE — 90837 PSYTX W PT 60 MINUTES: CPT | Mod: 95 | Performed by: MARRIAGE & FAMILY THERAPIST

## 2024-10-11 NOTE — PATIENT INSTRUCTIONS
Patient reports that her goals for next few days include:    Visit dad's cabin  Hike trails  Ask partner to also go up to the cabin

## 2024-10-14 ENCOUNTER — VIRTUAL VISIT (OUTPATIENT)
Dept: PSYCHOLOGY | Facility: OTHER | Age: 44
End: 2024-10-14
Payer: COMMERCIAL

## 2024-10-14 DIAGNOSIS — F43.10 PTSD (POST-TRAUMATIC STRESS DISORDER): Primary | ICD-10-CM

## 2024-10-14 DIAGNOSIS — F33.1 MAJOR DEPRESSIVE DISORDER, RECURRENT EPISODE, MODERATE WITH ANXIOUS DISTRESS (H): ICD-10-CM

## 2024-10-14 PROCEDURE — 90837 PSYTX W PT 60 MINUTES: CPT | Mod: 95 | Performed by: MARRIAGE & FAMILY THERAPIST

## 2024-10-14 RX ORDER — TOPIRAMATE 25 MG/1
TABLET, FILM COATED ORAL
Qty: 107 TABLET | Refills: 10 | OUTPATIENT
Start: 2024-10-14

## 2024-10-14 NOTE — PATIENT INSTRUCTIONS
Patient reports that her goals for next 1-2 weeks include:     father's ashes and transport them as needed - maybe bring 18-year-old son on the drive  Continue to maintain boundaries with sisters
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
## 2024-10-14 NOTE — TELEPHONE ENCOUNTER
Last read by Kayla Cameron at  9:35 AM on 10/10/2024.     Patient has read message, no response at this time.

## 2024-10-21 DIAGNOSIS — E03.9 HYPOTHYROIDISM, UNSPECIFIED TYPE: ICD-10-CM

## 2024-10-21 DIAGNOSIS — C73 MALIGNANT NEOPLASM OF THYROID GLAND (H): ICD-10-CM

## 2024-10-21 RX ORDER — LEVOTHYROXINE SODIUM 100 UG/1
100 TABLET ORAL DAILY
Qty: 90 TABLET | Refills: 2 | OUTPATIENT
Start: 2024-10-21

## 2024-10-22 DIAGNOSIS — C73 MALIGNANT NEOPLASM OF THYROID GLAND (H): ICD-10-CM

## 2024-10-22 DIAGNOSIS — E03.9 HYPOTHYROIDISM, UNSPECIFIED TYPE: ICD-10-CM
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
## 2024-10-23 ENCOUNTER — MYC REFILL (OUTPATIENT)
Dept: FAMILY MEDICINE | Facility: CLINIC | Age: 44
End: 2024-10-23
Payer: COMMERCIAL

## 2024-10-23 ENCOUNTER — MYC MEDICAL ADVICE (OUTPATIENT)
Dept: FAMILY MEDICINE | Facility: CLINIC | Age: 44
End: 2024-10-23
Payer: COMMERCIAL

## 2024-10-23 ENCOUNTER — VIRTUAL VISIT (OUTPATIENT)
Dept: PSYCHOLOGY | Facility: OTHER | Age: 44
End: 2024-10-23
Payer: COMMERCIAL

## 2024-10-23 DIAGNOSIS — E03.9 HYPOTHYROIDISM, UNSPECIFIED TYPE: ICD-10-CM

## 2024-10-23 DIAGNOSIS — F43.10 PTSD (POST-TRAUMATIC STRESS DISORDER): Primary | ICD-10-CM

## 2024-10-23 DIAGNOSIS — F33.1 MAJOR DEPRESSIVE DISORDER, RECURRENT EPISODE, MODERATE WITH ANXIOUS DISTRESS (H): ICD-10-CM

## 2024-10-23 DIAGNOSIS — C73 MALIGNANT NEOPLASM OF THYROID GLAND (H): ICD-10-CM

## 2024-10-23 PROCEDURE — 90837 PSYTX W PT 60 MINUTES: CPT | Mod: 95 | Performed by: MARRIAGE & FAMILY THERAPIST

## 2024-10-23 RX ORDER — LEVOTHYROXINE SODIUM 100 UG/1
100 TABLET ORAL DAILY
Qty: 90 TABLET | Refills: 1 | Status: CANCELLED | OUTPATIENT
Start: 2024-10-23

## 2024-10-23 RX ORDER — LEVOTHYROXINE SODIUM 100 UG/1
100 TABLET ORAL DAILY
Qty: 90 TABLET | Refills: 2 | Status: SHIPPED | OUTPATIENT
Start: 2024-10-23 | End: 2024-10-23

## 2024-10-23 RX ORDER — LEVOTHYROXINE SODIUM 100 UG/1
100 TABLET ORAL DAILY
Qty: 90 TABLET | Refills: 2 | Status: SHIPPED | OUTPATIENT
Start: 2024-10-23

## 2024-10-23 NOTE — PATIENT INSTRUCTIONS
Patient reports that her goals for next week include:    Find ways to soothe nervous system: use ice pack, be in nature, spend time alone, get hugs from sons  Find out diagnosis and treatment plan on Friday

## 2024-10-24 ENCOUNTER — NURSE TRIAGE (OUTPATIENT)
Dept: FAMILY MEDICINE | Facility: CLINIC | Age: 44
End: 2024-10-24
Payer: COMMERCIAL

## 2024-10-24 NOTE — TELEPHONE ENCOUNTER
S-(situation):   Transferred to RN for symptoms    B-(background):   Patient reports Thyroid Cancer    Benign essential hypertension      A-(assessment):   Patient reports (Tachycardia) started last night  Patient reports her BP is elevated ( no reading available).  RN noted (SOB) sentences interrupted with breathing.    Patient states symptoms have kept awake and she has not slept.    R-(recommendations):   Advised ED.     Patient verbalizes agreement with plan.    YURI Blood, BSN  [place], [place]        Reason for Disposition   Difficulty breathing    Additional Information   Negative: Chest pain   Negative: Passed out (i.e., lost consciousness, collapsed and was not responding)   Negative: Shock suspected (e.g., cold/pale/clammy skin, too weak to stand, low BP, rapid pulse)   Negative: Difficult to awaken or acting confused (e.g., disoriented, slurred speech)   Negative: Visible sweat on face or sweat dripping down face   Negative: Unable to walk, or can only walk with assistance (e.g., requires support)   Negative: Received SHOCK from implantable cardiac defibrillator and has persisting symptoms (i.e., palpitations, lightheadedness)   Negative: Dizziness, lightheadedness, or weakness and heart beating very rapidly (e.g., > 140 / minute)   Negative: Dizziness, lightheadedness, or weakness and heart beating very slowly (e.g., < 50 / minute)   Negative: Sounds like a life-threatening emergency to the triager    Protocols used: Heart Rate and Heartbeat [id number]

## 2024-10-25 DIAGNOSIS — R60.1 GENERALIZED EDEMA: ICD-10-CM

## 2024-10-25 RX ORDER — HYDROCHLOROTHIAZIDE 12.5 MG/1
12.5 TABLET ORAL DAILY
Qty: 30 TABLET | Refills: 2 | Status: SHIPPED | OUTPATIENT
Start: 2024-10-25
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
## 2024-10-30 ENCOUNTER — VIRTUAL VISIT (OUTPATIENT)
Dept: PSYCHOLOGY | Facility: OTHER | Age: 44
End: 2024-10-30
Payer: COMMERCIAL

## 2024-10-30 ENCOUNTER — FCC EXTENDED DOCUMENTATION (OUTPATIENT)
Dept: PSYCHOLOGY | Facility: CLINIC | Age: 44
End: 2024-10-30
Payer: COMMERCIAL

## 2024-10-30 DIAGNOSIS — F43.10 PTSD (POST-TRAUMATIC STRESS DISORDER): Primary | ICD-10-CM

## 2024-10-30 DIAGNOSIS — F33.1 MAJOR DEPRESSIVE DISORDER, RECURRENT EPISODE, MODERATE WITH ANXIOUS DISTRESS (H): ICD-10-CM

## 2024-10-30 PROCEDURE — 90837 PSYTX W PT 60 MINUTES: CPT | Mod: 95 | Performed by: MARRIAGE & FAMILY THERAPIST

## 2024-10-30 PROCEDURE — 90785 PSYTX COMPLEX INTERACTIVE: CPT | Mod: 95 | Performed by: MARRIAGE & FAMILY THERAPIST

## 2024-10-30 NOTE — PROGRESS NOTES
LOCUS Worksheet     Name: Shannan Cameron MRN: [id number]    : 1980      Gender:  female    PMI:     Provider Name: PARI Becker LADC   Provider NPI:  [id number]    Actual level of Care Provided:  outpatient individual therapy    Service(s) receiving or referred to:  PHP    Reason for Variance: patient would benefit from programmatic care and medication management (PCP has been contacted and asked to refer pt for psychiatry)      Rating completed by: PARI Becker LADC      I. Risk of Harm:   2      Low Risk of Harm    II. Functional Status:   4      Serious Impairment    III. Co-Morbidity:   4      Major Co-Morbidity    IV - A. Recovery Environment - Level of Stress:   3      Moderately Stress Environment    IV - B. Recovery Environment - Level of Support:   3      Limited Support in Environment    V. Treatment and Recovery History:   3      Moderate to Equivocal Response to Treatment and Recovery Management    VI. Engagement and Recovery Project:   2      Positive Engagement and Recovery       21 Composite Score    Level of Care Recommendation:   20 to 22       Medically Monitored Non-Residential Services

## 2024-10-30 NOTE — PATIENT INSTRUCTIONS
"Patient reports that her goals for next week include:    \"Stand firm in my truth even if no one believes me\"  \"Work on my relationship with God, like my dad would have wanted me to\"  Keep looking for a Christian that aligns with values  Homework: look into therapy groups in area, including DBT group  "

## 2024-10-31 ENCOUNTER — OFFICE VISIT (OUTPATIENT)
Dept: FAMILY MEDICINE | Facility: CLINIC | Age: 44
End: 2024-10-31
Payer: COMMERCIAL

## 2024-10-31 ENCOUNTER — TELEPHONE (OUTPATIENT)
Dept: BEHAVIORAL HEALTH | Facility: CLINIC | Age: 44
End: 2024-10-31

## 2024-10-31 VITALS
RESPIRATION RATE: 18 BRPM | HEIGHT: 68 IN | WEIGHT: 231.1 LBS | SYSTOLIC BLOOD PRESSURE: 131 MMHG | TEMPERATURE: 97.3 F | HEART RATE: 93 BPM | OXYGEN SATURATION: 100 % | BODY MASS INDEX: 35.03 KG/M2 | DIASTOLIC BLOOD PRESSURE: 77 MMHG

## 2024-10-31 DIAGNOSIS — F43.10 PTSD (POST-TRAUMATIC STRESS DISORDER): ICD-10-CM

## 2024-10-31 DIAGNOSIS — E66.812 CLASS 2 SEVERE OBESITY DUE TO EXCESS CALORIES WITH SERIOUS COMORBIDITY AND BODY MASS INDEX (BMI) OF 35.0 TO 35.9 IN ADULT (H): ICD-10-CM

## 2024-10-31 DIAGNOSIS — Z79.84 DIABETES MELLITUS TREATED WITH ORAL MEDICATION (H): ICD-10-CM

## 2024-10-31 DIAGNOSIS — E11.9 DIABETES MELLITUS TREATED WITH ORAL MEDICATION (H): ICD-10-CM

## 2024-10-31 DIAGNOSIS — R59.9 REACTIVE LYMPHOID HYPERPLASIA: Primary | ICD-10-CM

## 2024-10-31 DIAGNOSIS — E66.01 CLASS 2 SEVERE OBESITY DUE TO EXCESS CALORIES WITH SERIOUS COMORBIDITY AND BODY MASS INDEX (BMI) OF 35.0 TO 35.9 IN ADULT (H): ICD-10-CM

## 2024-10-31 DIAGNOSIS — K21.9 GASTROESOPHAGEAL REFLUX DISEASE, UNSPECIFIED WHETHER ESOPHAGITIS PRESENT: ICD-10-CM

## 2024-10-31 DIAGNOSIS — F32.1 MODERATE MAJOR DEPRESSION (H): ICD-10-CM

## 2024-10-31 PROCEDURE — 90480 ADMN SARSCOV2 VAC 1/ONLY CMP: CPT | Performed by: FAMILY MEDICINE

## 2024-10-31 PROCEDURE — 90471 IMMUNIZATION ADMIN: CPT | Mod: SL | Performed by: FAMILY MEDICINE

## 2024-10-31 PROCEDURE — 99214 OFFICE O/P EST MOD 30 MIN: CPT | Mod: 25 | Performed by: FAMILY MEDICINE

## 2024-10-31 PROCEDURE — 90656 IIV3 VACC NO PRSV 0.5 ML IM: CPT | Performed by: FAMILY MEDICINE

## 2024-10-31 PROCEDURE — 91320 SARSCV2 VAC 30MCG TRS-SUC IM: CPT | Performed by: FAMILY MEDICINE

## 2024-10-31 ASSESSMENT — PATIENT HEALTH QUESTIONNAIRE - PHQ9
SUM OF ALL RESPONSES TO PHQ QUESTIONS 1-9: 14
SUM OF ALL RESPONSES TO PHQ QUESTIONS 1-9: 14
10. IF YOU CHECKED OFF ANY PROBLEMS, HOW DIFFICULT HAVE THESE PROBLEMS MADE IT FOR YOU TO DO YOUR WORK, TAKE CARE OF THINGS AT HOME, OR GET ALONG WITH OTHER PEOPLE: EXTREMELY DIFFICULT

## 2024-10-31 NOTE — TELEPHONE ENCOUNTER
Provider LVM for pt re: referral placed by [facility] therapist for PHP level of care. Pt resides in [place], [place] and provider reached out to confirm that pt would be able to commute to/from [place] or [place] 5 days per week to complete program.  Left provider's name and direct phone # requesting call back.    Rebeca Mai MSW, LICSW

## 2024-10-31 NOTE — ASSESSMENT & PLAN NOTE
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  Infectious disease following to rule out infectious cause but so far cultures are negative.  In reviewing literature there are conditions of reactive lymphoid hyperplasia being systemic and requiring treatment.  This would likely be through hematology or hematology oncology.  Given other workup happening at [facility] my recommendation would be to follow-up there and a referral is placed at this time.  Patient will let me know if she has any difficulty getting this scheduled.  Orders:    Adult Oncology/Hematology  Referral; Future

## 2024-10-31 NOTE — ASSESSMENT & PLAN NOTE
BMI 35 likely contributing to diabetes, on medication for diabetes that also may help with weight loss.  Weight gain likely exacerbated by recent health concerns.  Patient is working at dietary changes.  Continues to follow with diabetes education and endocrinology regarding her thyroid.

## 2024-10-31 NOTE — ASSESSMENT & PLAN NOTE
Patient with episode of acid reflux in the middle of the night that led to shortness of breath.  Otherwise has not had similar symptoms but does have some lingering gastritis symptoms after that episode.  Will have her take omeprazole for at least the next couple of months and see if that helps  Orders:    omeprazole (PRILOSEC) 20 MG DR capsule; Take 1 capsule (20 mg) by mouth daily.

## 2024-10-31 NOTE — ASSESSMENT & PLAN NOTE
Diabetes is improving/stable/worsening: Not fully controlled, has improved since recent insulin switch.  Continues to follow with diabetes educator.  Current medications are reviewed.  A1c in 6 to 8 weeks  Diabetes will be reassessed at next visit anticipated in December.

## 2024-10-31 NOTE — PROGRESS NOTES
Assessment & Plan  Diabetes mellitus treated with oral medication (H)  Diabetes is improving/stable/worsening: Not fully controlled, has improved since recent insulin switch.  Continues to follow with diabetes educator.  Current medications are reviewed .  A1c in 6 to 8 weeks  Diabetes will be reassessed  at next visit anticipated in December .         Reactive lymphoid hyperplasia  Biopsies showed reactive lymphoid hyperplasia on the tongue and patient having systemic symptoms.  Infectious disease following to rule out infectious cause but so far cultures are negative.  In reviewing literature there are conditions of reactive lymphoid hyperplasia being systemic and requiring treatment.  This would likely be through hematology or hematology oncology.  Given other workup happening at [facility] my recommendation would be to follow-up there and a referral is placed at this time.  Patient will let me know if she has any difficulty getting this scheduled.  Orders:    Adult Oncology/Hematology  Referral; Future    Gastroesophageal reflux disease, unspecified whether esophagitis present  Patient with episode of acid reflux in the middle of the night that led to shortness of breath.  Otherwise has not had similar symptoms but does have some lingering gastritis symptoms after that episode.  Will have her take omeprazole for at least the next couple of months and see if that helps  Orders:    omeprazole (PRILOSEC) 20 MG DR capsule; Take 1 capsule (20 mg) by mouth daily.    PTSD (post-traumatic stress disorder)  Patient notes depression anxiety and PTSD have not been fully controlled.  Not currently taking medication other than clonazepam.  Had been started on topiramate but that was related to headaches.  In consultation with mental health therapist recommendation was for consultation with psychiatry.  Will refer to collaborative psychiatry program for evaluation  Orders:    Adult Mental Health   Referral; Future    Moderate major depression (H)  As noted above referred to collaborative psychiatry program for evaluation  Orders:    Adult Mental Health  Referral; Future    Class 2 severe obesity due to excess calories with serious comorbidity and body mass index (BMI) of 35.0 to 35.9 in adult (H)  BMI 35 likely contributing to diabetes, on medication for diabetes that also may help with weight loss.  Weight gain likely exacerbated by recent health concerns.  Patient is working at dietary changes.  Continues to follow with diabetes education and endocrinology regarding her thyroid.             Jessica Garza is a 44 year old, presenting for the following health issues:  Follow Up (Diabetes,)        10/31/2024     8:56 AM   Additional Questions   Roomed by Jerad     Patient here for follow up     Still awaiting [facility] follow up. 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 Notes that her symptoms did improve during the brief period that she was on antibiotics, which they can't currently explain.   Notes that she is having increased swelling on her scalp. Leads to severe headaches. Had prior biopsy of scalp that showed squamous acanthosis with hyperkeratosis.     History of Present Illness       Diabetes:   She presents for follow up of diabetes.   She is checking home blood glucose with a continuous glucose monitor.   She checks blood glucose before meals, after meals, before and after meals and at bedtime.  Blood glucose is sometimes over 200 and sometimes under 70. She is aware of hypoglycemia symptoms including dizziness, weakness and blurred vision.   She is concerned about frequent infections.   She is having numbness in feet, blurry vision and weight gain.  The patient has not had a diabetic eye exam in the last 12 months.          Hyperlipidemia:  She presents for follow up of hyperlipidemia.   She is taking medication to lower cholesterol. She is not having myalgia or other side  "effects to statin medications.    Hypertension: She presents for follow up of hypertension.  She does check blood pressure  regularly outside of the clinic. Outside blood pressures have been over 140/90. She does not follow a low salt diet.     Hypothyroidism:     Since last visit, patient describes the following symptoms::  Anxiety, Constipation, Depression, Dry skin, Fatigue, Tremors and Weight gain    Weight gain::  5 lbs.    Reason for visit:  Follow up for symptoms due to n ongoing condition    She eats 4 or more servings of fruits and vegetables daily.She consumes 1 sweetened beverage(s) daily.She exercises with enough effort to increase her heart rate 30 to 60 minutes per day.  She exercises with enough effort to increase her heart rate 3 or less days per week.   She is taking medications regularly.                     Objective    /77   Pulse 93   Temp 97.3  F (36.3  C) (Tympanic)   Resp 18   Ht 1.727 m (5' 8\")   Wt 104.8 kg (231 lb 1.6 oz)   LMP 09/25/2019 (Within Days)   SpO2 100%   BMI 35.14 kg/m    Body mass index is 35.14 kg/m .  Physical Exam   Alert, cooperative, no distress            Signed Electronically by: Agnieszka Ahn MD    "

## 2024-10-31 NOTE — ASSESSMENT & PLAN NOTE
Patient notes depression anxiety and PTSD have not been fully controlled.  Not currently taking medication other than clonazepam.  Had been started on topiramate but that was related to headaches.  In consultation with mental health therapist recommendation was for consultation with psychiatry.  Will refer to collaborative psychiatry program for evaluation  Orders:    Adult Mental Health  Referral; Future

## 2024-10-31 NOTE — ASSESSMENT & PLAN NOTE
As noted above referred to collaborative psychiatry program for evaluation  Orders:    Adult Mental Health  Referral; Future

## 2024-11-01 ENCOUNTER — TELEPHONE (OUTPATIENT)
Dept: BEHAVIORAL HEALTH | Facility: CLINIC | Age: 44
End: 2024-11-01
Payer: COMMERCIAL

## 2024-11-01 NOTE — TELEPHONE ENCOUNTER
Summary of Patient Care Communication Handoff to Patient Navigator Coordinator    PATIENT'S NAME: Shannan Cameorn  MRN:   [id number]  :   1980    DATE OF SERVICE: 24    Referral Needed: Yes    Is the patient coming from an inpatient unit? No    What program is this referral for? Adult Mental Health Referral    Level of Care Recommended:  Partial Hospitalization Program (PHP)    Specialty Track Recommendations:  MH Specialty Tracks: General Mood Disorder    Schedule Preferences: Schedule Preference:  No schedule preference (Mornings or Afternoons)    Are there any potential barriers for entrance into programmatic care? Pt agrees to commute from [place], [place] each day of programming.    Followed up from  Needed?:  No    Mental Health Referral Needed: No    Release of Information Needed:  YOLANDA Needed: Other:  Not at this time.    Faxing Needed: No    Follow up Requests:  Patient Navigator Coordinator Follow-Up Needed: Referral to designated [facility].    Comments: Pt is able to begin adult PHP as soon as possible.  Pt is open to either [place] or [place] location.     Rebeca LEON, [facility]    Patient Navigator Coordinator Contact Information  Pool Message: [email or web address] ([phone])   Phone:  [phone]  Fax:  [phone]  Email:  [email or web address]

## 2024-11-04 ENCOUNTER — MYC MEDICAL ADVICE (OUTPATIENT)
Dept: FAMILY MEDICINE | Facility: CLINIC | Age: 44
End: 2024-11-04
Payer: COMMERCIAL

## 2024-11-04 ASSESSMENT — MIGRAINE DISABILITY ASSESSMENT (MIDAS)
HOW MANY DAYS WAS YOUR PRODUCTIVITY CUT IN HALF BECAUSE OF HEADACHES: 14
TOTAL SCORE: 73
HOW MANY DAYS IN THE PAST 3 MONTHS HAVE YOU HAD A HEADACHE: 40
HOW MANY DAYS DID YOU MISS WORK OR SCHOOL BECAUSE OF HEADACHES: 7
ON A SCALE FROM 0-10 ON AVERAGE HOW PAINFUL WERE HEADACHES: 10
HOW OFTEN WERE SOCIAL ACTIVITIES MISSED DUE TO HEADACHES: 3
HOW MANY DAYS DID YOU NOT DO HOUSEWORK BECAUSE OF HEADACHES: 21
HOW MANY DAYS WAS HOUSEWORK PRODUCTIVITY CUT IN HALF DUE TO HEADACHES: 28

## 2024-11-04 ASSESSMENT — HEADACHE IMPACT TEST (HIT 6)
HOW OFTEN HAVE YOU FELT FED UP OR IRRITATED BECAUSE OF YOUR HEADACHES: ALWAYS
HOW OFTEN DO HEADACHES LIMIT YOUR DAILY ACTIVITIES: VERY OFTEN
WHEN YOU HAVE A HEADACHE HOW OFTEN DO YOU WISH YOU COULD LIE DOWN: VERY OFTEN
HIT6 TOTAL SCORE: 68
HOW OFTEN HAVE YOU FELT TOO TIRED TO WORK BECAUSE OF YOUR HEADACHES: SOMETIMES
WHEN YOU HAVE HEADACHES HOW OFTEN IS THE PAIN SEVERE: ALWAYS
HOW OFTEN DID HEADACHS LIMIT CONCENTRATION ON WORK OR DAILY ACTIVITY: SOMETIMES

## 2024-11-04 NOTE — PROGRESS NOTES
Pre-visit planning and chart review completed.     RETURN patient appointment:    11/19 with Dr. Mary Hull  11/18 CT chest wo contrast    - 2 month follow-up .    CE updated. Medications, allergies, problem list, and immunizations reconciled.
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
## 2024-11-06 ENCOUNTER — VIRTUAL VISIT (OUTPATIENT)
Dept: PSYCHOLOGY | Facility: OTHER | Age: 44
End: 2024-11-06
Payer: COMMERCIAL

## 2024-11-06 ENCOUNTER — MYC MEDICAL ADVICE (OUTPATIENT)
Dept: FAMILY MEDICINE | Facility: CLINIC | Age: 44
End: 2024-11-06
Payer: COMMERCIAL

## 2024-11-06 DIAGNOSIS — F43.10 PTSD (POST-TRAUMATIC STRESS DISORDER): Primary | ICD-10-CM

## 2024-11-06 DIAGNOSIS — F33.1 MAJOR DEPRESSIVE DISORDER, RECURRENT EPISODE, MODERATE WITH ANXIOUS DISTRESS (H): ICD-10-CM

## 2024-11-06 PROCEDURE — 90837 PSYTX W PT 60 MINUTES: CPT | Mod: 95 | Performed by: MARRIAGE & FAMILY THERAPIST

## 2024-11-06 NOTE — TELEPHONE ENCOUNTER
I called and scheduled patient with Jose Elizabeth on 11/08 @ 1020 am for hospital follow-up. Patient verbalized understanding, no further questions/concerns at this time.

## 2024-11-06 NOTE — PATIENT INSTRUCTIONS
"Patient reports that her goals for next week include:    Try to rise above weird political issues going on and stay focused on iain  Try to surround self with people who hold similar values  Draw a picture of father (\"I have a picture in my head\")  "

## 2024-11-06 NOTE — TELEPHONE ENCOUNTER
Patient with an ER visit.  I sent her a message.  Please find out if she wants to see someone else in the next week and a half or if we can get her on my schedule for the week of November 18 when I am back in clinic.

## 2024-11-07 ENCOUNTER — MYC MEDICAL ADVICE (OUTPATIENT)
Dept: FAMILY MEDICINE | Facility: CLINIC | Age: 44
End: 2024-11-07

## 2024-11-08 ENCOUNTER — OFFICE VISIT (OUTPATIENT)
Dept: NEUROLOGY | Facility: CLINIC | Age: 44
End: 2024-11-08
Attending: FAMILY MEDICINE
Payer: COMMERCIAL

## 2024-11-08 VITALS
WEIGHT: 229 LBS | DIASTOLIC BLOOD PRESSURE: 88 MMHG | SYSTOLIC BLOOD PRESSURE: 136 MMHG | BODY MASS INDEX: 34.82 KG/M2 | HEART RATE: 80 BPM | OXYGEN SATURATION: 98 %

## 2024-11-08 DIAGNOSIS — D22.9 ATYPICAL MOLE: ICD-10-CM

## 2024-11-08 DIAGNOSIS — G43.109 MIGRAINE WITH AURA AND WITHOUT STATUS MIGRAINOSUS, NOT INTRACTABLE: ICD-10-CM

## 2024-11-08 DIAGNOSIS — E11.65 TYPE 2 DIABETES MELLITUS WITH HYPERGLYCEMIA, WITH LONG-TERM CURRENT USE OF INSULIN (H): ICD-10-CM

## 2024-11-08 DIAGNOSIS — Z79.4 TYPE 2 DIABETES MELLITUS WITH HYPERGLYCEMIA, WITH LONG-TERM CURRENT USE OF INSULIN (H): ICD-10-CM

## 2024-11-08 DIAGNOSIS — H53.8 VISION BLURRING: Primary | ICD-10-CM

## 2024-11-08 PROCEDURE — 99205 OFFICE O/P NEW HI 60 MIN: CPT | Performed by: PSYCHIATRY & NEUROLOGY

## 2024-11-08 PROCEDURE — 99417 PROLNG OP E/M EACH 15 MIN: CPT | Performed by: PSYCHIATRY & NEUROLOGY

## 2024-11-08 RX ORDER — ACYCLOVIR 800 MG/1
1 TABLET ORAL
Qty: 6 EACH | Refills: 5 | Status: SHIPPED | OUTPATIENT
Start: 2024-11-08

## 2024-11-08 RX ORDER — FREMANEZUMAB-VFRM 225 MG/1.5ML
225 INJECTION SUBCUTANEOUS
Qty: 1.5 ML | Refills: 6 | Status: SHIPPED | OUTPATIENT
Start: 2024-11-08

## 2024-11-08 NOTE — LETTER
"11/8/2024      Shannan Cameron  [address]  [facility] [address]      Dear Colleague,    Thank you for referring your patient, Shannan Cameron, to the [facility]. Please see a copy of my visit note below.    [facility]   Headache Neurology Consult  November 8, 2024     Shannan Cameron MRN# [id number]   YOB: 1980 Age: 44 year old     Requesting provider: Deb Wilson MD          Assessment and Recommendations:     Shannan Cameron is a 44 year old female with reactive lymphoid hyperplasia with atypia, currently under evaluation and scalp edema and nodules who is experiencing worsening headaches which are likely secondarily being triggered by this underlying entity. They are consistent with chronic migraine.     MR brain with and without contrast - for evaluation of worsening headaches   Dermatology consultation - atypical nevus evaluation and iris freckling, melanoma?  Eye evaluation- for blurred vision    Headache treatment plan:  Acute medication recommendations:  Rizatriptan 10mg - may repeat at second dose  2 hours after the initial dose. Limit to no more than 9 days per month of use     Preventative medication recommendations:  She has diabetes is not a candidate for beta blockers and is already on an selective serotonin reuptake inhibitor so would not recommend another serotonergic antidepressant given risk of serotonin syndrome. History of nephrolithiasis is a contraindication for treatment with topiramate.     Will prescribe Ajovy 225mg subcutaneous injection every 30 days, for 6 month trial  Side effects reviewed today    Will meet her in return visit in 3 months.    Total time spent today was 80 minutes in chart review, history, exam and counseling.      Katelyn García MD  Neurology            Chief Complaint:     Chief Complaint   Patient presents with     Migraine     Sensitivity to light and sounds  Gets \"Aura\"- " "vision will get blurry or gets double vision or half of the vision will go \"black\" \"It is like Hologram\" \"Floating black dots\"           History is obtained from the patient and medical record.      Shannan Cameron is a 44 year old female who was stung by a wasp last year. Then she had COVID-19 and had enlarged lymph nodes bilaterally in the occipital cervical region.    She could not think or function at that time, she was finally seen for it. She was checked for neurologic symptoms. She was then assessed by eye and they assessed her vision, and eyes were normal. She was taken by ambulance and she was diagnosed with thyroid cancer. She had surgery for that. She still has the headaches and the pain and will notice that she has headaches and it is a discomfort to lean against it.     These headaches are severe and difficult to manage. There will be pulsating on the occipital region of her head. Her glasses will cut into the swollen regions of her head. It starts in the occipital region. There is a protrusion in the occipital region that pops out. She feels that it is always worse when this takes place. She gets dizzy on leaning forward. Laying down will make it way worse. She will need to meditate through it. Average pain is a 10/10 and she considers it this way if she cannot speak or hold a thought or communicate.     She has had phonophobia and there is photophobia (this is piercing pain). There are floaters and there is always distorted vision. There is scleral swelling she has noted and there is sandpaper and there is purulent discharge. There is persistent nausea. There has been vomiting.     She had a headache for the first time in her 20s. She had a really piercing headache in her 20s. It was a thunderclap phenotype and it knocked her on her knees. She did go to the ED and everything was normal on evaluation. She had tension headaches (starting in the neck).    She had part of her thyroid necrosed after she had " "the surgery about 6 months ago. She had no removal of the necrosed thyroid. She was started on levothyroxine after that time. 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    She also has a history of non-tuberculosis mycobacterium (immunogenum). She did have relief from that treatment and felt relief from that treatment and her swelling went down. She was then pulled off treatment for that at [facility] as they disagreed with the diagnosis given to her at [facility]. She had a biopsy at [facility] and that brought her to the diagnosis of reactive lymph node hyperplasia. She has had those multiple nodules. 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 She also has hepatomegaly.     There have been no fevers, but there have been night sweats which remitted in Feb. There was a bad bout after 9/17/2024 after the death of her father. There are painful stools but no diarrhea, there has been flushing. There has been strange appetite, with times she does not eat and other times, will try with abdominal pain. She will be ravenous and this is from the exogenous admin of the levothyroxine. She has had weight gain.             Past Medical History:     Past Medical History:   Diagnosis Date     History of partial thyroidectomy      Nephrolithiasis 2007     Papillary adenocarcinoma, follicular variant (H)      Pulmonary nodules     up to 6 mm   \"Acanthotic verrucal proliferation of squamous epithelium associated with hyper and parakeratosis.  Mild to focal moderate epithelial atypia is associated with the lesion (scalp)\" on 9/23/2024  \"Oropharynx, posterior tongue, biopsy:  Benign squamous   mucosa.  Reactive lymphoid hyperplasia. \" (Tongue lesion) on 10/4/2024  Diabetes mellitus type 2           Past Surgical History:     Past Surgical History:   Procedure Laterality Date     ARTHROSCOPIC REPAIR ACL  03/14/2017     C LAP " REMOVAL, Back LIPOMA  2018      SECTION  1996      SECTION  10/21/2008      SECTION  2006     HYSTERECTOMY  2019    Left ovarian cystectomy.     MAMMOGRAM - HIM SCAN  2017     [facility]  DELIVERY ONLY      Description:  Section;  Recorded: 2011;   She has had 3 tumors in her breast this year, biopsied in March, and found to be benign  Uterine growth with abnormal blood supply, hysterectomy          Social History:     Social History     Socioeconomic History     Marital status: Single     Spouse name: Not on file     Number of children: Not on file     Years of education: Not on file     Highest education level: Not on file   Occupational History     Not on file   Tobacco Use     Smoking status: Former, smoked at age 18 socially, and then quit in pregnancy and breastfeeding (4 years total) and then until age 43 (0.5 PPD)     Current packs/day: 0.00     Types: Cigarettes     Quit date: 2023     Years since quittin.0     Passive exposure: Past     Smokeless tobacco: Never   Vaping Use     Vaping status: Never Used   Substance and Sexual Activity     Alcohol use: No     Drug use: None     Sexual activity: Not Currently   Other Topics Concern     Not on file   Social History Narrative     Not on file     Social Drivers of Health     Financial Resource Strain: Low Risk  (2023)    Financial Resource Strain      Within the past 12 months, have you or your family members you live with been unable to get utilities (heat, electricity) when it was really needed?: No   Food Insecurity: Food Insecurity Present (2024)    Received from [facility]    Hunger Vital Sign      Worried About Running Out of Food in the Last Year: Sometimes true      Ran Out of Food in the Last Year: Sometimes true   Transportation Needs: No Transportation Needs (2024)    Received from [facility]    PRAPARE - Transportation      Lack  of Transportation (Medical): No      Lack of Transportation (Non-Medical): No   Physical Activity: Sufficiently Active (2/12/2024)    Received from [facility]    Exercise Vital Sign      Days of Exercise per Week: 2 days      Minutes of Exercise per Session: 120 min   Stress: Not on file   Social Connections: Unknown (1/1/2022)    [facility]    Social Connections      Frequency of Communication with Friends and Family: Not on file   Interpersonal Safety: Low Risk  (10/31/2024)    Interpersonal Safety      Do you feel physically and emotionally safe where you currently live?: Yes      Within the past 12 months, have you been hit, slapped, kicked or otherwise physically hurt by someone?: No      Within the past 12 months, have you been humiliated or emotionally abused in other ways by your partner or ex-partner?: No   Housing Stability: Low Risk  (2/12/2024)    [facility]    Housing Stability      What is your living situation today?: I have a steady place to live             Family History:     Family History   Problem Relation Age of Onset     Alcoholism Mother      Allergic rhinitis Mother      Anemia Mother      Arthritis Mother      Cancer Mother      Depression Mother      Substance Abuse Mother      Emotional abuse Mother      Fibromyalgia Mother      Mental Illness Mother      Alcoholism Father      Depression Father      Diabetes Father      Substance Abuse Father    None for headaches          Allergies:      Allergies   Allergen Reactions     Adhesive Tape Rash, Blisters and Itching     Bee Venom Shortness Of Breath and Dizziness     Fainting             Medications:   Acute headache medications:     rizatriptan (MAXALT) 10 MG tablet, Take 1 tablet (10 mg) by mouth at onset of headache for migraine May repeat in 2 hours. Max 3 tablets/24 hours., Disp: 10 tablet, Rfl: 1 -  was helpful until the last 2 times      Preventative headache medications:     topiramate (TOPAMAX) 25 MG tablet, Take 1 tablet (25 mg) by mouth daily for 7 days, THEN 2 tablets (50 mg) daily for 50 days., Disp: 107 tablet, Rfl: 0 - not taking      Current Outpatient Medications:      blood glucose (NO BRAND SPECIFIED) test strip, Use to test blood sugar 3 times daily or as directed. To accompany: Blood Glucose Monitor Brands: per insurance., Disp: 300 strip, Rfl: 6     blood glucose monitoring (NO BRAND SPECIFIED) meter device kit, Use to test blood sugar 1 times daily or as directed. Preferred blood glucose meter OR supplies to accompany: Blood Glucose Monitor Brands: per insurance., Disp: 1 kit, Rfl: 0     clonazePAM (KLONOPIN) 0.5 MG tablet, TAKE ONE TABLET (0.5 MG) BY MOUTH TWO TIMES DAILY AS NEEDED FOR ANXIETY., Disp: 10 tablet, Rfl: 0     Continuous Blood Gluc Sensor (FREESTYLE LAURA 3 SENSOR) MISC, 1 each every 14 days Use 1 sensor every 14 days. Use to read blood sugars per 's instructions., Disp: 6 each, Rfl: 5     empagliflozin (JARDIANCE) 10 MG TABS tablet, Take 1 tablet (10 mg) by mouth daily, Disp: 90 tablet, Rfl: 1     hydroCHLOROthiazide 12.5 MG tablet, TAKE 1 TABLET BY MOUTH ONCE DAILY, Disp: 30 tablet, Rfl: 2     insulin glargine (LANTUS SOLOSTAR) 100 UNIT/ML pen, INJECT 33 units twice a day and continue to increase up to 40 units twice a day, Disp: 225 mL, Rfl: 1     insulin lispro (HUMALOG KWIKPEN) 100 UNIT/ML (1 unit dial) KWIKPEN, Inject 33 Units subcutaneously., Disp: , Rfl:      insulin pen needle (32G X 4 MM) 32G X 4 MM miscellaneous, Use 4 pen needles daily or as directed., Disp: 400 each, Rfl: 3     levothyroxine (SYNTHROID/LEVOTHROID) 100 MCG tablet, Take 1 tablet (100 mcg) by mouth daily., Disp: 90 tablet, Rfl: 2     losartan (COZAAR) 25 MG tablet, Take 1 tablet (25 mg) by mouth daily, Disp: 90 tablet, Rfl: 4     metroNIDAZOLE (FLAGYL) 500 MG tablet, Take 1 tablet (500 mg) by  mouth 2 times daily., Disp: 14 tablet, Rfl: 0     Microlet Lancets MISC, USE TO TEST ONCE DAILY, Disp: 300 each, Rfl: 1     Multiple Vitamins-Minerals (ONCOVITE) TABS, Take 1 tablet by mouth daily., Disp: , Rfl:      omeprazole (PRILOSEC) 20 MG DR capsule, Take 1 capsule (20 mg) by mouth daily., Disp: 90 capsule, Rfl: 3     ondansetron (ZOFRAN) 8 MG tablet, Take 1 tablet (8 mg) by mouth every 8 hours as needed for nausea, Disp: 12 tablet, Rfl: 1     rosuvastatin (CRESTOR) 10 MG tablet, Take 1 tablet (10 mg) by mouth daily, Disp: 90 tablet, Rfl: 4     Semaglutide, 2 MG/DOSE, (OZEMPIC) 8 MG/3ML pen, Inject 2 mg subcutaneously every 7 days., Disp: 9 mL, Rfl: 3     tiZANidine (ZANAFLEX) 4 MG tablet, Take 4 mg by mouth as needed, Disp: , Rfl:           Physical Exam:   /88 (BP Location: Left arm, Patient Position: Sitting, Cuff Size: Adult Large)   Pulse 80   Wt 103.9 kg (229 lb)   LMP 09/25/2019 (Within Days)   SpO2 98%   BMI 34.82 kg/m     Physical Exam:  Photos over 1 year time with increased facial width  Neurologic:   Mental Status Exam: Alert, awake and oriented to situation. No dysarthria. Speech of normal fluency.   Cranial Nerves: Fundoscopic exam with clear disc margins bilaterally. PERRLA, EOMs intact, no nystagmus, facial movements symmetric, facial sensation intact to light touch, hearing intact to conversation, trapezius and SCMs 5/5 bilaterally, tongue midline and fully mobile.    Motor: Normal tone in all four extremities, no atrophy. 5/5 strength bilaterally in shoulder abduction, elbow extensors and flexors, wrist extensors and flexors, hip flexors, knee extensors and flexors, dorsi- and plantarflexion. No tremors or abnormal movements noted.   Sensory: Sensation intact to pinprick and vibration sensation on arms and legs bilaterally.    Coordination: Finger-nose-finger intact bilaterally.  Rapidly alternating movements intact bilaterally in the upper extremities.  Normal finger tapping  bilaterally.  Intact heel-shin bilaterally. Romberg with sway.   Reflexes: 2+ and symmetric in triceps, biceps, brachioradialis, patellar, Achilles, and plantars downgoing bilaterally.   Gait: Normal gait.  Able to toe and heel walk.  Tandem gait with mild instability.  Head: Normocephalic, atraumatic. Scalp soft tissue swelling bilaterally in the occipitalis region  Eyes: No conjunctival injection, no scleral icterus. Several iris freckles.          Data:     None current or available for imaging  Elevated LFTs      Again, thank you for allowing me to participate in the care of your patient.        Sincerely,        Katelyn García MD

## 2024-11-08 NOTE — NURSING NOTE
"Chief Complaint   Patient presents with    Migraine     Sensitivity to light and sounds  Gets \"Aura\"- vision will get blurry or gets double vision or half of the vision will go \"black\" \"It is like Hologram\" \"Floating black dots\"       Tiera Ortega, RMA on 11/8/2024 at 8:51 AM    "

## 2024-11-08 NOTE — PROGRESS NOTES
"[facility]   Headache Neurology Consult  November 8, 2024     Shannan Cameron MRN# [id number]   YOB: 1980 Age: 44 year old     Requesting provider: Deb Wilson MD          Assessment and Recommendations:     Shannan Cameron is a 44 year old female with reactive lymphoid hyperplasia with atypia, currently under evaluation and scalp edema and nodules who is experiencing worsening headaches which are likely secondarily being triggered by this underlying entity. They are consistent with chronic migraine.     MR brain with and without contrast - for evaluation of worsening headaches   Dermatology consultation - atypical nevus evaluation and iris freckling, melanoma?  Eye evaluation- for blurred vision    Headache treatment plan:  Acute medication recommendations:  Rizatriptan 10mg - may repeat at second dose  2 hours after the initial dose. Limit to no more than 9 days per month of use     Preventative medication recommendations:  She has diabetes is not a candidate for beta blockers and is already on an selective serotonin reuptake inhibitor so would not recommend another serotonergic antidepressant given risk of serotonin syndrome. History of nephrolithiasis is a contraindication for treatment with topiramate.     Will prescribe Ajovy 225mg subcutaneous injection every 30 days, for 6 month trial  Side effects reviewed today    Will meet her in return visit in 3 months.    Total time spent today was 80 minutes in chart review, history, exam and counseling.      Katelyn García MD  Neurology            Chief Complaint:     Chief Complaint   Patient presents with    Migraine     Sensitivity to light and sounds  Gets \"Aura\"- vision will get blurry or gets double vision or half of the vision will go \"black\" \"It is like Hologram\" \"Floating black dots\"           History is obtained from the patient and medical record.      Shannan Cameron is a 44 year old female who was stung " by a wasp last year. Then she had COVID-19 and had enlarged lymph nodes bilaterally in the occipital cervical region.    She could not think or function at that time, she was finally seen for it. She was checked for neurologic symptoms. She was then assessed by eye and they assessed her vision, and eyes were normal. She was taken by ambulance and she was diagnosed with thyroid cancer. She had surgery for that. She still has the headaches and the pain and will notice that she has headaches and it is a discomfort to lean against it.     These headaches are severe and difficult to manage. There will be pulsating on the occipital region of her head. Her glasses will cut into the swollen regions of her head. It starts in the occipital region. There is a protrusion in the occipital region that pops out. She feels that it is always worse when this takes place. She gets dizzy on leaning forward. Laying down will make it way worse. She will need to meditate through it. Average pain is a 10/10 and she considers it this way if she cannot speak or hold a thought or communicate.     She has had phonophobia and there is photophobia (this is piercing pain). There are floaters and there is always distorted vision. There is scleral swelling she has noted and there is sandpaper and there is purulent discharge. There is persistent nausea. There has been vomiting.     She had a headache for the first time in her 20s. She had a really piercing headache in her 20s. It was a thunderclap phenotype and it knocked her on her knees. She did go to the ED and everything was normal on evaluation. She had tension headaches (starting in the neck).    She had part of her thyroid necrosed after she had the surgery about 6 months ago. She had no removal of the necrosed thyroid. She was started on levothyroxine after that time. 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    She also has a history of non-tuberculosis mycobacterium (immunogenum). She did  "have relief from that treatment and felt relief from that treatment and her swelling went down. She was then pulled off treatment for that at [facility] as they disagreed with the diagnosis given to her at [facility]. She had a biopsy at [facility] and that brought her to the diagnosis of reactive lymph node hyperplasia. She has had those multiple nodules. 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 She also has hepatomegaly.     There have been no fevers, but there have been night sweats which remitted in Feb. There was a bad bout after 2024 after the death of her father. There are painful stools but no diarrhea, there has been flushing. There has been strange appetite, with times she does not eat and other times, will try with abdominal pain. She will be ravenous and this is from the exogenous admin of the levothyroxine. She has had weight gain.             Past Medical History:     Past Medical History:   Diagnosis Date    History of partial thyroidectomy     Nephrolithiasis 2007    Papillary adenocarcinoma, follicular variant (H)     Pulmonary nodules     up to 6 mm   \"Acanthotic verrucal proliferation of squamous epithelium associated with hyper and parakeratosis.  Mild to focal moderate epithelial atypia is associated with the lesion (scalp)\" on 2024  \"Oropharynx, posterior tongue, biopsy:  Benign squamous   mucosa.  Reactive lymphoid hyperplasia. \" (Tongue lesion) on 10/4/2024  Diabetes mellitus type 2           Past Surgical History:     Past Surgical History:   Procedure Laterality Date    ARTHROSCOPIC REPAIR ACL  2017    C LAP REMOVAL, Back LIPOMA  2018     SECTION  1996     SECTION  10/21/2008     SECTION  2006    HYSTERECTOMY  2019    Left ovarian cystectomy.    MAMMOGRAM - [facility] SCAN  2017    [facility]      " Description:  Section;  Recorded: 2011;   She has had 3 tumors in her breast this year, biopsied in March, and found to be benign  Uterine growth with abnormal blood supply, hysterectomy          Social History:     Social History     Socioeconomic History    Marital status: Single     Spouse name: Not on file    Number of children: Not on file    Years of education: Not on file    Highest education level: Not on file   Occupational History    Not on file   Tobacco Use    Smoking status: Former, smoked at age 18 socially, and then quit in pregnancy and breastfeeding (4 years total) and then until age 43 (0.5 PPD)     Current packs/day: 0.00     Types: Cigarettes     Quit date: 2023     Years since quittin.0     Passive exposure: Past    Smokeless tobacco: Never   Vaping Use    Vaping status: Never Used   Substance and Sexual Activity    Alcohol use: No    Drug use: None    Sexual activity: Not Currently   Other Topics Concern    Not on file   Social History Narrative    Not on file     Social Drivers of Health     Financial Resource Strain: Low Risk  (2023)    Financial Resource Strain     Within the past 12 months, have you or your family members you live with been unable to get utilities (heat, electricity) when it was really needed?: No   Food Insecurity: Food Insecurity Present (2024)    Received from [facility]    Hunger Vital Sign     Worried About Running Out of Food in the Last Year: Sometimes true     Ran Out of Food in the Last Year: Sometimes true   Transportation Needs: No Transportation Needs (2024)    Received from [facility]    PRAPARE - Transportation     Lack of Transportation (Medical): No     Lack of Transportation (Non-Medical): No   Physical Activity: Sufficiently Active (2024)    Received from [facility]    Exercise Vital Sign     Days of Exercise per Week: 2 days     Minutes of Exercise per Session: 120 min    Stress: Not on file   Social Connections: Unknown (1/1/2022)    [facility], [employer] [facility]    Social Connections     Frequency of Communication with Friends and Family: Not on file   Interpersonal Safety: Low Risk  (10/31/2024)    Interpersonal Safety     Do you feel physically and emotionally safe where you currently live?: Yes     Within the past 12 months, have you been hit, slapped, kicked or otherwise physically hurt by someone?: No     Within the past 12 months, have you been humiliated or emotionally abused in other ways by your partner or ex-partner?: No   Housing Stability: Low Risk  (2/12/2024)    [facility]    Housing Stability     What is your living situation today?: I have a steady place to live             Family History:     Family History   Problem Relation Age of Onset    Alcoholism Mother     Allergic rhinitis Mother     Anemia Mother     Arthritis Mother     Cancer Mother     Depression Mother     Substance Abuse Mother     Emotional abuse Mother     Fibromyalgia Mother     Mental Illness Mother     Alcoholism Father     Depression Father     Diabetes Father     Substance Abuse Father    None for headaches          Allergies:      Allergies   Allergen Reactions    Adhesive Tape Rash, Blisters and Itching    Bee Venom Shortness Of Breath and Dizziness     Fainting             Medications:   Acute headache medications:    rizatriptan (MAXALT) 10 MG tablet, Take 1 tablet (10 mg) by mouth at onset of headache for migraine May repeat in 2 hours. Max 3 tablets/24 hours., Disp: 10 tablet, Rfl: 1 - was helpful until the last 2 times      Preventative headache medications:    topiramate (TOPAMAX) 25 MG tablet, Take 1 tablet (25 mg) by mouth daily for 7 days, THEN 2 tablets (50 mg) daily for 50 days., Disp: 107 tablet, Rfl: 0 - not taking      Current Outpatient Medications:     blood glucose (NO  BRAND SPECIFIED) test strip, Use to test blood sugar 3 times daily or as directed. To accompany: Blood Glucose Monitor Brands: per insurance., Disp: 300 strip, Rfl: 6    blood glucose monitoring (NO BRAND SPECIFIED) meter device kit, Use to test blood sugar 1 times daily or as directed. Preferred blood glucose meter OR supplies to accompany: Blood Glucose Monitor Brands: per insurance., Disp: 1 kit, Rfl: 0    clonazePAM (KLONOPIN) 0.5 MG tablet, TAKE ONE TABLET (0.5 MG) BY MOUTH TWO TIMES DAILY AS NEEDED FOR ANXIETY., Disp: 10 tablet, Rfl: 0    Continuous Blood Gluc Sensor (FREESTYLE LAURA 3 SENSOR) [facility], 1 each every 14 days Use 1 sensor every 14 days. Use to read blood sugars per 's instructions., Disp: 6 each, Rfl: 5    empagliflozin (JARDIANCE) 10 MG TABS tablet, Take 1 tablet (10 mg) by mouth daily, Disp: 90 tablet, Rfl: 1    hydroCHLOROthiazide 12.5 MG tablet, TAKE 1 TABLET BY MOUTH ONCE DAILY, Disp: 30 tablet, Rfl: 2    insulin glargine (LANTUS SOLOSTAR) 100 UNIT/ML pen, INJECT 33 units twice a day and continue to increase up to 40 units twice a day, Disp: 225 mL, Rfl: 1    insulin lispro (HUMALOG KWIKPEN) 100 UNIT/ML (1 unit dial) KWIKPEN, Inject 33 Units subcutaneously., Disp: , Rfl:     insulin pen needle (32G X 4 MM) 32G X 4 MM miscellaneous, Use 4 pen needles daily or as directed., Disp: 400 each, Rfl: 3    levothyroxine (SYNTHROID/LEVOTHROID) 100 MCG tablet, Take 1 tablet (100 mcg) by mouth daily., Disp: 90 tablet, Rfl: 2    losartan (COZAAR) 25 MG tablet, Take 1 tablet (25 mg) by mouth daily, Disp: 90 tablet, Rfl: 4    metroNIDAZOLE (FLAGYL) 500 MG tablet, Take 1 tablet (500 mg) by mouth 2 times daily., Disp: 14 tablet, Rfl: 0    Microlet Lancets MISC, USE TO TEST ONCE DAILY, Disp: 300 each, Rfl: 1    Multiple Vitamins-Minerals (ONCOVITE) TABS, Take 1 tablet by mouth daily., Disp: , Rfl:     omeprazole (PRILOSEC) 20 MG DR capsule, Take 1 capsule (20 mg) by mouth daily., Disp: 90 capsule,  Rfl: 3    ondansetron (ZOFRAN) 8 MG tablet, Take 1 tablet (8 mg) by mouth every 8 hours as needed for nausea, Disp: 12 tablet, Rfl: 1    rosuvastatin (CRESTOR) 10 MG tablet, Take 1 tablet (10 mg) by mouth daily, Disp: 90 tablet, Rfl: 4    Semaglutide, 2 MG/DOSE, (OZEMPIC) 8 MG/3ML pen, Inject 2 mg subcutaneously every 7 days., Disp: 9 mL, Rfl: 3    tiZANidine (ZANAFLEX) 4 MG tablet, Take 4 mg by mouth as needed, Disp: , Rfl:           Physical Exam:   /88 (BP Location: Left arm, Patient Position: Sitting, Cuff Size: Adult Large)   Pulse 80   Wt 103.9 kg (229 lb)   LMP 09/25/2019 (Within Days)   SpO2 98%   BMI 34.82 kg/m     Physical Exam:  Photos over 1 year time with increased facial width  Neurologic:   Mental Status Exam: Alert, awake and oriented to situation. No dysarthria. Speech of normal fluency.   Cranial Nerves: Fundoscopic exam with clear disc margins bilaterally. PERRLA, EOMs intact, no nystagmus, facial movements symmetric, facial sensation intact to light touch, hearing intact to conversation, trapezius and SCMs 5/5 bilaterally, tongue midline and fully mobile.    Motor: Normal tone in all four extremities, no atrophy. 5/5 strength bilaterally in shoulder abduction, elbow extensors and flexors, wrist extensors and flexors, hip flexors, knee extensors and flexors, dorsi- and plantarflexion. No tremors or abnormal movements noted.   Sensory: Sensation intact to pinprick and vibration sensation on arms and legs bilaterally.    Coordination: Finger-nose-finger intact bilaterally.  Rapidly alternating movements intact bilaterally in the upper extremities.  Normal finger tapping bilaterally.  Intact heel-shin bilaterally. Romberg with sway.   Reflexes: 2+ and symmetric in triceps, biceps, brachioradialis, patellar, Achilles, and plantars downgoing bilaterally.   Gait: Normal gait.  Able to toe and heel walk.  Tandem gait with mild instability.  Head: Normocephalic, atraumatic. Scalp soft tissue  swelling bilaterally in the occipitalis region  Eyes: No conjunctival injection, no scleral icterus. Several iris freckles.          Data:     None current or available for imaging  Elevated LFTs

## 2024-11-11 ENCOUNTER — OFFICE VISIT (OUTPATIENT)
Dept: FAMILY MEDICINE | Facility: CLINIC | Age: 44
End: 2024-11-11
Payer: COMMERCIAL

## 2024-11-11 ENCOUNTER — VIRTUAL VISIT (OUTPATIENT)
Dept: PSYCHOLOGY | Facility: OTHER | Age: 44
End: 2024-11-11
Payer: COMMERCIAL

## 2024-11-11 VITALS
DIASTOLIC BLOOD PRESSURE: 80 MMHG | OXYGEN SATURATION: 98 % | BODY MASS INDEX: 34.49 KG/M2 | RESPIRATION RATE: 18 BRPM | WEIGHT: 227.6 LBS | HEIGHT: 68 IN | TEMPERATURE: 97.7 F | HEART RATE: 90 BPM | SYSTOLIC BLOOD PRESSURE: 130 MMHG

## 2024-11-11 DIAGNOSIS — F43.10 PTSD (POST-TRAUMATIC STRESS DISORDER): ICD-10-CM

## 2024-11-11 DIAGNOSIS — K21.9 GASTROESOPHAGEAL REFLUX DISEASE, UNSPECIFIED WHETHER ESOPHAGITIS PRESENT: ICD-10-CM

## 2024-11-11 DIAGNOSIS — R59.9 REACTIVE LYMPHOID HYPERPLASIA: ICD-10-CM

## 2024-11-11 DIAGNOSIS — E11.65 TYPE 2 DIABETES MELLITUS WITH HYPERGLYCEMIA, WITH LONG-TERM CURRENT USE OF INSULIN (H): Primary | ICD-10-CM

## 2024-11-11 DIAGNOSIS — K14.8 LESION OF TONGUE: ICD-10-CM

## 2024-11-11 DIAGNOSIS — F33.1 MAJOR DEPRESSIVE DISORDER, RECURRENT EPISODE, MODERATE WITH ANXIOUS DISTRESS (H): Primary | ICD-10-CM

## 2024-11-11 DIAGNOSIS — Z79.4 TYPE 2 DIABETES MELLITUS WITH HYPERGLYCEMIA, WITH LONG-TERM CURRENT USE OF INSULIN (H): Primary | ICD-10-CM

## 2024-11-11 PROCEDURE — 90837 PSYTX W PT 60 MINUTES: CPT | Mod: 95 | Performed by: MARRIAGE & FAMILY THERAPIST

## 2024-11-11 PROCEDURE — 99214 OFFICE O/P EST MOD 30 MIN: CPT | Performed by: PHYSICIAN ASSISTANT

## 2024-11-11 NOTE — PROGRESS NOTES
"  Assessment & Plan     (E11.65,  Z79.4) Type 2 diabetes mellitus with hyperglycemia, with long-term current use of insulin (H)  (primary encounter diagnosis)  Comment: Stable  Plan: GI symptoms persist may consider holding Ozempic    (K21.9) Gastroesophageal reflux disease, unspecified whether esophagitis present  Comment: Worsening  Plan: omeprazole (PRILOSEC) 20 MG DR capsule        Increase omeprazole to twice daily    (R59.9) Reactive lymphoid hyperplasia  Comment: Waiting to hear from [facility]  Plan: Will let us know she finds out    (K14.8) Lesion of tongue  Comment: Waiting to hear from [facility]  Plan: Contact if she needs referral to local oncology        MED REC REQUIRED  Post Medication Reconciliation Status:   BMI  Estimated body mass index is 34.61 kg/m  as calculated from the following:    Height as of this encounter: 1.727 m (5' 8\").    Weight as of this encounter: 103.2 kg (227 lb 9.6 oz).             Jessica Garza is a 44 year old, presenting for the following health issues:  ER F/U (11/6/24 abdominal pain at [facility], has improved but still gets some pain )        11/11/2024     4:32 PM   Additional Questions   Roomed by LYLE Arce     44-year-old female (clinic follow-up for recent emergency room visit for abdominal pain and syncope x 3  She feels better not completely back to normal  She has been very gaseous both belching and flatus  Normal bowel movements  No emesis slight nausea  She has very complex medical history she does have history of lymphoid hyperplasia she has had lesions on her tongue she has had a plasma infection and had 3 months of treatment  She is trying to get into [facility] she is waiting to hear from them she hopes to hear on Wednesday of this week  Dr. Ahn had noted that if she does not get into [facility] we can refer her locally and she was given 3 options she is to think about this and will MyChart us if she does not hear positively from [facility]  She has had GI " "symptoms off and on for a number of years  At 1 point she thought they were related to metformin she has not been on the metformin for a long time and still has symptoms from time to time  She has gained a significant amount of weight she states that her diet has not changed much of anything she eats less than she was previously  She was concerned because her recent CT abdomen pelvis showed splenule's which have not been noted in prior reads           ED/UC Followup:    Facility:  [facility]   Date of visit: 11/6/24  Reason for visit: abdominal pain   Current Status:             Objective    /80 (BP Location: Right arm, Patient Position: Sitting, Cuff Size: Adult Large)   Pulse 90   Temp 97.7  F (36.5  C) (Tympanic)   Resp 18   Ht 1.727 m (5' 8\")   Wt 103.2 kg (227 lb 9.6 oz)   LMP 09/25/2019 (Within Days)   SpO2 98%   BMI 34.61 kg/m    Body mass index is 34.61 kg/m .  Physical Exam alert attentive no acute distress  Lungs clear well ventilated  Cardiovascular rate and rhythm  Abdomen normal active bowel sounds soft no tenderness no organomegaly              Signed Electronically by: YUNI Reeder    "
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
## 2024-11-11 NOTE — PATIENT INSTRUCTIONS
Patient reports that her goals for next week include:    Honor dad and brother: donate money to the [employer] and finish drawing of dad on his motorcycle  Attend the  next Tuesday
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
## 2024-11-13 ENCOUNTER — TELEPHONE (OUTPATIENT)
Dept: BEHAVIORAL HEALTH | Facility: CLINIC | Age: 44
End: 2024-11-13

## 2024-11-13 ENCOUNTER — BEH TREATMENT PLAN (OUTPATIENT)
Dept: BEHAVIORAL HEALTH | Facility: CLINIC | Age: 44
End: 2024-11-13
Attending: PSYCHIATRY & NEUROLOGY

## 2024-11-13 ENCOUNTER — OFFICE VISIT (OUTPATIENT)
Dept: OTOLARYNGOLOGY | Facility: CLINIC | Age: 44
End: 2024-11-13
Payer: COMMERCIAL

## 2024-11-13 DIAGNOSIS — R49.0 MUSCLE TENSION DYSPHONIA: ICD-10-CM

## 2024-11-13 DIAGNOSIS — F43.10 PTSD (POST-TRAUMATIC STRESS DISORDER): Primary | ICD-10-CM

## 2024-11-13 DIAGNOSIS — R49.0 HOARSENESS: ICD-10-CM

## 2024-11-13 DIAGNOSIS — K21.9 LPRD (LARYNGOPHARYNGEAL REFLUX DISEASE): Primary | ICD-10-CM

## 2024-11-13 DIAGNOSIS — F33.2 MAJOR DEPRESSIVE DISORDER, RECURRENT EPISODE, SEVERE WITH ANXIOUS DISTRESS (H): ICD-10-CM

## 2024-11-13 RX ORDER — PANTOPRAZOLE SODIUM 40 MG/1
40 TABLET, DELAYED RELEASE ORAL DAILY
Qty: 90 TABLET | Refills: 0 | Status: SHIPPED | OUTPATIENT
Start: 2024-11-13

## 2024-11-13 ASSESSMENT — ENCOUNTER SYMPTOMS
CONSTITUTIONAL NEGATIVE: 1
PSYCHIATRIC NEGATIVE: 1
COUGH: 1
NEUROLOGICAL NEGATIVE: 1
EYES NEGATIVE: 1
MUSCULOSKELETAL NEGATIVE: 1
HEARTBURN: 1
SORE THROAT: 1
SHORTNESS OF BREATH: 1

## 2024-11-13 NOTE — TELEPHONE ENCOUNTER
**Patient Navigator Follow Up**    11/13/2024;    Referral for PHP    Are there any potential barriers for entrance into programmatic care? Pt agrees to commute from [place] each day of programming.    Comments: Pt is able to begin adult PHP as soon as possible.  Pt is open to either [place] or [facility] location.        **I sent a staff message out to our [employer] insurance to confirm if her [employer] [place] plan covers PHP.  I will update as I hear back        Per [employer] team:    Hello:    Her plan does cover PHP. Do we have a start date? PHP requires prior authorization and prior auth needs to be approved before pt can start PHP.    Please let us know about a potential start date so we can work on securing prior auth.    Thanks!          I spoke to patient about her PHP options.  She wanted to start asap and willing to go to [facility] since they have PHP openings right now.  She wants to start Friday, 11/15.       I added her to the census and sent in basket message to the program as well.  I sent her Welcome Letter through [employer].        Thank you,    Katia WAN  Patient Navigator

## 2024-11-13 NOTE — NURSING NOTE
Shannan Cameron's chief complaint for this visit includes:  Chief Complaint   Patient presents with    Consult     Hoarseness, onset prior to Thyroid cancer Dx. Has Thyroid surg 2-16-24. Hoarseness gets worse after talking a lot or mid sentence will choke and cough. Also tumor on tongue, was bx 3 times but not completely removed.       PCP: Agnieszka Ahn    Referring Provider:  Agnieszka Ahn MD  [facility] [place] [address]    [facility] 09/25/2019 (Within Days)

## 2024-11-13 NOTE — TELEPHONE ENCOUNTER
----- Message from Katia ARELLANO sent at 11/13/2024  2:18 PM CST -----  Regarding: Referral for [facility]  Adult Mental Health Programmatic Care Schedule Request    Patient Name: Shannan Cameron  Location of programming: [place]  Start Date: 11/15/2024      Adult Program Group: Adult Program Group: Adult Program Group: Adult Program Group: Adult Program Group: [id number] [[id number]]  Schedule:  M, T, W, TH, F  8:30AM - 2:00PM  25 hours per week for 3 weeks  Number of visits to be scheduled: 15 days    Attending Provider (MD): Dr. Cal Evangelista ([place])  Visit Type:  In-Person    Accommodations Needed: No  Alerts Identified/Substantiation: No  Consulted with Supervisor: No
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
## 2024-11-14 ENCOUNTER — TELEPHONE (OUTPATIENT)
Dept: BEHAVIORAL HEALTH | Facility: CLINIC | Age: 44
End: 2024-11-14
Payer: COMMERCIAL

## 2024-11-14 ENCOUNTER — MYC MEDICAL ADVICE (OUTPATIENT)
Dept: FAMILY MEDICINE | Facility: CLINIC | Age: 44
End: 2024-11-14
Payer: COMMERCIAL

## 2024-11-14 PROBLEM — F33.2 MAJOR DEPRESSIVE DISORDER, RECURRENT EPISODE, SEVERE WITH ANXIOUS DISTRESS (H): Status: ACTIVE | Noted: 2024-11-14

## 2024-11-14 PROBLEM — F43.10 POSTTRAUMATIC STRESS DISORDER WITH DISSOCIATIVE SYMPTOMS: Status: ACTIVE | Noted: 2024-11-14

## 2024-11-14 NOTE — TELEPHONE ENCOUNTER
----- Message from Norma León sent at 11/14/2024  1:59 PM CST -----  Regarding: Delay [facility] [place] [facility] Start to Monday 11/18.  Hi there,     Pt is currently scheduled to start [facility] tomorrow, 11/15. We need to delay start to Monday, 11/18 due to waiting on insurance authorization as well as our medication provider availability.

## 2024-11-14 NOTE — TELEPHONE ENCOUNTER
US lower extremity was faxed to [facility] at [phone] and they will contact pt to schedule appt.

## 2024-11-14 NOTE — TELEPHONE ENCOUNTER
Writer ALAINAM informing her of delayed PHP start until Monday due to waiting on insurance authorization and availability of program medication provider.  Writer apologized for the inconvenience and offered direct line for any questions.

## 2024-11-14 NOTE — PROGRESS NOTES
"   Partial Hospitalization Program  Occupational Therapy Evaluation     Patient: Shannan Cameron  Preferred Name: Shannan  Pronouns:  she/her   MRN: [id number]  : 1980  Age: 44 year old  Date of Occupational Therapy Evaluation: 2024  Program start date: 2024  Anticipated discharge: 12/10/2024  Assessment format: Observation of patient, Form, Chart review, Consultation with team members      Medical and Therapy History  Mental Health Diagnosis:  (Per most recent H&P)   296.33 (F33.2) Major Depressive Disorder, Recurrent Episode, Severe With anxious distress  309.81 (F43.10) Posttraumatic Stress Disorder (includes Posttraumatic Stress Disorder for Children 6 Years and Younger)  With dissociative symptoms.  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\"   Components of a good life from patient's perspective: \"Love, respect, support\"   Self-identified barriers to engagement in occupations:  \"Emotional distress, trauma\"    Patient also identifies current difficulties with: Time management, Motivation, Concentration, Memory, Perfectionism, Self-compassion, Self-confidence, Structure and routine, Frustration tolerance, and Sensory aversions or preferences   Considerations of personal values, interests, culture:  \"Parenting successfully, being a safe person for others & myself, hiking, , drawing\"         Self-identified Occupational Performance Inventory  Area of Occupation Level of Importance   1-10 Current Performance   1-10 Motivation to Change   1-10 Additional Details    Self-Care: nutrition, eating, hygiene, medication, movement, mobility, sexual activity, sleep, rest, spiritual/Caodaism " "practice 10 4 8 \"Need improvement: sleep, rest, sex, eating\"  \"Adequate: hygiene, meds, spiritual\"   Home Management: care of people and pets, meal planning, cooking, cleaning, laundry, finances, paperwork, home maintenance 10 6 9 \"Finances, Home maintenance\"   Community Management: transportation, shopping, appointments, being in public, community groups and clubs 8 0 4 Patient left blank   Employment and Education:  working, applying for jobs, volunteering, school, homework   7 2 5 \"Volunteering\"   Leisure: hobbies, sports, identifying interests, finding affordable activities   8 3 6 Patient left blank   Social Participation: building and maintaining relationships, asserting needs, boundaries, socializing 10 2 8 \"Reaching out, accepting love, being lightheaded for a lunch\"     Plan of Care:   Please refer to multidisciplinary treatment plan for additional information regarding plan of care.     Clinical Occupational Summary:  Patient is a 44 year old who uses she/her pronouns and is diagnosed with MDD and PTSD.  Patient has been an active   participant in occupational therapy group thus far. Patient reports highest levels of importance and motivation to change in the areas of self-care, home management, community management, employment and education, leisure, and social participation.  Based on the information patient provided, patient is currently demonstrating impairment in the occupation categories of ADLs, IADLs, leisure, social participation, work/education, and rest/sleep. Patient would benefit from skilled occupational therapy services to progress functional status and increase participation in meaningful activities.  Treatment diagnosis: Ineffective coping and decline in functional status.   Occupational Therapy Goal(s):  Please refer to individualized treatment plan for multidisciplinary team goals.     Skilled Occupational Therapy Interventions: Including but not limited to:  Evaluation, goal setting, " ongoing assessment, treatment planning, discharge planning.  Treatment groups: Facilitation and group cohesion development.  Psychoeducation and Experiential groups focusing on: Self-awareness & self-expression, lifestyle health, neurosensory applications/interventions, mindfulness, motivation, energy management, meaningful & purposeful intervention activities, self-regulation skill development, self-compassion and resiliency development, therapeutic use of self, community resources.  Medical Necessity:  Low complexity   CPT codes & descriptors Occupational Profile and  Medical/Therapy History Assessment of Occupational Performance Clinical   Decision Making   Overall Level       Low Complexity (32032) Brief history relating to presenting  Problem   Problem-focused. 1-3 performance  deficits relating to  physical, cognitive, psychosocial  limitations/restrictions Low complexity, limited  amount of treatment options,  no assessment modification,  no co-morbidities     X  X   x Moderate Complexity (57337)   Expanded review of therapy/  medical records. Additional  review of physical, cognitive,  psychosocial performance Detailed. 3-5 performance  deficits relating to physical,  cognitive, psychosocial limitations/  restrictions Moderate analytical complexity,  detailed assessments,  minimal to moderate modification  of assessments, may have  comorbidities.      X     High Complexity (27023)   Extensive review of physical,  cognitive, psychosocial performance Comprehensive, 5 or more  performance deficits relating  to physical, cognitive, and  psychosocial limitations/restrictions. High analytical complexity,  comprehensive assessments,  multiple treatment options,  significant modifications of  assessment, comorbidities  affecting performance.            Would patient benefit from skilled Occupational Therapy Intervention to work on the above goals?  Yes    Patients potential for improvement towards goals: good      Signature: Coral Laguerre OTR/L

## 2024-11-14 NOTE — PROGRESS NOTES
"RN Review of Medical History / Physical Health Screen  Outpatient Mental Health Programs - [facility] Adult Partial Hospitalization Program    PATIENT'S NAME: Shannan Cameron  Preferred name: Kayla   44 year old  MRN:   [id number]  :   1980  ACCT. NUMBER: [id number]  CURRENT AGE:  44 year old    DATE OF DIAGNOSTIC ASSESSMENT: DATE OF SERVICE:  10/02/24  DATE OF ADMISSION: 2024    Please see Diagnostic Assessment for additional Medical History.     General Health:   Have you had any exposure to any communicable disease in the past 2-3 weeks? no     Do you have a history of seizures?     If so, do you have a seizure plan? Known triggers?     Notify patient that we will call 911 (if virtual) or a code (if in-person), if we were to witness seizure during group. no         Nutrition:    Are you on a special diet? If yes, please explain:  Yes no meat no dairy, low salt, high protein,   Do you have any concerns regarding your nutritional status? If yes, please explain:  no   Have you had any appetite changes in the last 3 months?  Yes, d/t upset stomach     Have you had any weight loss or weight gain in the last 3 months?  No           Height/Weight Review:  Patient reported height:  5'9.5\"      Patient reports weight:  Date last checked:  226   Patient reported       Patient height and weight recorded by RN in epic flowsheet: No; Unable to measure  Programmatic Care currently provided via telehealth. All pt weights and heights will be collected through patient self-report and recorded in physical health screening progress note upon admission to the program.      BMI Review:  Was the patient informed of BMI? No.     Findings Normal, No Intervention         Fall Risk:   Have you had any falls in the past 3 months? Yes patient loses balance d/t blood pressure, and severe weakness.   Do you currently use any assistive devices for mobility?   no      Does the patient have medication concerns? " no     Was an MTM referral placed? no      Does the patient have any acute or chronic pain concerns that might impact participation in the program? Yes        Additional Comments/Assessment:     Per completion of the Medical History / Physical Health Screen, is there a recommendation to see / follow up with a primary care physician/clinic or dentist?    Yes, Recommendations:   pain  nutritional risk  physical exam          Kosta Hobbs RN  11/14/2024

## 2024-11-14 NOTE — PROGRESS NOTES
"[place] Safety Plan       Creation Date: 10/2/24 Last Update Date: 10/2/24      Step 1: Warning signs:     Warning Signs     Heightened feelings of insecurity; trusting no one; isolating; thinking more about plan to go into the river and turn numb from hypothermia      Step 2: Internal coping strategies - Things I can do to take my mind off my problems without contacting another person:     Strategies     Go for a long nature walk; do some photography      Step 3: People and social settings that provide distraction:     Name Contact Information     Partner, partner's sisters           Places     Outside; by the river if it feels safe to go there      Step 4: People whom I can ask for help during a crisis:     Name Contact Information     Partner or partner's sisters        Step 5: Professionals or agencies I can contact during a crisis:     Clinician/Agency Name Phone Emergency Contact     [facility] [phone] (Office phone) Cinda Leyva (therapist)     [place] Peer Support Warmline text \"Support\" to 57775       [place] Peer Support Warmline [phone]       [facility] Crisis Line [phone]       [facility] Crisis [phone] (ask for the on call crisis worker)          Local Emergency Department Emergency Department Address Emergency Department Phone     [facility] [address] [place] [address] [phone]      Suicide Prevention Lifeline Phone: Call or Text 264  Crisis Text Line: Text HOME to 493527     Step 6: Making the environment safer (plan for lethal means safety):   Patient reports that she has thought in the past about going to the river in the winter and immersing self in the water until she becomes numb from hypothermia.     Optional: What is most important to me and worth living for?:   Sons, partner, and sisters.     [place] Safety Plan. Yee Dickinson and Henry Toney. Used with " permission of the authors.
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
## 2024-11-15 ENCOUNTER — TELEPHONE (OUTPATIENT)
Dept: BEHAVIORAL HEALTH | Facility: CLINIC | Age: 44
End: 2024-11-15
Payer: COMMERCIAL

## 2024-11-15 ENCOUNTER — VIRTUAL VISIT (OUTPATIENT)
Dept: PSYCHOLOGY | Facility: OTHER | Age: 44
End: 2024-11-15
Payer: COMMERCIAL

## 2024-11-15 DIAGNOSIS — F43.10 PTSD (POST-TRAUMATIC STRESS DISORDER): ICD-10-CM

## 2024-11-15 DIAGNOSIS — F33.1 MAJOR DEPRESSIVE DISORDER, RECURRENT EPISODE, MODERATE WITH ANXIOUS DISTRESS (H): Primary | ICD-10-CM

## 2024-11-15 PROCEDURE — 90837 PSYTX W PT 60 MINUTES: CPT | Mod: 95 | Performed by: MARRIAGE & FAMILY THERAPIST

## 2024-11-15 NOTE — TELEPHONE ENCOUNTER
Writer spoke with pt regarding insurance, as they have not yet approved PHP. Writer offered to call pt first thing on Monday with any update on the insurance authorization and stated that she could join group late that day. Writer encouraged pt to call her insurance, as this can sometimes expedite authorization. Pt reported she would call writer back after calling her insurance.

## 2024-11-15 NOTE — DISCHARGE SUMMARY
Outpatient Mental Health Program Discharge Summary / Instructions - Adult       PATIENT'S NAME: Shannan Cameron   MRN:   [id number]  :   1980    PROGRAM PARTICIPATION: Adult Partial Hospitalization Program ([facility])  Track: [facility]    ADMISSION DATE: 24  DISCHARGE DATE: ***      Reason for Discharge:   {Reason for D/C:456457}    Diagnosis:   Principal DSM5 Diagnoses  (Sustained by DSM5 Criteria Listed Above):   296.33 (F33.2) Major Depressive Disorder, Recurrent Episode, Severe With anxious distress  309.81 (F43.10) Posttraumatic Stress Disorder (includes Posttraumatic Stress Disorder for Children 6 Years and Younger)  With dissociative symptoms.    Medications: (include name, dose, and frequency)    Current Outpatient Medications   Medication Sig Dispense Refill    blood glucose (NO BRAND SPECIFIED) test strip Use to test blood sugar 3 times daily or as directed. To accompany: Blood Glucose Monitor Brands: per insurance. 300 strip 6    blood glucose monitoring (NO BRAND SPECIFIED) meter device kit Use to test blood sugar 1 times daily or as directed. Preferred blood glucose meter OR supplies to accompany: Blood Glucose Monitor Brands: per insurance. 1 kit 0    clonazePAM (KLONOPIN) 0.5 MG tablet TAKE ONE TABLET (0.5 MG) BY MOUTH TWO TIMES DAILY AS NEEDED FOR ANXIETY. 10 tablet 0    Continuous Glucose Sensor (FREESTYLE LAURA 3 SENSOR) MISC 1 each every 14 days. Use 1 sensor every 14 days. Use to read blood sugars per 's instructions. 6 each 5    empagliflozin (JARDIANCE) 10 MG TABS tablet Take 1 tablet (10 mg) by mouth daily 90 tablet 1    Fremanezumab-vfrm (AJOVY) 225 MG/1.5ML SOAJ Inject 225 mg subcutaneously every 30 days. 1.5 mL 6    hydroCHLOROthiazide 12.5 MG tablet TAKE 1 TABLET BY MOUTH ONCE DAILY 30 tablet 2    insulin lispro (HUMALOG KWIKPEN) 100 UNIT/ML (1 unit dial) KWIKPEN Inject 33 Units subcutaneously.      insulin pen needle (32G X 4 MM) 32G X 4 MM miscellaneous Use 4 pen  needles daily or as directed. 400 each 3    levothyroxine (SYNTHROID/LEVOTHROID) 100 MCG tablet Take 1 tablet (100 mcg) by mouth daily. 90 tablet 2    losartan (COZAAR) 25 MG tablet Take 1 tablet (25 mg) by mouth daily 90 tablet 4    Microlet Lancets MISC USE TO TEST ONCE DAILY 300 each 1    Multiple Vitamins-Minerals (ONCOVITE) TABS Take 1 tablet by mouth daily.      omeprazole (PRILOSEC) 20 MG DR capsule Take 1 capsule (20 mg) by mouth 2 times daily.      ondansetron (ZOFRAN) 8 MG tablet Take 1 tablet (8 mg) by mouth every 8 hours as needed for nausea 12 tablet 1    pantoprazole (PROTONIX) 40 MG EC tablet Take 1 tablet (40 mg) by mouth daily. 90 tablet 0    rosuvastatin (CRESTOR) 10 MG tablet Take 1 tablet (10 mg) by mouth daily 90 tablet 4    cloNIDine (CATAPRES) 0.1 MG tablet Take 1 tablet (0.1 mg) by mouth at bedtime. 30 tablet 0    insulin glargine (LANTUS SOLOSTAR) 100 UNIT/ML pen INJECT 33 units twice a day and continue to increase up to 40 units twice a day (Patient not taking: Reported on 11/15/2024) 225 mL 1    rizatriptan (MAXALT) 10 MG tablet Take 1 tablet (10 mg) by mouth at onset of headache for migraine May repeat in 2 hours. Max 3 tablets/24 hours. (Patient not taking: Reported on 11/15/2024) 10 tablet 1    Tirzepatide 2.5 MG/0.5ML SOAJ Inject 0.5 mLs (2.5 mg) subcutaneously every 7 days. 2 mL 0    [START ON 12/30/2024] Tirzepatide 5 MG/0.5ML SOAJ Inject 0.5 mLs (5 mg) subcutaneously every 7 days. 2 mL 0    [START ON 1/27/2025] Tirzepatide 7.5 MG/0.5ML SOAJ Inject 0.5 mLs (7.5 mg) subcutaneously every 7 days. 2 mL 5    tiZANidine (ZANAFLEX) 4 MG tablet Take 4 mg by mouth as needed (Patient not taking: Reported on 11/15/2024)       No current facility-administered medications for this visit.         DISCHARGE PLAN / RECOMMENDATIONS TO MANAGE SYMPTOMS AND PREVENT RELAPSE:    Take medications as prescribed.    Medication Management: Provider: ***  Next Appointment: ***  Follow up with your providers  "and appointments.    Next Level of Care / Frequency: {Follow up w/providers & appts:138126}    Follow up with the following resources and/or community supports:   National [place] on Mental Illness   Https://namimn.org/    [place] Warm line Peer support  https://mentalhealthmn.org/support/[place]-warmline/   834-473-7163  Text \"Support\" to 56539    [facility] Help line  682.425.3121    ***    Report symptoms and significant changes to your care team: including any safety concerns, thoughts of suicide or homicide, changes in sleep, increased confusion, mood getting worse, feeling more aggressive  Use coping skills: Create a routine or structure that works for your needs, practice mindfulness, move your body intentionally every day, eat regular balanced meals daily, reach out to supports, and practice self-compassion.     Do not use illicit drugs.  Avoid alcohol.    PERSONAL SAFETY / CRISIS MANAGEMENT:  Follow your individualized Safety Coping Plan.  Report increased symptoms and safety concerns to your care team.  Call 911 or go to your nearest emergency department.  Use the crisis resources listed below:    Crisis Resources:  Suicide Prevention Lifeline: 0-773-632-TALK (9371)  Crisis Text Line Service (available 24 hours a day, 7 days a week): Text [place] to 570132  Call  **CRISIS (341206) from a cell phone to talk to a team of professionals who can help you.    Crisis Services [facility]: Phone Number:   [place]     [phone]   [place]    [phone]   [place] (COPE)    [phone]   [place]    [phone]   [place]                                                    [phone] (after hours)   [place]   [phone]   [place]     [phone]       The above discharge plan and recommendations were created to help you manage your mental health and to improve your overall functioning and well-being.     Not following the above recommendations may result in an increase of symptoms, " potential safety risks and a need for increased services.    We appreciate the opportunity to work with you and sincerely thank you for choosing [facility].      Thank You!  Thank you for choosing  [facility] for your care - it was an honor to serve you. One way we continue to improve is by listening to patients and family members. If you receive a survey, please take the time to complete it and share your experience with us. Our goal is to always provide great care and an excellent experience. We hope you feel comfortable recommending our services to your family and friends.      Your treatment team: Cal Evangelista MD; WADE Donald, [facility]; [place] Hobbs RN; Coral Laguerre, OTR/L; WADE Chacon, WADE Fletcher        Patient Signature: ______________________________________________________ Date:___________
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
## 2024-11-16 NOTE — PATIENT INSTRUCTIONS
Discussed strategies for dealing with difficult people:    BIFF communication: brief, informative, friendly, and firm  [place]
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
## 2024-11-18 ENCOUNTER — TELEPHONE (OUTPATIENT)
Dept: BEHAVIORAL HEALTH | Facility: CLINIC | Age: 44
End: 2024-11-18
Payer: COMMERCIAL

## 2024-11-18 NOTE — TELEPHONE ENCOUNTER
----- Message from Norma León sent at 11/18/2024  9:29 AM CST -----  Regarding: Cancel [facility] Start  Insurance denied authorization for [facility]. Please cancel all appointments and remove from [facility]. Writer will resubmit scheduling request if insurance approves after reconsideration/appeal process.

## 2024-11-18 NOTE — TELEPHONE ENCOUNTER
Writer YANIQUE for patient informing her that her insurance denied authorization for [facility] and included possible next steps, including coordinating with her individual therapist to submit a reconsideration to insurance if pt approves.

## 2024-11-18 NOTE — PROGRESS NOTES
Writer completed chart review on 11/18 and submits the following recommendation:     Pt has seen Cinda Leyva for individual therapy weekly or biweekly depending on availability since 9/25/24 and was diagnosed at that time with MDD, including suicidal thoughts, and PTSD. The patient was also prescribed Klonopin to address anxiety in early October. Notes from the patient's sessions with Cinda Leyva over the past several weeks indicate very minimal to no changes in symptoms, even with extended session times and emergency sessions scheduled due to patient distress level and the complexity of patient needs. Notes further indicate that symptoms continue to negatively impact patient's social functioning, health maintenance, financial stability, and ability to work and complete ADLs (e.g., personal hygiene, cleaning clothes, eating regularly). In addition, the patient's ongoing psychosocial stressors suggest the need for support beyond individual therapy; these include a significant trauma history, current cancer diagnosis and treatment, limited social support, children with mental health concerns and developmental disability, financial concerns, and recent death of her father.     PHP is the current recommended level of care based on the minimal progress made thus far in individual therapy and the need for greater support in addressing the patient's symptoms and psychosocial needs.

## 2024-11-18 NOTE — TELEPHONE ENCOUNTER
Pt called writer back. Writer outlined next steps in challenging insurance authorization denial. Pt provided verbal permission for staff to coordinate with individual therapist in submitting a reconsideration to insurance. Writer will follow up with pt regarding any updates.

## 2024-11-18 NOTE — TELEPHONE ENCOUNTER
Writer informed pt that insurance authorization for [facility] is still pending. Writer called insurance to check on status and attempt to expedite the authorization process. Writer will reach out to pt with any updates.

## 2024-11-20 ENCOUNTER — TELEPHONE (OUTPATIENT)
Dept: BEHAVIORAL HEALTH | Facility: CLINIC | Age: 44
End: 2024-11-20
Payer: COMMERCIAL

## 2024-11-20 ENCOUNTER — VIRTUAL VISIT (OUTPATIENT)
Dept: PSYCHOLOGY | Facility: OTHER | Age: 44
End: 2024-11-20
Payer: COMMERCIAL

## 2024-11-20 DIAGNOSIS — F33.1 MAJOR DEPRESSIVE DISORDER, RECURRENT EPISODE, MODERATE WITH ANXIOUS DISTRESS (H): Primary | ICD-10-CM

## 2024-11-20 DIAGNOSIS — F43.10 PTSD (POST-TRAUMATIC STRESS DISORDER): ICD-10-CM

## 2024-11-20 NOTE — TELEPHONE ENCOUNTER
----- Message from Norma León sent at 11/20/2024  2:26 PM CST -----  Regarding: Pt Starting [facility] tomorrow, 11/21  Adult Mental Health Programmatic Care Schedule Request    Patient Name: Shannan Cameron  Location of programming: [facility]  Start Date: 11/21/24    Adult Program Group: Adult Program Group: Adult Program Group: Adult Program Group: Adult Program Group: [facility] [[id number]]  Schedule:  M, T, W, TH, F  8:30AM - 2:00PM  25 hours per week for 3 weeks  Number of visits to be scheduled: 15 days    Attending Provider (MD):  Dr. Cal Evangelista ([facility])  Visit Type:  In-Person    Accommodations Needed: n/a  Alerts Identified/Substantiation: n/a  Consulted with Supervisor: Norma Keller    Send to:   [[facility] ([id number])] ##ONLY if Start Date is determined##  [facility] ([facility] OUTPATIENT [facility]ESSMENTS [[id number]])   Lainey Perez ([facility])  Anna Franco (PHP)  Norma León/Krishna ([facility]: PHP & IOP)
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
## 2024-11-20 NOTE — PATIENT INSTRUCTIONS
Patient states that her goals for the next two weeks include:    Check back in with friend  Go to [facility] at [facility] in [place] if symptoms worsen  Decide which relationships to maintain and fight for

## 2024-11-20 NOTE — TELEPHONE ENCOUNTER
Writer YANIQUE informing pt that her insurance approved PHP level of care and asked for call back to discuss scheduling admit date.

## 2024-11-21 ENCOUNTER — HOSPITAL ENCOUNTER (OUTPATIENT)
Dept: BEHAVIORAL HEALTH | Facility: CLINIC | Age: 44
End: 2024-11-21
Attending: PSYCHIATRY & NEUROLOGY
Payer: COMMERCIAL

## 2024-11-21 DIAGNOSIS — F43.10 POSTTRAUMATIC STRESS DISORDER WITH DISSOCIATIVE SYMPTOMS: Primary | ICD-10-CM

## 2024-11-21 PROCEDURE — H0035 MH PARTIAL HOSP TX UNDER 24H: HCPCS | Performed by: COUNSELOR

## 2024-11-21 PROCEDURE — H0035 MH PARTIAL HOSP TX UNDER 24H: HCPCS

## 2024-11-21 RX ORDER — CLONIDINE HYDROCHLORIDE 0.1 MG/1
0.1 TABLET ORAL AT BEDTIME
Qty: 30 TABLET | Refills: 0 | Status: SHIPPED | OUTPATIENT
Start: 2024-11-21

## 2024-11-21 ASSESSMENT — ANXIETY QUESTIONNAIRES
5. BEING SO RESTLESS THAT IT IS HARD TO SIT STILL: NOT AT ALL
2. NOT BEING ABLE TO STOP OR CONTROL WORRYING: MORE THAN HALF THE DAYS
7. FEELING AFRAID AS IF SOMETHING AWFUL MIGHT HAPPEN: NEARLY EVERY DAY
3. WORRYING TOO MUCH ABOUT DIFFERENT THINGS: MORE THAN HALF THE DAYS
6. BECOMING EASILY ANNOYED OR IRRITABLE: SEVERAL DAYS
IF YOU CHECKED OFF ANY PROBLEMS ON THIS QUESTIONNAIRE, HOW DIFFICULT HAVE THESE PROBLEMS MADE IT FOR YOU TO DO YOUR WORK, TAKE CARE OF THINGS AT HOME, OR GET ALONG WITH OTHER PEOPLE: VERY DIFFICULT

## 2024-11-21 ASSESSMENT — COLUMBIA-SUICIDE SEVERITY RATING SCALE - C-SSRS
REASONS FOR IDEATION SINCE LAST CONTACT: COMPLETELY TO END OR STOP THE PAIN (YOU COULDN'T GO ON LIVING WITH THE PAIN OR HOW YOU WERE FEELING)
2. HAVE YOU ACTUALLY HAD ANY THOUGHTS OF KILLING YOURSELF?: YES
5. HAVE YOU STARTED TO WORK OUT OR WORKED OUT THE DETAILS OF HOW TO KILL YOURSELF? DO YOU INTEND TO CARRY OUT THIS PLAN?: NO
TOTAL  NUMBER OF PREPARATORY ACTS SINCE LAST CONTACT: 0
6. HAVE YOU EVER DONE ANYTHING, STARTED TO DO ANYTHING, OR PREPARED TO DO ANYTHING TO END YOUR LIFE?: NO
TOTAL  NUMBER OF ABORTED OR SELF INTERRUPTED ATTEMPTS SINCE LAST CONTACT: NO
TOTAL  NUMBER OF INTERRUPTED ATTEMPTS SINCE LAST CONTACT: NO
1. SINCE LAST CONTACT, HAVE YOU WISHED YOU WERE DEAD OR WISHED YOU COULD GO TO SLEEP AND NOT WAKE UP?: YES
ATTEMPT SINCE LAST CONTACT: NO
6. HAVE YOU EVER DONE ANYTHING, STARTED TO DO ANYTHING, OR PREPARED TO DO ANYTHING TO END YOUR LIFE?: YES
SUICIDE, SINCE LAST CONTACT: NO

## 2024-11-21 ASSESSMENT — PATIENT HEALTH QUESTIONNAIRE - PHQ9
5. POOR APPETITE OR OVEREATING: MORE THAN HALF THE DAYS
SUM OF ALL RESPONSES TO PHQ QUESTIONS 1-9: 20

## 2024-11-21 NOTE — GROUP NOTE
Psychotherapy Group Note    PATIENT'S NAME: Shannan Cameron  MRN:   [id number]  :   1980  ACCT. NUMBER: [id number]  DATE OF SERVICE: 24  START TIME: 12:00 PM  END TIME: 12:50 PM  FACILITATOR: Louie Hines TH  TOPIC:  EBP Group: Behavioral Activation  [facility] Adult Partial Hospitalization Program  TRACK: [facility]    NUMBER OF PARTICIPANTS: 6    Summary of Group / Topics Discussed:  Behavioral Activation: Behavior Chain Analysis: Patients explored the steps leading up to identified unwanted behaviors, and ways to replace them with more effective behaviors. Patients examined factors contributing to unwanted behaviors, with a goal of determining ways to interrupt the unhealthy patterns.    Patient Session Goals / Objectives:  Identify thoughts, feelings, and actions that contribute to unwanted behaviors  Identify thoughts, feelings, and actions that contribute to more effective/healthy and desired behaviors  Make plans to track and implement changes and share experiences in group  Identify personal barriers to change      Patient Participation / Response:  Fully participated with the group by sharing personal reflections / insights and openly received / provided feedback with other participants.    Demonstrated understanding of topics discussed through group discussion and participation, Expressed understanding of the relationship between behaviors, thoughts, and feelings, and Identified barriers to change    Treatment Plan:  Patient has a current master individualized treatment plan.  See Epic treatment plan for more information.    MILY Fletcher

## 2024-11-21 NOTE — GROUP NOTE
Psychotherapy Group Note    PATIENT'S NAME: Shannan Cameron  MRN:   [id number]  :   1980  ACCT. NUMBER: [id number]  DATE OF SERVICE: 24  START TIME:  1:00 PM  END TIME:  1:50 PM  FACILITATOR: Simone Parikh TH  TOPIC:  EBP Group: Behavioral Activation  [facility] Adult Partial Hospitalization Program  TRACK: [facility]/IOP 2 Combined    NUMBER OF PARTICIPANTS:  9    Summary of Group / Topics Discussed:  Behavioral Activation:   Distraction Skills; Pts learned about various distraction skills to assist with emotion regulation.  Pts learned about Urge Surfing and the DBT skill ACCEPTS.       Patient Participation / Response:  Fully participated with the group by sharing personal reflections / insights and openly received / provided feedback with other participants.    Demonstrated understanding of topics discussed through group discussion and participation, Expressed understanding of the relationship between behaviors, thoughts, and feelings, Shared experiences and challenges with making behavioral changes, and Identified barriers to change    Treatment Plan:  Patient has a current master individualized treatment plan.  See Epic treatment plan for more information.    MILY Marcelo

## 2024-11-21 NOTE — GROUP NOTE
Psychoeducation Group Note    PATIENT'S NAME: Shannan Cameron  MRN:   [id number]  :   1980  ACCT. NUMBER: [id number]  DATE OF SERVICE: 24  START TIME:  9:30 AM  END TIME: 10:20 AM  FACILITATOR: Kosta Hobbs RN  TOPIC:  Wellness Group: [facility] Adult Partial Hospitalization Program  TRACK: [place] [facility]    NUMBER OF PARTICIPANTS:     Summary of Group / Topics Discussed:  Foundations of Health: Nutrition: Super Nutrients & Micronutrients: Super Nutrients are Foods that have high nutritional yield. Micronutrients are essential elements found in food or taken through supplements that are necessary for normal physiological functioning. This group was designed to complement the macronutrients group and build upon previous education. The changes that food makes on the brain (how the brain uses sugar) and nutrition as it relates to mental health was also discussed.       Patient Session Goals / Objectives:  Identified the health enhancing benefits to good nutrition  Verbalized ways in which the food we eat affects the brain  Explained the role of micronutrients in the body, how much we need, and how to get it      Patient Participation / Response:  Fully participated with the group by sharing personal reflections / insights and openly received / provided feedback with other participants.    Demonstrated understanding of topics discussed through group discussion and participation, Identified / Expressed personal readiness to practice skills, and Verbalized understanding of foundations of health topic    Treatment Plan:  Patient has a current master individualized treatment plan.  See Epic treatment plan for more information.    Kosta Hobbs RN
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
## 2024-11-21 NOTE — PROGRESS NOTES
Acknowledgement of Current Treatment Plan       I have reviewed my treatment plan with my treatment team members.  I agree with the plan as it is written in the electronic health record.     Name:                   Signature:                                                          Date:  Shannan Evangelista MD  Psychiatrist/Medical Director

## 2024-11-21 NOTE — GROUP NOTE
OT Clinic Group Note    PATIENT'S NAME: Shannan Cameron  MRN:   [id number]  :   1980  ACCT. NUMBER: [id number]  DATE OF SERVICE: 24  START TIME:  8:30 AM  END TIME:  9:20 AM  FACILITATOR: Coral Gutierrez OT  TOPIC: [facility] OT Group: Occupational Therapy Clinic  [facility] Adult Partial Hospitalization Program  TRACK: [facility]    NUMBER OF PARTICIPANTS: 7    Summary of Group / Topics Discussed:  Occupational Therapy Clinic: Provided opportunity for patients to independently choose and engage in a therapeutic activity that supports progress towards their goals and psychiatric recovery. The Occupational Therapy Clinic provides a context for patients to monitor their symptoms, gain self-awareness, practice skills (self-regulation, mindfulness, self-talk, focus/concentration, social, productivity), build a sense of self efficacy and mastery, as well as receive validation, support, and resources. OT checks in, observes, assesses, and monitors performance skills and patterns in context with each group member. Patient completed the Occupational Self Assessment (GRETCHEN) in which identified functional areas their mental health status is impacting and which are most important for them to work on while in the Partial Hospitalization Program. Patient was provided orientation to the OT clinic and overview of purpose of the OT clinic in support of meeting their goals.     Patient Session Goals / Objectives:  Independently identify a purposeful and meaningful therapeutic activity.  Identified which goal(s) they are intentionally working on during session.   Reflected on their performance and share insight about their progress.  Practiced and identified a way to generalize a skill to their everyday life      Patient Participation / Response:  Fully participated with the group by sharing personal reflections / insights and openly received / provided feedback with other participants.    Patient demonstrated understanding of  "context through participation of a personal productive, leisure, or self care activity (working on productive tasks of paperwork for the PHP program and working on the OT evaluation form). Pt reported feeling like their morning had \"turned around\" after being provided time to complete the paperwork. Patient's affect was appropriate to situation and mood congruent.       Treatment Plan:  Patient has an initial individualized treatment plan that was created as part of their diagnostic assessment / admission process.  A master individualized treatment plan is in the process of being developed with the patient and multi-disciplinary care team.    Coral Gutierrez, OT  "

## 2024-11-21 NOTE — PROGRESS NOTES
Partial Hospital Program   Physician Certification of Medical Necessity    Patient Legal Name: Shannan Cameron   Patient Preferred Name: Kayla Benitez : 1980  Patient MRN: [id number]    Attending physician: Cal Evangelista MD      Admission Certification:    I certify the above-named patient would require inpatient psychiatric care if partial hospitalization services were not provided and that the patient requires such PHP services for a minimum of 20 hours per week. These services are provided under the care and supervision of a physician. An individualized, written plan of treatment has been created and will be periodically reviewed by the physician and the patient in concert with a multidisciplinary treatment team.    From admission date: 24 to 18 day: 24    Cal Evangelista MD on 2024 at 4:04 PM

## 2024-11-22 ENCOUNTER — HOSPITAL ENCOUNTER (OUTPATIENT)
Dept: MRI IMAGING | Facility: CLINIC | Age: 44
Discharge: HOME | End: 2024-11-22
Attending: PSYCHIATRY & NEUROLOGY | Admitting: PSYCHIATRY & NEUROLOGY
Payer: COMMERCIAL

## 2024-11-22 DIAGNOSIS — H53.8 VISION BLURRING: ICD-10-CM

## 2024-11-22 DIAGNOSIS — G43.109 MIGRAINE WITH AURA AND WITHOUT STATUS MIGRAINOSUS, NOT INTRACTABLE: ICD-10-CM

## 2024-11-22 PROCEDURE — A9585 GADOBUTROL INJECTION: HCPCS | Performed by: PSYCHIATRY & NEUROLOGY

## 2024-11-22 PROCEDURE — 255N000002 HC RX 255 OP 636: Performed by: PSYCHIATRY & NEUROLOGY

## 2024-11-22 PROCEDURE — 70553 MRI BRAIN STEM W/O & W/DYE: CPT

## 2024-11-22 RX ORDER — GADOBUTROL 604.72 MG/ML
10 INJECTION INTRAVENOUS ONCE
Status: COMPLETED | OUTPATIENT
Start: 2024-11-22 | End: 2024-11-22

## 2024-11-22 RX ADMIN — GADOBUTROL 10 ML: 604.72 INJECTION INTRAVENOUS at 13:53

## 2024-11-24 ENCOUNTER — MYC MEDICAL ADVICE (OUTPATIENT)
Dept: FAMILY MEDICINE | Facility: CLINIC | Age: 44
End: 2024-11-24
Payer: COMMERCIAL

## 2024-11-24 DIAGNOSIS — E11.65 TYPE 2 DIABETES MELLITUS WITH HYPERGLYCEMIA, WITHOUT LONG-TERM CURRENT USE OF INSULIN (H): Primary | ICD-10-CM

## 2024-11-24 DIAGNOSIS — K21.9 GASTROESOPHAGEAL REFLUX DISEASE, UNSPECIFIED WHETHER ESOPHAGITIS PRESENT: ICD-10-CM

## 2024-11-24 DIAGNOSIS — R16.0 HEPATOMEGALY: ICD-10-CM

## 2024-11-25 ENCOUNTER — HOSPITAL ENCOUNTER (OUTPATIENT)
Dept: BEHAVIORAL HEALTH | Facility: CLINIC | Age: 44
End: 2024-11-25
Attending: PSYCHIATRY & NEUROLOGY
Payer: COMMERCIAL

## 2024-11-25 PROCEDURE — H0035 MH PARTIAL HOSP TX UNDER 24H: HCPCS

## 2024-11-25 PROCEDURE — H0035 MH PARTIAL HOSP TX UNDER 24H: HCPCS | Performed by: COUNSELOR

## 2024-11-25 PROCEDURE — 99215 OFFICE O/P EST HI 40 MIN: CPT | Performed by: PSYCHIATRY & NEUROLOGY
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
## 2024-11-25 NOTE — GROUP NOTE
Psychotherapy Group Note    PATIENT'S NAME: Shannan Cameron  MRN:   [id number]  :   1980  ACCT. NUMBER: [id number]  DATE OF SERVICE: 24  START TIME:  1:00 PM  END TIME:  1:50 PM  FACILITATOR: Louie Hines TH; Kosta Hobbs RN  TOPIC:  EBP Group: Social Support  [facility] Adult Partial Hospitalization Program  TRACK: [facility]    NUMBER OF PARTICIPANTS: 5    Summary of Group / Topics Discussed:  Social Support:   Meeting: Patients and their support system participated in a session on creating family and community-based support.  The importance and role of family/social support was stressed to increase the effectiveness of treatment. Patients and their support system were given information on PHP structure, diagnoses served within the program, in addition to key goals and objectives. Additionally, physical symptoms, thoughts, and behaviors commonly observed in psychopathology were presented and discussed.  Lastly, information was presented on ways to help promote and support recovery.  The patients and their support system benefitted from this session by working through an exercise designed to clarify their needs for assistance and support in order to enhance their recovery. Staff helped each clarify their roles, so that the patients can be in charge of their stabilization and recovery.      Patient Session Goals / Objectives:  Normalize aspects of mental health conditions and mental illness  Provide education on  normal  vs.  abnormal  aspects of mental health  Clarify the role of partial hospitalization programming  Reduce stigma associated with mental health diagnosis (es).  Provide social support system with techniques and strategies to improve communication and enhance empathy.  Improve interpersonal communication regarding symptoms  Build a sense of mastery and self-respect      Patient Participation / Response:  Fully participated with the group by sharing personal  reflections / insights and openly received / provided feedback with other participants.      Treatment Plan:  Patient has a current master individualized treatment plan.  See Epic treatment plan for more information.    Louie Hines, TH

## 2024-11-25 NOTE — GROUP NOTE
Psychoeducation Group Note    PATIENT'S NAME: Shannan Cameron  MRN:   [id number]  :   1980  ACCT. NUMBER: [id number]  DATE OF SERVICE: 24  START TIME:  9:30 AM  END TIME: 10:20 AM  FACILITATOR: Coral Gutierrez OT  TOPIC: [facility] OT Group: Self- Regulation Skills  [facility] Adult Partial Hospitalization Program  TRACK: [facility]    NUMBER OF PARTICIPANTS: 5    Summary of Group / Topics Discussed:  Sensory Approaches to Mental Health: Relaxation: Patients were provided with verbal and experiential education to identify physical and sensorimotor based activities that can be utilized for relaxation, stress management, self-care, health maintenance, and self-regulation.  Patients went through an experiential practice of different relaxation skills and identified skills they can use in their daily life to decrease anxiety and promote relaxation. Patients increased knowledge and awareness of activities that support relaxation, build resiliency, and prevent relapse through healthy engagement in mindful focused activities.     Patient Session Goals / Objectives:   Identified specific physical and sensorimotor based activities for relaxation.   Improved awareness of activities that are meaningful focused activities that support relaxation.   Established a plan for practice of these skills in their own environments.   Practiced and reflected on how to generalize taught skills to their everyday life.        Patient Participation / Response:  Fully participated with the group by sharing personal reflections / insights and openly received / provided feedback with other participants.    Demonstrating understand of content through actively participating in relaxation techniques including square breathing, mental grounding, bean bag tapping, mindfulness, 5-4-3-2-1, body scanning, and progressive muscle relaxation (PMR). Patient reports liking the bean bag tapping and having to re-focus on breath during most of the other  techniques. Patient's affect was appropriate to situation and mood congruent.        Treatment Plan:  Patient has a current master individualized treatment plan.  See Epic treatment plan for more information.    Coral Gutierrez, OT

## 2024-11-25 NOTE — GROUP NOTE
Psychotherapy Group Note    PATIENT'S NAME: Shannan Cameron  MRN:   [id number]  :   1980  ACCT. NUMBER: [id number]  DATE OF SERVICE: 24  START TIME: 12:00 PM  END TIME: 12:50 PM  FACILITATOR: Simone Parikh TH  TOPIC: MH EBP Group: Specialty Awareness  [facility] Adult Partial Hospitalization Program  TRACK: [facility]    NUMBER OF PARTICIPANTS: 5    Summary of Group / Topics Discussed:  Specialty Topics: Wellness Goal Setting;  Pts learned about SMART goal setting.  Pts shared examples of their SMART goals in 7 dimensions of wellness.      Patient Participation / Response:  Fully participated with the group by sharing personal reflections / insights and openly received / provided feedback with other participants.    Demonstrated understanding of topics discussed through group discussion and participation, Identified / Expressed readiness to act on skill suggestions discussed in topic, Verbalized understanding of ways to proactively manage illness, and Identified plan to address barriers to practicing skills discussed in topic    Treatment Plan:  Patient has a current master individualized treatment plan.  See Epic treatment plan for more information.    MILY Marcelo
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
## 2024-11-25 NOTE — GROUP NOTE
Psychoeducation Group Note    PATIENT'S NAME: Shannan Cameron  MRN:   [id number]  :   1980  ACCT. NUMBER: [id number]  DATE OF SERVICE: 24  START TIME:  8:30 AM  END TIME:  9:20 AM  FACILITATOR: Kosta Hobbs RN  TOPIC:  Wellness Group: [facility] Adult Partial Hospitalization Program  TRACK: [place]    NUMBER OF PARTICIPANTS: 5    Summary of Group / Topics Discussed:  [facility]:  [facility]: Relationship with Diagnosis    Patient Session Goals / Objectives:  Increase awareness of emotional responses to mental health diagnoses.  Identify strategies for coping with the psychological impact of a diagnosis.  Encourage peer support by sharing experiences and learning from others' perspectives.          Patient Participation / Response:  Fully participated with the group by sharing personal reflections / insights and openly received / provided feedback with other participants.    Demonstrated understanding of topics discussed through group discussion and participation, Identified / Expressed personal readiness to practice skills, and Verbalized understanding of this topic    Treatment Plan:  Patient has a current master individualized treatment plan.  See Epic treatment plan for more information.    Kosta Hobbs RN

## 2024-11-26 NOTE — ADDENDUM NOTE
Encounter addended by: Louie Hines TH on: 11/26/2024 9:32 AM   Actions taken: Charge Capture section accepted

## 2024-11-26 NOTE — ADDENDUM NOTE
Encounter addended by: Kosta Hobbs RN on: 11/26/2024 10:39 AM   Actions taken: Charge Capture section accepted

## 2024-11-27 ENCOUNTER — HOSPITAL ENCOUNTER (OUTPATIENT)
Dept: BEHAVIORAL HEALTH | Facility: CLINIC | Age: 44
End: 2024-11-27
Attending: PSYCHIATRY & NEUROLOGY
Payer: COMMERCIAL

## 2024-11-27 PROCEDURE — H0035 MH PARTIAL HOSP TX UNDER 24H: HCPCS

## 2024-11-27 PROCEDURE — H0035 MH PARTIAL HOSP TX UNDER 24H: HCPCS | Performed by: COUNSELOR

## 2024-11-27 NOTE — GROUP NOTE
"  Psychotherapy Group Note    PATIENT'S NAME: Shannan Cameron  MRN:   [id number]  :   1980  ACCT. NUMBER: [id number]  DATE OF SERVICE: 24  START TIME:  1:00 PM  END TIME:  1:50 PM  FACILITATOR: Simone Parikh TH  TOPIC:  EBP Group: Specialty Awareness  [facility] Adult Partial Hospitalization Program  TRACK: [facility]/IOP 2 Combined    NUMBER OF PARTICIPANTS: 6    Summary of Group / Topics Discussed:  Specialty Topics: IMPROVE the moment; DBT skill IMPROVE for distress tolerance.  Pts learned and practiced DBT skill IMPROVE which stands for \"Imagery, Meaning, Prayer, Relaxation, One thing at a time, Vacation, and Encouragement\" - meaning each letter represents a specific strategy you can use to tolerate difficult emotions in the moment; Pts provided personal examples of how they could use this skill.       Patient Participation / Response:  Fully participated with the group by sharing personal reflections / insights and openly received / provided feedback with other participants.    Demonstrated understanding of topics discussed through group discussion and participation, Identified / Expressed readiness to act on skill suggestions discussed in topic, and Verbalized understanding of ways to proactively manage illness    Treatment Plan:  Patient has a current master individualized treatment plan.  See Epic treatment plan for more information.    MILY Marcelo   "
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
## 2024-11-27 NOTE — GROUP NOTE
Psychotherapy Group Note    PATIENT'S NAME: Shannan Cameron  MRN:   [id number]  :   1980  ACCT. NUMBER: [id number]  DATE OF SERVICE: 24  START TIME: 12:00 PM  END TIME: 12:50 PM  FACILITATOR: Louie Hines TH  TOPIC:  EBP Group: Specialty Awareness  [facility] Adult Partial Hospitalization Program  TRACK: [facility]    NUMBER OF PARTICIPANTS: 4    Summary of Group / Topics Discussed:  Specialty Topics: Outdoor/Nature Recreation and Gratitude.    Patient's engaged in artistic expression activity surrounding gratitude. Each participant completed an art drawing incorporating gratitude. Patient's discussed and explored outdoor activities such as hiking, bird watching, and engagement with animals and how this contributes to improving their mental health symptoms.       Patient Participation / Response:  Fully participated with the group by sharing personal reflections / insights and openly received / provided feedback with other participants.    Demonstrated understanding of topics discussed through group discussion and participation and Verbalized understanding of ways to proactively manage illness    Treatment Plan:  Patient has a current master individualized treatment plan.  See Epic treatment plan for more information.    MILY Fletcher

## 2024-11-27 NOTE — GROUP NOTE
Psychoeducation Group Note    PATIENT'S NAME: Shannan Cameron  MRN:   [id number]  :   1980  ACCT. NUMBER: [id number]  DATE OF SERVICE: 24  START TIME:  8:30 AM  END TIME:  9:20 AM  FACILITATOR: Coral Gutierrez OT  TOPIC: MH PHP OT Group: Lifestyle Balance and Structure  [facility] Adult Partial Hospitalization Program  TRACK: PHP     NUMBER OF PARTICIPANTS: 4    Summary of Group / Topics Discussed:  Self-Regulation Skills: Coping Cards: Provided education on the benefits of developing a robust coping plan to help manage distress and regulate emotions.  Discussed the importance of having easy access to this plan in times of increased symptoms.  Brainstormed with group members to identify coping skills in the categories of sensory strategies, distraction techniques, cognitive strategies, and physical activity.  Instructed patients to select 1-2 skills from each category to include in their deck of coping cards, then provided time and materials for patients to create this deck.  Prompted patients to consider where they might keep this tool and when they might find it most helpful.      Patient Session Goals / Objectives:  Review the benefits of having a robust coping plan to help manage distress and regulate emotions  Develop a deck of personal coping cards to promote self-regulation and independent skill use  Established a plan for practice of these skills in their own environments       Patient Participation / Response:  Fully participated with the group by sharing personal reflections / insights and openly received / provided feedback with other participants.    Demonstrated understanding of content through identifying coping skills and creating personalized coping cards to assist with working through difficult situations in any environment. Patient's affect was appropriate to situation and mood congruent.     Treatment Plan:  Patient has a current master individualized treatment plan.  See Epic  treatment plan for more information.    Coral Gutierrez, OT

## 2024-11-27 NOTE — GROUP NOTE
Psychoeducation Group Note    PATIENT'S NAME: Shannan Cameron  MRN:   [id number]  :   1980  ACCT. NUMBER: [id number]  DATE OF SERVICE: 24  START TIME:  9:30 AM  END TIME: 10:20 AM  FACILITATOR: Kosta Hobbs RN  TOPIC:  Wellness Group: [facility] Adult Partial Hospitalization Program  TRACK: [facility]    NUMBER OF PARTICIPANTS: 4    Summary of Group / Topics Discussed:  Foundations of Health: Sleep: Pathophysiology of sleep disorders: The anatomy of sleep was reviewed including structures, stages, mechanisms, and the purpose that sleep has on the brain. Sleep disorders were discussed within the group with a focus on gaining knowledge about the etiology and pathophysiology of insomnia, sleep apnea, restless leg syndrome, narcolepsy, medications that can cause risk for sleeping disorders, and other symptoms/factors that may interfere with sleep. Risk factors for developing sleep disorders were discussed and treatments/pharmacological options were explored.     Patient Session Goals / Objectives:  Described the effect and purpose of sleep on the brain and identify the amount of sleep needed  Identified differences between sleep disorders and risks associated with sleep disorders  Described the connection between sleep disturbances and mental illness  Increased knowledge about treatments for sleep disorders      Patient Participation / Response:  Fully participated with the group by sharing personal reflections / insights and openly received / provided feedback with other participants.    Demonstrated understanding of topics discussed through group discussion and participation, Identified / Expressed personal readiness to practice skills, and Verbalized understanding of foundations of health topic    Treatment Plan:  Patient has a current master individualized treatment plan.  See Epic treatment plan for more information.    Kosta Hobbs RN

## 2024-11-29 ENCOUNTER — HOSPITAL ENCOUNTER (OUTPATIENT)
Dept: BEHAVIORAL HEALTH | Facility: CLINIC | Age: 44
Discharge: HOME | End: 2024-11-29
Attending: PSYCHIATRY & NEUROLOGY
Payer: COMMERCIAL

## 2024-11-29 PROCEDURE — H0035 MH PARTIAL HOSP TX UNDER 24H: HCPCS

## 2024-11-29 PROCEDURE — H0035 MH PARTIAL HOSP TX UNDER 24H: HCPCS | Performed by: COUNSELOR

## 2024-11-29 NOTE — GROUP NOTE
Psychotherapy Group Note    PATIENT'S NAME: Shannan Cameron  MRN:   [id number]  :   1980  ACCT. NUMBER: [id number]  DATE OF SERVICE: 24  START TIME:  9:30 AM  END TIME: 10:20 AM  FACILITATOR: Simone Parikh TH; Kosta Hobbs RN  TOPIC: MH EBP Group: Specialty Awareness  [facility] Adult Partial Hospitalization Program  TRACK: [facility]    NUMBER OF PARTICIPANTS: 3    Summary of Group / Topics Discussed:  Specialty Topics: Goal Setting: Provided education and assessment on patient's group programming goals. Evaluated patient's assessment of their ability to participate in groups, share emotions with group members, and openness to the group format. Provided education regarding appropriate individual-based group therapy goals.     Patient Session Goals and Objectives:  -Participate in reflective assessment of group readiness.  -Understand appropriate topics and methods to discuss those topics in group programming.  -Identify and problem solve barriers to group participation.  -Identify strategies to actively cope while engaging in group programming.   -Reflected up individualized treatment plans  -Meet with members of the treatment team to assess goal progress       Patient Participation / Response:  Fully participated with the group by sharing personal reflections / insights and openly received / provided feedback with other participants.    Demonstrated understanding of topics discussed through group discussion and participation, Identified / Expressed readiness to act on skill suggestions discussed in topic, and Verbalized understanding of ways to proactively manage illness    Treatment Plan:  Patient has a current master individualized treatment plan.  See Epic treatment plan for more information.    MILY Marcelo
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
## 2024-11-29 NOTE — GROUP NOTE
"Process Group Note    PATIENT'S NAME: Shannan Cameron  MRN:   [id number]  :   1980  ACCT. NUMBER: [id number]  DATE OF SERVICE: 24  START TIME:  9:30 AM  END TIME: 10:20 AM  FACILITATOR: Simone Parikh TH; Kosta Hobbs RN  TOPIC:  Process Group    Diagnoses:  ***    [facility] Adult Partial Hospitalization Program  TRACK: [facility]     NUMBER OF PARTICIPANTS: 3          Data:    Session content: At the start of this group, patients were invited to check in by identifying themselves, describing their current emotional status, and identifying issues to address in this group.   Area(s) of treatment focus addressed in this session included {OP BEH ADULT  AREA OF TREATMENT FOCUS:103506}.  ***    Therapeutic Interventions/Treatment Strategies:  {OP  THERAPEUTIC INTERVENTIONS/TREATMENT:687690}    Assessment:    Patient response:   Patient responded to session by {PATIENT RESPONSE:129712}    Possible barriers to participation / learning include: {POSSIBLE BARRIERS:588588}    Health Issues:   {YES / NO:565398}       Substance Use Review:   Substance Use: {YES / NO:489129}    Mental Status/Behavioral Observations  Appearance:   {Appearance:405561::\"Appropriate \"}  Eye Contact:   {Eye Contact:958173::\"Good \"}  Psychomotor Behavior: {Psychomotor Behavior:848417::\"Normal \"}  Attitude:   {Attitude:893473::\"Cooperative \"}  Orientation:   {Orientation:119813::\"All\"}  Speech   Rate / Production: {Speech Rate/Production:944491::\"Normal \"}   Volume:  {Speech Volume:337228::\"Normal \"}  Mood:    {Mood:266616::\"Normal\"}  Affect:    {Affect:114028::\"Appropriate \"}  Thought Content:   {Thought Content with Safety:224192}  Thought Form:  {Thought Form:815992::\"Coherent \",\"Logical \"}    Insight:    {Insight:268543::\"Good \"}    Plan:     Safety Plan: {SAFETY PLAN:411766}     Barriers to treatment: {Barriers to Treatment:856644}    Patient Contracts (see media tab):  {Patient Contracts:495839}    Substance " Use: {SUBSTANCE USE ASSESSMENT/INTERVENTION:885329}     Continue or Discharge: {Continue or Discharge:677917}      Simone Parikh, TH November 29, 2024

## 2024-11-29 NOTE — GROUP NOTE
Psychoeducation Group Note    PATIENT'S NAME: Shannan Cameron  MRN:   [id number]  :   1980  ACCT. NUMBER: [id number]  DATE OF SERVICE: 24  START TIME:  8:30 AM  END TIME:  9:20 AM  FACILITATOR: Kosta Hobbs RN  TOPIC:  Wellness Group: Medication Education and Management  [facility] Partial Hospitalization Program  TRACK: [facility]    NUMBER OF PARTICIPANTS: 3    Summary of Group / Topics Discussed:  Medication Educations and Management:  Medication Jeopardy: Patients provided education regarding medication safety, antidepressants, side effects, neuroleptics, expected medication outcomes, knowledge of diagnosis, symptoms, and symptom management through an engaging jeopardy-style format.     Patient Session Goals / Objectives:    Participated in team-based Jeopardy game  Identified strategies for safe use, handling, and disposal of medications  Discussed basic aspects of medication safety, side effects, adverse outcomes and contraindications      Patient Participation / Response:  Fully participated with the group by sharing personal reflections / insights and openly received / provided feedback with other participants.     Demonstrated understanding of topics discussed through group discussion and participation, Identified / Expressed personal readiness to practice skills, and Verbalized understanding of medication education and management topic    Treatment Plan:  Patient has a current master individualized treatment plan.  See Epic treatment plan for more information.    Kosta Hobbs RN

## 2024-11-29 NOTE — GROUP NOTE
"Psychotherapy Group Note    PATIENT'S NAME: Shannan Cameron  MRN:   [id number]  :   1980  ACCT. NUMBER: [id number]  DATE OF SERVICE: 24  START TIME: 12:00 PM  END TIME: 12:50 PM  FACILITATOR: Louie Hines TH  TOPIC:  EBP Group: Coping Skills  [facility] Adult Partial Hospitalization Program  TRACK: [facility]    NUMBER OF PARTICIPANTS: 3    Summary of Group / Topics Discussed:  Coping Skills: Vulnerability    Patient's watched TedTalk by Carmen Girard \"The Power of Vulnerability\" and engaged in a discussion regarding difficulties related to practicing vulnerability as well as ways to engage in vulnerability to develop connection with others.       Patient Participation / Response:  Fully participated with the group by sharing personal reflections / insights and openly received / provided feedback with other participants.    Demonstrated understanding of topics discussed through group discussion and participation    Treatment Plan:  Patient has a current master individualized treatment plan.  See Epic treatment plan for more information.    MILY Fletcher     "
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
## 2024-11-29 NOTE — GROUP NOTE
"Psychotherapy Group Note    PATIENT'S NAME: Shannan Cameron  MRN:   [id number]  :   1980  ACCT. NUMBER: [id number]  DATE OF SERVICE: 24  START TIME:  1:00 PM  END TIME:  1:50 PM  FACILITATOR: Louie Hines TH  TOPIC: MH EBP Group: Coping Skills  [facility] Adult Partial Hospitalization Program  TRACK: [facility]    NUMBER OF PARTICIPANTS: 3    Summary of Group / Topics Discussed:  Coping Skills: Building Positive Experiences    Patient's engaged in discussion regarding building positive experiences. To practice this skill the group played \"mental health bingo\" and engaged in discussion.       Patient Participation / Response:  Fully participated with the group by sharing personal reflections / insights and openly received / provided feedback with other participants.    Demonstrated understanding of topics discussed through group discussion and participation    Treatment Plan:  Patient has a current master individualized treatment plan.  See Epic treatment plan for more information.    MILY Fletcher     "

## 2024-12-02 ENCOUNTER — THERAPY VISIT (OUTPATIENT)
Dept: SPEECH THERAPY | Facility: CLINIC | Age: 44
End: 2024-12-02
Attending: OTOLARYNGOLOGY
Payer: COMMERCIAL

## 2024-12-02 ENCOUNTER — HOSPITAL ENCOUNTER (OUTPATIENT)
Dept: BEHAVIORAL HEALTH | Facility: CLINIC | Age: 44
End: 2024-12-02
Attending: PSYCHIATRY & NEUROLOGY
Payer: COMMERCIAL

## 2024-12-02 DIAGNOSIS — R49.0 MUSCLE TENSION DYSPHONIA: ICD-10-CM

## 2024-12-02 DIAGNOSIS — J38.3 VOCAL CORD DYSFUNCTION: ICD-10-CM

## 2024-12-02 DIAGNOSIS — R49.0 DYSPHONIA: Primary | ICD-10-CM

## 2024-12-02 DIAGNOSIS — R49.0 HOARSENESS: ICD-10-CM

## 2024-12-02 DIAGNOSIS — K21.9 LPRD (LARYNGOPHARYNGEAL REFLUX DISEASE): ICD-10-CM

## 2024-12-02 PROCEDURE — H0035 MH PARTIAL HOSP TX UNDER 24H: HCPCS

## 2024-12-02 PROCEDURE — 99215 OFFICE O/P EST HI 40 MIN: CPT | Performed by: PSYCHIATRY & NEUROLOGY

## 2024-12-02 PROCEDURE — 92524 BEHAVRAL QUALIT ANALYS VOICE: CPT | Mod: GN | Performed by: STUDENT IN AN ORGANIZED HEALTH CARE EDUCATION/TRAINING PROGRAM

## 2024-12-02 PROCEDURE — 92507 TX SP LANG VOICE COMM INDIV: CPT | Mod: GN | Performed by: STUDENT IN AN ORGANIZED HEALTH CARE EDUCATION/TRAINING PROGRAM

## 2024-12-02 PROCEDURE — 31575 DIAGNOSTIC LARYNGOSCOPY: CPT | Performed by: STUDENT IN AN ORGANIZED HEALTH CARE EDUCATION/TRAINING PROGRAM

## 2024-12-02 NOTE — GROUP NOTE
Psychoeducation Group Note    PATIENT'S NAME: Shannan Cameron  MRN:   [id number]  :   1980  ACCT. NUMBER: [id number]  DATE OF SERVICE: 24  START TIME:  9:30 AM  END TIME: 10:20 AM  FACILITATOR: Coral Gutierrez OT  TOPIC:  PHP OT Group: Lifestyle Balance and Structure  [facility] Adult Partial Hospitalization Program  TRACK: [facility]    NUMBER OF PARTICIPANTS: 3    Summary of Group / Topics Discussed:  Difficulty Day Planning: Patients were guided through therapeutic discussion focused on identifying how they feel physically and emotionally when they are having a difficult day and are in need of support. Psychoeducation was offered regarding the potential benefits of developing a plan to follow when they are feeling dysregulated to assist with decision making, self-regulation, and ensuring safety. Patients then developed and personalized a functional difficult day plan which identifies skills and tools that are helpful in the areas of: medications, sleep, nutrition, movement, support, leisure or self-care activities, and coping skills. Patients identified a place they can keep this document to easily access it as needed.     Patient Session Goals / Objectives:      Identified how they feel physically and emotionally when they are having a difficult day, and what daily activities may become difficult.     Identified personalized skills to utilize when appropriate and incorporated them into a functional plan to follow as needed.     Established a plan for accessing their plan in their own environments.        Patient Participation / Response:  Fully participated with the group by sharing personal reflections / insights and openly received / provided feedback with other participants.    Demonstrated understanding of content through identifying what a difficult day looks like and a cope ahead plan utilizing different strategies including: medication, sleep, nutrition, movement, support systems,  leisure/self-care activities, and coping skills. Patient's affect was appropriate to situation and mood congruent.      Treatment Plan:  Patient has a current master individualized treatment plan.  See Epic treatment plan for more information.    Coral Gutierrez OT

## 2024-12-02 NOTE — GROUP NOTE
Process Group Note    PATIENT'S NAME: Shannan Cameron  MRN:   [id number]  :   1980  ACCT. NUMBER: [id number]  DATE OF SERVICE: 24  START TIME: 11:00 AM  END TIME: 11:50 AM  FACILITATOR: Louie Hines TH  TOPIC:  Process Group    Diagnoses:  MDD, recurrent, severe  PTSD with dissociative symptoms    [facility] Partial Hospitalization Program  TRACK: [facility]     NUMBER OF PARTICIPANTS: 4        Data:    Session content: At the start of this group, patients were invited to check in by identifying themselves, describing their current emotional status, and identifying issues to address in this group.   Area(s) of treatment focus addressed in this session included Symptom Management.  Patient reported feeling exhausted and defeated due to today being a difficult day for her.   Patient discussed working toward journaling thoughts and feelings.   Skills they plan to practice to address the goal include opposite action and not catastrophizing   They identified a potential barrier as being physically ill.   Patient reported passive suicidal ideation without plan or intent.   Patient reported no substance use  Patient reported taking medications as prescribed  Patient reported feeling proud/grateful for her sons.      Therapeutic Interventions/Treatment Strategies:  Treatment modalities used include Motivational Interviewing.    Assessment:    Patient response:   Patient responded to session by accepting feedback, giving feedback, listening, being attentive, accepting support, and appearing alert    Possible barriers to participation / learning include: and no barriers identified    Health Issues:   None reported       Substance Use Review:   Substance Use: No active concerns identified.    Mental Status/Behavioral Observations  Appearance:   Appropriate   Eye Contact:   Good   Psychomotor Behavior: Normal   Attitude:   Cooperative   Orientation:   All  Speech   Rate / Production: Normal     Volume:  Normal   Mood:    Normal  Affect:    Appropriate   Thought Content:   Clear  Thought Form:  Coherent  Logical     Insight:    Good     Plan:   Safety Plan: Recommended that patient call 911 or go to the local ED should there be a change in any of these risk factors.   Barriers to treatment: None identified  Patient Contracts (see media tab):  None  Substance Use: Not addressed in session   Continue or Discharge: Patient will continue in Adult Partial Hospitalization Program (PHP)  as planned. Patient is likely to benefit from learning and using skills as they work toward the goals identified in their treatment plan.      Louie Hines, TH  December 2, 2024

## 2024-12-02 NOTE — PROGRESS NOTES
"SPEECH LANGUAGE PATHOLOGY EVALUATION              Subjective        Presenting condition or subjective complaint:  43yo female s/p partial thyroidectomy in February 2024 for thyroid cancer and subsequent necrosed thyroid presenting for SLP evaluation of voice and breathing.  Pt reports voice changes that started within the past year, a little before her thyroid surgery in February.  Her voice fatigues quickly when talking and gets raspy.  Her voice also does not sound the way it used to when singing.  In April-July of this year, she experienced significant SOB and O2 saturation as low as 85%.  CT was significant for pulmonary nodules.  Breathing improved by September, but now she is having times where she is gasping for breath again.  Symptoms will happen when she is standing but not otherwise exerting and at night.  She will have difficulty breathing with stridor, noting that it feels like her \"airway is collapsing.\" Breathing episodes can last up to 10 minutes at a time.  Breathing episodes can be triggered by reflux, especially at night.  She has been taking reflux medication without much benefit, potentially d/t interactions with her diabetes medication.  She is working with her doctors on finding a good medication regimen.  There have been times when she can feel stomach bile come up and she has to cough it out to prevent aspirating on it.  Otherwise, breathing episodes happen randomly.  She also reports regular dysphagia symptoms, including choking on saliva and foods.  Swallowing feels asymmetric, with pt noting the right side feels \"stiffer\" when swallowing.  She also frequently experiences globus sensation and throat pain d/t tight muscles.  She coughs up mucus every morning and is struggling with inflammation throughout her body.  Date of onset: 11/13/24 (order date)    Relevant medical history:  See below; pt also being seen by ENT for tongue lesions and reactive lymphoid hyperplasia   Dates & types of " surgery:  See below    Prior diagnostic imaging/testing results:    US Thyroid, CT Neck, most recent laryngoscopy with ENT on 24 significant for edema of the posterior commissure. Pt reports ENT has noted incomplete glottal closure on examination as well   Prior therapy history for the same diagnosis, illness or injury:    No    PAST MEDICAL HISTORY:   Past Medical History:   Diagnosis Date    History of partial thyroidectomy     Nephrolithiasis 2007    Papillary adenocarcinoma, follicular variant (H)     Pulmonary nodules     up to 6 mm       PAST SURGICAL HISTORY:   Past Surgical History:   Procedure Laterality Date    ARTHROSCOPIC REPAIR ACL  2017    C LAP REMOVAL, SPERMATIC CORD LIPOMA  2018     SECTION  1996     SECTION  10/21/2008     SECTION  2006    HYSTERECTOMY  2019    Left ovarian cystectomy.    MAMMOGRAM - HIM SCAN  2017    [facility]  DELIVERY ONLY      Description:  Section;  Recorded: 2011;       SOCIAL HISTORY:   Social History     Tobacco Use    Smoking status: Former     Current packs/day: 0.00     Types: Cigarettes     Quit date: 2023     Years since quittin.0     Passive exposure: Past    Smokeless tobacco: Never   Substance Use Topics    Alcohol use: No       Living Environment  Social support:     Help at home:    Equipment owned:       Employment:      Hobbies/Interests:      Patient goals for therapy:  To improve her symptoms as much as possible    Pain assessment: Pain denied     Objective     PERSONAL RATING/VOICE USE  Avocational Voice Use: Conversational speech, pt used to enjoy singing but has not been singing recently d/t her voice problems.  Patient description of current voice quality: Pt reports that her voice sounds okay today.    COUGH/THROAT CLEAR  Cough/throat clear characteristics: not observed during evaluation     HYPERFUNCTION  Visible tension: neck, shoulders    Palpation of the  thyrohyoid region: firm musculature, reduced thyrohyoid space, reported tenderness of the thyrohyoid region L>R     VOICE PROFILE DURING CONVERSATION (1 min monologue & paragraph reading)  Voice quality:  SPEECH: Consistent mild breathiness and intermittent mild roughness with upper register predominance.  THERAPY PROBES: Improved voice quality with flow mode, forward resonance, diaphragmatic engagement, glottal closure, and semi-occluded vocal tract probes.    Voice quality severity rating continuum: 6 - mild   Breath control: irregular, poor respiratory/phonation coordination, stridor with demonstration of breathing symptoms, occasional deep inhalations during speech with excessive thoracic muscle use pattern   Breath control severity rating continuum: 5 - mild-moderate  Voice Use/Effort: pinched/squeezed larynx, contraction of neck muscles   Voice Use/Effort severity rating continuum: 5 - mild-moderate  Fundamental frequency (Hz): 207.65 Hz (centered around Ab3)  Pitch/Frequency Description: WNL, but sounds higher d/t breathy voice quality and upper register predominance    Pitch/Frequency severity rating continuum: 6 - mild  Volume: WNL   Volume severity rating continuum: 7 - WNL  Neuromuscular Control: WNL   Neuromuscular Control severity rating continuum: 7 - WNL  Resonance: WNL   Resonance severity rating continuum: 7 - WNL    CAPE-V Overall Severity: 23/100    ADDUCTION/ABDUCTION FUNCTION  Laryngeal diadokinetic speed: WNL  Laryngeal diadokinetic strength: WNL   Laryngeal diadokinetic consistency: regular   Adduction/Abduction function scale: Age 11-65, norm per sec: 5+     VIBRATORY FUNCTION OF VOCAL FOLDS  Prolonged  ah  at mid pitch (sec):  17.1 seconds at Bb3 with consistent mild breathiness and roughness that becomes more frequent with prolongation  Vibratory Function of Vocal Folds Scale Norms: females 20 - 80 yrs: 10 - 22 secs     FUNCTIONAL LENGTHENERS/SHORTENERS (CT & TA muscles)  Pitch glides:  lower pitches more dysphonic, limited range   Lowest pitch: C#3 with increased breathiness  Highest pitch: F#5    VIDEOSTROBOSCOPY/ENDOSCOPY  Scope serial number: Damion [id number], Barcode [id number]  Tissue description: smooth pink larynx tissues, white vocal fold tissues, edematous vocal folds, inflamed, dry tissue surface, excess secretions   Laryngeal movements: adduction/abduction full range of motion (ROM) although R TVF is sluggish relative to the L TVF, lengthening/shortening of true vocal folds (TFV) full ROM, forward rocking of the R arytenoid during inspiration with /hi/-sniff probe for vocal cord dysfunction  Vibratory characteristics: Unable to complete stroboscopy d/t equipment malfunction   Vocal fold edges: smooth   Glottal closure characteristics: posterior gap, incomplete, especially at lower pitches    Supraglottic activity: anterior-posterior compression, mild, improves with therapy probes    Flexible laryngoscopy with videostroboscopy completed following application of topical anesthetic (3% Lidocaine HCL, 0.25% Phenylephrine HCL) and after obtaining informed verbal consent.  Scope was inserted via the right nostril.  Pt tolerated the procedure well.              Assessment & Plan   CLINICAL IMPRESSIONS   Medical Diagnosis: LPRD, Hoarseness, Muscle tension dysphonia    Treatment Diagnosis: Dysphonia, Vocal cord dysfunction   Impression/Assessment: Pt is a 44 year old female with voice and breathing complaints in the context of incomplete glottal closure, sluggish movement of the right true vocal fold relative to the left, supraglottic hyperfunction, laryngeal irritation, and likely inducible laryngeal obstruction/vocal cord dysfunction on laryngeal examination. The following significant findings have been identified:  mild dysphonia (although today was a good voice day) and mild-moderately poor laryngeal respiratory mechanics , characterized by breathiness that is more apparent at lower pitches,  roughness, upper register predominance for speech, reduced pitch range, poor respiratory/phonatory coordination, stridor during demonstration of breathing symptoms, incoordination and right arytenoid hooding during /hi/-sniff probe for vocal cord dysfunction, and tight laryngeal musculature with neck involvement during phonation. Identified deficits interfere with their ability to communicate within the home or community as compared to previous level of function.    PLAN OF CARE  Treatment Interventions: Voice    Prognosis to achieve stated therapy goals is good   Rehab potential is impacted by: comorbidities, current level of function, patient awareness of deficits, patient motivation, pending medical intervention    Long Term Goals:   SLP Goal 1  Goal Identifier: Voice  Goal Description: In a 20-minute speech task, patient will demonstrate breathiness and roughness that do not exceed a level of 2 out of 10, 90% of the time by SLP judgment, so that patient is able to meet her voice quality demands for conversations at home and in the community.  Target Date: 03/02/25  SLP Goal 2  Goal Identifier: Breathing  Goal Description: Patient will learn, implement and demonstrate 2-3 breathing strategies (e.g. diaphragmatic breathing, rounded lip inhalation) at rest and when symptomatic with 90% accy given no-min cues from SLP, in order to reduce stridor and improve breathing comfort.  Target Date: 03/02/25  SLP Goal 3  Goal Identifier: Massage  Goal Description: Patient will learn, demonstrate, and implement use of circumlaryngeal massage exercises independently 1-2x per day, in order to promote reduced laryngeal discomfort and tension.  Target Date: 03/02/25      Frequency of Treatment: 1x/week  Duration of Treatment: 8 weeks with a 1 month follow-up     Recommended Referrals to Other Professionals:  N/A at this time  Education Assessment:   Learner/Method: Patient;Listening;Reading;Demonstration;Pictures/Video;No Barriers  to Learning  Education Comments: SLP provided education regarding evaluation and laryngeal exam findings and proposed POC.  Therapy initiated today.    Risks and benefits of evaluation/treatment have been explained.   Patient/Family/caregiver agrees with Plan of Care.     Evaluation Time:    Laryngoscopy W/O Stroboscopy Minutes (33015): 30  Voice Minutes (49268): 25    Signing Clinician: Kyra Morton, RAFAT Bedoya (SABINA ty, CCC-SLP  Speech-Language Pathologist  [facility] Certificate of Vocology  [facility]  [phone]

## 2024-12-02 NOTE — PROGRESS NOTES
"[facility]   Adult Mental Health Outpatient Programs  Psychiatric Progress Record    Program Track: [facility]    PATIENT'S NAME: Shannan Cameron  MRN:   [id number]  :   1980  ACCT. NUMBER: [id number]  DATE OF SERVICE: 24    Interval History:  \"I had a good and bad weekend.\" Kayla presents today for follow-up and ongoing program supervision.   Endorses:  States had a good Thanksgiving until her mom called in the evening.  That sent her into a spiral.  She states she was able to stop the spiral earlier than in the past and thought this was a positive  She did go to see family (her significant other's family) for Thanksgiving, which she was not planning on going.  She was glad she did and felt it was the program that allowed her to do this  Still struggles with daily suicidal thoughts.  Has a chronic plan but no intent.  Still has lots of negative thoughts about self  The last few days there has been more body pain/aches.  Migraine today.  Frustrated that had to stop Ozempic due to nausea, vomiting and aches/pains despite that it helped with blood sugar.  Now on new pill    Review of Symptoms  Sleep: less nightmares this last week  Appetite: ok  Suicidal ideation: has suicidal ideation which is described as would be better if I were dead  Thoughts of non-suicidal self-injury: denied  Recent self-injurious behavior: denied  Homicidal ideation: denied  Other safety concerns: denied    Activities of Daily Living and Related Systems Impacted by Illness:  Hygiene: ok  Socialization: hard, but forcing self to do some  Activities of Daily Living: (cleaning, shopping, bills, etc.): not able to do   Concerns related to work: not working    Substance use:  none    Response to Program Interventions:  Structure and support very helpful.  Learning skills    Medications:  Current Outpatient Medications   Medication Sig Dispense Refill    blood glucose (NO BRAND SPECIFIED) test strip Use to test blood sugar 3 times " daily or as directed. To accompany: Blood Glucose Monitor Brands: per insurance. 300 strip 6    blood glucose monitoring (NO BRAND SPECIFIED) meter device kit Use to test blood sugar 1 times daily or as directed. Preferred blood glucose meter OR supplies to accompany: Blood Glucose Monitor Brands: per insurance. 1 kit 0    clonazePAM (KLONOPIN) 0.5 MG tablet TAKE ONE TABLET (0.5 MG) BY MOUTH TWO TIMES DAILY AS NEEDED FOR ANXIETY. 10 tablet 0    cloNIDine (CATAPRES) 0.1 MG tablet Take 1 tablet (0.1 mg) by mouth at bedtime. 30 tablet 0    Continuous Glucose Sensor (FREESTYLE LAURA 3 SENSOR) [facility] 1 each every 14 days. Use 1 sensor every 14 days. Use to read blood sugars per 's instructions. 6 each 5    empagliflozin (JARDIANCE) 10 MG TABS tablet Take 1 tablet (10 mg) by mouth daily 90 tablet 1    Fremanezumab-vfrm (AJOVY) 225 MG/1.5ML SOAJ Inject 225 mg subcutaneously every 30 days. 1.5 mL 6    hydroCHLOROthiazide 12.5 MG tablet TAKE 1 TABLET BY MOUTH ONCE DAILY 30 tablet 2    insulin glargine (LANTUS SOLOSTAR) 100 UNIT/ML pen INJECT 33 units twice a day and continue to increase up to 40 units twice a day (Patient not taking: Reported on 11/15/2024) 225 mL 1    insulin lispro (HUMALOG KWIKPEN) 100 UNIT/ML (1 unit dial) KWIKPEN Inject 33 Units subcutaneously.      insulin pen needle (32G X 4 MM) 32G X 4 MM miscellaneous Use 4 pen needles daily or as directed. 400 each 3    levothyroxine (SYNTHROID/LEVOTHROID) 100 MCG tablet Take 1 tablet (100 mcg) by mouth daily. 90 tablet 2    losartan (COZAAR) 25 MG tablet Take 1 tablet (25 mg) by mouth daily 90 tablet 4    Microlet Lancets MISC USE TO TEST ONCE DAILY 300 each 1    Multiple Vitamins-Minerals (ONCOVITE) TABS Take 1 tablet by mouth daily.      omeprazole (PRILOSEC) 20 MG DR capsule Take 1 capsule (20 mg) by mouth 2 times daily.      ondansetron (ZOFRAN) 8 MG tablet Take 1 tablet (8 mg) by mouth every 8 hours as needed for nausea 12 tablet 1    pantoprazole  "(PROTONIX) 40 MG EC tablet Take 1 tablet (40 mg) by mouth daily. 90 tablet 0    rizatriptan (MAXALT) 10 MG tablet Take 1 tablet (10 mg) by mouth at onset of headache for migraine May repeat in 2 hours. Max 3 tablets/24 hours. (Patient not taking: Reported on 11/15/2024) 10 tablet 1    rosuvastatin (CRESTOR) 10 MG tablet Take 1 tablet (10 mg) by mouth daily 90 tablet 4    Tirzepatide 2.5 MG/0.5ML SOAJ Inject 0.5 mLs (2.5 mg) subcutaneously every 7 days. 2 mL 0    [START ON 12/30/2024] Tirzepatide 5 MG/0.5ML SOAJ Inject 0.5 mLs (5 mg) subcutaneously every 7 days. 2 mL 0    [START ON 1/27/2025] Tirzepatide 7.5 MG/0.5ML SOAJ Inject 0.5 mLs (7.5 mg) subcutaneously every 7 days. 2 mL 5    tiZANidine (ZANAFLEX) 4 MG tablet Take 4 mg by mouth as needed (Patient not taking: Reported on 11/15/2024)         The above list was reviewed and updated in EPIC with patient today.     Patient is taking medications as prescribed and denies adverse effects    Laboratory Results:  Most recent labs reviewed. Pertinent updates/findings: None.     Mental Status Examination:  Vital Signs: LMP 09/25/2019 (Within Days)    Appearance: adequately groomed, appears stated age, and in mild distress.  Attitude: cooperative   Eye Contact: good   Muscle Strength and Tone: normal  Psychomotor Behavior: normal or unremarkable and tearful at times    Gait and Station: normal width, turn, arm swing  Speech: clear, coherent, normal prosody, regular rate, regular rhythm, and fluent  Associations: No loosening of associations  Thought Process: coherent and goal directed  Thought Content: no evidence of psychotic thought, passive suicidal ideation present, no auditory hallucinations present, and no visual hallucinations present  Mood: \"anxious and depressed\"  Affect: mood congruent  Insight: good  Judgment: intact, adequate for safety  Impulse Control: intact  Oriented to: time, place, person, and situation  Attention Span and Concentration: " Normal  Language: Intact  Recent and Remote Memory: Delayed & immediate recall intact  Fund of Knowledge/Assessment of Intelligence: Average  Capacity of Activities of Daily Living: Independent, able to participate in programmatic care services.    Diagnosis/es:  PTSD with dissociative symptoms  MDD, recurrent, severe  H/o TBI per pt    Assessment/Plan:  Kayla presents today for follow-up psychiatric evaluation and assessment of progress. Endorses having a good and bad weekend/holiday.  Felt that the medication has helped with being able to stop a spiral but still has daily suicidal thoughts with a plan but no intent.  Pt consistent on not wanting to kill herself and expressing her significant other and boys as the safety factors.      PTSD  Overall improved   Will move clonidine to dinnertime as during the holiday, she forgot to do this.  The hope being that she is able to wake up in the morning better  Would like to increase the dose as nightmares have improved and we may be able to get more improvement with a higher dose    Depression  Overall unchanged  Continue to work the program. Most likely will need another week for PHP due to the slow progress, but progress nonetheless.    May consider starting viibryd to help with mood and PTSD, but will wait to see how increase in clonidine goes (if able to increase).        Safety Assessment:  Kayla reports suicidal ideation and/or non-suicidal self-injury or thoughts thereof as noted above  Kayla is future-oriented and is engaged in treatment planning   I do not feel that Kayla meets criteria for a 72-hour involuntary hold and remains appropriate for an outpatient level of care    Cal Evangelista MD on 12/2/2024 at 2:49 PM    Visit Details:  Type of service: In-person  Location (patient and provider): [facility] Adult Mental Health Outpatient Programmatic Care Offices    Level of Medical Decision Making:   Discussion of management or test interpretation with  external physician/other qualified healthcare professional/appropriate source - programmatic care multidisciplinary treatment team  42 min spent on the date of the encounter in chart review, patient visit, review of tests, documentation, care coordination, and/or discussion with other providers about the issues documented above.      This document completed in part using [employer] dictation software and therefore may contain inadvertent word or phrase substitutions.

## 2024-12-03 ENCOUNTER — HOSPITAL ENCOUNTER (OUTPATIENT)
Dept: BEHAVIORAL HEALTH | Facility: CLINIC | Age: 44
End: 2024-12-03
Attending: PSYCHIATRY & NEUROLOGY
Payer: COMMERCIAL

## 2024-12-03 PROCEDURE — H0035 MH PARTIAL HOSP TX UNDER 24H: HCPCS | Performed by: COUNSELOR

## 2024-12-03 PROCEDURE — H0035 MH PARTIAL HOSP TX UNDER 24H: HCPCS

## 2024-12-03 NOTE — GROUP NOTE
OT Clinic Group Note    PATIENT'S NAME: Shannan Cameron  MRN:   [id number]  :   1980  ACCT. NUMBER: [id number]  DATE OF SERVICE: 24  START TIME:  9:30 AM  END TIME: 10:20 AM  FACILITATOR: Coral Gutierrez OT  TOPIC: [facility] OT Group: Occupational Therapy Clinic  [facility] Adult Partial Hospitalization Program  TRACK: [facility]    NUMBER OF PARTICIPANTS: 4    Summary of Group / Topics Discussed:  Occupational Therapy Clinic: Provided opportunity for patients to independently choose and engage in a therapeutic activity that supports progress towards their goals and psychiatric recovery. The Occupational Therapy Clinic provides a context for patients to monitor their symptoms, gain self-awareness, practice skills (self-regulation, mindfulness, self-talk, focus/concentration, social, productivity), build a sense of self efficacy and mastery, as well as receive validation, support, and resources. OT checks in, observes, assesses, and monitors performance skills and patterns in context with each group member. Patient completed the Occupational Self Assessment (GRETCHEN) in which identified functional areas their mental health status is impacting and which are most important for them to work on while in the Partial Hospitalization Program. Patient was provided orientation to the OT clinic and overview of purpose of the OT clinic in support of meeting their goals.     Patient Session Goals / Objectives:  Independently identify a purposeful and meaningful therapeutic activity.  Identified which goal(s) they are intentionally working on during session.   Reflected on their performance and share insight about their progress.  Practiced and identified a way to generalize a skill to their everyday life      Patient Participation / Response:  Fully participated with the group by sharing personal reflections / insights and openly received / provided feedback with other participants.    Patient demonstrated understanding of  context through participation of a personal productive, leisure, or self care activity (leisure exploration through puzzling; productive task of taking phone calls for doctors appointments). Pt reported feeling like she would like to get into drawing more frequently as a coping skill. Patient's affect was appropriate to situation and mood congruent.       Treatment Plan:  Patient has a current master individualized treatment plan.  See Epic treatment plan for more information.    Coral Gutierrez, OT

## 2024-12-03 NOTE — GROUP NOTE
Psychotherapy Group Note    PATIENT'S NAME: Shannan Cameron  MRN:   [id number]  :   1980  ACCT. NUMBER: [id number]  DATE OF SERVICE: 24  START TIME:  1:00 PM  END TIME:  1:50 PM  FACILITATOR: Louie Hines TH  TOPIC:  EBP Group: Self-Awareness  [facility] Adult Partial Hospitalization Program  TRACK: [facility]    NUMBER OF PARTICIPANTS: 4    Summary of Group / Topics Discussed:  Self-Awareness: Personal Strengths: Topic focused on assisting patients in identifying personal strengths and how they relate to the management of mental health symptoms. Patients discussed the benefits of acknowledging their personal strengths and their impact on mood improvement, mindfulness, and perspective. Patients worked to increase time spent on recognition and appreciation of what is positive and working in their lives. The goal is to reduce rumination and negative thinking resulting in increased mindfulness and resilience. Patients will work to put skills into practice and problem-solve barriers.     Patient Session Goals / Objectives:  Identified personal strengths  Identified barriers to recognition of personal strengths  Verbalized understanding of strategies to increase use of their strengths in management of daily symptoms      Patient Participation / Response:  Fully participated with the group by sharing personal reflections / insights and openly received / provided feedback with other participants.    Demonstrated understanding of topics discussed through group discussion and participation and Demonstrated understanding of values, strengths, and challenges to learn about themselves    Treatment Plan:  Patient has a current master individualized treatment plan.  See Epic treatment plan for more information.    MILY Fletcher

## 2024-12-03 NOTE — GROUP NOTE
Process Group Note    PATIENT'S NAME: Shannan Cameron  MRN:   [id number]  :   1980  ACCT. NUMBER: [id number]  DATE OF SERVICE: 24  START TIME: 11:00 AM  END TIME: 11:50 AM  FACILITATOR: Simone Parikh TH  TOPIC:  Process Group    Diagnoses:  Principal DSM5 Diagnoses  (Sustained by DSM5 Criteria Listed Above):   296.33 (F33.2) Major Depressive Disorder, Recurrent Episode, Severe With anxious distress  309.81 (F43.10) Posttraumatic Stress Disorder (includes Posttraumatic Stress Disorder for Children 6 Years and Younger)  With dissociative symptoms    [facility] Adult Partial Hospitalization Program  TRACK: [facility]/IOP 2 Combined    NUMBER OF PARTICIPANTS: 5        Data:    Session content: At the start of this group, patients were invited to check in by identifying themselves, describing their current emotional status, and identifying issues to address in this group.   Area(s) of treatment focus addressed in this session included Symptom Management, Personal Safety, and Community Resources/Discharge Planning.  Patient arrived on time for group and engaged with the prompted check-in questions pertaining to emotions, goals, boundaries, barriers, safety concerns, chemical use, medication management, group support and feedback, and stated the following:    Feeling:  Pt reported feeling dysregulated this week as her partner took week off to help her, the bad weather and roads. Pt reported her son has cold weather induced asthma.   Treatment Goal:  Emotional regulation;   Skills:  talking about her feelings, processing thoughts  Barriers:  stress, emotion regulation.   Safety concerns:  Pt denied any safety concerns.   Chemicals:  Pt denied  Meds:   Pt reported taking medications as prescribed.  Grateful:     Pt reported being grateful for the group.    Support:   Pt reported being grateful for the group.  Processing Time:   Pt denied needing process time.    Therapeutic Interventions/Treatment  Strategies:  Psychotherapist offered support, feedback and validation, set limits, and provided redirection. Treatment modalities used include Motivational Interviewing and Cognitive Behavioral Therapy.    Assessment:    Patient response:   Patient responded to session by accepting feedback, giving feedback, listening, focusing on goals, being attentive, and accepting support    Possible barriers to participation / learning include: severity of symptoms    Health Issues:   None reported       Substance Use Review:   Substance Use: No active concerns identified.    Mental Status/Behavioral Observations  Appearance:   Appropriate   Eye Contact:   Good   Psychomotor Behavior: Normal   Attitude:   Cooperative   Orientation:   All  Speech   Rate / Production: Normal    Volume:  Normal   Mood:    Normal  Affect:    Appropriate   Thought Content:   Clear  Thought Form:  Coherent  Logical     Insight:    Good     Plan:   Safety Plan: No current safety concerns identified.  Recommended that patient call 911 or go to the local ED should there be a change in any of these risk factors.   Barriers to treatment:  stress, emotion regulation.   Patient Contracts (see media tab):  None  Substance Use: Not addressed in session   Continue or Discharge: Patient will continue in Adult Partial Hospitalization Program (PHP)  as planned. Patient is likely to benefit from learning and using skills as they work toward the goals identified in their treatment plan.      Simone Parikh, TH  December 3, 2024

## 2024-12-03 NOTE — GROUP NOTE
Psychotherapy Group Note    PATIENT'S NAME: Shannan Cameron  MRN:   [id number]  :   1980  ACCT. NUMBER: [id number]  DATE OF SERVICE: 24  START TIME: 12:00 PM  END TIME: 12:50 PM  FACILITATOR: Louie Hines TH; Kosta Hobbs RN  TOPIC: MH EBP Group: Specialty Awareness  [facility] Adult Partial Hospitalization Program  TRACK: [facility]    NUMBER OF PARTICIPANTS: 4    Summary of Group / Topics Discussed:  Specialty Topics: Goal Setting: Provided education and assessment on patient's group programming goals. Evaluated patient's assessment of their ability to participate in groups, share emotions with group members, and openness to the group format. Provided education regarding appropriate individual-based group therapy goals.     Patient Session Goals and Objectives:  -Participate in reflective assessment of group readiness.  -Understand appropriate topics and methods to discuss those topics in group programming.  -Identify and problem solve barriers to group participation.  -Identify strategies to actively cope while engaging in group programming.   -Reflected up individualized treatment plans  -Meet with members of the treatment team to assess goal progress       Patient Participation / Response:  Fully participated with the group by sharing personal reflections / insights and openly received / provided feedback with other participants.    Demonstrated understanding of topics discussed through group discussion and participation    Treatment Plan:  Patient has a current master individualized treatment plan.  See Epic treatment plan for more information.    MILY Fletcher

## 2024-12-03 NOTE — GROUP NOTE
Psychoeducation Group Note    PATIENT'S NAME: Shannan Cameron  MRN:   [id number]  :   1980  ACCT. NUMBER: [id number]  DATE OF SERVICE: 24  START TIME:  8:30 AM  END TIME:  9:20 AM  FACILITATOR: Kosta Hobbs RN  TOPIC:  Wellness Group: Health Maintenance  [facility] Adult Partial Hospitalization Program  TRACK: [facility]    NUMBER OF PARTICIPANTS: 4    Summary of Group / Topics Discussed:  Health Maintenance: Eight Dimensions of Wellness: The concept of holistic health through the model of eight dimensions was introduced. Group members participated in identifying behaviors and activities in each of the dimensions of wellness.  The importance of each dimension was reinforced and the concept of balance in life as it relates to wellness was explored.      Patient Session Goals / Objectives:  Verbalized understanding of balance in wellness and how it relates to their life  Identified and explained the eight dimensions of wellness  Categorized activities and wellness needs into corresponding dimensions appropriately during exercise        Patient Participation / Response:  Fully participated with the group by sharing personal reflections / insights and openly received / provided feedback with other participants.    Demonstrated understanding of topics discussed through group discussion and participation, Identified / Expressed personal readiness to practice skills, and Verbalized understanding of health maintenance topic    Treatment Plan:  Patient has a current master individualized treatment plan.  See Epic treatment plan for more information.    Kosta Hobbs RN

## 2024-12-04 ENCOUNTER — TELEPHONE (OUTPATIENT)
Dept: FAMILY MEDICINE | Facility: CLINIC | Age: 44
End: 2024-12-04

## 2024-12-04 ENCOUNTER — OFFICE VISIT (OUTPATIENT)
Dept: FAMILY MEDICINE | Facility: CLINIC | Age: 44
End: 2024-12-04
Payer: COMMERCIAL

## 2024-12-04 VITALS
RESPIRATION RATE: 16 BRPM | DIASTOLIC BLOOD PRESSURE: 68 MMHG | OXYGEN SATURATION: 97 % | HEART RATE: 83 BPM | WEIGHT: 227.9 LBS | BODY MASS INDEX: 34.54 KG/M2 | SYSTOLIC BLOOD PRESSURE: 132 MMHG | TEMPERATURE: 98.4 F | HEIGHT: 68 IN

## 2024-12-04 DIAGNOSIS — J02.9 SORE THROAT: ICD-10-CM

## 2024-12-04 DIAGNOSIS — E11.65 TYPE 2 DIABETES MELLITUS WITH HYPERGLYCEMIA, WITHOUT LONG-TERM CURRENT USE OF INSULIN (H): Primary | ICD-10-CM

## 2024-12-04 DIAGNOSIS — Z79.84 DIABETES MELLITUS TREATED WITH ORAL MEDICATION (H): Primary | ICD-10-CM

## 2024-12-04 DIAGNOSIS — E11.9 DIABETES MELLITUS TREATED WITH ORAL MEDICATION (H): Primary | ICD-10-CM

## 2024-12-04 DIAGNOSIS — J03.90 TONSILLITIS: ICD-10-CM

## 2024-12-04 LAB
DEPRECATED S PYO AG THROAT QL EIA: NEGATIVE
GROUP A STREP BY PCR: NOT DETECTED

## 2024-12-04 PROCEDURE — 87651 STREP A DNA AMP PROBE: CPT | Performed by: NURSE PRACTITIONER

## 2024-12-04 PROCEDURE — 99214 OFFICE O/P EST MOD 30 MIN: CPT | Performed by: NURSE PRACTITIONER

## 2024-12-04 RX ORDER — AMOXICILLIN 875 MG/1
875 TABLET, COATED ORAL 2 TIMES DAILY
Qty: 20 TABLET | Refills: 0 | Status: SHIPPED | OUTPATIENT
Start: 2024-12-04

## 2024-12-04 NOTE — PROGRESS NOTES
"  Assessment & Plan     (E11.9,  Z79.84) Diabetes mellitus treated with oral medication (H)  (primary encounter diagnosis)  Comment:   Plan: Actually had hemoglobin A1c quite recently and it was improved from her last reading and is under 8    (J02.9) Sore throat  Comment:   Plan: Streptococcus A Rapid Screen w/Reflex to PCR -         Clinic Collect, Group A Streptococcus PCR         Throat Swab            (J03.90) Tonsillitis  Comment:   Plan: amoxicillin (AMOXIL) 875 MG tablet          Rapid strep is negative but both tonsillar pillars are coated and white.  Will treat for tonsillitis with the caveat that if she see significant improvement over the next 24 hours she does not need to start the antibiotics.  Certainly if she would spike fever or be worsening in any way I do would want her to start the antibiotics.  She did have a procedure today with the scope down the right nares and is able to eat and drink and keep hydrated and no trouble breathing.          BMI  Estimated body mass index is 34.65 kg/m  as calculated from the following:    Height as of this encounter: 1.727 m (5' 8\").    Weight as of this encounter: 103.4 kg (227 lb 14.4 oz).             Jessica Garza is a 44 year old, presenting for the following health issues: sore throat, redness, feels \"sharp\" when swallowing, white spots on tonsils, no fever  Pharyngitis        12/4/2024     2:08 PM   Additional Questions   Roomed by grace guardado   Accompanied by self     History of Present Illness       Reason for visit:  Swollen red throat and nose with white spots and white tonsils  Symptom onset:  1-3 days ago   She is taking medications regularly.                 Objective    /68 (BP Location: Right arm, Patient Position: Sitting)   Pulse 83   Temp 98.4  F (36.9  C)   Resp 16   Ht 1.727 m (5' 8\")   Wt 103.4 kg (227 lb 14.4 oz)   LMP 09/25/2019 (Within Days)   SpO2 97%   Breastfeeding No   BMI 34.65 kg/m    Body mass index is 34.65 " kg/m .  Physical Exam   GENERAL: alert and no distress  EYES: Eyes grossly normal to inspection, PERRL and conjunctivae and sclerae normal  HENT: ear canals and TM's normal, nose and mouth without ulcers or lesions but she has white exudate on both tonsillar pillars, pillars are 2+/4+ just mildly erythematous  NECK: no adenopathy, no asymmetry, masses, or scars  RESP: lungs clear to auscultation - no rales, rhonchi or wheezes  CV: regular rate and rhythm, normal S1 S2, no S3 or S4, no murmur, click or rub, no peripheral edema  MS: no gross musculoskeletal defects noted, no edema    Patient's recent diagnosis of thyroid cancer, reactive lymphoid hyperplasia and diabetes make her more at risk for complications and so it was opted to start her on antibiotics in the next 24 hours if she develops a fever does not start seeing improvement        Signed Electronically by: Danni Mendoza NP

## 2024-12-05 ENCOUNTER — HOSPITAL ENCOUNTER (OUTPATIENT)
Dept: BEHAVIORAL HEALTH | Facility: CLINIC | Age: 44
End: 2024-12-05
Attending: PSYCHIATRY & NEUROLOGY
Payer: COMMERCIAL

## 2024-12-05 PROCEDURE — H0035 MH PARTIAL HOSP TX UNDER 24H: HCPCS

## 2024-12-05 PROCEDURE — H0035 MH PARTIAL HOSP TX UNDER 24H: HCPCS | Performed by: COUNSELOR

## 2024-12-05 NOTE — GROUP NOTE
Psychoeducation Group Note    PATIENT'S NAME: Shannan Cameron  MRN:   [id number]  :   1980  ACCT. NUMBER: [id number]  DATE OF SERVICE: 24  START TIME:  9:30 AM  END TIME: 10:20 AM  FACILITATOR: Kosta Hobbs RN  TOPIC:  Wellness Group: Mental Health Maintenance  [facility] Adult Partial Hospitalization Program  TRACK: [facility]    NUMBER OF PARTICIPANTS: 4    Summary of Group / Topics Discussed:  Mental Health Maintenance:  Assessments of Strengths: Patients completed a self-reflection on personal strengths worksheet. The concept of personal strength as it relates to resilience were explored. Patients shared responses with the group and participated in discussion.     Patient Session Goals / Objectives:  Patients identified 1-3 qualities they consider a personal strength.  Patients understood the concept of personal strengths and the connection it has to resiliency      Patient Participation / Response:  Fully participated with the group by sharing personal reflections / insights and openly received / provided feedback with other participants.    Demonstrated understanding of topics discussed through group discussion and participation, Identified / Expressed personal readiness to practice skills, and Verbalized understanding of mental health maintenance topic    Treatment Plan:  Patient has a current master individualized treatment plan.  See Epic treatment plan for more information.    Kosta Hobbs, RN

## 2024-12-05 NOTE — GROUP NOTE
"  Psychotherapy Group Note    PATIENT'S NAME: Shannan Cameron  MRN:   [id number]  :   1980  ACCT. NUMBER: [id number]  DATE OF SERVICE: 24  START TIME: 12:00 PM  END TIME: 12:50 PM  FACILITATOR: Louie Hines TH  TOPIC:  EBP Group: Specialty Awareness  [facility] Adult Partial Hospitalization Program  TRACK: [facility]    NUMBER OF PARTICIPANTS: 4    Summary of Group / Topics Discussed:  Specialty Topics: Alternative Depression    Summary of Group / Topics Discussed:  Symptom Awareness: Alternative Depression: Patients received a general overview of mood disorders including depressive disorders, anxiety disorders, and bipolar disorders and how it relates to their current symptoms. Patient's focused on non-medical causes of depression, including cultural and environmental factors.  The purpose is to promote understanding of their diagnoses and how it impacts their functioning. Patients reviewed their current awareness of symptoms and diagnoses. Patients received information regarding diagnoses, etiology, cultural, and environmental factors as well as impact on functioning.  Patient Session Goals / Objectives:  Watch a LESVIA talk by Renato Ribera \"This could be why you're depressed or anxious\".  Discussed patient individual symptoms and experiences  Reviewed diagnostic criteria and etiology of diagnoses    Discussed causes and solutions to depression outlined in the video and how patient can apply it to their experiences.      Patient Participation / Response:  Fully participated with the group by sharing personal reflections / insights and openly received / provided feedback with other participants.    Demonstrated understanding of topics discussed through group discussion and participation, Identified / Expressed readiness to act on skill suggestions discussed in topic, and Verbalized understanding of ways to proactively manage illness    Treatment Plan:  Patient has a current master individualized " treatment plan.  See Epic treatment plan for more information.    Louie Hines, TH

## 2024-12-05 NOTE — GROUP NOTE
Process Group Note    PATIENT'S NAME: Shannan Cameron  MRN:   [id number]  :   1980  ACCT. NUMBER: [id number]  DATE OF SERVICE: 24  START TIME: 11:00 AM  END TIME: 11:50 AM  FACILITATOR: Simone Parikh TH  TOPIC:  Process Group    Diagnoses:  Principal DSM5 Diagnoses  (Sustained by DSM5 Criteria Listed Above):   296.33 (F33.2) Major Depressive Disorder, Recurrent Episode, Severe With anxious distress  309.81 (F43.10) Posttraumatic Stress Disorder (includes Posttraumatic Stress Disorder for Children 6 Years and Younger)  With dissociative symptoms    [facility] Adult Partial Hospitalization Program  TRACK: [facility]/IOP 2 Combined    NUMBER OF PARTICIPANTS: 5        Data:    Session content: At the start of this group, patients were invited to check in by identifying themselves, describing their current emotional status, and identifying issues to address in this group.   Area(s) of treatment focus addressed in this session included Symptom Management, Personal Safety, and Community Resources/Discharge Planning.  Patient arrived on time for group and engaged with the prompted check-in questions pertaining to emotions, goals, boundaries, barriers, safety concerns, chemical use, medication management, group support and feedback, and stated the following:    Feeling:  Pt reported feeling unsteady today, tired, and small.  Pt reported needing another surgery on .  Pt reported having an infection from her surgery last Monday.  Treatment Goal:  Commute from home to IOP Group today.    Skills:  Opposite action; challenge negative thinking.  Barriers:  Stress, PTSD symptoms, health concerns or feeling physically sick.  Safety concerns:  Pt denied but reported having some ideation for SIB through eating.  Chemicals:  Pt denied any substance use concerns.   Meds:   Pt had a medication change recently. Pt reported taking medications at different times.  Grateful:   Pt reported being grateful for  IOP Group.    Support:   Pt reported group can be supportive with feedback.  Processing Time:   Pt denied needing process time as she stated feeling discombobulated.    Therapeutic Interventions/Treatment Strategies:  Psychotherapist offered support, feedback and validation, set limits, and provided redirection. Treatment modalities used include Motivational Interviewing and Cognitive Behavioral Therapy.    Assessment:    Patient response:   Patient responded to session by accepting feedback, giving feedback, listening, focusing on goals, being attentive, and accepting support    Possible barriers to participation / learning include: severity of symptoms    Health Issues:   None reported       Substance Use Review:   Substance Use: No active concerns identified.    Mental Status/Behavioral Observations  Appearance:   Appropriate   Eye Contact:   Good   Psychomotor Behavior: Normal   Attitude:   Cooperative   Orientation:   All  Speech   Rate / Production: Normal    Volume:  Normal   Mood:    Anxious  Depressed  Normal  Affect:    Appropriate   Thought Content:   Clear  Thought Form:  Coherent  Logical     Insight:    Good     Plan:   Safety Plan: No current safety concerns identified.  Recommended that patient call 911 or go to the local ED should there be a change in any of these risk factors.   Barriers to treatment:  Stress, PTSD symptoms, health concerns or feeling physically sick.  Patient Contracts (see media tab):  None  Substance Use: Not addressed in session   Continue or Discharge: Patient will continue in Adult Partial Hospitalization Program (PHP)  as planned. Patient is likely to benefit from learning and using skills as they work toward the goals identified in their treatment plan.      Simone Parikh, TH  December 5, 2024

## 2024-12-05 NOTE — GROUP NOTE
Psychotherapy Group Note    PATIENT'S NAME: Shannan Cameron  MRN:   [id number]  :   1980  ACCT. NUMBER: [id number]  DATE OF SERVICE: 24  START TIME:  1:00 PM  END TIME:  1:50 PM  FACILITATOR: Simone Parikh TH  TOPIC:  EBP Group: Self-Awareness  [facility] Adult Partial Hospitalization Program  TRACK:  [facility]/IOP 2 Combined    NUMBER OF PARTICIPANTS: 5    Summary of Group / Topics Discussed:  Self-Awareness: Building Healthy Habits  Pts discussed building healthy habits.  Pts reflected on how to establish them, breakdown of structure for healthy habits. Pts discussed the influence of environment. Pts discussed how motivation, willpower, and routine impacts building healthy habits.       Patient Participation / Response:  Fully participated with the group by sharing personal reflections / insights and openly received / provided feedback with other participants.    Demonstrated understanding of topics discussed through group discussion and participation, Demonstrated understanding of values, strengths, and challenges to learn about themselves, Identified / Expressed readiness to act intentionally, increase self-compassion, promote personal growth, and Verbalized understanding of ways to proactively manage illness    Treatment Plan:  Patient has a current master individualized treatment plan.  See Epic treatment plan for more information.    MILY Marcelo

## 2024-12-06 ASSESSMENT — COLUMBIA-SUICIDE SEVERITY RATING SCALE - C-SSRS
2. HAVE YOU ACTUALLY HAD ANY THOUGHTS OF KILLING YOURSELF?: NO
1. SINCE LAST CONTACT, HAVE YOU WISHED YOU WERE DEAD OR WISHED YOU COULD GO TO SLEEP AND NOT WAKE UP?: YES

## 2024-12-06 NOTE — DISCHARGE SUMMARY
Outpatient Mental Health Program Discharge Summary / Instructions - Adult       PATIENT'S NAME: Shannan Cameron   MRN:   [id number]  :   1980    PROGRAM PARTICIPATION: Adult Partial Hospitalization Program (PHP)  Track: [facility]    ADMISSION DATE: 24  DISCHARGE DATE:       Reason for Discharge:   Incomplete treatment: patient requires medical/surgical care that interferes with their ability to continue to participate in the program at this time.    Diagnosis:   PTSD with dissociative symptoms  MDD, recurrent, severe  H/o TBI per pt       Medications: (include name, dose, and frequency)    Current Outpatient Medications   Medication Sig Dispense Refill    blood glucose (NO BRAND SPECIFIED) test strip Use to test blood sugar 3 times daily or as directed. To accompany: Blood Glucose Monitor Brands: per insurance. 300 strip 6    blood glucose monitoring (NO BRAND SPECIFIED) meter device kit Use to test blood sugar 1 times daily or as directed. Preferred blood glucose meter OR supplies to accompany: Blood Glucose Monitor Brands: per insurance. 1 kit 0    clonazePAM (KLONOPIN) 0.5 MG tablet TAKE ONE TABLET (0.5 MG) BY MOUTH TWO TIMES DAILY AS NEEDED FOR ANXIETY. 10 tablet 0    Continuous Glucose Sensor (FREESTYLE LAURA 3 SENSOR) MISC 1 each every 14 days. Use 1 sensor every 14 days. Use to read blood sugars per 's instructions. 6 each 5    empagliflozin (JARDIANCE) 10 MG TABS tablet Take 1 tablet (10 mg) by mouth daily 90 tablet 1    Fremanezumab-vfrm (AJOVY) 225 MG/1.5ML SOAJ Inject 225 mg subcutaneously every 30 days. 1.5 mL 6    hydroCHLOROthiazide 12.5 MG tablet TAKE 1 TABLET BY MOUTH ONCE DAILY 30 tablet 2    insulin lispro (HUMALOG KWIKPEN) 100 UNIT/ML (1 unit dial) KWIKPEN Inject 33 Units subcutaneously. (Patient not taking: Reported on 2024)      insulin pen needle (32G X 4 MM) 32G X 4 MM miscellaneous Use 4 pen needles daily or as directed. 400 each 3    levothyroxine  (SYNTHROID/LEVOTHROID) 100 MCG tablet Take 1 tablet (100 mcg) by mouth daily. 90 tablet 2    losartan (COZAAR) 25 MG tablet Take 1 tablet (25 mg) by mouth daily 90 tablet 4    Microlet Lancets MISC USE TO TEST ONCE DAILY 300 each 1    Multiple Vitamins-Minerals (ONCOVITE) TABS Take 1 tablet by mouth daily.      omeprazole (PRILOSEC) 20 MG DR capsule Take 1 capsule (20 mg) by mouth 2 times daily.      ondansetron (ZOFRAN) 8 MG tablet Take 1 tablet (8 mg) by mouth every 8 hours as needed for nausea 12 tablet 1    pantoprazole (PROTONIX) 40 MG EC tablet Take 1 tablet (40 mg) by mouth daily. 90 tablet 0    rosuvastatin (CRESTOR) 10 MG tablet Take 1 tablet (10 mg) by mouth daily 90 tablet 4    amoxicillin (AMOXIL) 875 MG tablet Take 1 tablet (875 mg) by mouth 2 times daily. 20 tablet 0    cloNIDine (CATAPRES) 0.1 MG tablet Take 1 tablet (0.1 mg) by mouth 2 times daily. 60 tablet 0    insulin glargine (LANTUS SOLOSTAR) 100 UNIT/ML pen INJECT 33 units twice a day and continue to increase up to 40 units twice a day 225 mL 1    Insulin Glargine (TOUJEO SOLOSTAR SC) Inject subcutaneously. Use as directed      rizatriptan (MAXALT) 10 MG tablet Take 1 tablet (10 mg) by mouth at onset of headache for migraine May repeat in 2 hours. Max 3 tablets/24 hours. 10 tablet 1    Tirzepatide 2.5 MG/0.5ML SOAJ Inject 0.5 mLs (2.5 mg) subcutaneously every 7 days. (Patient not taking: Reported on 12/4/2024) 2 mL 0    [START ON 12/30/2024] Tirzepatide 5 MG/0.5ML SOAJ Inject 0.5 mLs (5 mg) subcutaneously every 7 days. (Patient not taking: Reported on 12/4/2024) 2 mL 0    [START ON 1/27/2025] Tirzepatide 7.5 MG/0.5ML SOAJ Inject 0.5 mLs (7.5 mg) subcutaneously every 7 days. (Patient not taking: Reported on 12/4/2024) 2 mL 5    tiZANidine (ZANAFLEX) 4 MG tablet Take 4 mg by mouth as needed.       No current facility-administered medications for this visit.       DISCHARGE PLAN / RECOMMENDATIONS TO MANAGE SYMPTOMS AND PREVENT RELAPSE:    Take  medications as prescribed.  Medication Management: Provider: Writer submitted a referral   Next Appointment: N/A  Follow up with your providers and appointments.  Next Level of Care / Frequency: individual therapy at [facility] with Cinda Autumn. Next appointment: Pt will schedule.   Attend all medical appointments at [facility] starting 12/9.   Please reach out when you are ready to resume programming/talk about next steps: [phone]        PERSONAL SAFETY / CRISIS MANAGEMENT:  Follow your individualized Safety Coping Plan.  Report increased symptoms and safety concerns to your care team.  Call 911 or go to your nearest emergency department.  Use the crisis resources listed below:    Crisis Resources:  Suicide Prevention Lifeline: 1-298-461-MFCW (4231)  Crisis Text Line Service (available 24 hours a day, 7 days a week): Text [place] to 733835  Call  **CRISIS (171311) from a cell phone to talk to a team of professionals who can help you.    Crisis Services By [facility]: Phone Number:   [place]     [phone]   [place]    [phone]   [place] ([place])    [phone]   [place]    [phone]   [place]                                                    [phone] (after hours)   [place]   [phone]   [place]     [phone]       The above discharge plan and recommendations were created to help you manage your mental health and to improve your overall functioning and well-being.     Not following the above recommendations may result in an increase of symptoms, potential safety risks and a need for increased services.    We appreciate the opportunity to work with you and sincerely thank you for choosing [facility].        Your treatment team: Cal Evangelista MD; WADE Donald, [facility]; Ventura Hobbs RN; Coral Laguerre OT: WADE Chacon       Patient Signature: _Discharge completed over the phone_____________________________________________________ Date:___________

## 2024-12-06 NOTE — PROGRESS NOTES
Partial Hospitalization Program   Physician Recertification of Medical Necessity    Patient Legal Name: Shannan Cameron  Patient Preferred Name: Kayla  Patient : 1980  Patient MRN: [id number]    Attending physician: Cal Evangelista MD    Recertification #1 from date 24 through date 24    I certify the above-named patient would require inpatient psychiatric care if partial hospitalization program (PHP) services were not provided and that the patient requires such PHP services for a minimum of 20 hours per week. These services are provided under the care and supervision of a physician and under an individualized Plan of Treatment authorized and approved by the physician.    Patient's response to the therapeutic interventions provided by PHP:  Opening up in group, using structure of program and support of program, learning skills      Patient's psychiatric symptoms that continue to place the patient at risk of inpatient psychiatric hospitalization:  Daily passive SI, high anxiety symptoms, severe social stressors with dad's birthday next week (dad passed away unexpectedly this summer) and continued stress with family      Treatment Goals for coordination of services to facilitate discharge from the partial hospitalization program:    Goal # 1: reduce binge/purging behavior by not taking stops on the way home from programming    Goal # 2: creating evening and weekend structure    Goal # 3: explore eating disorder treatment resources to include in discharge plan      Cal Evangelista MD on 2024 at 11:41 AM

## 2024-12-12 NOTE — LETTER
(Inserted Image. Unable to display)                       2021  Re:  JOE MELENDEZ  :  1980        [place] [address]      Dear    [facility],    The following patient has been referred to your office/practice:  JOE MELENDEZ     Appointment is pending with ENT. Please contact patient to schedule.        Please refer to the attached clinical documentation for a summary of JOE's care.  Please do not hesitate to contact our office if any additional clinical questions arise. All relevant records and transition of care documents should be mailed or faxed.     Your assistance in providing continuity of care is appreciated.         Sincerely,   [address]  (P) [phone]  (F) [phone]   66

## 2024-12-14 ENCOUNTER — HEALTH MAINTENANCE LETTER (OUTPATIENT)
Age: 44
End: 2024-12-14

## 2024-12-16 ENCOUNTER — VIRTUAL VISIT (OUTPATIENT)
Dept: PSYCHIATRY | Facility: CLINIC | Age: 44
End: 2024-12-16
Payer: COMMERCIAL

## 2024-12-16 ENCOUNTER — VIRTUAL VISIT (OUTPATIENT)
Dept: BEHAVIORAL HEALTH | Facility: CLINIC | Age: 44
End: 2024-12-16
Payer: COMMERCIAL

## 2024-12-16 ENCOUNTER — VIRTUAL VISIT (OUTPATIENT)
Dept: SPEECH THERAPY | Facility: CLINIC | Age: 44
End: 2024-12-16
Payer: COMMERCIAL

## 2024-12-16 DIAGNOSIS — F43.10 PTSD (POST-TRAUMATIC STRESS DISORDER): Primary | ICD-10-CM

## 2024-12-16 DIAGNOSIS — R49.0 HOARSENESS: ICD-10-CM

## 2024-12-16 DIAGNOSIS — R49.0 MUSCLE TENSION DYSPHONIA: ICD-10-CM

## 2024-12-16 DIAGNOSIS — F41.1 GENERALIZED ANXIETY DISORDER: ICD-10-CM

## 2024-12-16 DIAGNOSIS — F33.2 MAJOR DEPRESSIVE DISORDER, RECURRENT EPISODE, SEVERE WITH ANXIOUS DISTRESS (H): ICD-10-CM

## 2024-12-16 DIAGNOSIS — R49.0 DYSPHONIA: Primary | ICD-10-CM

## 2024-12-16 DIAGNOSIS — F41.9 ANXIETY DISORDER, UNSPECIFIED TYPE: ICD-10-CM

## 2024-12-16 DIAGNOSIS — K21.9 LPRD (LARYNGOPHARYNGEAL REFLUX DISEASE): ICD-10-CM

## 2024-12-16 DIAGNOSIS — J38.3 VOCAL CORD DYSFUNCTION: ICD-10-CM

## 2024-12-16 DIAGNOSIS — F33.1 MODERATE EPISODE OF RECURRENT MAJOR DEPRESSIVE DISORDER (H): ICD-10-CM

## 2024-12-16 PROCEDURE — 92507 TX SP LANG VOICE COMM INDIV: CPT | Mod: GN | Performed by: STUDENT IN AN ORGANIZED HEALTH CARE EDUCATION/TRAINING PROGRAM

## 2024-12-16 PROCEDURE — 99214 OFFICE O/P EST MOD 30 MIN: CPT | Mod: 95 | Performed by: NURSE PRACTITIONER

## 2024-12-16 PROCEDURE — 90832 PSYTX W PT 30 MINUTES: CPT | Mod: 95 | Performed by: SOCIAL WORKER

## 2024-12-16 RX ORDER — CLONIDINE HYDROCHLORIDE 0.1 MG/1
0.2 TABLET, EXTENDED RELEASE ORAL AT BEDTIME
Qty: 30 TABLET | Refills: 1 | Status: SHIPPED | OUTPATIENT
Start: 2024-12-16

## 2024-12-16 ASSESSMENT — ANXIETY QUESTIONNAIRES
7. FEELING AFRAID AS IF SOMETHING AWFUL MIGHT HAPPEN: SEVERAL DAYS
3. WORRYING TOO MUCH ABOUT DIFFERENT THINGS: MORE THAN HALF THE DAYS
GAD7 TOTAL SCORE: 8
GAD7 TOTAL SCORE: 8
4. TROUBLE RELAXING: SEVERAL DAYS
6. BECOMING EASILY ANNOYED OR IRRITABLE: SEVERAL DAYS
7. FEELING AFRAID AS IF SOMETHING AWFUL MIGHT HAPPEN: SEVERAL DAYS
8. IF YOU CHECKED OFF ANY PROBLEMS, HOW DIFFICULT HAVE THESE MADE IT FOR YOU TO DO YOUR WORK, TAKE CARE OF THINGS AT HOME, OR GET ALONG WITH OTHER PEOPLE?: SOMEWHAT DIFFICULT
8. IF YOU CHECKED OFF ANY PROBLEMS, HOW DIFFICULT HAVE THESE MADE IT FOR YOU TO DO YOUR WORK, TAKE CARE OF THINGS AT HOME, OR GET ALONG WITH OTHER PEOPLE?: SOMEWHAT DIFFICULT
GAD7 TOTAL SCORE: 8
5. BEING SO RESTLESS THAT IT IS HARD TO SIT STILL: NOT AT ALL
1. FEELING NERVOUS, ANXIOUS, OR ON EDGE: SEVERAL DAYS
2. NOT BEING ABLE TO STOP OR CONTROL WORRYING: MORE THAN HALF THE DAYS
6. BECOMING EASILY ANNOYED OR IRRITABLE: SEVERAL DAYS
GAD7 TOTAL SCORE: 8
5. BEING SO RESTLESS THAT IT IS HARD TO SIT STILL: NOT AT ALL
IF YOU CHECKED OFF ANY PROBLEMS ON THIS QUESTIONNAIRE, HOW DIFFICULT HAVE THESE PROBLEMS MADE IT FOR YOU TO DO YOUR WORK, TAKE CARE OF THINGS AT HOME, OR GET ALONG WITH OTHER PEOPLE: SOMEWHAT DIFFICULT
1. FEELING NERVOUS, ANXIOUS, OR ON EDGE: SEVERAL DAYS
GAD7 TOTAL SCORE: 8
7. FEELING AFRAID AS IF SOMETHING AWFUL MIGHT HAPPEN: SEVERAL DAYS
7. FEELING AFRAID AS IF SOMETHING AWFUL MIGHT HAPPEN: SEVERAL DAYS
2. NOT BEING ABLE TO STOP OR CONTROL WORRYING: MORE THAN HALF THE DAYS
3. WORRYING TOO MUCH ABOUT DIFFERENT THINGS: MORE THAN HALF THE DAYS
4. TROUBLE RELAXING: SEVERAL DAYS
IF YOU CHECKED OFF ANY PROBLEMS ON THIS QUESTIONNAIRE, HOW DIFFICULT HAVE THESE PROBLEMS MADE IT FOR YOU TO DO YOUR WORK, TAKE CARE OF THINGS AT HOME, OR GET ALONG WITH OTHER PEOPLE: SOMEWHAT DIFFICULT
GAD7 TOTAL SCORE: 8

## 2024-12-16 ASSESSMENT — PATIENT HEALTH QUESTIONNAIRE - PHQ9
10. IF YOU CHECKED OFF ANY PROBLEMS, HOW DIFFICULT HAVE THESE PROBLEMS MADE IT FOR YOU TO DO YOUR WORK, TAKE CARE OF THINGS AT HOME, OR GET ALONG WITH OTHER PEOPLE: VERY DIFFICULT
10. IF YOU CHECKED OFF ANY PROBLEMS, HOW DIFFICULT HAVE THESE PROBLEMS MADE IT FOR YOU TO DO YOUR WORK, TAKE CARE OF THINGS AT HOME, OR GET ALONG WITH OTHER PEOPLE: VERY DIFFICULT
SUM OF ALL RESPONSES TO PHQ QUESTIONS 1-9: 16

## 2024-12-16 ASSESSMENT — PAIN SCALES - GENERAL: PAINLEVEL_OUTOF10: MODERATE PAIN (5)

## 2024-12-16 NOTE — Clinical Note
Please call to schedule patient for follow-up in 6 weeks with me and Kaycee Sutton.  Thank you!   JAMIR Infante, [facility] ([facility]) [facility]

## 2024-12-16 NOTE — NURSING NOTE
Is the patient currently in the state of [place]? YES    Current patient location:  [place].    Visit mode:VIDEO    If the visit is dropped, the patient can be reconnected by: VIDEO VISIT: Text to cell phone:   Telephone Information:   Mobile [phone]       Will anyone else be joining the visit? No  (If patient encounters technical issues they should call [phone])    Are changes needed to the allergy or medication list? Yes Medications flagged for removal; and Pt stated no changes to allergies    Are refills needed on medications prescribed by this physician? No    Rooming Documentation: Attendance Guidelines - Care team has reviewed attendance agreement with patient. Patient advised that two failed appointments within 6 months may lead to termination of current episode of care.      Reason for visit: Consult     JOSAFAT Campbell
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
## 2024-12-16 NOTE — PROGRESS NOTES
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 patient convincingly denies active suicidality today. There was no deceit detected, and the patient presented in a manner that was believable. Local community safety resources reviewed and sent to patient to use if needed.    A safety and risk management plan has been developed including: Patient consented to co-developed safety plan on 12/16/2024.  Safety and risk management plan was reviewed.   Patient agreed to use safety plan should any safety concerns arise.  A copy was made available to the patient.    DSM5  Diagnosis:  296.32 (F33.1) Major Depressive Disorder, Recurrent Episode, Moderate _  309.81 (F43.10) Posttraumatic Stress Disorder (includes Posttraumatic Stress Disorder for Children 6 Years and Younger)  With dissociative symptoms    Medical Comorbidities Include:   Patient Active Problem List    Diagnosis Date Noted    Major depressive disorder, recurrent episode, severe with anxious distress (H) 11/14/2024     Priority: Medium    Posttraumatic stress disorder with dissociative symptoms 11/14/2024     Priority: Medium    Class 2 severe obesity due to excess calories with serious comorbidity in adult (H) 10/31/2024     Priority: Medium    Reactive lymphoid hyperplasia 10/31/2024     Priority: Medium    Gastroesophageal reflux disease, " unspecified whether esophagitis present 10/31/2024     Priority: Medium    S/P partial thyroidectomy 04/22/2024     Priority: Medium    Benign essential hypertension 04/22/2024     Priority: Medium    Migraine with aura and without status migrainosus, not intractable 04/22/2024     Priority: Medium    Malignant neoplasm of thyroid gland (H) 01/18/2024     Priority: Medium    Diabetes mellitus treated with oral medication (H) 08/22/2022     Priority: Medium    Moderate major depression (H) 08/22/2022     Priority: Medium    Polycystic ovary syndrome 08/22/2022     Priority: Medium    Type 2 diabetes mellitus (H) 08/22/2022     Priority: Medium    PTSD (post-traumatic stress disorder) 07/20/2022     Priority: Medium    Pelvic Pain      Priority: Medium     Created by Conversion  Replacement Utility updated for latest IMO load        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   Known issue that I take into account for their medical decisions, no current exacerbations or new concerns.    Impression:  Shannan Cameron is a 44 year old White, female who presents for initial visit with [facility] ([facility]) for medication management. Carries past diagnoses: PTSD with dissociative symptoms, Major Depressive Disorder. Patient reports long history of psychiatric symptoms, but denies history of psychiatric providers, medications through PCP only up to recent PHP.  No history of inpt hospitalizations or suicide attempts.  "Patient referred to [facility]PS following abrupt discharge from [facility] due to requiring \"medical/surgical care that interferes with their ability to continue to participate in the program at this time.\" She reports planning to restart once medical needs reduce. Patient seen by Dr. Evangelista 12/2/2024 who started clonidine 0.1 mg twice a day. Patient reports notable improvement in sx with the medication, does wonder about drowsiness with current dose. She does have some dizziness at baseline that she attributes to other medical issues not the clonidine but will continue to monitor. She takes BP at home and reports \"very normal\" now with clondine (had been elevated previously, no HTN diagnosis.) Does continue to report moderate levels of depression, although not worsened by clonidine, including frequent suicidal ideation. Reports consistent plan since young age that she declines desire / intent / actions taken, denies current safety concerns. No NSSIB, HI, psychosis, jordan, or current substance use. Given improvement with clonidine but possible side effects, discussed risks / benefits of medication changes today. Dr. Evangelista previously discussed retrial of SSRI which patient has been hesitant about, as well as alternatives lamotrigine, viibryd, pristiq. She reports maybe in the future would consider additional anxiety, depression support. She would like to try the XR formulation of clonidine if better tolerated. Encouraged restarting with therapist if not following program at this time. Follow-up with this provider in 6 weeks or sooner as needed. Patient agreeable to plan.       Medication side effects and alternatives reviewed. Health promotion activities recommended and reviewed today. All questions addressed. Education and counseling completed regarding risks and benefits of medications and psychotherapy options. Recommend therapy for additional support.     Treatment Plan:  DISCONTINUE clonidine 0.1 mg twice a day.  START " clonidine ER (Kapvay) 0.2 mg at bedtime. Script sent for #30 + 1 refill  Continue all other medications per primary care provider.   Continue therapy with previous provider.  Safety plan reviewed. To the Emergency Department as needed or call after hours crisis line at [phone] or [phone]. [place] Crisis Text Line: Text [place] to 089811 or  Suicide LifeLine Chat: suicidepreventionlifeline.org/chat  Schedule an appointment with me in 6 weeks or sooner as needed.  Call [facility] at [phone] to schedule.  Follow up with primary care provider as planned or sooner if needed for acute medical concerns.  Call the psychiatric nurse line with medication questions or concerns at [phone].  [email or web address] may be used to communicate with your provider, but this is not intended to be used for emergencies.    Patient Education:  Medication side effects and alternatives reviewed. Health promotion activities recommended and reviewed today. All questions addressed. Education and counseling completed regarding risks and benefits of medications and psychotherapy options.  Consent provided by patient/guardian  Call the psychiatric nurse line with medication questions or concerns at [phone].  [email or web address] may be used to communicate with your provider, but this is not intended to be used for emergencies.  Medlineplus.gov is information for patients.  It is run by the National Library of Medicine and it contains information about all disorders, diseases and all medications.      Discussed that clonidine is being used off label given current clinical picture, with possible benefit to anxiety and/or nightmares at bedtime. Discussed risks of side effects to monitor, including drowsiness, low HR, BP, lightheadedness or dizziness, as well as not to suddenly stop medication due to risk for rebound hypertension.     Community Resources:    National Suicide Prevention Lifeline: 959.259.2252 (TTY: 907.305.2011). Call  anytime for help.  (www.suicidepreventionlifeline.org)  National Dell on Mental Illness (www.stacie.org): 894.814.8392 or 900-793-7964.   Mental Health Association (www.mentalhealth.org): 756.109.6512 or 125-783-9976.  [place] Crisis Text Line: Text [place] to 956333  Suicide LifeLine Chat: suicideDapu.com.org/chat    Administrative Billing:     Level of Medical Decision Making:   - At least 1 chronic problem that is not stable  - Engaged in prescription drug management during visit (discussed any medication benefits, side effects, alternatives, etc.)         Patient Status:  CCPS MD/DO/NP/PA providers offer care a specialty service for Primary Care Providers in the [facility] that seek to optimize psychotropic medications for unstable patients.  Once medications have been optimized, our providers discharge the patient back to the referring Primary Care Provider for ongoing medication management.  This type of system allows our providers to serve a high volume of patients.   Patient will continue to be seen for ongoing consultation and stabilization.    Signed:   Tanika Gruber, MSN, APRN, PMHNP-BC  Collaborative Care Psychiatry Service ([facility]PS)  [facility]    Chart documentation done in part with Dragon Voice Recognition software.  Although reviewed after completion, some word and grammatical errors may remain.

## 2024-12-17 ENCOUNTER — OFFICE VISIT (OUTPATIENT)
Dept: FAMILY MEDICINE | Facility: CLINIC | Age: 44
End: 2024-12-17
Payer: COMMERCIAL

## 2024-12-17 ENCOUNTER — MYC MEDICAL ADVICE (OUTPATIENT)
Dept: FAMILY MEDICINE | Facility: CLINIC | Age: 44
End: 2024-12-17

## 2024-12-17 ENCOUNTER — NURSE TRIAGE (OUTPATIENT)
Dept: FAMILY MEDICINE | Facility: CLINIC | Age: 44
End: 2024-12-17

## 2024-12-17 VITALS
WEIGHT: 228.6 LBS | HEIGHT: 68 IN | RESPIRATION RATE: 16 BRPM | SYSTOLIC BLOOD PRESSURE: 130 MMHG | TEMPERATURE: 97.5 F | HEART RATE: 71 BPM | BODY MASS INDEX: 34.65 KG/M2 | OXYGEN SATURATION: 97 % | DIASTOLIC BLOOD PRESSURE: 74 MMHG

## 2024-12-17 DIAGNOSIS — R59.0 LYMPHADENOPATHY, INGUINAL: ICD-10-CM

## 2024-12-17 DIAGNOSIS — E11.9 DIABETES MELLITUS TREATED WITH ORAL MEDICATION (H): Primary | ICD-10-CM

## 2024-12-17 DIAGNOSIS — Z79.84 DIABETES MELLITUS TREATED WITH ORAL MEDICATION (H): Primary | ICD-10-CM

## 2024-12-17 DIAGNOSIS — E89.0 S/P PARTIAL THYROIDECTOMY: ICD-10-CM

## 2024-12-17 LAB
EST. AVERAGE GLUCOSE BLD GHB EST-MCNC: 174 MG/DL
HBA1C MFR BLD: 7.7 % (ref 0–5.6)
T3FREE SERPL-MCNC: 2.8 PG/ML (ref 2–4.4)
T4 FREE SERPL-MCNC: 1.28 NG/DL (ref 0.9–1.7)
TSH SERPL DL<=0.005 MIU/L-ACNC: 0.5 UIU/ML (ref 0.3–4.2)

## 2024-12-17 PROCEDURE — 84439 ASSAY OF FREE THYROXINE: CPT | Performed by: PHYSICIAN ASSISTANT

## 2024-12-17 PROCEDURE — 84443 ASSAY THYROID STIM HORMONE: CPT | Performed by: PHYSICIAN ASSISTANT

## 2024-12-17 PROCEDURE — 99213 OFFICE O/P EST LOW 20 MIN: CPT | Performed by: PHYSICIAN ASSISTANT

## 2024-12-17 PROCEDURE — 84481 FREE ASSAY (FT-3): CPT | Performed by: PHYSICIAN ASSISTANT

## 2024-12-17 PROCEDURE — 83036 HEMOGLOBIN GLYCOSYLATED A1C: CPT | Performed by: PHYSICIAN ASSISTANT

## 2024-12-17 PROCEDURE — 36415 COLL VENOUS BLD VENIPUNCTURE: CPT | Performed by: PHYSICIAN ASSISTANT

## 2024-12-17 NOTE — TELEPHONE ENCOUNTER
"Nurse Triage SBAR    Is this a 2nd Level Triage? YES, LICENSED PRACTITIONER REVIEW IS REQUIRED    Situation: Received call via priority line. Patient states she just left the [facility] after having imaging done on a lymph node. Patient states she was seen in clinic today and was sent to have imaging done on enlarged lymph node. Patient states her left leg suddenly went numb. Patient states leg is completely numb right now.    Background:     Assessment: A/O. Speech is clear.    Protocol Recommended Disposition:   Call  Now    Recommendation: per protocol, call 911 now. Patient did pull car over. Patient advised to call 911 to be brought to the emergency room d/t sudden onset numbness of left lower extremity that is still present now. Patient verbalizes understanding and agrees with plan.    Routed to provider as FYI    Does the patient meet one of the following criteria for ADS visit consideration? No    Lourdes Oliver RN on 12/17/2024 at 4:19 PM      Reason for Disposition   Sudden onset of numbness of the face, arm or leg on one side of the body and present now    Additional Information   Negative: SEVERE weakness (e.g., unable to walk or barely able to walk, requires support) and new-onset or getting worse   Negative: Difficult to awaken or acting confused (e.g., disoriented, slurred speech)   Negative: Sudden onset of weakness of the face, arm or leg on one side of the body and present now (Exception: Bell's palsy suspected: weakness on one side of the face developing over hours to days, with no other symptoms.)    Answer Assessment - Initial Assessment Questions  1. SYMPTOM: \"What is the main symptom you are concerned about?\" (e.g., weakness, numbness)      Left lower leg  2. ONSET: \"When did this start?\" (minutes, hours, days; while sleeping)      Today suddenly  3. LAST NORMAL: \"When was the last time you (the patient) were normal (no symptoms)?\"      Leaving clinic  4. PATTERN \"Does this come " "and go, or has it been constant since it started?\"  \"Is it present now?\"      Present now  5. CARDIAC SYMPTOMS: \"Have you had any of the following symptoms: chest pain, difficulty breathing, palpitations?\"        6. NEUROLOGIC SYMPTOMS: \"Have you had any of the following symptoms: headache, dizziness, vision loss, double vision, changes in speech, unsteady on your feet?\"        7. OTHER SYMPTOMS: \"Do you have any other symptoms?\"        8. PREGNANCY: \"Is there any chance you are pregnant?\" \"When was your last menstrual period?\"    Protocols used: Neurologic Deficit-A-OH    "

## 2024-12-17 NOTE — PROGRESS NOTES
"  Assessment & Plan     (E11.9,  Z79.84) Diabetes mellitus treated with oral medication (H)  (primary encounter diagnosis)  Comment: Labs pending  Plan: HEMOGLOBIN A1C        A1c today    (R59.0) Lymphadenopathy, inguinal  Comment: Refer  Plan: Adult Gen Surg  Referral                  BMI  Estimated body mass index is 34.76 kg/m  as calculated from the following:    Height as of this encounter: 1.727 m (5' 8\").    Weight as of this encounter: 103.7 kg (228 lb 9.6 oz).           Jessica Garza is a 44 year old, presenting for the following health issues:  RECHECK (Lymph node in left inner thigh, itching/painful  )        12/17/2024     9:34 AM   Additional Questions   Roomed by LYLE Arce     44-year-old presents to the  clinic with enlarging lymph node in the left inguinal region  It is now painful  She has some paresthesias in this area  She has had no injury  Ultrasounds from earlier this year reviewed.  She has had no fever chills or night sweats  She recently had flow cytometry ruled out lymphoma    History of Present Illness       Reason for visit:  Swollen red throat and nose with white spots and white tonsils  Symptom onset:  1-3 days ago   She is taking medications regularly.                     Objective    /74 (BP Location: Right arm, Patient Position: Sitting, Cuff Size: Adult Large)   Pulse 71   Temp 97.5  F (36.4  C) (Tympanic)   Resp 16   Ht 1.727 m (5' 8\")   Wt 103.7 kg (228 lb 9.6 oz)   LMP 09/25/2019 (Within Days)   SpO2 97%   BMI 34.76 kg/m    Body mass index is 34.76 kg/m .  Physical Exam Chaperone present  This an area of soft tissue fullness left medial thigh inferior to the inguinal chain            Signed Electronically by: YUNI Reeder    "

## 2024-12-18 NOTE — PATIENT INSTRUCTIONS
**For crisis resources, please see the information at the end of this document**     Thank you for coming to the [facility] [place] [facility].    TREATMENT PLAN:    Medications:   DISCONTINUE clonidine 0.1 mg twice a day.  START clonidine ER (Kapvay) 0.2 mg at bedtime. Script sent for #30 + 1 refill  Continue all other medications per primary care provider.     Consults / Referrals:   - Continue therapy with previous provider.    Follow-up:  Schedule an appointment with me in 6 weeks or sooner as needed.  Call [facility] at [phone] to schedule.  Follow up with primary care provider as planned or sooner if needed for acute medical concerns.  Call the psychiatric nurse line with medication questions or concerns at [phone].  Context Labshart may be used to communicate with your provider, but this is not intended to be used for emergencies.    Psychoeducation:  Discussed that clonidine is being used off label given current clinical picture, with possible benefit to anxiety and/or nightmares at bedtime. Discussed risks of side effects to monitor, including drowsiness, low HR, BP, lightheadedness or dizziness, as well as not to suddenly stop medication due to risk for rebound hypertension.     Financial Assistance [phone]  [employer] Billing [phone]  [facility]: [phone]  [facility] Billing [phone]  Medical Records [phone]  [facility] Patient Bill of Rights [email or web address]       MENTAL HEALTH CRISIS RESOURCES:  For a emergency help, please call 911 or go to the nearest Emergency Department.     Emergency Walk-In Options:   EmPATH Unit @ [facility] ([place]): [phone] - Specialized mental health emergency area designed to be calming  [facility] ([place]): [phone]  [facility] ([place]): [phone]  [facility]): [phone]    [facility] Crisis Information:   [place]: [phone]  Jan: [phone]  Silvia WATTERS) - Adult: [phone]     Child: [phone]  [place] - Adult: [phone]     Child: [phone]  [place]: [phone]  List of all [facility] resources:   https://[place].gov/dhs/people-we-serve/adults/health-care/mental-health/resources/crisis-contacts.jsp    National Crisis Information:   National Suicide & Crisis Lifeline: Call 378        For online chat options, visit https://suicidepreventionDxUpCloseline.org/chat/  Poison Control Center: 7-111-771-1626  Poison Control Center: 7-874-474-8905  Trans Lifeline: 1-425.893.5162 - Hotline for transgender people of all ages  The [employer]: 3-214-720-7242 - Hotline for LGBT youth     For Non-Emergency Support:   Fast Tracker: Mental Health & Substance Use Disorder Resources -   https://www.fasttrackermn.org/       Again thank you for choosing [facility] [place] [facility] and please let us know how we can best partner with you to improve you and your family's health.    You may be receiving a survey regarding this appointment. We would love to have your feedback, both positive and negative. The survey is done by an external company, so your answers are anonymous.        Patient Education   Collaborative Care Psychiatry Service  What to Expect  Here's what to expect from your Collaborative Care Psychiatry Service (CCPS).   About CCPS  CCPS means 2 people work together to help you get better. You'll meet with a behavioral health clinician and a psychiatric doctor. A behavioral health clinician helps people with mental health problems by talking with them. A psychiatric doctor helps people by giving them medicine.  How it works  At every visit, you'll see the behavioral health clinician (BHC) first. They'll talk with you about how you're doing and teach you how to feel better.   Then you'll see the psychiatric  "doctor. This doctor can help you deal with troubling thoughts and feelings by giving you medicine. They'll make sure you know the plan for your care.   CCPS usually takes 3 to 6 visits. If you need more visits, we may have you start seeing a different psychiatric doctor for ongoing care.  If you have any questions or concerns, we'll be glad to talk with you.  About visits  Be open  At your visits, please talk openly about your problems. It may feel hard, but it's the best way for us to help you.  Cancelling visits  If you can't come to your visit, please call us right away at [phone]. If you don't cancel at least 24 hours (1 full day) before your visit, that's \"late cancellation.\"  Being late to visits  Being very late is the same as not showing up. You will be a \"no show\" if:  Your appointment starts with a C, and you're more than 15 minutes late for a 30-minute (half hour) visit. This will also cancel your appointment with the psychiatric doctor.  Your appointment is with a psychiatric doctor only, and you're more than 15 minutes late for a 30-minute (half hour) visit.  Your appointment is with a psychiatric doctor only, and you're more than 30 minutes late for a 60-minute (full hour) visit.  If you cancel late or don't show up 2 times within 6 months, we may end your care.   Getting help between visits  If you need help between visits, you can call us Monday to Friday from 8 a.m. to 4:30 p.m. at [phone].  Emergency care  Call 911 or go to the nearest emergency department if your life or someone else's life is in danger.  Call 988 anytime to reach the national Suicide and Crisis hotline.  Medicine refills  To refill your medicine, call your pharmacy. You can also call [facility]'s Behavioral Access at [phone], Monday to Friday, 8 a.m. to 4:30 p.m. It can take 1 to 3 business days to get a refill.   Forms, letters, and tests  You may have papers to fill out, like FMLA, short-term " disability, and workability. We can help you with these forms at your visits, but you must have an appointment. You may need more than 1 visit for this, to be in an intensive therapy program, or both.  Before we can give you medicine for ADHD, we may refer you to get tested for it or confirm it another way.  We may not be able to give you an emotional support animal letter.  We don't do mental health checks ordered by the court.   We don't do mental health testing, but we can refer you to get tested.   Thank you for choosing us for your care.  For informational purposes only. Not to replace the advice of your health care provider. Copyright   2022 [facility]. All rights reserved. [employer] [id number] - 12/22.

## 2024-12-23 ENCOUNTER — OFFICE VISIT (OUTPATIENT)
Dept: OPHTHALMOLOGY | Facility: CLINIC | Age: 44
End: 2024-12-23
Attending: OPHTHALMOLOGY
Payer: COMMERCIAL

## 2024-12-23 DIAGNOSIS — H53.453 PERIPHERAL VISUAL FIELD DEFECT OF BOTH EYES: ICD-10-CM

## 2024-12-23 DIAGNOSIS — Z13.5 SCREENING FOR EYE CONDITION: ICD-10-CM

## 2024-12-23 DIAGNOSIS — H43.393 VITREOUS SYNERESIS OF BOTH EYES: Primary | ICD-10-CM

## 2024-12-23 DIAGNOSIS — D31.40 FRECKLE OF IRIS: ICD-10-CM

## 2024-12-23 PROCEDURE — 92285 EXTERNAL OCULAR PHOTOGRAPHY: CPT | Performed by: OPHTHALMOLOGY

## 2024-12-23 PROCEDURE — 92133 CPTRZD OPH DX IMG PST SGM ON: CPT | Performed by: OPHTHALMOLOGY

## 2024-12-23 PROCEDURE — 92083 EXTENDED VISUAL FIELD XM: CPT | Performed by: OPHTHALMOLOGY

## 2024-12-23 PROCEDURE — G0463 HOSPITAL OUTPT CLINIC VISIT: HCPCS | Performed by: OPHTHALMOLOGY

## 2024-12-23 ASSESSMENT — CONF VISUAL FIELD
OS_INFERIOR_NASAL_RESTRICTION: 0
METHOD: COUNTING FINGERS
OS_SUPERIOR_NASAL_RESTRICTION: 0
OS_SUPERIOR_TEMPORAL_RESTRICTION: 0
OD_SUPERIOR_NASAL_RESTRICTION: 0
OD_SUPERIOR_TEMPORAL_RESTRICTION: 0
OS_NORMAL: 1
OD_INFERIOR_NASAL_RESTRICTION: 0
OD_INFERIOR_TEMPORAL_RESTRICTION: 0
OD_NORMAL: 1
OS_INFERIOR_TEMPORAL_RESTRICTION: 0

## 2024-12-23 ASSESSMENT — CUP TO DISC RATIO
OD_RATIO: 0.2
OS_RATIO: 0.2

## 2024-12-23 ASSESSMENT — VISUAL ACUITY
OS_SC: 20/20
OS_SC+: -1
METHOD: SNELLEN - LINEAR
OD_SC: 20/20

## 2024-12-23 ASSESSMENT — TONOMETRY
OS_IOP_MMHG: 15
OD_IOP_MMHG: 15
IOP_METHOD: TONOPEN

## 2024-12-23 ASSESSMENT — EXTERNAL EXAM - RIGHT EYE: OD_EXAM: NORMAL

## 2024-12-23 ASSESSMENT — EXTERNAL EXAM - LEFT EYE: OS_EXAM: NORMAL

## 2024-12-23 ASSESSMENT — SLIT LAMP EXAM - LIDS
COMMENTS: NORMAL
COMMENTS: NORMAL

## 2024-12-23 NOTE — LETTER
12/23/2024       RE: Shannan Cameron  [address]  [facility] [address]     Dear Colleague,    Thank you for referring your patient, Shannan Cameron, to the [facility] [place] at [facility]. Please see a copy of my visit note below.    Chief Complaint/Presenting Concern: Uveitis evaluation    History of Present Illness:   Shannan Cameron is a 44 year old patient who presents for evaluation of possible scleritis.    Shannan Cameron reports having retina scans at [facility] in October 2023 and was told this was fine told these were normal and was then referred to a neurologist who recommended headache mediations and this visit. Glasses were obtained but are used mostly for driving.     Ms. Cameron reports spots and needing more light over the last year. She also has noticed more colored spots on the iris over time. Vision does go dark/dim during migraines but can last days at times before going away. There are times when the whites of the eye on the sides will get bumpy for 1-2 days and this takes ice masks to help    Additional Ocular History:   Previous iris freckles but as above, these seem to be increasing in number  LASIK surgery in 2010 each eye.   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 Migraines have been happening since COVID  Autoimmune hepatitis and will have liver biopsy. Liver tests have been elevated for 1 year.   Diagnosed with lymphadenopathy and plans for biopsy will happen soon  Thyroid cancer is being treated    Family history of skin melanoma in aunt and sister. Grandmother had severe glaucoma    Relevant Review of Systems: Migraines less sore but still having them     Labs/imaging:   MRI brain 11/20/24: Normal   12/2024: Normal JAGUAR, but elevated anti-smooth antibodies 1:40     Current eye  related medications: Systane drops and gel as needed     Retina/Uveitis Imaging:  OCT macula December 23, 2024  Right eye:Early vitreous syneresis, normal retinal contour, no fluid or thinning  Left eye: Early vitreous syneresis, normal retinal contour, no fluid or thinning    RNFL December 23, 2024  Right eye: Avg thickness 106 microns, no thickening, no thinning  Left eye: Avg thickness 108 microns, no thickening, no thinning    HVF 24-2 December 23, 2024  Right eye:Reliable, rim artifact likely. MD-5.94dB, VFI 83%  Left eye: Reliable, rim artifact likely. MD-7.06dB, VFI 83%    Slit Lamp Photos December 23, 2024  Right eye: Iris freckles  Left eye:  Iris freckles    Assessment:    1. Vitreous syneresis of both eyes (Primary)  Likely main cause of floaters    2. Screening for eye condition  Normal retina and optic nerve scans and exam    3. Peripheral visual field defect of both eyes  Likely rim artifacts as so symmetric in each eye     4. Freckle of iris-both eyes  These are benign    Plan/Recommendations:  Discussed findings with patient. The floaters are likely related to early aging changes but there is no signs of inflammation in either eye. Although visual fields showed a rim artifact, optic nerve scans and and exams are normal. There are no retinal/optic nerve issues which seem to be accounting for transient visual changes which may be related to migraines. As such, no specific treatment recommended  No specific testing recommended  Call/message us if the white part of the eyes gets swollen    RTC 1 year tonopen, refract if interested, slit lamp photos, then dilate in room       Physician Attestation    Attending Physician Attestation:  Complete documentation of historical and exam elements from today's encounter can be found in the full encounter summary report (not reduplicated in this progress note). I personally obtained the chief complaint(s) and history of present illness. I confirmed and edited as  necessary the review of systems, past medical/surgical history, family history, social history, and examination findings as documented by others; and I examined the patient myself. I personally reviewed the relevant tests, images, and reports as documented above. I formulated and edited as necessary the assessment and plan and discussed the findings and management plan with the patient and any family members present at the time of the visit.  Samuel Drake M.D., Uveitis and Medical Retina, December 23, 2024     Again, thank you for allowing me to participate in the care of your patient.      Sincerely,    Samuel Drake MD  Uveitis and Medical Retina    Department of Ophthalmology & Visual Neurosciences  [facility]

## 2024-12-23 NOTE — PATIENT INSTRUCTIONS
No specific testing or treatment recommended  Call/message us if the white part of the eyes gets swollen     Nsaids Pregnancy And Lactation Text: These medications are considered safe up to 30 weeks gestation. It is excreted in breast milk.

## 2024-12-23 NOTE — PROGRESS NOTES
Chief Complaint/Presenting Concern: Uveitis evaluation    History of Present Illness:   Shannan Cameron is a 44 year old patient who presents for evaluation of possible scleritis.    Shannan Cameron reports having retina scans at [facility] in October 2023 and was told this was fine told these were normal and was then referred to a Neurologist who recommended headache mediations and this visit. Glasses were obtained but are used mostly for driving.     Ms. Cameron reports spots and needing more light over the last year. She also has noticed more colored spots on the iris over time. Vision does go dark/dim during migraines but can last days at times before going away. There are times when the whites of the eye on the sides will get bumpy for 1-2 days and this takes ice masks to help    Additional Ocular History:   Previous iris freckles but as above, these seem to be increasing in number  LASIK surgery in 2010 each eye.   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 Migraines have been happening since COVID  Autoimmune hepatitis and will have liver biopsy. Liver tests have been elevated for 1 year.   Diagnosed with lymphadenopathy and plans for biopsy will happen soon  Thyroid cancer is being treated    Family history of skin melanoma in Aunt and Sister. Grandmother had severe glaucoma    Relevant Review of Systems:Migraines less sore but still having them     Labs/imaging:   MRI Brain 11/20/24: Normal   12/2024: Normal JAGUAR, but elevated anti-smooth antibodies 1:40     Current eye related medications: Systane drops and gel as needed     Retina/Uveitis Imaging:  OCT macula December 23, 2024  Right eye:Early vitreous syneresis, normal retinal contour, no fluid or thinning  Left eye: Early vitreous syneresis, normal retinal contour, no fluid or thinning    RNFL December 23,  2024  Right eye: Avg thickness 106 microns, no thickening, no thinning  Left eye: Avg thickness 108 microns, no thickening, no thinning    HVF 24-2 December 23, 2024  Right eye:Reliable, rim artifact likely. MD-5.94dB, VFI 83%  Left eye: Reliable, rim artifact likely. MD-7.06dB, VFI 83%    Slit Lamp Photos December 23, 2024  Right eye: Iris freckles  Left eye:  Iris freckles    Assessment:    1. Vitreous syneresis of both eyes (Primary)  Likely main cause of floaters    2. Screening for eye condition  Normal retina and optic nerve scans and exam    3. Peripheral visual field defect of both eyes  Likely rim artifacts as so symmetric in each eye     4. Freckle of iris-both eyes  These are benign    Plan/Recommendations:  Discussed findings with patient. The floaters are likely related to early aging changes but there is no signs of inflammation in either eye. Although visual fields showed a rim artifact, optic nerve scans and and exams are normal. There are no retinal/optic nerve issues which seem to be accounting for transient visual changes which may be related to migraines. As such, no specific treatment recommended  No specific testing recommended  Call/message us if the white part of the eyes gets swollen    RTC 1 year tonopen, refract if interested, slit lamp photos, then dilate in room     Physician Attestation     Attending Physician Attestation:  Complete documentation of historical and exam elements from today's encounter can be found in the full encounter summary report (not reduplicated in this progress note). I personally obtained the chief complaint(s) and history of present illness. I confirmed and edited as necessary the review of systems, past medical/surgical history, family history, social history, and examination findings as documented by others; and I examined the patient myself. I personally reviewed the relevant tests, images, and reports as documented above. I formulated and edited as necessary  the assessment and plan and discussed the findings and management plan with the patient and any family members present at the time of the visit.  Samuel Drake M.D., Uveitis and Medical Retina, December 23, 2024

## 2024-12-23 NOTE — NURSING NOTE
Chief Complaints and History of Present Illnesses   Patient presents with    Uveitis Evaluation     Chief Complaint(s) and History of Present Illness(es)       Uveitis Evaluation              Context: night driving    Associated symptoms: double vision, dryness (Scratchy and dry upon waking), eye pain (OS, slight ache), redness, photophobia (Everything seems extra bright this year.), flashes, floaters, discharge and swelling    Treatments tried: artificial tears    Pain scale: 1/10              Comments    Pt reports her records couldn't be sent over electronically so they were downloaded onto a USB stick which she forgot to bring today .     Pt reports her vision is better today than yesterday. She had double vision and peripheral fruit flies and flashes yesterday. She used to have better night vision than most people and now she cannot see well at night. She needs bright, direct light at night. She has been told some of her symptoms are migraine symptoms.     She sometimes is scared to chop vegetables because the light is behind her and she needs direct light. Last BS was 251, has CGM. She recently stopped Ozempic and her BS has been rising the past week.     She reports having tested positive for an antibody that gave her autoimmune hepatitis. She has not drank in 5 years and will have a liver biopsy on 1/2/2025.    Lab Results       Component                Value               Date                       A1C                      7.7                 12/17/2024                 A1C                      8.9                 08/07/2024                 A1C                      8.8                 04/22/2024                 A1C                      9.1                 12/06/2023                 A1C                      9.5                 08/30/2023             Ocular Meds:   Systane PRN  Systane gel qhs     Yazmin Kolb OA 12:20 PM December 23, 2024

## 2024-12-26 NOTE — TELEPHONE ENCOUNTER
Called pharmacy. PA was submitted on 12/10, no determination yet  
Faxed forms to pharmacy to submit to plan        
Form was completed and signed by Dr. Ahn, sent back to prior authorization team attn Tarik. Routing encounter.   
Forms not received. Called pharmacy for update. Pharmacy advised they just sent and should be received shortly.   
Forms received. Filled out and faxed back to pharmacy to submit to plan.       
Mounjaro 2.5 was prescribed to Jesse.  
PA Initiation    Medication: TIRZEPATIDE 2.5 MG/0.5ML SC SOAJ  Insurance Company: [employer] ([facility]) - Phone [phone] Fax [phone]  Pharmacy Filling the Rx: [facility] - [place], [place] - [facility] [address] [facility]  Filling Pharmacy Phone: [phone]  Filling Pharmacy Fax: [phone]  Start Date: 12/4/2024    Requested PA form from pharmacy. Insurance requires pharmacy to manage the PA process.           
Pharmacy confirmed forms received, however they are missing a page. Advised the missing page was not included in the original fax. Pharmacy sending missing page  
Prescriptions were accidentally discontinued, provider re-sent prescriptions to [place].   
Prior Authorization Not Needed per Insurance    Medication: TIRZEPATIDE 2.5 MG/0.5ML SC SOAJ  Insurance Company: [employer] ([facility]) - Phone [phone] Fax [phone]  Expected CoPay: $    Pharmacy Filling the Rx: [facility] - [address]  Pharmacy Notified: n  Patient Notified: n    Spoke with pharmacy to check in on PA status, pharmacy advised clinic canceled script therefore the PA process has been discontinued. PA not  needed at this time.   
Prior Authorization Retail Medication Request    Medication/Dose: Tirzepatide 2.5mg  Diagnosis and ICD code (if different than what is on RX):    New/renewal/insurance change PA/secondary ins. PA:  Previously Tried and Failed:  Ozempic side effects  Rationale:      Insurance   Primary:   Insurance ID:      Secondary (if applicable):  Insurance ID:      Pharmacy Information (if different than what is on RX)  Name:   Phone:   Fax:    Clinic Information  Preferred routing pool for dept communication:    
Received form. Filled out and faxed back  
Response from prior authorization team, 2.5 starting dose was not sent. Routing to Dr. Ahn to order.  
Spoke with pharmacy, forms did not make it over. Pharmacy advised on how to get form online. Filled out and faxed to pharmacy          
Spoke with pharmacy, they advised they need updated PA forms. Faxing over.   
Family

## 2024-12-30 ENCOUNTER — VIRTUAL VISIT (OUTPATIENT)
Dept: SPEECH THERAPY | Facility: CLINIC | Age: 44
End: 2024-12-30
Payer: COMMERCIAL

## 2024-12-30 DIAGNOSIS — J38.3 VOCAL CORD DYSFUNCTION: ICD-10-CM

## 2024-12-30 DIAGNOSIS — R49.0 HOARSENESS: ICD-10-CM

## 2024-12-30 DIAGNOSIS — R49.0 DYSPHONIA: Primary | ICD-10-CM

## 2024-12-30 DIAGNOSIS — K21.9 LPRD (LARYNGOPHARYNGEAL REFLUX DISEASE): ICD-10-CM

## 2024-12-30 DIAGNOSIS — R49.0 MUSCLE TENSION DYSPHONIA: ICD-10-CM

## 2024-12-30 PROCEDURE — 92507 TX SP LANG VOICE COMM INDIV: CPT | Mod: GN | Performed by: STUDENT IN AN ORGANIZED HEALTH CARE EDUCATION/TRAINING PROGRAM

## 2025-01-06 DIAGNOSIS — G43.109 MIGRAINE WITH AURA AND WITHOUT STATUS MIGRAINOSUS, NOT INTRACTABLE: ICD-10-CM

## 2025-01-07 RX ORDER — ONDANSETRON 8 MG/1
TABLET, FILM COATED ORAL
Qty: 12 TABLET | Refills: 1 | Status: SHIPPED | OUTPATIENT
Start: 2025-01-07

## 2025-01-13 ENCOUNTER — MYC MEDICAL ADVICE (OUTPATIENT)
Dept: FAMILY MEDICINE | Facility: CLINIC | Age: 45
End: 2025-01-13

## 2025-01-13 ENCOUNTER — OFFICE VISIT (OUTPATIENT)
Dept: FAMILY MEDICINE | Facility: CLINIC | Age: 45
End: 2025-01-13
Payer: COMMERCIAL

## 2025-01-13 ENCOUNTER — NURSE TRIAGE (OUTPATIENT)
Dept: FAMILY MEDICINE | Facility: CLINIC | Age: 45
End: 2025-01-13

## 2025-01-13 VITALS
RESPIRATION RATE: 20 BRPM | TEMPERATURE: 97.7 F | BODY MASS INDEX: 35.49 KG/M2 | HEART RATE: 88 BPM | WEIGHT: 234.2 LBS | DIASTOLIC BLOOD PRESSURE: 84 MMHG | OXYGEN SATURATION: 97 % | HEIGHT: 68 IN | SYSTOLIC BLOOD PRESSURE: 128 MMHG

## 2025-01-13 DIAGNOSIS — R00.2 PALPITATIONS: Primary | ICD-10-CM

## 2025-01-13 LAB
ERYTHROCYTE [DISTWIDTH] IN BLOOD BY AUTOMATED COUNT: 13.4 % (ref 10–15)
HCT VFR BLD AUTO: 47.2 % (ref 35–47)
HGB BLD-MCNC: 15.9 G/DL (ref 11.7–15.7)
MCH RBC QN AUTO: 29.9 PG (ref 26.5–33)
MCHC RBC AUTO-ENTMCNC: 33.7 G/DL (ref 31.5–36.5)
MCV RBC AUTO: 89 FL (ref 78–100)
PLATELET # BLD AUTO: 283 10E3/UL (ref 150–450)
RBC # BLD AUTO: 5.31 10E6/UL (ref 3.8–5.2)
WBC # BLD AUTO: 8 10E3/UL (ref 4–11)

## 2025-01-13 PROCEDURE — 99214 OFFICE O/P EST MOD 30 MIN: CPT | Performed by: PHYSICIAN ASSISTANT

## 2025-01-13 PROCEDURE — 80048 BASIC METABOLIC PNL TOTAL CA: CPT | Performed by: PHYSICIAN ASSISTANT

## 2025-01-13 PROCEDURE — 85027 COMPLETE CBC AUTOMATED: CPT | Performed by: PHYSICIAN ASSISTANT

## 2025-01-13 PROCEDURE — 36415 COLL VENOUS BLD VENIPUNCTURE: CPT | Performed by: PHYSICIAN ASSISTANT

## 2025-01-13 PROCEDURE — G2211 COMPLEX E/M VISIT ADD ON: HCPCS | Performed by: PHYSICIAN ASSISTANT

## 2025-01-13 PROCEDURE — 93000 ELECTROCARDIOGRAM COMPLETE: CPT | Performed by: PHYSICIAN ASSISTANT

## 2025-01-13 NOTE — TELEPHONE ENCOUNTER
Reason for Call:  Appointment Request    Patient requesting this type of appt: Chronic Diease Management/Medication/Follow-Up    Requested provider: Agnieszka Ahn    Reason patient unable to be scheduled: Not within requested timeframe    When does patient want to be seen/preferred time: 1-2 weeks    Comments: Unable to schedule a follow up with her PCP, nothing available. She wants to see her in a week and has some results coming in this week (biopsy), would like some advice. And also medication renewals.     Could we send this information to you in [employer] or would you prefer to receive a phone call?:   Patient would prefer a phone call   Okay to leave a detailed message?: Yes at Cell number on file:    Telephone Information:   Mobile [phone]       Call taken on 1/13/2025 at 10:35 AM by Mary Fletcher

## 2025-01-13 NOTE — TELEPHONE ENCOUNTER
"Nurse Triage SBAR    Is this a 2nd Level Triage? YES, LICENSED PRACTITIONER REVIEW IS REQUIRED    Situation:   Patient calls reporting heart rate of 110 and feeling \"off\"    Background:   Hx: Thyroid cancer that has caused this in the past - about a year ago   No new change in medication   Taking Synthroid and Losartan (other medications, as well- see med list)    Assessment:   Since early this morning reports feeling \"off\", noting a high heart rate 110 and has irregular beating \"fluttering\"  Feels like electricity is \"off\" in body - face and head   Having \"weird muscle spasms in ear drum\"  Eyes are twitchy    Constant - worse when walking or standing  Very mild shortness of breath    No weakness   Not lightheaded  No pregnancy  No fever  No chest pain     Protocol Recommended Disposition:   See in Office Within 3 Days    Recommendation: Patient is agreeable with plan. Appointment made for today.        Reason for Disposition   History of hyperthyroidism or taking thyroid medication    Additional Information   Negative: Passed out (e.g., fainted, lost consciousness, blacked out and was not responding)   Negative: Shock suspected (e.g., cold/pale/clammy skin, too weak to stand, low BP, rapid pulse)   Negative: Difficult to awaken or acting confused (e.g., disoriented, slurred speech)   Negative: Visible sweat on face or sweat dripping down face   Negative: Unable to walk, or can only walk with assistance (e.g., requires support)   Negative: Received SHOCK from implantable cardiac defibrillator and has persisting symptoms (i.e., palpitations, lightheadedness)   Negative: Feeling weak or lightheaded (e.g., woozy, feeling like they might faint) AND heart beating very rapidly (e.g., > 140 / minute)   Negative: Feeling weak or lightheaded (e.g., woozy, feeling like they might faint) AND heart beating very slowly (e.g., < 50 / minute)   Negative: Sounds like a life-threatening emergency to the triager   Negative: Chest " pain   Negative: Difficulty breathing   Negative: Feeling weak or lightheaded (e.g., woozy, feeling like they might faint)   Negative: Heart beating very rapidly (e.g., > 140 / minute) and present now  (Exception: During exercise.)   Negative: Heart beating very slowly (e.g., < 50 / minute)  (Exception: Athlete and heart rate normal for caller.)   Negative: New or worsened shortness of breath with activity (dyspnea on exertion)   Negative: Patient sounds very sick or weak to the triager   Negative: Wearing a cardiac monitor (e.g., cardiac event, Holter)   Negative: Received SHOCK from implantable cardiac defibrillator (and now feels well)   Negative: Heart beating very rapidly (e.g., > 140 / minute) and not present now  (Exception: During exercise.)   Negative: Skipped or extra beat(s) and increases with exercise or exertion   Negative: Skipped or extra beat(s) and occurs 4 or more times per minute   Negative: History of heart disease (i.e., heart attack, bypass surgery, angina, angioplasty)  (Exception: Brief heartbeat symptoms that went away and now feels well.)   Negative: Age > 60 years  (Exception: Brief heartbeat symptoms that went away and now feels well.)   Negative: Taking water pill (i.e., diuretic) or heart medication (e.g., digoxin)   Negative: Patient wants to be seen   Negative: Heart rhythm alert (e.g., 'you have irregular heartbeat') from personal wearable device (e.g., Apple Watch)   Negative: New-onset or worsening ankle swelling    Protocols used: Heart Rate and Heartbeat [id number]

## 2025-01-13 NOTE — TELEPHONE ENCOUNTER
For Joanne ARELLANO - please reach out to patient and see when I could get her in. If symptoms of rash are worsening, she will need to see one of my partners to have this addressed.

## 2025-01-13 NOTE — PROGRESS NOTES
"  Assessment & Plan     (R00.2) Palpitations  (primary encounter diagnosis)  Comment: EKG with Q waves in inferior leads otherwise normal rate and rhythm  Plan: CBC with platelets, Basic metabolic panel, EKG         12-lead complete w/read - Clinics, Adult         Cardiology Yesi Fowler Referral        Repeat Zio patch as she had allergic reaction to the adhesive will get cardiology referral to emergency room if increased palpitations lightheadedness visual change change in chest symptoms shortness of breath or other concern  Follow-up if rash returns        BMI  Estimated body mass index is 35.61 kg/m  as calculated from the following:    Height as of this encounter: 1.727 m (5' 8\").    Weight as of this encounter: 106.2 kg (234 lb 3.2 oz).             Jessica Garza is a 44 year old, presenting for the following health issues:  Tachycardia (Elevated heart rate since this morning, feeling \"off\", blurry vision with chest discomfort (had recent endoscopy) rash on left arm/hand )    Patient has noted elevated heart rate this morning and feeling off with some blurry vision.  She has history of migraine cephalgia and is little more frequent episodes as of late  She has had a history of palpitations previously she states most recently when she had thyroid cancer after surgery things had settled  She is on thyroid replacement  Most recent thyroid testing was unremarkable  She had upper endoscopy in the last week  Her family dynamic situation has improved  Was having some palpitations earlier this year she had a Zio patch for short time and then had problems with the adhesive so it was discontinued early this was unremarkable during the time of the study  Rash on left arm hand and forearm started over the weekend it was totally asymptomatic is resolving no new contacts environmental exposure not elsewhere on body    History of Present Illness       Reason for visit:  High heart rate  Symptom onset:  Today   She is " "taking medications regularly.                     Objective    /84 (BP Location: Right arm, Patient Position: Sitting, Cuff Size: Adult Large)   Pulse 88   Temp 97.7  F (36.5  C) (Tympanic)   Resp 20   Ht 1.727 m (5' 8\")   Wt 106.2 kg (234 lb 3.2 oz)   LMP 09/25/2019 (Within Days)   SpO2 97%   BMI 35.61 kg/m    Body mass index is 35.61 kg/m .  Physical Exam   Alert attentive no acute distress  Oropharynx benign  Lungs clear well ventilated no wheeze rales or rhonchi  Cardiovascular normal in rate and rhythm            Signed Electronically by: YUNI Reeder    "

## 2025-01-14 ENCOUNTER — OFFICE VISIT (OUTPATIENT)
Dept: DERMATOLOGY | Facility: CLINIC | Age: 45
End: 2025-01-14
Attending: PSYCHIATRY & NEUROLOGY
Payer: COMMERCIAL

## 2025-01-14 DIAGNOSIS — D22.9 ATYPICAL MOLE: ICD-10-CM

## 2025-01-14 DIAGNOSIS — D22.9 MULTIPLE BENIGN NEVI: ICD-10-CM

## 2025-01-14 DIAGNOSIS — Z80.8 FAMILY HISTORY OF MELANOMA: ICD-10-CM

## 2025-01-14 DIAGNOSIS — L82.0 SEBORRHEIC KERATOSES, INFLAMED: Primary | ICD-10-CM

## 2025-01-14 DIAGNOSIS — L85.8 KP (KERATOSIS PILARIS): ICD-10-CM

## 2025-01-14 LAB
ANION GAP SERPL CALCULATED.3IONS-SCNC: 14 MMOL/L (ref 7–15)
BUN SERPL-MCNC: 12.9 MG/DL (ref 6–20)
CALCIUM SERPL-MCNC: 10 MG/DL (ref 8.8–10.4)
CHLORIDE SERPL-SCNC: 99 MMOL/L (ref 98–107)
CREAT SERPL-MCNC: 0.61 MG/DL (ref 0.51–0.95)
EGFRCR SERPLBLD CKD-EPI 2021: >90 ML/MIN/1.73M2
GLUCOSE SERPL-MCNC: 306 MG/DL (ref 70–99)
HCO3 SERPL-SCNC: 25 MMOL/L (ref 22–29)
POTASSIUM SERPL-SCNC: 3.5 MMOL/L (ref 3.4–5.3)
SODIUM SERPL-SCNC: 138 MMOL/L (ref 135–145)

## 2025-01-14 PROCEDURE — 99203 OFFICE O/P NEW LOW 30 MIN: CPT | Performed by: STUDENT IN AN ORGANIZED HEALTH CARE EDUCATION/TRAINING PROGRAM

## 2025-01-14 NOTE — PATIENT INSTRUCTIONS
You can use this for the rough areas of skin or areas where you pores appear larger.

## 2025-01-14 NOTE — PROGRESS NOTES
[facility] Dermatology Note  Encounter Date: Jan 14, 2025  Office Visit     Dermatology Problem List:  1. Prior biopsy of epithelial acanthosis of the scalp (see report), resolved  2. Sister with melanoma, s/p WLE    ____________________________________________    Assessment & Plan:     # Family history of melanoma (sister)  - annual TBSE    # Benign lesions: Seborrheic keratoses, lentigines, cherry angioma, and multiple benign nevi, sebaceous hyperplasia  - Reassurance provided; no treatment is medically necessary    # Prior biopsy - like ISK with reactive atypia. No evidence of carcinoma  - Monitor for recurrence or symptomatic similar lesions.    #.Irritated Keratosis, disturbance of skin sensation  - Reviewed nature and etiology.  - Monitoring vs cryotherapy. Patient opted for monitoring today.  - Upper groin with stuck on waxy papules.     #. Keratosis pilaris  - Start CeraVe SA, rough and bumpy at bedtime to BID   - Reviewed that this is more of a skin type than a dermatologic diagnosis/problem. Skin type is associated with reduction of acne.       Procedures Performed:   None    Follow-up: 1 year(s) in-person, or earlier for new or changing lesions    Staff:     Carol Choudhary MD PhD  Dermatology, Dermatopathology    ____________________________________________    CC: Derm Problem (Mole check )    HPI:  Shannan Cameron is a(n) 44 year old female who presents today as a new patient for rash and skin check.    Rash has improved with conservative measures.   Rough bumps on the mons pubis  Had a spot biopsied on the scalp which has resolved, it was tender and would catch when she combed her hair. She noticed that the report said the lesion was atypical and concerned about monitoring.    No changing moles.     Patient is otherwise feeling well, without additional skin concerns.     Labs Reviewed:  Lab Results   Component Value Date    WBC 8.0 01/13/2025    HGB 15.9 (H) 01/13/2025    HCT 47.2 (H)  01/13/2025     01/13/2025     01/13/2025    POTASSIUM 3.5 01/13/2025    CHLORIDE 99 01/13/2025    CO2 25 01/13/2025    BUN 12.9 01/13/2025    CR 0.61 01/13/2025     (H) 01/13/2025    SED 15 10/17/2023    AST 96 (H) 04/22/2024     (H) 04/22/2024    ALKPHOS 121 04/22/2024    BILITOTAL 0.5 04/22/2024        Pathology:  9/23/2024 - Skin, scalp, biopsy  Squamous acanthosis with hyperkeratosis and associated mild to moderate epithelial atypia, no evidence of malignancy (see description)    Physical Exam:  Vitals: LMP 09/25/2019 (Within Days)   SKIN: Total skin excluding the undergarment areas was performed. The exam included the head/face, neck, both arms, chest, back, abdomen, both legs, digits and/or nails. Exam performed chaperoned.     - scattered skin colored stuck on papules  - LLE with DF  - Right upper shoulder with angioma  - Upper mons with waxy, brown stuck on papules (1 large, one small)      - No other lesions of concern on areas examined.       Medications:  Current Outpatient Medications   Medication Sig Dispense Refill    blood glucose (NO BRAND SPECIFIED) test strip Use to test blood sugar 3 times daily or as directed. To accompany: Blood Glucose Monitor Brands: per insurance. 300 strip 6    blood glucose monitoring (NO BRAND SPECIFIED) meter device kit Use to test blood sugar 1 times daily or as directed. Preferred blood glucose meter OR supplies to accompany: Blood Glucose Monitor Brands: per insurance. 1 kit 0    clonazePAM (KLONOPIN) 0.5 MG tablet TAKE ONE TABLET (0.5 MG) BY MOUTH TWO TIMES DAILY AS NEEDED FOR ANXIETY. 10 tablet 0    CloNIDine ER (KAPVAY) 0.1 MG 12 hr tablet Take 2 tablets (0.2 mg) by mouth at bedtime. 30 tablet 1    Continuous Glucose Sensor (FREESTYLE LAURA 3 SENSOR) [facility] 1 each every 14 days. Use 1 sensor every 14 days. Use to read blood sugars per 's instructions. 6 each 5    empagliflozin (JARDIANCE) 10 MG TABS tablet Take 1 tablet (10 mg)  by mouth daily 90 tablet 1    Fremanezumab-vfrm (AJOVY) 225 MG/1.5ML SOAJ Inject 225 mg subcutaneously every 30 days. 1.5 mL 6    hydroCHLOROthiazide 12.5 MG tablet TAKE 1 TABLET BY MOUTH ONCE DAILY 30 tablet 2    insulin glargine (LANTUS SOLOSTAR) 100 UNIT/ML pen INJECT 33 units twice a day and continue to increase up to 40 units twice a day 225 mL 1    Insulin Glargine (TOUJEO SOLOSTAR SC) Inject subcutaneously. Use as directed      insulin pen needle (32G X 4 MM) 32G X 4 MM miscellaneous Use 4 pen needles daily or as directed. 400 each 3    levothyroxine (SYNTHROID/LEVOTHROID) 100 MCG tablet Take 1 tablet (100 mcg) by mouth daily. 90 tablet 2    losartan (COZAAR) 25 MG tablet Take 1 tablet (25 mg) by mouth daily 90 tablet 4    Microlet Lancets MISC USE TO TEST ONCE DAILY 300 each 1    MOUNJARO 5 MG/0.5ML SOAJ Inject 0.5 mLs (5 mg) subcutaneously every 7 days. 2 mL 0    Multiple Vitamins-Minerals (ONCOVITE) TABS Take 1 tablet by mouth daily.      ondansetron (ZOFRAN) 8 MG tablet TAKE 1 TABLET BY MOUTH EVERY EIGHT HOURS AS NEEDED FOR NAUSEA 12 tablet 1    pantoprazole (PROTONIX) 40 MG EC tablet Take 1 tablet (40 mg) by mouth daily. 90 tablet 0    rosuvastatin (CRESTOR) 10 MG tablet Take 1 tablet (10 mg) by mouth daily 90 tablet 4    Tirzepatide (MOUNJARO) 7.5 MG/0.5ML SOAJ Inject 0.5 mLs (7.5 mg) subcutaneously every 7 days. 2 mL 0    Tirzepatide 2.5 MG/0.5ML SOAJ Inject 0.5 mLs (2.5 mg) subcutaneously every 7 days. 2 mL 0    tiZANidine (ZANAFLEX) 4 MG tablet Take 4 mg by mouth as needed.       No current facility-administered medications for this visit.      Past Medical History:   Patient Active Problem List   Diagnosis    Mood Disorder Of Unknown (Axis III) Etiology    Generalized Anxiety Disorder    Hirsutism    Pelvic Pain    Menorrhagia    PTSD (post-traumatic stress disorder)    Diabetes mellitus treated with oral medication (H)    Moderate major depression (H)    Polycystic ovary syndrome    Type 2 diabetes  mellitus (H)    Malignant neoplasm of thyroid gland (H)    S/P partial thyroidectomy    Benign essential hypertension    Migraine with aura and without status migrainosus, not intractable    Class 2 severe obesity due to excess calories with serious comorbidity in adult (H)    Reactive lymphoid hyperplasia    Gastroesophageal reflux disease, unspecified whether esophagitis present    Major depressive disorder, recurrent episode, severe with anxious distress (H)    Posttraumatic stress disorder with dissociative symptoms       Past Medical History:   Diagnosis Date    History of partial thyroidectomy     Nephrolithiasis 2007    Papillary adenocarcinoma, follicular variant (H)     Pulmonary nodules     up to 6 mm       CC: Primary Care Provider: Agnieszka Ahn or Referring Provider: Katelyn García

## 2025-01-14 NOTE — LETTER
1/14/2025      Shannan Cameron  [address] [address]      Dear Colleague,    Thank you for referring your patient, Shannan Cameron, to the [facility]. Please see a copy of my visit note below.    [facility] Dermatology Note  Encounter Date: Jan 14, 2025  Office Visit     Dermatology Problem List:  1. Prior biopsy of epithelial acanthosis of the scalp (see report), resolved  2. Sister with melanoma, s/p WLE    ____________________________________________    Assessment & Plan:     # Family history of melanoma (sister)  - annual TBSE    # Benign lesions: Seborrheic keratoses, lentigines, cherry angioma, and multiple benign nevi, sebaceous hyperplasia  - Reassurance provided; no treatment is medically necessary    # Prior biopsy - like ISK with reactive atypia. No evidence of carcinoma  - Monitor for recurrence or symptomatic similar lesions.    #.Irritated Keratosis, disturbance of skin sensation  - Reviewed nature and etiology.  - Monitoring vs cryotherapy. Patient opted for monitoring today.  - Upper groin with stuck on waxy papules.     #. Keratosis pilaris  - Start CeraVe SA, rough and bumpy at bedtime to BID   - Reviewed that this is more of a skin type than a dermatologic diagnosis/problem. Skin type is associated with reduction of acne.       Procedures Performed:   None    Follow-up: 1 year(s) in-person, or earlier for new or changing lesions    Staff:     Carol Choudhary MD PhD  Dermatology, Dermatopathology    ____________________________________________    CC: Derm Problem (Mole check )    HPI:  Shannan Cameron is a(n) 44 year old female who presents today as a new patient for rash and skin check.    Rash has improved with conservative measures.   Rough bumps on the mons pubis  Had a spot biopsied on the scalp which has resolved, it was tender and would catch when she combed her hair. She noticed that the report said the lesion was atypical and  concerned about monitoring.    No changing moles.     Patient is otherwise feeling well, without additional skin concerns.     Labs Reviewed:  Lab Results   Component Value Date    WBC 8.0 01/13/2025    HGB 15.9 (H) 01/13/2025    HCT 47.2 (H) 01/13/2025     01/13/2025     01/13/2025    POTASSIUM 3.5 01/13/2025    CHLORIDE 99 01/13/2025    CO2 25 01/13/2025    BUN 12.9 01/13/2025    CR 0.61 01/13/2025     (H) 01/13/2025    SED 15 10/17/2023    AST 96 (H) 04/22/2024     (H) 04/22/2024    ALKPHOS 121 04/22/2024    BILITOTAL 0.5 04/22/2024        Pathology:  9/23/2024 - Skin, scalp, biopsy  Squamous acanthosis with hyperkeratosis and associated mild to moderate epithelial atypia, no evidence of malignancy (see description)    Physical Exam:  Vitals: LMP 09/25/2019 (Within Days)   SKIN: Total skin excluding the undergarment areas was performed. The exam included the head/face, neck, both arms, chest, back, abdomen, both legs, digits and/or nails. Exam performed chaperoned.     - scattered skin colored stuck on papules  - LLE with DF  - Right upper shoulder with angioma  - Upper mons with waxy, brown stuck on papules (1 large, one small)      - No other lesions of concern on areas examined.       Medications:  Current Outpatient Medications   Medication Sig Dispense Refill     blood glucose (NO BRAND SPECIFIED) test strip Use to test blood sugar 3 times daily or as directed. To accompany: Blood Glucose Monitor Brands: per insurance. 300 strip 6     blood glucose monitoring (NO BRAND SPECIFIED) meter device kit Use to test blood sugar 1 times daily or as directed. Preferred blood glucose meter OR supplies to accompany: Blood Glucose Monitor Brands: per insurance. 1 kit 0     clonazePAM (KLONOPIN) 0.5 MG tablet TAKE ONE TABLET (0.5 MG) BY MOUTH TWO TIMES DAILY AS NEEDED FOR ANXIETY. 10 tablet 0     CloNIDine ER (KAPVAY) 0.1 MG 12 hr tablet Take 2 tablets (0.2 mg) by mouth at bedtime. 30 tablet 1      Continuous Glucose Sensor (FREESTYLE LAURA 3 SENSOR) [facility] 1 each every 14 days. Use 1 sensor every 14 days. Use to read blood sugars per 's instructions. 6 each 5     empagliflozin (JARDIANCE) 10 MG TABS tablet Take 1 tablet (10 mg) by mouth daily 90 tablet 1     Fremanezumab-vfrm (AJOVY) 225 MG/1.5ML SOAJ Inject 225 mg subcutaneously every 30 days. 1.5 mL 6     hydroCHLOROthiazide 12.5 MG tablet TAKE 1 TABLET BY MOUTH ONCE DAILY 30 tablet 2     insulin glargine (LANTUS SOLOSTAR) 100 UNIT/ML pen INJECT 33 units twice a day and continue to increase up to 40 units twice a day 225 mL 1     Insulin Glargine (TOUJEO SOLOSTAR SC) Inject subcutaneously. Use as directed       insulin pen needle (32G X 4 MM) 32G X 4 MM miscellaneous Use 4 pen needles daily or as directed. 400 each 3     levothyroxine (SYNTHROID/LEVOTHROID) 100 MCG tablet Take 1 tablet (100 mcg) by mouth daily. 90 tablet 2     losartan (COZAAR) 25 MG tablet Take 1 tablet (25 mg) by mouth daily 90 tablet 4     Microlet Lancets MISC USE TO TEST ONCE DAILY 300 each 1     MOUNJARO 5 MG/0.5ML SOAJ Inject 0.5 mLs (5 mg) subcutaneously every 7 days. 2 mL 0     Multiple Vitamins-Minerals (ONCOVITE) TABS Take 1 tablet by mouth daily.       ondansetron (ZOFRAN) 8 MG tablet TAKE 1 TABLET BY MOUTH EVERY EIGHT HOURS AS NEEDED FOR NAUSEA 12 tablet 1     pantoprazole (PROTONIX) 40 MG EC tablet Take 1 tablet (40 mg) by mouth daily. 90 tablet 0     rosuvastatin (CRESTOR) 10 MG tablet Take 1 tablet (10 mg) by mouth daily 90 tablet 4     Tirzepatide (MOUNJARO) 7.5 MG/0.5ML SOAJ Inject 0.5 mLs (7.5 mg) subcutaneously every 7 days. 2 mL 0     Tirzepatide 2.5 MG/0.5ML SOAJ Inject 0.5 mLs (2.5 mg) subcutaneously every 7 days. 2 mL 0     tiZANidine (ZANAFLEX) 4 MG tablet Take 4 mg by mouth as needed.       No current facility-administered medications for this visit.      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     Generalized Anxiety Disorder     Hirsutism     Pelvic Pain     Menorrhagia     PTSD (post-traumatic stress disorder)     Diabetes mellitus treated with oral medication (H)     Moderate major depression (H)     Polycystic ovary syndrome     Type 2 diabetes mellitus (H)     Malignant neoplasm of thyroid gland (H)     S/P partial thyroidectomy     Benign essential hypertension     Migraine with aura and without status migrainosus, not intractable     Class 2 severe obesity due to excess calories with serious comorbidity in adult (H)     Reactive lymphoid hyperplasia     Gastroesophageal reflux disease, unspecified whether esophagitis present     Major depressive disorder, recurrent episode, severe with anxious distress (H)     Posttraumatic stress disorder with dissociative symptoms       Past Medical History:   Diagnosis Date     History of partial thyroidectomy      Nephrolithiasis 2007     Papillary adenocarcinoma, follicular variant (H)      Pulmonary nodules     up to 6 mm       CC: Primary Care Provider: Agnieszka Ahn or Referring Provider: Katelyn García      Again, thank you for allowing me to participate in the care of your patient.        Sincerely,        Carol Choudhary MD    Electronically signed

## 2025-01-15 NOTE — TELEPHONE ENCOUNTER
I called and spoke with patient, patient is unsure what she wants to do. I scheduled patient for 125 PM on 02/10/2025 as patient wanted for a 'touch base' appointment. I verbalized understanding, patient was going to call [facility] for establish care appointment per Keiko Quiroz recommendation. I verbalized understanding.

## 2025-01-19 ENCOUNTER — HEALTH MAINTENANCE LETTER (OUTPATIENT)
Age: 45
End: 2025-01-19

## 2025-01-20 ENCOUNTER — TELEPHONE (OUTPATIENT)
Dept: BEHAVIORAL HEALTH | Facility: CLINIC | Age: 45
End: 2025-01-20
Payer: COMMERCIAL

## 2025-01-20 NOTE — TELEPHONE ENCOUNTER
Pt was scheduled for a virtual diagnostic update for adult mental health programming today at 11:30am.  Pt did not show by 11:50am.  Provider called pt and LVM with provider name & direct phone # if pt would like to reschedule. Also provided phone # for the [employer] Hub.    eRbeca LEON, LICSW

## 2025-01-21 DIAGNOSIS — R60.1 GENERALIZED EDEMA: ICD-10-CM

## 2025-01-21 RX ORDER — HYDROCHLOROTHIAZIDE 12.5 MG/1
12.5 TABLET ORAL DAILY
Qty: 90 TABLET | Refills: 2 | Status: SHIPPED | OUTPATIENT
Start: 2025-01-21

## 2025-01-23 ENCOUNTER — TELEPHONE (OUTPATIENT)
Dept: BEHAVIORAL HEALTH | Facility: CLINIC | Age: 45
End: 2025-01-23
Payer: COMMERCIAL
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
## 2025-01-23 NOTE — TELEPHONE ENCOUNTER
Attempted to contact patient regarding delaying start while waiting for insurance approval. No answer. Left voicemail.    Anna Franco [facility] on 1/23/2025 at 12:48 PM

## 2025-01-26 ENCOUNTER — HOSPITAL ENCOUNTER (OUTPATIENT)
Facility: CLINIC | Age: 45
Setting detail: OBSERVATION
Discharge: HOME | End: 2025-01-27
Attending: EMERGENCY MEDICINE | Admitting: EMERGENCY MEDICINE
Payer: COMMERCIAL

## 2025-01-26 DIAGNOSIS — F33.1 MAJOR DEPRESSIVE DISORDER, RECURRENT EPISODE, MODERATE (H): ICD-10-CM

## 2025-01-26 DIAGNOSIS — F43.10 PTSD (POST-TRAUMATIC STRESS DISORDER): ICD-10-CM

## 2025-01-26 DIAGNOSIS — R45.851 SUICIDAL IDEATION: ICD-10-CM

## 2025-01-26 PROBLEM — F33.2 MAJOR DEPRESSIVE DISORDER, RECURRENT EPISODE, SEVERE WITH ANXIOUS DISTRESS (H): Status: ACTIVE | Noted: 2024-11-14

## 2025-01-26 LAB
AMPHETAMINES UR QL SCN: NORMAL
BARBITURATES UR QL SCN: NORMAL
BENZODIAZ UR QL SCN: NORMAL
BZE UR QL SCN: NORMAL
CANNABINOIDS UR QL SCN: NORMAL
FENTANYL UR QL: NORMAL
HCG UR QL: NEGATIVE
OPIATES UR QL SCN: NORMAL
PCP QUAL URINE (ROCHE): NORMAL

## 2025-01-26 PROCEDURE — 250N000013 HC RX MED GY IP 250 OP 250 PS 637: Performed by: NURSE PRACTITIONER

## 2025-01-26 PROCEDURE — 80307 DRUG TEST PRSMV CHEM ANLYZR: CPT | Performed by: NURSE PRACTITIONER

## 2025-01-26 PROCEDURE — 99285 EMERGENCY DEPT VISIT HI MDM: CPT

## 2025-01-26 PROCEDURE — G0378 HOSPITAL OBSERVATION PER HR: HCPCS

## 2025-01-26 PROCEDURE — 81025 URINE PREGNANCY TEST: CPT | Performed by: NURSE PRACTITIONER

## 2025-01-26 RX ORDER — HYDROXYZINE HYDROCHLORIDE 50 MG/1
50 TABLET, FILM COATED ORAL EVERY 6 HOURS PRN
Status: DISCONTINUED | OUTPATIENT
Start: 2025-01-26 | End: 2025-01-27 | Stop reason: HOSPADM

## 2025-01-26 RX ORDER — I-VITE, TAB 1000-60-2MG (60/BT) 300MCG-200
1 TAB ORAL DAILY
Status: DISCONTINUED | OUTPATIENT
Start: 2025-01-27 | End: 2025-01-26

## 2025-01-26 RX ORDER — LEVOTHYROXINE SODIUM 50 UG/1
100 TABLET ORAL DAILY
Status: DISCONTINUED | OUTPATIENT
Start: 2025-01-27 | End: 2025-01-27 | Stop reason: HOSPADM

## 2025-01-26 RX ORDER — HYDROCHLOROTHIAZIDE 12.5 MG/1
12.5 TABLET ORAL DAILY
Status: DISCONTINUED | OUTPATIENT
Start: 2025-01-27 | End: 2025-01-27 | Stop reason: HOSPADM

## 2025-01-26 RX ORDER — VIT C/E/ZN/COPPR/LUTEIN/ZEAXAN 60 MG-6 MG
1 CAPSULE ORAL DAILY
Status: DISCONTINUED | OUTPATIENT
Start: 2025-01-27 | End: 2025-01-27 | Stop reason: HOSPADM

## 2025-01-26 RX ORDER — ACETAMINOPHEN 325 MG/1
650 TABLET ORAL EVERY 4 HOURS PRN
Status: DISCONTINUED | OUTPATIENT
Start: 2025-01-26 | End: 2025-01-27 | Stop reason: HOSPADM

## 2025-01-26 RX ORDER — TRAZODONE HYDROCHLORIDE 50 MG/1
50 TABLET, FILM COATED ORAL
Status: DISCONTINUED | OUTPATIENT
Start: 2025-01-26 | End: 2025-01-27 | Stop reason: HOSPADM

## 2025-01-26 RX ORDER — OLANZAPINE 10 MG/2ML
10 INJECTION, POWDER, FOR SOLUTION INTRAMUSCULAR 2 TIMES DAILY PRN
Status: DISCONTINUED | OUTPATIENT
Start: 2025-01-26 | End: 2025-01-27 | Stop reason: HOSPADM

## 2025-01-26 RX ORDER — ONDANSETRON 4 MG/1
4 TABLET, FILM COATED ORAL EVERY 6 HOURS PRN
Status: DISCONTINUED | OUTPATIENT
Start: 2025-01-26 | End: 2025-01-27 | Stop reason: HOSPADM

## 2025-01-26 RX ORDER — ROSUVASTATIN CALCIUM 10 MG/1
10 TABLET, COATED ORAL DAILY
Status: DISCONTINUED | OUTPATIENT
Start: 2025-01-27 | End: 2025-01-27 | Stop reason: HOSPADM

## 2025-01-26 RX ORDER — LOSARTAN POTASSIUM 25 MG/1
25 TABLET ORAL DAILY
Status: DISCONTINUED | OUTPATIENT
Start: 2025-01-27 | End: 2025-01-27 | Stop reason: HOSPADM

## 2025-01-26 RX ORDER — OLANZAPINE 5 MG/1
5 TABLET, ORALLY DISINTEGRATING ORAL 4 TIMES DAILY PRN
Status: DISCONTINUED | OUTPATIENT
Start: 2025-01-26 | End: 2025-01-27 | Stop reason: HOSPADM

## 2025-01-26 RX ADMIN — TRAZODONE HYDROCHLORIDE 50 MG: 50 TABLET ORAL at 23:51

## 2025-01-26 ASSESSMENT — ACTIVITIES OF DAILY LIVING (ADL)
ADLS_ACUITY_SCORE: 41

## 2025-01-26 ASSESSMENT — COLUMBIA-SUICIDE SEVERITY RATING SCALE - C-SSRS
3. HAVE YOU BEEN THINKING ABOUT HOW YOU MIGHT KILL YOURSELF?: YES
1. IN THE PAST MONTH, HAVE YOU WISHED YOU WERE DEAD OR WISHED YOU COULD GO TO SLEEP AND NOT WAKE UP?: YES
6. HAVE YOU EVER DONE ANYTHING, STARTED TO DO ANYTHING, OR PREPARED TO DO ANYTHING TO END YOUR LIFE?: YES
2. HAVE YOU ACTUALLY HAD ANY THOUGHTS OF KILLING YOURSELF IN THE PAST MONTH?: YES
5. HAVE YOU STARTED TO WORK OUT OR WORKED OUT THE DETAILS OF HOW TO KILL YOURSELF? DO YOU INTEND TO CARRY OUT THIS PLAN?: YES

## 2025-01-27 ENCOUNTER — TELEPHONE (OUTPATIENT)
Dept: PSYCHIATRY | Facility: CLINIC | Age: 45
End: 2025-01-27

## 2025-01-27 ENCOUNTER — TELEPHONE (OUTPATIENT)
Dept: BEHAVIORAL HEALTH | Facility: CLINIC | Age: 45
End: 2025-01-27
Payer: COMMERCIAL

## 2025-01-27 VITALS
BODY MASS INDEX: 34.44 KG/M2 | TEMPERATURE: 98.2 F | WEIGHT: 232.5 LBS | RESPIRATION RATE: 16 BRPM | HEIGHT: 69 IN | SYSTOLIC BLOOD PRESSURE: 112 MMHG | HEART RATE: 70 BPM | DIASTOLIC BLOOD PRESSURE: 74 MMHG | OXYGEN SATURATION: 95 %

## 2025-01-27 LAB — GLUCOSE BLDC GLUCOMTR-MCNC: 291 MG/DL (ref 70–99)

## 2025-01-27 PROCEDURE — 250N000013 HC RX MED GY IP 250 OP 250 PS 637: Performed by: NURSE PRACTITIONER

## 2025-01-27 PROCEDURE — G0378 HOSPITAL OBSERVATION PER HR: HCPCS

## 2025-01-27 PROCEDURE — 99235 HOSP IP/OBS SAME DATE MOD 70: CPT | Performed by: PSYCHIATRY & NEUROLOGY

## 2025-01-27 PROCEDURE — 250N000013 HC RX MED GY IP 250 OP 250 PS 637: Performed by: PSYCHIATRY & NEUROLOGY

## 2025-01-27 PROCEDURE — 82962 GLUCOSE BLOOD TEST: CPT

## 2025-01-27 RX ORDER — DEXTROSE MONOHYDRATE 25 G/50ML
25-50 INJECTION, SOLUTION INTRAVENOUS
Status: DISCONTINUED | OUTPATIENT
Start: 2025-01-27 | End: 2025-01-27 | Stop reason: HOSPADM

## 2025-01-27 RX ORDER — CARBOXYMETHYLCELLULOSE SODIUM 5 MG/ML
1 SOLUTION/ DROPS OPHTHALMIC
Status: DISCONTINUED | OUTPATIENT
Start: 2025-01-27 | End: 2025-01-27 | Stop reason: HOSPADM

## 2025-01-27 RX ORDER — NICOTINE POLACRILEX 4 MG
15-30 LOZENGE BUCCAL
Status: DISCONTINUED | OUTPATIENT
Start: 2025-01-27 | End: 2025-01-27 | Stop reason: HOSPADM

## 2025-01-27 RX ORDER — VILAZODONE HYDROCHLORIDE 10 MG/1
10 TABLET ORAL DAILY
Qty: 30 TABLET | Refills: 0 | Status: SHIPPED | OUTPATIENT
Start: 2025-01-27

## 2025-01-27 RX ORDER — CLONIDINE HYDROCHLORIDE 0.1 MG/1
0.1 TABLET ORAL 2 TIMES DAILY
COMMUNITY
Start: 2025-01-27

## 2025-01-27 RX ADMIN — CARBOXYMETHYLCELLULOSE SODIUM 1 DROP: 5 SOLUTION/ DROPS OPHTHALMIC at 06:21

## 2025-01-27 RX ADMIN — LOSARTAN POTASSIUM 25 MG: 25 TABLET, FILM COATED ORAL at 07:55

## 2025-01-27 RX ADMIN — EMPAGLIFLOZIN 10 MG: 10 TABLET, FILM COATED ORAL at 07:55

## 2025-01-27 RX ADMIN — Medication 1 CAPSULE: at 07:56

## 2025-01-27 RX ADMIN — ROSUVASTATIN CALCIUM 10 MG: 10 TABLET, FILM COATED ORAL at 07:55

## 2025-01-27 RX ADMIN — HYDROCHLOROTHIAZIDE 12.5 MG: 12.5 TABLET ORAL at 07:55

## 2025-01-27 RX ADMIN — LEVOTHYROXINE SODIUM 100 MCG: 50 TABLET ORAL at 06:44

## 2025-01-27 ASSESSMENT — ACTIVITIES OF DAILY LIVING (ADL)
ADLS_ACUITY_SCORE: 41

## 2025-01-27 ASSESSMENT — COLUMBIA-SUICIDE SEVERITY RATING SCALE - C-SSRS
TOTAL  NUMBER OF INTERRUPTED ATTEMPTS SINCE LAST CONTACT: NO
2. HAVE YOU ACTUALLY HAD ANY THOUGHTS OF KILLING YOURSELF?: NO
1. SINCE LAST CONTACT, HAVE YOU WISHED YOU WERE DEAD OR WISHED YOU COULD GO TO SLEEP AND NOT WAKE UP?: YES
ATTEMPT SINCE LAST CONTACT: NO
SUICIDE, SINCE LAST CONTACT: NO
REASONS FOR IDEATION SINCE LAST CONTACT: DOES NOT APPLY
TOTAL  NUMBER OF ABORTED OR SELF INTERRUPTED ATTEMPTS SINCE LAST CONTACT: NO
6. HAVE YOU EVER DONE ANYTHING, STARTED TO DO ANYTHING, OR PREPARED TO DO ANYTHING TO END YOUR LIFE?: NO
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
## 2025-01-27 NOTE — ED NOTES
Pt. very tired and low energy this AM. Attributes to Trazodone given last night complicated by recent distress. Reports that she is happy and relieved that her birthday is over. Feels ready to move on with trauma therapy and addressing her medical issues ( has established appointments). Ate breakfast. VSS. Took AM medication. Waiting for evaluation by psychiatry.
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
## 2025-01-27 NOTE — ED NOTES
Psychiatry currently evaluating patient. According to patient she is taking long acting and short acting insulin at home. Provider notified. Patient is also on continuous BGM at home which is transmitted to her telephone.

## 2025-01-27 NOTE — ED NOTES
Patient was discharged with a safety plan in place. Discharge instructions were reviewed with the patient, including follow-up care plan. Vilazodone was filled by [facility] and delivered to the patient upon discharge. Reviewed safety plan and outpatient resources. Patient denies suicidal ideation and homicidal ideation. All belongings that were brought into the hospital have been returned to the patient. Escorted off the unit at 1715 accompanied by EmPATH staff. Discharged to her home.

## 2025-01-27 NOTE — ED NOTES
[facility]  ED to EMPATH Checklist:      Goal for EMPATH: Suicidality    Current Behavior: Cooperative    Safety Concerns: Suicidal, with a plan to drown self in river    Legal Hold Status: Voluntary    Medically Cleared by ED provider: Yes    Patient Therapeutically Searched: Therapeutic search by ED staff (strings, belts, shoes, pockets, electronics, etc.)    Belongings: In room locker    Independent Ambulation at Baseline: Yes/No: Yes    Participates in Care/Conversation: Yes/No: Yes    Patient Informed about EMPATH: Yes/No: Yes    DEC: Ordered and pending    Patient Ready to be Transferred to EMPATH? Yes/No: Yes
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
## 2025-01-27 NOTE — PLAN OF CARE
Shannan Cameron  January 26, 2025  Plan of Care Hand-off Note     Patient Recommended Care Path: observation    Clinical Substantiation:   A lower level of care has been unsuccessful in treating and stabilizing patient s mental health symptoms because of Shannan endorses significant biopsychosocial stressors causing distress to her mental health. Patient made a suicide attempt today, self-aborted when it did not go to plan and brought herself to the ED so she would not be alone. Patient is assessed to be at high risk of suicide at time of this assessment. However, with brief observation, monitored therapeutic treatment, and intervention of mental health symptoms in [facility], symptoms may be mitigated with potential for disposition to a less restrictive level of care than an inpatient setting. Patient is not currently on the inpatient worklist. Next steps in care include psych consult, LMHP reassessment. Extended Care will follow, working to address disposition     Goals for crisis stabilization:  Resources/referrals to target trauma/individual therapy, symptom stabilization and CAMS/suicide risk assessment, psychiatry consult    Next steps for Care Team:  CAMS/suicide risk assessment, psych consult, reassessment with LM    Treatment Objectives Addressed:  rapport building, identifying and practicing coping strategies, processing feelings, identifying treatment goals, building self-esteem, exploring obstacles to safety in the community, CAMS    Therapeutic Interventions:  Engaged in guided discovery, explored patient's perspectives and helped expand them through socratic dialogue., Provided positive reinforcement for progress towards goals, gains in knowledge, and application of skills previously taught., Explored strategies for self-soothing.    Has a specific means been identified for suicidal.homicide actions: Yes  If yes, describe: Patient reports she has thought to stop taking her cancer suppressing medication, or  to [place] herself [place] the [place]. She considered hypothermia today while at the river.  Explain action steps toward mitigation: Suicide risk assessment, safety planning, putting referrals and resources in place, providing safety and comfort, allowing patient to process the attempt  Document completion of mitigation action: Suicide risk assessment completed tonight, suicide psychoeducation  The follow up action still needed prior to discharge: safety plan, appointments and resources needed    Patient coping skills attempted to reduce the crisis:  Aborting a suicide attempt and bringing self to the ED      Collateral contact information:   Emergency contacts    Legal Status: Voluntary/Patient has signed consent for treatment                                                                           Reviewed court records: yes     Psychiatry Consult: To be seen by [facility] provider in the morning    BARBY Cruz

## 2025-01-27 NOTE — TELEPHONE ENCOUNTER
Patient needs to be rescheduled for their virtual visit due to Reason for Reschedule: In-Patient    Scheduling team, please refer to service line late cancellation/no-show policies and reach out to patient at a later date for rescheduling.    Appointment mode: Video  Provider: Tanika Gruber APRN CNP  and Kaycee Sutton    [facility] / Psychiatry appt  on 1/27 at 10am and 1030am

## 2025-01-27 NOTE — ED NOTES
Pt. BGM currently 291-just ate lunch and snacks. Prefers to check again before dinner to use sliding scale. Did take her long acting insulin last night @ 1800 prior to admission to [facility]. Sliding scale being ordered by MD. Pt. will discharge to home before requiring long acting insulin.
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
## 2025-01-28 ENCOUNTER — TELEPHONE (OUTPATIENT)
Dept: BEHAVIORAL HEALTH | Facility: CLINIC | Age: 45
End: 2025-01-28
Payer: COMMERCIAL

## 2025-01-28 ENCOUNTER — PATIENT OUTREACH (OUTPATIENT)
Dept: CARE COORDINATION | Facility: CLINIC | Age: 45
End: 2025-01-28
Payer: COMMERCIAL

## 2025-01-28 NOTE — TELEPHONE ENCOUNTER
patient needed help scheduling therapy. This was done through care connect and emailed patient with details.
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
## 2025-01-28 NOTE — TELEPHONE ENCOUNTER
Attempted to return patient's voicemail regarding status of starting program. No answer. Left voicemail.    Anna Franco, [facility] on 1/28/2025 at 3:16 PM

## 2025-01-28 NOTE — PROGRESS NOTES
Clinic Care Coordination Contact  Transitions of Care Outreach  Chief Complaint   Patient presents with    Clinic Care Coordination - Post Hospital       Most Recent Admission Date: 1/26/2025   Most Recent Admission Diagnosis:      Most Recent Discharge Date: 1/27/2025   Most Recent Discharge Diagnosis: Suicidal ideation - R45.851  PTSD (post-traumatic stress disorder) - F43.10  Major depressive disorder, recurrent episode, moderate (H) - F33.1     Transitions of Care Assessment    Discharge Assessment  How are you doing now that you are home?: Patient shares that she is doing better but does seem to be having a side effect from her medication possibly. Patient explains that she has a racing heart. Writer asks if she would like to speak with a triage nurse but patient declines. She was supposed to be psychiatrist yesterday but this was cancelled. Writer and patient discuss patient calling and getting in ASAP with her psychiatrist and calling triage nurse line at her clinic should things get worst. Writer did share CC program through [facility] and patient will consider this for the future.  How are your symptoms? (Red Flag symptoms escalate to triage hotline per guidelines): Improved  Do you know how to contact your clinic care team if you have future questions or changes to your health status? : Yes  Does the patient have their discharge instructions? : Yes  Does the patient have questions regarding their discharge instructions? : No  Were you started on any new medications or were there changes to any of your previous medications? : Yes  Does the patient have all of their medications?: Yes  Do you have questions regarding any of your medications? : No  Do you have all of your needed medical supplies or equipment (DME)?  (i.e. oxygen tank, CPAP, cane, etc.): Yes    Post-op ([facility] CTA Only)  If the patient had a surgery or procedure, do they have any questions for a nurse?: No    Post-op (Clinicians Only)  Did  the patient have surgery or a procedure: No        Follow up Plan     Discharge Follow-Up  Discharge follow up appointment scheduled in alignment with recommended follow up timeframe or Transitions of Risk Category? (Low = within 30 days; Moderate= within 14 days; High= within 7 days): Yes  Discharge Follow Up Appointment Date: 02/10/25  Discharge Follow Up Appointment Scheduled with?: Primary Care Provider    Future Appointments   Date Time Provider Department Center   2/3/2025 10:00 AM [facility]   2/10/2025  1:45 PM Agnieszka Ahn MD [facility]   2/21/2025 11:00 AM Katelyn García MD [facility]   12/26/2025 10:00 AM [facility]       Outpatient Plan as outlined on AVS reviewed with patient.    For any urgent concerns, please contact our 24 hour nurse triage line: [phone] ([phone])       TREVOR Nath

## 2025-01-30 NOTE — PROGRESS NOTES
"  HEART CARE OUT-PATIENT CONSULTATON NOTE      [facility]  [phone]      Assessment/Recommendations   Assessment: 45 year old female with tachycardia, palpitations     Plan:  Tachycardia, palpitations, dyspnea on exertion    Unclear etiology, likely sinus and sinus tachycardia, but also consider arrhythmia.  Recent TSH and CBC normal.  Had significant allergic reaction to Zio      -Stress echocardiogram       -Consider empiric beta blockers or Diltiazem       -Discussed [employer] mobile as a way to monitor at home     HTN  Well controlled      -Consider stopping Clonidine      -Consider increasing Losartan to 50mg      -Continue hydrochlorothiazide 12.5mg    Hyperlipidemia   LDL = 97      -Continue Crestor 10mg     The longitudinal plan of care for the diagnosis(es)/condition(s) as documented were addressed during this visit. Due to the added complexity in care, I will continue to support Kayla in the subsequent management and with ongoing continuity of care.          History of Present Illness/Subjective    Indication for Consult:  I was asked to see Shannan Cameron by Dr Elizabeth for tachycardia, palpitations       HPI: Shannan Cameron is a 45 year old female with a history of diabetes, hypothyroid, HTN, hyperlipidemia who presents for evaluation of tachycardia, palpitations.    She reports given antidepressant earlier this month and has been having increased heart rate since.  Also wondering if due to Synthroid, so stopped taking both the antidepressant and the Synthroid and the tachycardia seemed to improve.  Is back on both now.  Still gets fast heart rate when walks HR 110s.  Feels short of breath often with even minimal exertion, legs feel numb.      I reviewed notes from PCP prior to this visit.         Physical Examination  Past Cardiac History   Vitals: BP (!) 132/94 (BP Location: Left arm, Patient Position: Sitting, Cuff Size: Adult Large)   Pulse 67   Resp 20   Ht 1.753 m (5' 9\")   " Wt 106.7 kg (235 lb 3.2 oz)   LMP 09/25/2019 (Within Days)   SpO2 99%   BMI 34.73 kg/m    BMI= Body mass index is 34.73 kg/m .  Wt Readings from Last 3 Encounters:   02/04/25 106.7 kg (235 lb 3.2 oz)   01/26/25 105.5 kg (232 lb 8 oz)   01/13/25 106.2 kg (234 lb 3.2 oz)       General Appearance:   no distress, normal body habitus   ENT/Mouth: membranes moist, no oral lesions or bleeding gums.      EYES:  no scleral icterus, normal conjunctivae   Neck: no carotid bruits or thyromegaly   Chest/Lungs:   lungs are clear to auscultation, no rales or wheezing,  sternal scar, equal chest wall expansion    Cardiovascular:   Regular. Normal first and second heart sounds with no murmurs, rubs, or gallops; the carotid, radial and posterior tibial pulses are intact, Jugular venous pressure normal, no edema bilaterally    Abdomen:  no organomegaly, masses, bruits, or tenderness; bowel sounds are present   Extremities: no cyanosis or clubbing   Skin: no xanthelasma, warm.    Neurologic: normal  bilateral, no tremors           None    Most Recent Echocardiogram: None    Most Recent Stress Test: None    Most Recent Angiogram: None    ECG (reviewed by myself): 1/13/2025 NSR 90 bpm           Medical History  Family History Social History   Past Medical History:   Diagnosis Date    History of partial thyroidectomy     Nephrolithiasis 2007    Papillary adenocarcinoma, follicular variant (H)     Pulmonary nodules     up to 6 mm     Family History   Problem Relation Age of Onset    Alcoholism Mother     Allergic rhinitis Mother     Anemia Mother     Arthritis Mother     Cancer Mother     Depression Mother     Substance Abuse Mother     Emotional abuse Mother     Fibromyalgia Mother     Mental Illness Mother     Alcoholism Father     Depression Father     Diabetes Father     Substance Abuse Father     Melanoma Sister     Melanoma Maternal Aunt         Social History     Socioeconomic History    Marital status: Single     Spouse  name: Not on file    Number of children: Not on file    Years of education: Not on file    Highest education level: Not on file   Occupational History    Not on file   Tobacco Use    Smoking status: Former     Current packs/day: 0.00     Types: Cigarettes     Quit date: 2023     Years since quittin.2     Passive exposure: Past    Smokeless tobacco: Never   Vaping Use    Vaping status: Never Used   Substance and Sexual Activity    Alcohol use: No    Drug use: Not on file    Sexual activity: Not Currently   Other Topics Concern    Not on file   Social History Narrative    Not on file     Social Drivers of Health     Financial Resource Strain: Low Risk  (2024)    Financial Resource Strain     Within the past 12 months, have you or your family members you live with been unable to get utilities (heat, electricity) when it was really needed?: No   Food Insecurity: High Risk (2024)    Food Insecurity     Within the past 12 months, did you worry that your food would run out before you got money to buy more?: Yes     Within the past 12 months, did the food you bought just not last and you didn t have money to get more?: Yes   Transportation Needs: High Risk (2024)    Transportation Needs     Within the past 12 months, has lack of transportation kept you from medical appointments, getting your medicines, non-medical meetings or appointments, work, or from getting things that you need?: Yes   Physical Activity: Sufficiently Active (2024)    [facility]    Exercise Vital Sign     Days of Exercise per Week: 2 days     Minutes of Exercise per Session: 120 min   Stress: Not on file   Social Connections: Unknown (2022)    Received from [facility], [facility]    Social Connections     Frequency of Communication with Friends and Family: Not on file   Interpersonal Safety: Low Risk  (10/31/2024)     Interpersonal Safety     Do you feel physically and emotionally safe where you currently live?: Yes     Within the past 12 months, have you been hit, slapped, kicked or otherwise physically hurt by someone?: No     Within the past 12 months, have you been humiliated or emotionally abused in other ways by your partner or ex-partner?: No   Housing Stability: Low Risk  (11/11/2024)    Housing Stability     Do you have housing? : Yes     Are you worried about losing your housing?: No           Medications  Allergies   Current Outpatient Medications   Medication Sig Dispense Refill    cloNIDine (CATAPRES) 0.1 MG tablet Take 1 tablet (0.1 mg) by mouth 2 times daily. 60 tablet 1    Continuous Glucose Sensor (FREESTYLE LAURA 3 SENSOR) MISC 1 each every 14 days. Use 1 sensor every 14 days. Use to read blood sugars per 's instructions. 6 each 5    empagliflozin (JARDIANCE) 10 MG TABS tablet Take 1 tablet (10 mg) by mouth daily 90 tablet 1    Fremanezumab-vfrm (AJOVY) 225 MG/1.5ML SOAJ Inject 225 mg subcutaneously every 30 days. 1.5 mL 6    hydroCHLOROthiazide 12.5 MG tablet TAKE ONE TABLET BY MOUTH ONCE DAILY 90 tablet 2    Insulin Glargine (TOUJEO SOLOSTAR SC) Inject 80 Units subcutaneously daily (with dinner). Use as directed      insulin pen needle (32G X 4 MM) 32G X 4 MM miscellaneous Use 4 pen needles daily or as directed. 400 each 3    levothyroxine (SYNTHROID/LEVOTHROID) 100 MCG tablet Take 1 tablet (100 mcg) by mouth daily. 90 tablet 2    losartan (COZAAR) 25 MG tablet Take 1 tablet (25 mg) by mouth daily 90 tablet 4    Multiple Vitamins-Minerals (ONCOVITE) TABS Take 1 tablet by mouth daily.      ondansetron (ZOFRAN) 8 MG tablet TAKE 1 TABLET BY MOUTH EVERY EIGHT HOURS AS NEEDED FOR NAUSEA 12 tablet 1    rosuvastatin (CRESTOR) 10 MG tablet Take 1 tablet (10 mg) by mouth daily 90 tablet 4    vilazodone (VIIBRYD) 10 MG TABS tablet Take 1 tablet (10 mg) by mouth daily. (Patient taking differently: Take 5 mg  by mouth daily.) 30 tablet 0    blood glucose (NO BRAND SPECIFIED) test strip Use to test blood sugar 3 times daily or as directed. To accompany: Blood Glucose Monitor Brands: per insurance. (Patient not taking: Reported on 2/4/2025) 300 strip 6    blood glucose monitoring (NO BRAND SPECIFIED) meter device kit Use to test blood sugar 1 times daily or as directed. Preferred blood glucose meter OR supplies to accompany: Blood Glucose Monitor Brands: per insurance. (Patient not taking: Reported on 2/4/2025) 1 kit 0    clonazePAM (KLONOPIN) 0.5 MG tablet TAKE ONE TABLET (0.5 MG) BY MOUTH TWO TIMES DAILY AS NEEDED FOR ANXIETY. (Patient not taking: Reported on 2/4/2025) 10 tablet 0    insulin glargine (LANTUS SOLOSTAR) 100 UNIT/ML pen INJECT 33 units twice a day and continue to increase up to 40 units twice a day (Patient not taking: Reported on 2/4/2025) 225 mL 1    Microlet Lancets MISC USE TO TEST ONCE DAILY (Patient not taking: Reported on 2/4/2025) 300 each 1    MOUNJARO 5 MG/0.5ML SOAJ Inject 0.5 mLs (5 mg) subcutaneously every 7 days. (Patient not taking: Reported on 2/4/2025) 2 mL 0    pantoprazole (PROTONIX) 40 MG EC tablet Take 1 tablet (40 mg) by mouth daily. (Patient not taking: Reported on 2/4/2025) 90 tablet 0    Tirzepatide (MOUNJARO) 7.5 MG/0.5ML SOAJ Inject 0.5 mLs (7.5 mg) subcutaneously every 7 days. (Patient not taking: Reported on 2/4/2025) 2 mL 0    Tirzepatide 2.5 MG/0.5ML SOAJ Inject 0.5 mLs (2.5 mg) subcutaneously every 7 days. (Patient not taking: Reported on 2/4/2025) 2 mL 0       Allergies   Allergen Reactions    Adhesive Tape Rash, Blisters and Itching    Bee Venom Shortness Of Breath and Dizziness     Fainting    Wasp Venom Protein           Lab Results    Chemistry/lipid CBC Cardiac Enzymes/BNP/TSH/INR   Recent Labs   Lab Test 04/22/24  1050 08/22/22  0905 07/14/21  1401   CHOL 185   < > 221*   HDL 39*   < > 48*   LDL 97   < > 138*   TRIG 429*   < > 211*   CHOLHDLRATIO  --   --  4.6    < > =  "values in this interval not displayed.     Recent Labs   Lab Test 04/22/24  1050 08/30/23  1711 08/22/22  0905   LDL 97 155* 158*     Recent Labs   Lab Test 01/27/25  1452 01/13/25  1249   NA  --  138   POTASSIUM  --  3.5   CHLORIDE  --  99   CO2  --  25   * 306*   BUN  --  12.9   CR  --  0.61   GFRESTIMATED  --  >90   ZAIRA  --  10.0     Recent Labs   Lab Test 01/13/25  1249 08/07/24  1053 07/29/24  1549   CR 0.61 0.58 0.4*     Recent Labs   Lab Test 12/17/24  1004 08/07/24  1052 04/22/24  1050   A1C 7.7* 8.9* 8.8*          Recent Labs   Lab Test 01/13/25  1249   WBC 8.0   HGB 15.9*   HCT 47.2*   MCV 89        Recent Labs   Lab Test 01/13/25  1249 12/06/23  1128 10/17/23  1429   HGB 15.9* 14.7 16.1*    No results for input(s): \"TROPONINI\" in the last 43356 hours.  No results for input(s): \"BNP\", \"NTBNPI\", \"NTBNP\" in the last 03317 hours.  Recent Labs   Lab Test 12/17/24  1004   TSH 0.50     No results for input(s): \"INR\" in the last 18517 hours.     Tanna Duran MD  Noninvasive Cardiologist   [facility]                                    "

## 2025-02-03 ENCOUNTER — TELEPHONE (OUTPATIENT)
Dept: BEHAVIORAL HEALTH | Facility: CLINIC | Age: 45
End: 2025-02-03

## 2025-02-03 ENCOUNTER — VIRTUAL VISIT (OUTPATIENT)
Dept: BEHAVIORAL HEALTH | Facility: CLINIC | Age: 45
End: 2025-02-03
Payer: COMMERCIAL

## 2025-02-03 ENCOUNTER — VIRTUAL VISIT (OUTPATIENT)
Dept: PSYCHIATRY | Facility: CLINIC | Age: 45
End: 2025-02-03
Payer: COMMERCIAL

## 2025-02-03 DIAGNOSIS — F41.1 GENERALIZED ANXIETY DISORDER: ICD-10-CM

## 2025-02-03 DIAGNOSIS — F43.10 PTSD (POST-TRAUMATIC STRESS DISORDER): Primary | ICD-10-CM

## 2025-02-03 PROCEDURE — 90832 PSYTX W PT 30 MINUTES: CPT | Mod: 95 | Performed by: COUNSELOR

## 2025-02-03 RX ORDER — CLONIDINE HYDROCHLORIDE 0.1 MG/1
0.1 TABLET ORAL 2 TIMES DAILY
Qty: 60 TABLET | Refills: 1 | Status: SHIPPED | OUTPATIENT
Start: 2025-02-03

## 2025-02-03 ASSESSMENT — PAIN SCALES - GENERAL: PAINLEVEL_OUTOF10: SEVERE PAIN (8)

## 2025-02-03 ASSESSMENT — PATIENT HEALTH QUESTIONNAIRE - PHQ9: SUM OF ALL RESPONSES TO PHQ QUESTIONS 1-9: 12

## 2025-02-03 NOTE — TELEPHONE ENCOUNTER
Spoke to patient about insurance denial for trauma IOP. Patient expressed feeling very frustrated and disheartened by the process. Reviewed what her options were in the situation. Patient would like us to submit more information to the insurance company for a reconsideration.    Discussed some of the concerns from the insurance denial. Patient shared that she did have the leave her previous PHP early and then be out of treatment for 7 weeks. However, she didn't want to do this and it was due to medical issues. She said that she had new growths where she previously had cancer. This meant that she had a lot of doctor's visits and procedures so she was unable to attend programming. However, she really wanted to attend and work on her mental health. Also, she explained that she wasn't medication non-compliant. She said that her medication had been changed and she had severe adverse side effects. She stated that she spoke to a nurse about these concerns and was instructed to stop the new medication. She restarted her old medication at that point. Patient said she has tried lower levels of care, such as individual therapy, and that it isn't enough. She said her individual therapist is the one who recommended a higher level of care.     Last week she was at [facility] for suicidal ideation. She said she is continuing to experience the same symptoms as when she was there. These symptoms include: low self esteem, thoughts of death/suicide, excessive guilt, sadness, hopelessness, helplessness, avoidance, withdrawal/isolation, crying or feels like crying     Explained to patient that we could submit additional, more recent documentation to the insurance company very quickly. Let her know we'd keep in contact about any news we hear. Discussed calling back if she had any questions and again provided her with our phone number.    Anna Franco [facility] on 2/3/2025 at 12:34 PM

## 2025-02-03 NOTE — PROGRESS NOTES
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    Administrative Billing:     Level of Medical Decision Making:   - *HIGH ACUITY* - At least 1 acute or chronic problem that poses a threat to life or bodily function  - Engaged in prescription drug management during visit (discussed any medication benefits, side effects, alternatives, etc.)             Patient Status:  CCPS MD/DO/NP/PA providers offer care a specialty service for Primary Care Providers in the [facility] that seek to optimize psychotropic medications for unstable patients.  Once medications have been optimized, our providers discharge the patient back to the referring Primary Care Provider for ongoing medication management.  This type of system allows our providers to serve a high volume of patients.   Patient will continue to be seen for ongoing consultation and stabilization.    Signed:   Tanika Gruber, MSN, APRN, PMHNP-BC  Collaborative Care Psychiatry Service (CCPS)  [facility]    Chart documentation done in part with Dragon Voice Recognition software.  Although reviewed after completion, some word and grammatical errors may remain.
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
## 2025-02-03 NOTE — PATIENT INSTRUCTIONS
**For crisis resources, please see the information at the end of this document**     Thank you for coming to the [facility] [facility].    TREATMENT PLAN:    Medications:   RESTART viibryd 5 mg (1/2 tab) for 1 week. Then INCREASE to 10 mg every day. No script needed.  CONTINUE clonidine 0.1 mg twice a day every day. Script sent for #60 + 1 refill.  Continue all other medications per primary care provider.     Consults / Referrals:   Begin IOP if able, consider alternative DBT in community per Behavioral Health Consultant recommendations.    Follow-up:  Schedule an appointment with me and Behavioral Health Consultant in 4 weeks or sooner as needed. Call [facility] at [phone] to schedule.  Follow up with primary care provider as planned or for acute medical concerns.  Call the psychiatric nurse line with medication questions or concerns at [phone].  Pagevamphart may be used to communicate with your provider, but this is not intended to be used for emergencies.    Psychoeducation:  Discussed that clonidine is being used off label given current clinical picture, with possible benefit to anxiety and/or nightmares at bedtime. Discussed risks of side effects to monitor, including drowsiness, low HR, BP, lightheadedness or dizziness, as well as not to suddenly stop medication due to risk for rebound hypertension.     Financial Assistance [phone]  [facility] Billing [phone]  Central Billing Office, [facility]: [phone]  [facility] Billing [phone]  Medical Records [phone]  [facility] Patient Bill of Rights [email or web address]       MENTAL HEALTH CRISIS RESOURCES:  For a emergency help, please call 911 or go to the nearest Emergency Department.     Emergency Walk-In Options:   [facility] ([place]): [phone] - Specialized mental health emergency area designed to be  calming  [facility] ([place]): [phone]  [facility] ([place]): [phone]  [facility] ([place]): [phone]    [facility] Crisis Information:   Sandrita: [phone]  Jan: [phone]  Silvia (AGNIESZKA) - Adult: [phone]     Child: [phone]  [place] - Adult: [phone]     Child: [phone]  [place]: [phone]  List of all [facility] resources:   https://[place].gov/dhs/people-we-serve/adults/health-care/mental-health/resources/crisis-contacts.jsp    National Crisis Information:   National Suicide & Crisis Lifeline: Call 988        For online chat options, visit https://suicidepreventionlifeline.org/chat/  Poison Control Center: 4-104-200-4432  Poison Control Center: 7-621-714-3410  Trans Lifeline: 3-849-969-8168 - Hotline for transgender people of all ages  The [place] Project: 5-650-723-4594 - Hotline for LGBT youth     For Non-Emergency Support:   Fast Tracker: Mental Health & Substance Use Disorder Resources -   https://www.fasttrackermn.org/       Again thank you for choosing [facility] [place] [facility] and please let us know how we can best partner with you to improve you and your family's health.    You may be receiving a survey regarding this appointment. We would love to have your feedback, both positive and negative. The survey is done by an external company, so your answers are anonymous.        Patient Education   Collaborative Care Psychiatry Service  What to Expect  Here's what to expect from your Collaborative Care Psychiatry Service (CCPS).   About CCPS  CCPS means 2 people work together to help you get better. You'll meet with a behavioral health clinician and a psychiatric doctor. A behavioral health clinician helps people with mental health problems by talking with them. A psychiatric doctor helps people by giving them medicine.  How it works  At every visit, you'll see the behavioral health  "clinician (C) first. They'll talk with you about how you're doing and teach you how to feel better.   Then you'll see the psychiatric doctor. This doctor can help you deal with troubling thoughts and feelings by giving you medicine. They'll make sure you know the plan for your care.   CCPS usually takes 3 to 6 visits. If you need more visits, we may have you start seeing a different psychiatric doctor for ongoing care.  If you have any questions or concerns, we'll be glad to talk with you.  About visits  Be open  At your visits, please talk openly about your problems. It may feel hard, but it's the best way for us to help you.  Cancelling visits  If you can't come to your visit, please call us right away at [phone]. If you don't cancel at least 24 hours (1 full day) before your visit, that's \"late cancellation.\"  Being late to visits  Being very late is the same as not showing up. You will be a \"no show\" if:  Your appointment starts with a BHC, and you're more than 15 minutes late for a 30-minute (half hour) visit. This will also cancel your appointment with the psychiatric doctor.  Your appointment is with a psychiatric doctor only, and you're more than 15 minutes late for a 30-minute (half hour) visit.  Your appointment is with a psychiatric doctor only, and you're more than 30 minutes late for a 60-minute (full hour) visit.  If you cancel late or don't show up 2 times within 6 months, we may end your care.   Getting help between visits  If you need help between visits, you can call us Monday to Friday from 8 a.m. to 4:30 p.m. at [phone].  Emergency care  Call 911 or go to the nearest emergency department if your life or someone else's life is in danger.  Call 388 anytime to reach the national Suicide and Crisis hotline.  Medicine refills  To refill your medicine, call your pharmacy. You can also call [facility] Behavioral [facility] at [phone], Monday to Friday, 8 a.m. to 4:30 p.m. It " can take 1 to 3 business days to get a refill.   Forms, letters, and tests  You may have papers to fill out, like FMLA, short-term disability, and workability. We can help you with these forms at your visits, but you must have an appointment. You may need more than 1 visit for this, to be in an intensive therapy program, or both.  Before we can give you medicine for ADHD, we may refer you to get tested for it or confirm it another way.  We may not be able to give you an emotional support animal letter.  We don't do mental health checks ordered by the court.   We don't do mental health testing, but we can refer you to get tested.   Thank you for choosing us for your care.  For informational purposes only. Not to replace the advice of your health care provider. Copyright   2022 [facility]. All rights reserved. [employer] [id number] - 12/22.

## 2025-02-03 NOTE — NURSING NOTE
Is the patient currently in the state of [place]? YES    Current patient location: [address] [place] [id number]    Visit mode:Video    If the visit is dropped, the patient can be reconnected by: VIDEO VISIT: Text to cell phone:   Telephone Information:   Mobile [phone]       Will anyone else be joining the visit? No  (If patient encounters technical issues they should call [phone])    Are changes needed to the allergy or medication list? Yes Pt reported no longer taking medication: vilazodone (VIIBRYD) 10 MG TABS tablet; and Pt stated no changes to allergies    Are refills needed on medications prescribed by this physician? Yes, discuss with provider:  -cloNIDine (CATAPRES) 0.1 MG tablet (not the slow release).    Rooming Documentation: Unable to complete qnrs due to time.      Reason for visit: RECHECK     JOSAFAT Campbell

## 2025-02-04 ENCOUNTER — OFFICE VISIT (OUTPATIENT)
Dept: CARDIOLOGY | Facility: CLINIC | Age: 45
End: 2025-02-04
Attending: PHYSICIAN ASSISTANT
Payer: COMMERCIAL

## 2025-02-04 ENCOUNTER — MYC MEDICAL ADVICE (OUTPATIENT)
Dept: PSYCHIATRY | Facility: CLINIC | Age: 45
End: 2025-02-04

## 2025-02-04 VITALS
OXYGEN SATURATION: 99 % | RESPIRATION RATE: 20 BRPM | BODY MASS INDEX: 34.83 KG/M2 | HEART RATE: 67 BPM | HEIGHT: 69 IN | SYSTOLIC BLOOD PRESSURE: 132 MMHG | WEIGHT: 235.2 LBS | DIASTOLIC BLOOD PRESSURE: 94 MMHG

## 2025-02-04 DIAGNOSIS — F41.9 ANXIETY DISORDER, UNSPECIFIED TYPE: ICD-10-CM

## 2025-02-04 DIAGNOSIS — I10 PRIMARY HYPERTENSION: ICD-10-CM

## 2025-02-04 DIAGNOSIS — E78.5 HYPERLIPIDEMIA LDL GOAL <100: ICD-10-CM

## 2025-02-04 DIAGNOSIS — R00.2 PALPITATIONS: Primary | ICD-10-CM

## 2025-02-04 DIAGNOSIS — F43.10 PTSD (POST-TRAUMATIC STRESS DISORDER): Primary | ICD-10-CM

## 2025-02-04 PROCEDURE — 99204 OFFICE O/P NEW MOD 45 MIN: CPT | Performed by: INTERNAL MEDICINE

## 2025-02-04 PROCEDURE — G2211 COMPLEX E/M VISIT ADD ON: HCPCS | Performed by: INTERNAL MEDICINE

## 2025-02-04 RX ORDER — GUANFACINE 1 MG/1
1 TABLET, EXTENDED RELEASE ORAL AT BEDTIME
Qty: 30 TABLET | Refills: 1 | Status: SHIPPED | OUTPATIENT
Start: 2025-02-04

## 2025-02-04 NOTE — LETTER
2/4/2025    Agnieszka Ahn MD  [address] [facility] [address]    RE: Shannan Cameron       Dear Colleague,     I had the pleasure of seeing Shannan Cameron in the [facility].    HEART CARE OUT-PATIENT CONSULTATON NOTE      [facility]  [phone]      Assessment/Recommendations   Assessment: 45 year old female with tachycardia, palpitations     Plan:  Tachycardia, palpitations, dyspnea on exertion    Unclear etiology, likely sinus and sinus tachycardia, but also consider arrhythmia.  Recent TSH and CBC normal.  Had significant allergic reaction to Zio      -Stress echocardiogram       -Consider empiric beta blockers or Diltiazem       -Discussed Jamgluea mobile as a way to monitor at home     HTN  Well controlled      -Consider stopping Clonidine      -Consider increasing Losartan to 50mg      -Continue hydrochlorothiazide 12.5mg    Hyperlipidemia   LDL = 97      -Continue Crestor 10mg     The longitudinal plan of care for the diagnosis(es)/condition(s) as documented were addressed during this visit. Due to the added complexity in care, I will continue to support Kayla in the subsequent management and with ongoing continuity of care.          History of Present Illness/Subjective    Indication for Consult:  I was asked to see Shannan Cameron by Dr Elizabeth for tachycardia, palpitations       HPI: Shannan Cameron is a 45 year old female with a history of diabetes, hypothyroid, HTN, hyperlipidemia who presents for evaluation of tachycardia, palpitations.    She reports given antidepressant earlier this month and has been having increased heart rate since.  Also wondering if due to Synthroid, so stopped taking both the antidepressant and the Synthroid and the tachycardia seemed to improve.  Is back on both now.  Still gets fast heart rate when walks HR 110s.  Feels short of breath often with even minimal exertion, legs feel numb.      I reviewed notes from PCP prior to this  "visit.         Physical Examination  Past Cardiac History   Vitals: BP (!) 132/94 (BP Location: Left arm, Patient Position: Sitting, Cuff Size: Adult Large)   Pulse 67   Resp 20   Ht 1.753 m (5' 9\")   Wt 106.7 kg (235 lb 3.2 oz)   LMP 09/25/2019 (Within Days)   SpO2 99%   BMI 34.73 kg/m    BMI= Body mass index is 34.73 kg/m .  Wt Readings from Last 3 Encounters:   02/04/25 106.7 kg (235 lb 3.2 oz)   01/26/25 105.5 kg (232 lb 8 oz)   01/13/25 106.2 kg (234 lb 3.2 oz)       General Appearance:   no distress, normal body habitus   ENT/Mouth: membranes moist, no oral lesions or bleeding gums.      EYES:  no scleral icterus, normal conjunctivae   Neck: no carotid bruits or thyromegaly   Chest/Lungs:   lungs are clear to auscultation, no rales or wheezing,  sternal scar, equal chest wall expansion    Cardiovascular:   Regular. Normal first and second heart sounds with no murmurs, rubs, or gallops; the carotid, radial and posterior tibial pulses are intact, Jugular venous pressure normal, no edema bilaterally    Abdomen:  no organomegaly, masses, bruits, or tenderness; bowel sounds are present   Extremities: no cyanosis or clubbing   Skin: no xanthelasma, warm.    Neurologic: normal  bilateral, no tremors           None    Most Recent Echocardiogram: None    Most Recent Stress Test: None    Most Recent Angiogram: None    ECG (reviewed by myself): 1/13/2025 NSR 90 bpm           Medical History  Family History Social History   Past Medical History:   Diagnosis Date     History of partial thyroidectomy      Nephrolithiasis 2007     Papillary adenocarcinoma, follicular variant (H)      Pulmonary nodules     up to 6 mm     Family History   Problem Relation Age of Onset     Alcoholism Mother      Allergic rhinitis Mother      Anemia Mother      Arthritis Mother      Cancer Mother      Depression Mother      Substance Abuse Mother      Emotional abuse Mother      Fibromyalgia Mother      Mental Illness Mother      " Alcoholism Father      Depression Father      Diabetes Father      Substance Abuse Father      Melanoma Sister      Melanoma Maternal Aunt         Social History     Socioeconomic History     Marital status: Single     Spouse name: Not on file     Number of children: Not on file     Years of education: Not on file     Highest education level: Not on file   Occupational History     Not on file   Tobacco Use     Smoking status: Former     Current packs/day: 0.00     Types: Cigarettes     Quit date: 2023     Years since quittin.2     Passive exposure: Past     Smokeless tobacco: Never   Vaping Use     Vaping status: Never Used   Substance and Sexual Activity     Alcohol use: No     Drug use: Not on file     Sexual activity: Not Currently   Other Topics Concern     Not on file   Social History Narrative     Not on file     Social Drivers of Health     Financial Resource Strain: Low Risk  (2024)    Financial Resource Strain      Within the past 12 months, have you or your family members you live with been unable to get utilities (heat, electricity) when it was really needed?: No   Food Insecurity: High Risk (2024)    Food Insecurity      Within the past 12 months, did you worry that your food would run out before you got money to buy more?: Yes      Within the past 12 months, did the food you bought just not last and you didn t have money to get more?: Yes   Transportation Needs: High Risk (2024)    Transportation Needs      Within the past 12 months, has lack of transportation kept you from medical appointments, getting your medicines, non-medical meetings or appointments, work, or from getting things that you need?: Yes   Physical Activity: Sufficiently Active (2024)    [facility], [facility]    Exercise Vital Sign      Days of Exercise per Week: 2 days      Minutes of Exercise per Session: 120 min   Stress: Not on file   Social Connections: Unknown (2022)    Received  from [facility], [facility]    Social Connections      Frequency of Communication with Friends and Family: Not on file   Interpersonal Safety: Low Risk  (10/31/2024)    Interpersonal Safety      Do you feel physically and emotionally safe where you currently live?: Yes      Within the past 12 months, have you been hit, slapped, kicked or otherwise physically hurt by someone?: No      Within the past 12 months, have you been humiliated or emotionally abused in other ways by your partner or ex-partner?: No   Housing Stability: Low Risk  (11/11/2024)    Housing Stability      Do you have housing? : Yes      Are you worried about losing your housing?: No           Medications  Allergies   Current Outpatient Medications   Medication Sig Dispense Refill     cloNIDine (CATAPRES) 0.1 MG tablet Take 1 tablet (0.1 mg) by mouth 2 times daily. 60 tablet 1     Continuous Glucose Sensor (FREESTYLE LAURA 3 SENSOR) MISC 1 each every 14 days. Use 1 sensor every 14 days. Use to read blood sugars per 's instructions. 6 each 5     empagliflozin (JARDIANCE) 10 MG TABS tablet Take 1 tablet (10 mg) by mouth daily 90 tablet 1     Fremanezumab-vfrm (AJOVY) 225 MG/1.5ML SOAJ Inject 225 mg subcutaneously every 30 days. 1.5 mL 6     hydroCHLOROthiazide 12.5 MG tablet TAKE ONE TABLET BY MOUTH ONCE DAILY 90 tablet 2     Insulin Glargine (TOUJEO SOLOSTAR SC) Inject 80 Units subcutaneously daily (with dinner). Use as directed       insulin pen needle (32G X 4 MM) 32G X 4 MM miscellaneous Use 4 pen needles daily or as directed. 400 each 3     levothyroxine (SYNTHROID/LEVOTHROID) 100 MCG tablet Take 1 tablet (100 mcg) by mouth daily. 90 tablet 2     losartan (COZAAR) 25 MG tablet Take 1 tablet (25 mg) by mouth daily 90 tablet 4     Multiple Vitamins-Minerals (ONCOVITE) TABS Take 1 tablet by mouth daily.       ondansetron (ZOFRAN) 8 MG tablet TAKE 1 TABLET BY MOUTH EVERY  EIGHT HOURS AS NEEDED FOR NAUSEA 12 tablet 1     rosuvastatin (CRESTOR) 10 MG tablet Take 1 tablet (10 mg) by mouth daily 90 tablet 4     vilazodone (VIIBRYD) 10 MG TABS tablet Take 1 tablet (10 mg) by mouth daily. (Patient taking differently: Take 5 mg by mouth daily.) 30 tablet 0     blood glucose (NO BRAND SPECIFIED) test strip Use to test blood sugar 3 times daily or as directed. To accompany: Blood Glucose Monitor Brands: per insurance. (Patient not taking: Reported on 2/4/2025) 300 strip 6     blood glucose monitoring (NO BRAND SPECIFIED) meter device kit Use to test blood sugar 1 times daily or as directed. Preferred blood glucose meter OR supplies to accompany: Blood Glucose Monitor Brands: per insurance. (Patient not taking: Reported on 2/4/2025) 1 kit 0     clonazePAM (KLONOPIN) 0.5 MG tablet TAKE ONE TABLET (0.5 MG) BY MOUTH TWO TIMES DAILY AS NEEDED FOR ANXIETY. (Patient not taking: Reported on 2/4/2025) 10 tablet 0     insulin glargine (LANTUS SOLOSTAR) 100 UNIT/ML pen INJECT 33 units twice a day and continue to increase up to 40 units twice a day (Patient not taking: Reported on 2/4/2025) 225 mL 1     Microlet Lancets MISC USE TO TEST ONCE DAILY (Patient not taking: Reported on 2/4/2025) 300 each 1     MOUNJARO 5 MG/0.5ML SOAJ Inject 0.5 mLs (5 mg) subcutaneously every 7 days. (Patient not taking: Reported on 2/4/2025) 2 mL 0     pantoprazole (PROTONIX) 40 MG EC tablet Take 1 tablet (40 mg) by mouth daily. (Patient not taking: Reported on 2/4/2025) 90 tablet 0     Tirzepatide (MOUNJARO) 7.5 MG/0.5ML SOAJ Inject 0.5 mLs (7.5 mg) subcutaneously every 7 days. (Patient not taking: Reported on 2/4/2025) 2 mL 0     Tirzepatide 2.5 MG/0.5ML SOAJ Inject 0.5 mLs (2.5 mg) subcutaneously every 7 days. (Patient not taking: Reported on 2/4/2025) 2 mL 0       Allergies   Allergen Reactions     Adhesive Tape Rash, Blisters and Itching     Bee Venom Shortness Of Breath and Dizziness     Fainting     Wasp Venom Protein  "          Lab Results    Chemistry/lipid CBC Cardiac Enzymes/BNP/TSH/INR   Recent Labs   Lab Test 04/22/24  1050 08/22/22  0905 07/14/21  1401   CHOL 185   < > 221*   HDL 39*   < > 48*   LDL 97   < > 138*   TRIG 429*   < > 211*   CHOLHDLRATIO  --   --  4.6    < > = values in this interval not displayed.     Recent Labs   Lab Test 04/22/24  1050 08/30/23  1711 08/22/22  0905   LDL 97 155* 158*     Recent Labs   Lab Test 01/27/25  1452 01/13/25  1249   NA  --  138   POTASSIUM  --  3.5   CHLORIDE  --  99   CO2  --  25   * 306*   BUN  --  12.9   CR  --  0.61   GFRESTIMATED  --  >90   ZAIRA  --  10.0     Recent Labs   Lab Test 01/13/25  1249 08/07/24  1053 07/29/24  1549   CR 0.61 0.58 0.4*     Recent Labs   Lab Test 12/17/24  1004 08/07/24  1052 04/22/24  1050   A1C 7.7* 8.9* 8.8*          Recent Labs   Lab Test 01/13/25  1249   WBC 8.0   HGB 15.9*   HCT 47.2*   MCV 89        Recent Labs   Lab Test 01/13/25  1249 12/06/23  1128 10/17/23  1429   HGB 15.9* 14.7 16.1*    No results for input(s): \"TROPONINI\" in the last 81721 hours.  No results for input(s): \"BNP\", \"NTBNPI\", \"NTBNP\" in the last 91782 hours.  Recent Labs   Lab Test 12/17/24  1004   TSH 0.50     No results for input(s): \"INR\" in the last 52710 hours.     Tanna Duran MD  Noninvasive Cardiologist   [facility]                                        Thank you for allowing me to participate in the care of your patient.      Sincerely,     Tanna Duran MD     [facility]  cc:   Jose Elizabeth, PA  [address] [place] [address]      "

## 2025-02-04 NOTE — PATIENT INSTRUCTIONS
Clonidine can have a significant rebound effect and drive up blood pressure with missed doses.  Would check with psychiatry and see there is a better alternative for PTSD    The fast heart rates and short of breath may be related to the thyroid, but I think a stress echocardiogram would be very helpful to ensure not due to an underlying heart issue.    A device called Loylap may be an option to monitor heart rhythm at home in the future.

## 2025-02-04 NOTE — TELEPHONE ENCOUNTER
Natrona back from Dr. Duran (Cardiology) and we will trial move to guanfacine ER instead if better tolerated. Message sent to patient.

## 2025-02-05 ENCOUNTER — TELEPHONE (OUTPATIENT)
Dept: BEHAVIORAL HEALTH | Facility: CLINIC | Age: 45
End: 2025-02-05
Payer: COMMERCIAL

## 2025-02-05 NOTE — TELEPHONE ENCOUNTER
----- Message from Anna Franco sent at 2/5/2025 11:23 AM CST -----  Regarding: New IOP/DT 4 Start 02/07/2025  Scheduling Request    Patient Name: Shannan Cameron  Location of programming: [facility]  Start Date: February / 07 / 2025  Group: IOP/DT 4 Trauma Track [[id number]] on Tuesday, Wednesday, Thursday, and Friday at 9:00 AM to 1:00 PM  16 hours a week for 12 weeks  Attending Provider (MD): Dr. Carreon  Visit Type: In-person or Treatment - 870    Additional notes: N/A

## 2025-02-07 ENCOUNTER — HOSPITAL ENCOUNTER (OUTPATIENT)
Dept: BEHAVIORAL HEALTH | Facility: CLINIC | Age: 45
Discharge: HOME | End: 2025-02-07
Attending: PSYCHIATRY & NEUROLOGY
Payer: COMMERCIAL

## 2025-02-07 VITALS
RESPIRATION RATE: 16 BRPM | HEART RATE: 85 BPM | OXYGEN SATURATION: 96 % | TEMPERATURE: 97.7 F | DIASTOLIC BLOOD PRESSURE: 90 MMHG | SYSTOLIC BLOOD PRESSURE: 172 MMHG

## 2025-02-07 PROBLEM — F33.2 MDD (MAJOR DEPRESSIVE DISORDER), RECURRENT EPISODE, SEVERE (H): Status: ACTIVE | Noted: 2025-02-07

## 2025-02-07 PROCEDURE — 90853 GROUP PSYCHOTHERAPY: CPT

## 2025-02-07 ASSESSMENT — ANXIETY QUESTIONNAIRES
7. FEELING AFRAID AS IF SOMETHING AWFUL MIGHT HAPPEN: NOT AT ALL
3. WORRYING TOO MUCH ABOUT DIFFERENT THINGS: NOT AT ALL
6. BECOMING EASILY ANNOYED OR IRRITABLE: SEVERAL DAYS
2. NOT BEING ABLE TO STOP OR CONTROL WORRYING: NEARLY EVERY DAY
5. BEING SO RESTLESS THAT IT IS HARD TO SIT STILL: NOT AT ALL
4. TROUBLE RELAXING: SEVERAL DAYS
1. FEELING NERVOUS, ANXIOUS, OR ON EDGE: SEVERAL DAYS
IF YOU CHECKED OFF ANY PROBLEMS ON THIS QUESTIONNAIRE, HOW DIFFICULT HAVE THESE PROBLEMS MADE IT FOR YOU TO DO YOUR WORK, TAKE CARE OF THINGS AT HOME, OR GET ALONG WITH OTHER PEOPLE: SOMEWHAT DIFFICULT
GAD7 TOTAL SCORE: 6

## 2025-02-07 ASSESSMENT — COLUMBIA-SUICIDE SEVERITY RATING SCALE - C-SSRS
1. SINCE LAST CONTACT, HAVE YOU WISHED YOU WERE DEAD OR WISHED YOU COULD GO TO SLEEP AND NOT WAKE UP?: YES
2. HAVE YOU ACTUALLY HAD ANY THOUGHTS OF KILLING YOURSELF?: YES

## 2025-02-07 ASSESSMENT — PAIN SCALES - GENERAL: PAINLEVEL_OUTOF10: NO PAIN (0)
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
## 2025-02-07 NOTE — GROUP NOTE
Psychoeducation Group Note    PATIENT'S NAME: Shannan Cameron  MRN:   [id number]  :   1980  ACCT. NUMBER: [id number]  DATE OF SERVICE: 25  START TIME:  9:00 AM  END TIME:  9:50 AM  FACILITATOR: Prudencio Denney LSW; Aide Pineda RN  TOPIC:  Wellness Group: [facility] Adult Mental Health Outpatient Programs  TRACK: IOP/DT 4    NUMBER OF PARTICIPANTS: 9    Summary of Group / Topics Discussed:  Specialty [facility]:  [facility]: Introduce the topic and help participants understand what hygiene indifference is, how it relates to mental health, and why it is often overlooked    Patient Session Goals / Objectives:  Encourage open conversation about the relationship between mental health and hygiene, and address the stigma that often surrounds it  Review strategies for coping and support            Patient Participation / Response:  Fully participated with the group by sharing personal reflections / insights and openly received / provided feedback with other participants.    Demonstrated understanding of topics discussed through group discussion and participation and Identified / Expressed personal readiness to practice skills    Treatment Plan:  Patient has a current master individualized treatment plan.  See Epic treatment plan for more information.    TREVOR Fink
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
## 2025-02-07 NOTE — GROUP NOTE
Psychotherapy Group Note    PATIENT'S NAME: Shannan Cameron  MRN:   [id number]  :   1980  ACCT. NUMBER: [id number]  DATE OF SERVICE: 25  START TIME: 12:00 PM  END TIME: 12:50 PM  FACILITATOR: Gaby Mena LICSW  TOPIC:  EBP Group: [facility] Adult Mental Health Outpatient Programs  TRACK: IOP DT 4     NUMBER OF PARTICIPANTS: 8    Summary of Group / Topics Discussed:  Mindfulness: Mindfulness Skills: Patients received information on the main components of mindfulness. Patients participated in discussion on how to practice observing, describing, and participating in internal and external environments. Relevance of mindfulness skills to overall mental and physical health was explored.  Patients explored and discussed in group their current awareness and knowledge of mindfulness skills as well as barriers to applying skills.    Patient Session Goals / Objectives:  Demonstrated and verbalized understanding of key mindfulness concepts  Identified when/how to use mindfulness skills  Resolved barriers to practicing mindfulness skills  Identified plan to use mindfulness skills in daily life       Patient Participation / Response:  Fully participated with the group by sharing personal reflections / insights and openly received / provided feedback with other participants.    Demonstrated understanding of topics discussed through group discussion and participation, Demonstrated understanding of mindfulness skills and benefits of practice, and Identified / Expressed personal readiness to practice mindfulness skills    Treatment Plan:  Patient has a current master individualized treatment plan.  See Epic treatment plan for more information.    MESSI Boles

## 2025-02-10 ENCOUNTER — OFFICE VISIT (OUTPATIENT)
Dept: FAMILY MEDICINE | Facility: CLINIC | Age: 45
End: 2025-02-10
Payer: COMMERCIAL

## 2025-02-10 VITALS
HEART RATE: 74 BPM | OXYGEN SATURATION: 97 % | WEIGHT: 233.25 LBS | DIASTOLIC BLOOD PRESSURE: 62 MMHG | TEMPERATURE: 97.4 F | SYSTOLIC BLOOD PRESSURE: 124 MMHG | BODY MASS INDEX: 34.55 KG/M2 | HEIGHT: 69 IN | RESPIRATION RATE: 20 BRPM

## 2025-02-10 DIAGNOSIS — E03.4 HYPOTHYROIDISM DUE TO ACQUIRED ATROPHY OF THYROID: Primary | ICD-10-CM

## 2025-02-10 DIAGNOSIS — E11.65 TYPE 2 DIABETES MELLITUS WITH HYPERGLYCEMIA, WITH LONG-TERM CURRENT USE OF INSULIN (H): ICD-10-CM

## 2025-02-10 DIAGNOSIS — R76.0 ABNORMAL ANTIBODY TITER: ICD-10-CM

## 2025-02-10 DIAGNOSIS — K76.0 METABOLIC DYSFUNCTION-ASSOCIATED STEATOTIC LIVER DISEASE (MASLD): ICD-10-CM

## 2025-02-10 DIAGNOSIS — Z79.4 TYPE 2 DIABETES MELLITUS WITH HYPERGLYCEMIA, WITH LONG-TERM CURRENT USE OF INSULIN (H): ICD-10-CM

## 2025-02-10 RX ORDER — LEVOTHYROXINE SODIUM 88 UG/1
88 TABLET ORAL
Qty: 90 TABLET | Refills: 3 | Status: SHIPPED | OUTPATIENT
Start: 2025-02-10

## 2025-02-10 RX ORDER — ROSUVASTATIN CALCIUM 10 MG/1
10 TABLET, COATED ORAL DAILY
Qty: 90 TABLET | Refills: 4 | Status: SHIPPED | OUTPATIENT
Start: 2025-02-10

## 2025-02-10 NOTE — PROGRESS NOTES
"  Assessment & Plan     Hypothyroidism due to acquired atrophy of thyroid  Patient noting increased symptoms of increased thyroid, recent TSH 0.77, will decrease thyroid dose slightly and follow, has upcoming appointment with endocrinology in March along with thyroid follow up to recheck imaging at [facility]  - levothyroxine (SYNTHROID/LEVOTHROID) 88 MCG tablet; Take 1 tablet (88 mcg) by mouth every morning (before breakfast).    Abnormal antibody titer  Patient with positive anti-smooth muscle Ab with negative JAGUAR, GI concerned that autoimmune disease could be related to liver disease, rheumatology referral placed  - Adult Rheumatology  Referral; Future    Type 2 diabetes mellitus with hyperglycemia, with long-term current use of insulin (H)  Patient with need for decreased A1c and decreased weight given metaboilc changes to liver, sent prescription for tirzepatide  - rosuvastatin (CRESTOR) 10 MG tablet; Take 1 tablet (10 mg) by mouth daily.  - Tirzepatide 2.5 MG/0.5ML SOAJ; Inject 0.5 mLs (2.5 mg) subcutaneously every 7 days.  - Hepatic panel (Albumin, ALT, AST, Bili, Alk Phos, TP); Future  - metFORMIN (GLUCOPHAGE) 500 MG tablet; Take 2 tablets (1,000 mg) by mouth 2 times daily (with meals).    Metabolic dysfunction-associated steatotic liver disease (MASLD)  Under care of Gi        MED REC REQUIRED  Post Medication Reconciliation Status:  Discharge medications reconciled and changed, see notes/orders  BMI  Estimated body mass index is 34.43 kg/m  as calculated from the following:    Height as of this encounter: 1.753 m (5' 9.02\").    Weight as of this encounter: 105.8 kg (233 lb 4 oz).             Jessica Garza is a 45 year old, presenting for the following health issues:  RECHECK (Lower thyroid dose. Rheumatologist referral. Go back on Metformin. Zepbound Rx. Bulge back of right knee)    History of Present Illness       Diabetes:   She presents for follow up of diabetes.   She is checking home " blood glucose with a continuous glucose monitor.   She checks blood glucose before and after meals.  Blood glucose is sometimes over 200 and sometimes under 70. She is aware of hypoglycemia symptoms including weakness.   She is concerned about blood sugar frequently over 200 and other.   She is having numbness in feet, blurry vision and weight gain.            Hypothyroidism:     Since last visit, patient describes the following symptoms::  Anxiety, Constipation, Depression, Dry skin, Fatigue, Hair loss, Loose stools and Weight gain    Weight gain::  6-10 lbs.    Reason for visit:  Follow up    She eats 4 or more servings of fruits and vegetables daily.She consumes 0 sweetened beverage(s) daily.She exercises with enough effort to increase her heart rate 60 or more minutes per day.  She exercises with enough effort to increase her heart rate 7 days per week.   She is taking medications regularly.           1/28/2025   Post Discharge Outreach   How are you doing now that you are home? Patient shares that she is doing better but does seem to be having a side effect from her medication possibly. Patient explains that she has a racing heart. Writer asks if she would like to speak with a triage nurse but patient declines. She was supposed to be psychiatrist yesterday but this was cancelled. Writer and patient discuss patient calling and getting in ASAP with her psychiatrist and calling triage nurse line at her clinic should things get worst. Writer did share CC program through [facility] and patient will consider this for the future.   How are your symptoms? (Red Flag symptoms escalate to triage hotline per guidelines) Improved   Does the patient have their discharge instructions?  Yes   Does the patient have questions regarding their discharge instructions?  No   Were you started on any new medications or were there changes to any of your previous medications?  Yes   Does the patient have all of their medications?  "Yes   Do you have questions regarding any of your medications?  No   Do you have all of your needed medical supplies or equipment (DME)?  (i.e. oxygen tank, CPAP, cane, etc.) Yes   Discharge Follow Up Appointment Date 2/10/2025   Discharge Follow Up Appointment Scheduled with? Primary Care Provider       Hospital Follow-up Visit:    Hospital/Nursing Home/IP Rehab Facility: [facility]  Date of Admission: 1/26/25  Date of Discharge: 1/27/25  Reason(s) for Admission: observation at [facility] unit  Was the patient in the ICU or did the patient experience delirium during hospitalization?  No  Do you have any other stressors you would like to discuss with your provider? No    Problems taking medications regularly:  None  Medication changes since discharge: None  Problems adhering to non-medication therapy:  None, notes that current group is not a great fit for her but willing to continue at this time, established with mental health    Summary of hospitalization:  [facility] discharge summary reviewed  Diagnostic Tests/Treatments reviewed.  Follow up needed: none  Other Healthcare Providers Involved in Patient s Care:          connected with mental health  Update since discharge: improved.         Plan of care communicated with patient                     Objective    /62   Pulse 74   Temp 97.4  F (36.3  C)   Resp 20   Ht 1.753 m (5' 9.02\")   Wt 105.8 kg (233 lb 4 oz)   LMP 09/25/2019 (Within Days)   SpO2 97%   BMI 34.43 kg/m    Body mass index is 34.43 kg/m .  Physical Exam   GENERAL: alert and no distress  NECK: no adenopathy, no asymmetry, masses, or scars  RESP: lungs clear to auscultation - no rales, rhonchi or wheezes  CV: regular rate and rhythm, normal S1 S2, no S3 or S4, no murmur, click or rub, no peripheral edema            Signed Electronically by: Agnieszka Ahn MD    "

## 2025-02-11 ENCOUNTER — TELEPHONE (OUTPATIENT)
Dept: BEHAVIORAL HEALTH | Facility: CLINIC | Age: 45
End: 2025-02-11
Payer: COMMERCIAL

## 2025-02-11 ENCOUNTER — HOSPITAL ENCOUNTER (OUTPATIENT)
Dept: BEHAVIORAL HEALTH | Facility: CLINIC | Age: 45
End: 2025-02-11
Attending: PSYCHIATRY & NEUROLOGY
Payer: COMMERCIAL

## 2025-02-11 ENCOUNTER — TELEPHONE (OUTPATIENT)
Dept: FAMILY MEDICINE | Facility: CLINIC | Age: 45
End: 2025-02-11

## 2025-02-11 ENCOUNTER — HOSPITAL ENCOUNTER (OUTPATIENT)
Dept: CARDIOLOGY | Facility: CLINIC | Age: 45
Discharge: HOME | End: 2025-02-11
Attending: INTERNAL MEDICINE
Payer: COMMERCIAL

## 2025-02-11 DIAGNOSIS — R00.2 PALPITATIONS: ICD-10-CM

## 2025-02-11 LAB
CV STRESS CURRENT BP HE: NORMAL
CV STRESS CURRENT HR HE: 111
CV STRESS CURRENT HR HE: 119
CV STRESS CURRENT HR HE: 124
CV STRESS CURRENT HR HE: 124
CV STRESS CURRENT HR HE: 126
CV STRESS CURRENT HR HE: 134
CV STRESS CURRENT HR HE: 137
CV STRESS CURRENT HR HE: 137
CV STRESS CURRENT HR HE: 139
CV STRESS CURRENT HR HE: 156
CV STRESS CURRENT HR HE: 75
CV STRESS CURRENT HR HE: 79
CV STRESS CURRENT HR HE: 81
CV STRESS CURRENT HR HE: 87
CV STRESS CURRENT HR HE: 88
CV STRESS CURRENT HR HE: 88
CV STRESS CURRENT HR HE: 89
CV STRESS CURRENT HR HE: 89
CV STRESS CURRENT HR HE: 90
CV STRESS CURRENT HR HE: 90
CV STRESS CURRENT HR HE: 91
CV STRESS CURRENT HR HE: 95
CV STRESS DEVIATION TIME HE: NORMAL
CV STRESS ECHO PERCENT HR HE: NORMAL
CV STRESS EXERCISE STAGE HE: NORMAL
CV STRESS EXERCISE STAGE REACHED HE: NORMAL
CV STRESS FINAL RESTING BP HE: NORMAL
CV STRESS FINAL RESTING HR HE: 90
CV STRESS MAX HR HE: 157
CV STRESS MAX TREADMILL GRADE HE: 14
CV STRESS MAX TREADMILL SPEED HE: 3.4
CV STRESS PEAK DIA BP HE: NORMAL
CV STRESS PEAK SYS BP HE: NORMAL
CV STRESS PHASE HE: NORMAL
CV STRESS PROTOCOL HE: NORMAL
CV STRESS REASON STOPPED HE: NORMAL
CV STRESS RESTING PT POSITION HE: NORMAL
CV STRESS RESTING PT POSITION HE: NORMAL
CV STRESS ST DEVIATION AMOUNT HE: NORMAL
CV STRESS ST DEVIATION ELEVATION HE: NORMAL
CV STRESS ST EVELATION AMOUNT HE: NORMAL
CV STRESS SYMPTOMS HE: NORMAL
CV STRESS TEST TYPE HE: NORMAL
CV STRESS TOTAL STAGE TIME MIN 1 HE: NORMAL
STRESS ECHO BASELINE DIASTOLIC HE: 82
STRESS ECHO BASELINE HR: 84
STRESS ECHO BASELINE SYSTOLIC BP: 138
STRESS ECHO LAST STRESS DIASTOLIC BP: 88
STRESS ECHO LAST STRESS HR: 156
STRESS ECHO LAST STRESS SYSTOLIC BP: 200
STRESS ECHO POST ESTIMATED WORKLOAD: 8.9
STRESS ECHO POST EXERCISE DUR MIN: 7
STRESS ECHO POST EXERCISE DUR SEC: 0
STRESS ECHO TARGET HR: 148

## 2025-02-11 PROCEDURE — 90853 GROUP PSYCHOTHERAPY: CPT

## 2025-02-11 PROCEDURE — 93350 STRESS TTE ONLY: CPT | Mod: 26 | Performed by: INTERNAL MEDICINE

## 2025-02-11 PROCEDURE — 90853 GROUP PSYCHOTHERAPY: CPT | Performed by: SOCIAL WORKER

## 2025-02-11 PROCEDURE — 93018 CV STRESS TEST I&R ONLY: CPT | Performed by: INTERNAL MEDICINE

## 2025-02-11 PROCEDURE — 93325 DOPPLER ECHO COLOR FLOW MAPG: CPT | Mod: 26 | Performed by: INTERNAL MEDICINE

## 2025-02-11 PROCEDURE — 93016 CV STRESS TEST SUPVJ ONLY: CPT | Performed by: INTERNAL MEDICINE

## 2025-02-11 PROCEDURE — 93321 DOPPLER ECHO F-UP/LMTD STD: CPT | Mod: 26 | Performed by: INTERNAL MEDICINE

## 2025-02-11 PROCEDURE — 93325 DOPPLER ECHO COLOR FLOW MAPG: CPT | Mod: TC

## 2025-02-11 PROCEDURE — 93350 STRESS TTE ONLY: CPT | Mod: TC

## 2025-02-11 PROCEDURE — 90853 GROUP PSYCHOTHERAPY: CPT | Performed by: COUNSELOR

## 2025-02-11 ASSESSMENT — PATIENT HEALTH QUESTIONNAIRE - PHQ9: SUM OF ALL RESPONSES TO PHQ QUESTIONS 1-9: 18

## 2025-02-11 ASSESSMENT — ANXIETY QUESTIONNAIRES: GAD7 TOTAL SCORE: 6

## 2025-02-11 NOTE — GROUP NOTE
Psychotherapy Group Note    PATIENT'S NAME: Shannan Cameron  MRN:   [id number]  :   1980  ACCT. NUMBER: [id number]  DATE OF SERVICE: 25  START TIME: 12:00 PM  END TIME: 12:50 PM  FACILITATOR: Francoise Arguelles LICSW  TOPIC: MH EBP Group: Coping Skills  [facility] Adult Mental Health Outpatient Programs  TRACK: IOP/DT 4 T    NUMBER OF PARTICIPANTS: 6    Summary of Group / Topics Discussed:  Coping Skills: Distraction: Patients learned to mindfully use distraction as a way to decrease heightened stress in the moment.  Patients will identified situations that necessitate healthy distraction strategies.  They explored ways to manage physical symptoms of distress using distraction. The group began to distinguish when this can be useful in their lives or when other strategies would be more relevant or helpful.    Patient Session Goals / Objectives:  Understand the purpose and benefits of using healthy distraction to decrease distress.  Process what happens in the body when using distraction strategies.  Demonstrate understanding of when to use distraction strategies.  Explore patient s current distraction activities, and how to take a more intentional approach to the use of distraction.  Identify and problem solve barriers to applying distraction strategies.  Choose 1-2 healthy distraction strategies to apply during times of distress.      Patient Participation / Response:  Fully participated with the group by sharing personal reflections / insights and openly received / provided feedback with other participants.    Demonstrated understanding of topics discussed through group discussion and participation, Expressed understanding of the relevance / importance of coping skills at distressing times in life, Demonstrated knowledge of when to consider using a variety of coping skills in daily life, Identified barriers to applying coping skills, and Identified 2-3 positive coping strategies that have helped  maintain / improve symptoms in the past    Treatment Plan:  Patient has a current master individualized treatment plan.  See Epic treatment plan for more information.    MESSI Sabillon

## 2025-02-11 NOTE — GROUP NOTE
Psychoeducation Group Note    PATIENT'S NAME: Shannan Cameron  MRN:   [id number]  :   1980  ACCT. NUMBER: [id number]  DATE OF SERVICE: 25  START TIME:  9:00 AM  END TIME:  9:50 AM  FACILITATOR: Alberto Schilling LPC; Zuly Tubbs OTR  TOPIC:  Life Skills Group: Life Skills  [facility] Adult Mental Health Outpatient Programs  TRACK: IOP/DT-4 TRAUMA  NUMBER OF PARTICIPANTS: 8    Summary of Group / Topics Discussed:  Life Skills: Life Skills Clinic: Provided opportunity for patients to independently choose and engage in a therapeutic activity that supports progress towards their goals and psychiatric recovery. Discussed the skill of behavioral activation and acting opposite to emotion to improve motivation in everyday life tasks. The Life Skills Clinic provides a context for patients to monitor their symptoms, gain self-awareness, practice skills (self-regulation, mindfulness, self-talk, focus/concentration, social, productivity), build a sense of self efficacy and mastery, as well as receive validation, support, and resources. Staff checks in, observes, assesses, and monitors performance skills and patterns in context with each group member.      Patient Session Goals and Objectives:   Independently identify a purposeful and meaningful therapeutic activity.   Identified which goal(s) they are intentionally working on during session.    Reflected on their performance and share insight about their progress.   Practiced and identified a way to generalize a skill to their everyday life.    Patient Participation / Response:  Fully participated with the group by sharing personal reflections / insights and openly received / provided feedback with other participants.    Verbalized understanding of content, Patient would benefit from additional opportunities to practice the content to be able to generalize it to their everyday life with increased intentionality, consistency, and efficacy in support of  their psychiatric recovery, and Patient worked towards initial treatment plan goals     Treatment Plan:  Patient has a current master individualized treatment plan.  See Epic treatment plan for more information.    Alberto Schilling, LPC

## 2025-02-11 NOTE — TELEPHONE ENCOUNTER
Pt did not arrive for IOPDT4 programming today. Writer called pt in attempt to inquire about plan for participation in programming today, assess safety, and see if any support is needed at this time. Pt did not answer call. Writer ALAINAM requesting call back to program line.

## 2025-02-11 NOTE — GROUP NOTE
Process Group Note    PATIENT'S NAME: Shannan Cameron  MRN:   [id number]  :   1980  ACCT. NUMBER: [id number]  DATE OF SERVICE: 25  START TIME: 10:00 AM  END TIME: 10:50 AM  FACILITATOR: Nubia Rodriguez LICSW  TOPIC:  Process Group    Diagnoses:  296.32 (F33.1) Major Depressive Disorder, Recurrent Episode, Moderate _  309.81 (F43.10) Posttraumatic Stress Disorder (includes Posttraumatic Stress Disorder for Children 6 Years and Younger)  With dissociative symptoms    [facility] Adult Mental Health Outpatient Programs  TRACK: IOP 5    NUMBER OF PARTICIPANTS: 8        Data:    Session content: At the start of this group, patients were invited to check in by identifying themselves, describing their current emotional status, and identifying issues to address in this group.     Area(s) of treatment focus addressed in this session included Symptom Management, Personal Safety, Community Resources/Discharge Planning, Abstinence/Relapse Prevention, Develop / Improve Independent Living Skills, and Develop Socialization / Interpersonal Relationship Skills.    Shannan reports depressed and anxious mood. Denies S/I, SIB or safety issues.  Shannan processed having her birthday as a trigger which is  . Reports recently had nightmares and flashbacks. Shannan is using coping skills for symptom management. She went to consult with her PCP.  She is currently fasting due to needing a procedure at the hospital today at 2:00 pm. She has feelings of loss related to her father's death. Her son is home sick today.             2025     1:00 PM   Suicide Ideation Check In   Since last session, how often have you had suicidal thoughts? No thoughts of suicide             Therapeutic Interventions/Treatment Strategies:  Psychotherapist offered support, feedback and validation and reinforced use of skills. Treatment modalities used include Cognitive Behavioral Therapy.    Assessment:    Patient response:   Patient  responded to session by verbalizing understanding    Possible barriers to participation / learning include: and no barriers identified    Health Issues:   Yes: Coping with cancer    Reports starting 4 new medications. Reports medication compliance.        Substance Use Review:   Substance Use: No active concerns identified.    Mental Status/Behavioral Observations  Appearance:   Appropriate   Eye Contact:   Good   Psychomotor Behavior: Normal   Attitude:   Cooperative  Interested  Orientation:   All  Speech   Rate / Production: Normal    Volume:  Normal   Mood:    Anxious  Depressed   Affect:    Appropriate  Worrisome   Thought Content:   Rumination and Safety denies any current safety concerns including suicidal ideation, self-harm, and homicidal ideation  Thought Form:  Coherent  Goal Directed  Logical     Insight:    Good     Plan:   Safety Plan: No current safety concerns identified.  Recommended that patient call 911 or go to the local ED should there be a change in any of these risk factors.   Barriers to treatment: None identified  Patient Contracts (see media tab):  None  Substance Use: Not addressed in session   Continue or Discharge: Patient will continue in Adult Day Treatment (ADT)  as planned. Patient is likely to benefit from learning and using skills as they work toward the goals identified in their treatment plan.      Nubia Rodriguez, [facility]  February 11, 2025

## 2025-02-11 NOTE — TELEPHONE ENCOUNTER
Prior Authorization Retail Medication Request    Medication/Dose: PA needed for Mounjaro 2.5 mg/0.5 mL auto injectors    Key:  [id number]  Diagnosis and ICD code (if different than what is on RX):  see rx details  New/renewal/insurance change PA/secondary ins. PA:  Previously Tried and Failed:    Rationale:      Insurance   Primary: see chart  Insurance ID:      Secondary (if applicable):  Insurance ID:      Pharmacy Information (if different than what is on RX)  Name:  see rx details  Phone:    Fax:    Clinic Information  Preferred routing pool for dept communication:

## 2025-02-11 NOTE — GROUP NOTE
Psychotherapy Group Note    PATIENT'S NAME: Shannan Cameron  MRN:   [id number]  :   1980  ACCT. NUMBER: [id number]  DATE OF SERVICE: 25  START TIME: 11:00 AM  END TIME: 11:50 AM  FACILITATOR: Chuck Richey LMFT  TOPIC:  EBP Group: Emotions Management  [facility] Mental Health Outpatient Programs  TRACK: IOPDT4    NUMBER OF PARTICIPANTS: 7    Summary of Group / Topics Discussed:  Emotions Management: Check Facts: Patients participated in an interactive approach to identifying and challenging cognitive distortions that arise following an event that triggers intense emotion.  Patients choose an emotional reaction/event to work on.  The group shared their experiences and thought processes for feedback.      Patient Session Goals / Objectives:  Learn the process of identifying thoughts associated with the situation and reaction  Learn to challenge cognitive distortions and reframe the situation/event/reaction   Distinguish between facts, feelings, thoughts  Gain understanding of how to interpret an emotional reaction  Practice identification of cognitive distortions and evaluating an emotionally charged situation more rationally/objectively/mindfully      Patient Participation / Response:  Fully participated with the group by sharing personal reflections / insights and openly received / provided feedback with other participants.    Demonstrated understanding of topics discussed through group discussion and participation and Expressed understanding of the relevance / importance of emotions management skills at distressing times in life    Treatment Plan:  Patient has a current master individualized treatment plan.  See Epic treatment plan for more information.    PARI Farrar

## 2025-02-13 ENCOUNTER — HOSPITAL ENCOUNTER (OUTPATIENT)
Dept: BEHAVIORAL HEALTH | Facility: CLINIC | Age: 45
End: 2025-02-13
Attending: PSYCHIATRY & NEUROLOGY
Payer: COMMERCIAL

## 2025-02-13 PROCEDURE — 90853 GROUP PSYCHOTHERAPY: CPT | Performed by: SOCIAL WORKER

## 2025-02-13 NOTE — GROUP NOTE
Psychotherapy Group Note    PATIENT'S NAME: Shannan Cameron  MRN:   [id number]  :   1980  ACCT. NUMBER: [id number]  DATE OF SERVICE: 25  START TIME: 12:00 PM  END TIME: 12:50 PM  FACILITATOR: Tessa De La Fuente LICSW  TOPIC: MH EBP Group: Specialty Awareness  [facility] Adult Mental Health Outpatient Programs  TRACK: iop/dt4    NUMBER OF PARTICIPANTS: 7    Summary of Group / Topics Discussed:  Specialty Topics: Trauma and PTSD: Patients were provided with information to understand the types and origins of trauma, the relationship between trauma and PTSD, the scope of Trauma-Informed Care, and treatment options. Patients were able to explore how trauma may have impacted their functioning directly or indirectly, with reference to the the complexity of trauma and associated treatment options. Patients reviewed their current awareness of this topic and relevance to their functioning. Individual experiences with symptoms and treatment options were discussed.     Patient Session Goals / Objectives:  Discussed definitions of trauma, Trauma-Informed Care, PTSD  Discussed how traumatic experiences affect the individual and their relationships (directly, indirectly, brain development and function)  Identified how a history of trauma or exposure to trauma may impact group work  Assisted patients to find ways to adapt functioning in light of past traumatic experience(s), and to identify suitable treatment options and community resources      Patient Participation / Response:  Fully participated with the group by sharing personal reflections / insights and openly received / provided feedback with other participants.    Demonstrated understanding of topics discussed through group discussion and participation and Identified / Expressed readiness to act on skill suggestions discussed in topic    Treatment Plan:  Patient has a current master individualized treatment plan.  See Epic treatment plan for more  information.    Tessa De La Fuente, [facility] C.[facility]. [facility] HospitalSW

## 2025-02-13 NOTE — GROUP NOTE
Process Group Note    PATIENT'S NAME: Shannan Cameron  MRN:   [id number]  :   1980  ACCT. NUMBER: [id number]  DATE OF SERVICE: 25  START TIME: 10:00 AM  END TIME: 10:50 AM  FACILITATOR: Tessa De La Fuente LICSW  TOPIC:  Process Group    Diagnoses:  Principal DSM5 Diagnoses  (Sustained by DSM5 Criteria Listed Above):   296.33 (F33.2) Major Depressive Disorder, Recurrent Episode, Severe With anxious distress  309.81 (F43.10) Posttraumatic Stress Disorder (includes Posttraumatic Stress Disorder for Children 6 Years and Younger)  With dissociative symptoms.       [facility] Adult Mental Health Outpatient Programs  TRACK: iop/dt4    NUMBER OF PARTICIPANTS: 7        Data:    Session content: At the start of this group, patients were invited to check in by identifying themselves, describing their current emotional status, and identifying issues to address in this group.   Area(s) of treatment focus addressed in this session included Symptom Management and Personal Safety.  Shannan shares that she feels overwhelmed today.  Took time in group to share that she is concerned about recent testing with high cancer markers.  Shares with the group that there have been several losses over last year including death of father and death of stepmother.  Accepted the support of the group    Therapeutic Interventions/Treatment Strategies:  Psychotherapist offered support, feedback and validation and reinforced use of skills. Treatment modalities used include Cognitive Behavioral Therapy and Dialectical Behavioral Therapy.    Assessment:    Patient response:   Patient responded to session by accepting feedback, giving feedback, listening, focusing on goals, being attentive, accepting support, appearing alert, and verbalizing understanding    Possible barriers to participation / learning include: and no barriers identified    Health Issues:   Yes: Chronic disease management, Associated Psychological Distress       Substance  Use Review:   Substance Use: No active concerns identified.    Mental Status/Behavioral Observations  Appearance:   Appropriate   Eye Contact:   Good   Psychomotor Behavior: Normal   Attitude:   Cooperative   Orientation:   All  Speech   Rate / Production: Normal    Volume:  Normal   Mood:    Normal  Affect:    Appropriate   Thought Content:   Clear  Thought Form:  Coherent  Goal Directed  Logical     Insight:    Good     Plan:   Safety Plan: No current safety concerns identified.  Recommended that patient call 911 or go to the local ED should there be a change in any of these risk factors.   Barriers to treatment: None identified  Patient Contracts (see media tab):  None  Substance Use: Not addressed in session   Continue or Discharge: Patient will continue in Adult Day Treatment (ADT)  as planned. Patient is likely to benefit from learning and using skills as they work toward the goals identified in their treatment plan.      Tessa De La Fuente, [facility]  February 13, 2025

## 2025-02-13 NOTE — GROUP NOTE
Psychotherapy Group Note    PATIENT'S NAME: Shannan Cameron  MRN:   [id number]  :   1980  ACCT. NUMBER: [id number]  DATE OF SERVICE: 25  START TIME: 11:00 AM  END TIME: 11:50 AM  FACILITATOR: Tessa De La Fuente LICSW  TOPIC: MH EBP Group: Coping Skills  [facility] Adult Mental Health Outpatient Programs  TRACK: iop/dt4    NUMBER OF PARTICIPANTS: 7    Summary of Group / Topics Discussed:  Coping Skills: Additional Coping Skills:  Patients discussed and practiced using a variety of coping skills in different situations.  Reviewed the benefits of applying the aforementioned coping strategies.  Patients explored how these strategies might be applied to daily stressors or distressing situations.    Patient Session Goals / Objectives:  Understand the purpose and benefits of applying  coping strategies      Address barriers to utilizing coping skills when in distress.      Patient Participation / Response:  Fully participated with the group by sharing personal reflections / insights and openly received / provided feedback with other participants.    Demonstrated understanding of topics discussed through group discussion and participation and Expressed understanding of the relevance / importance of coping skills at distressing times in life    Treatment Plan:  Patient has a current master individualized treatment plan.  See Epic treatment plan for more information.    MESSI Ceballos

## 2025-02-15 NOTE — TELEPHONE ENCOUNTER
Retail Pharmacy Prior Authorization Team   Phone: [phone]    PA Initiation    Medication: Mounjaro 2.5 mg/0.5 mL auto injectors  Insurance Company: [place] Medicaid ([facility]) - Phone [phone] Fax [phone]  Pharmacy Filling the Rx: [facility] - [place], [place] - [address] [place] [address]  Filling Pharmacy Phone: [phone]  Filling Pharmacy Fax: [phone]  Start Date: 2/15/2025

## 2025-02-19 ENCOUNTER — TELEPHONE (OUTPATIENT)
Dept: NEUROLOGY | Facility: CLINIC | Age: 45
End: 2025-02-19
Payer: COMMERCIAL

## 2025-02-19 NOTE — TELEPHONE ENCOUNTER
Prior Authorization Retail Medication Request    Medication/Dose:   Diagnosis and ICD code (if different than what is on RX):    New/renewal/insurance change PA/secondary ins. PA:  Previously Tried and Failed:    Rationale:      Insurance   Primary: [employer]  Insurance ID:  [id number]    Secondary (if applicable):  Insurance ID:      Pharmacy Information (if different than what is on RX)  Name:    Phone:    Fax:    Clinic Information  Preferred routing pool for dept communication: [facility] NEURO CLINICAL CARE TEAM

## 2025-02-21 NOTE — TELEPHONE ENCOUNTER
Pharmacy will send over the [place] form.  Once form is received provider part will need to be filled out and chart notes attached then faxed back to pharmacy.  This will be documented when sent.

## 2025-02-21 NOTE — TELEPHONE ENCOUNTER
Additional information needed for PA review. Need patient's current headache/migraine frequency and duration.

## 2025-02-24 NOTE — TELEPHONE ENCOUNTER
Here is the info you requested:    30/30 headache days, almost all severe headaches lasting 5-7 days.    All 30 days are severe

## 2025-02-25 NOTE — TELEPHONE ENCOUNTER
Retail Pharmacy Prior Authorization Team   Phone: [phone]    PA Initiation    Medication: AJOVY 225 MG/1.5ML SC SOAJ  Insurance Company: [employer] ([facility]) - Phone [phone] Fax [phone]  Pharmacy Filling the Rx: [facility] - [place], [place] - [address] [facility]  Filling Pharmacy Phone: [phone]  Filling Pharmacy Fax:    Start Date: 2/26/2025    Note: Due to record-high volumes, our turn-around time is taking longer than usual . We are currently 4  business days behind in the pools.   We are working diligently to submit all requests in a timely manner and in the order they are received. Please only flag TRUE URGENT requests as high priority to the pool at this time.   If you have questions on status of PA's,  please send a note/message in the active PA encounter and send back to the [facility] PA pool [[id number]].    If you have questions about the turn-around time or about our process, please reach out to our supervisor Yazmin Newell.   Thank you!   RPPA (Retail Pharmacy Prior Authorization) team    Faxed form and chart notes to pharmacy to fax to [place] Medicaid for review.

## 2025-02-26 NOTE — TELEPHONE ENCOUNTER
PRIOR AUTHORIZATION DENIED    Medication: MOUNJARO 2.5 MG/0.5ML SC SOAJ  Insurance Company: [place] Medicaid [facility] [employer] - Phone [phone] Fax [phone]  Denial Date: 2/26/2025  Denial Reason(s): Patient needs to use formulary alternatives  Appeal Information: N/A  Patient Notified: No    This has now been DENIED twice. Patient needs to use preferred alternatives.

## 2025-02-27 DIAGNOSIS — E11.9 TYPE 2 DIABETES MELLITUS WITHOUT COMPLICATION, WITHOUT LONG-TERM CURRENT USE OF INSULIN (H): ICD-10-CM

## 2025-02-27 RX ORDER — PEN NEEDLE, DIABETIC 32GX 5/32"
NEEDLE, DISPOSABLE MISCELLANEOUS
Qty: 100 EACH | Refills: 10 | Status: SHIPPED | OUTPATIENT
Start: 2025-02-27

## 2025-03-01 ENCOUNTER — MYC MEDICAL ADVICE (OUTPATIENT)
Dept: FAMILY MEDICINE | Facility: CLINIC | Age: 45
End: 2025-03-01
Payer: COMMERCIAL

## 2025-03-01 DIAGNOSIS — E11.65 TYPE 2 DIABETES MELLITUS WITH HYPERGLYCEMIA, WITHOUT LONG-TERM CURRENT USE OF INSULIN (H): Primary | ICD-10-CM

## 2025-03-01 DIAGNOSIS — R76.0 ABNORMAL ANTIBODY TITER: ICD-10-CM

## 2025-03-03 NOTE — TELEPHONE ENCOUNTER
Prior Authorization Not Needed per pharmacy    Medication: AJOVY 225 MG/1.5ML SC SOAJ  Insurance Company: [employer] ([facility]) - Phone [phone] Fax [phone]  Expected CoPay: $    Pharmacy Filling the Rx: [facility] - [place], [place] - [facility] [address] [facility]  Pharmacy Notified: Yes  Patient Notified: **Instructed pharmacy to notify patient when script is ready to /ship.**    I called Danny Macedo to check the status of PA if they received our PA forms and chart notes to fax to insurance. Per technician, they got a paid claim on 2/24/25 and is ready for  and they will notify the patient.

## 2025-03-04 ENCOUNTER — FCC EXTENDED DOCUMENTATION (OUTPATIENT)
Dept: PSYCHOLOGY | Facility: CLINIC | Age: 45
End: 2025-03-04
Payer: COMMERCIAL
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
## 2025-03-14 ENCOUNTER — VIRTUAL VISIT (OUTPATIENT)
Dept: BEHAVIORAL HEALTH | Facility: CLINIC | Age: 45
End: 2025-03-14
Payer: COMMERCIAL

## 2025-03-14 DIAGNOSIS — F43.10 POSTTRAUMATIC STRESS DISORDER WITH DISSOCIATIVE SYMPTOMS: Primary | ICD-10-CM

## 2025-03-14 PROCEDURE — 90832 PSYTX W PT 30 MINUTES: CPT | Mod: 95 | Performed by: COUNSELOR

## 2025-03-14 ASSESSMENT — PATIENT HEALTH QUESTIONNAIRE - PHQ9
10. IF YOU CHECKED OFF ANY PROBLEMS, HOW DIFFICULT HAVE THESE PROBLEMS MADE IT FOR YOU TO DO YOUR WORK, TAKE CARE OF THINGS AT HOME, OR GET ALONG WITH OTHER PEOPLE: SOMEWHAT DIFFICULT
SUM OF ALL RESPONSES TO PHQ QUESTIONS 1-9: 7
SUM OF ALL RESPONSES TO PHQ QUESTIONS 1-9: 7
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
## 2025-03-31 ENCOUNTER — TELEPHONE (OUTPATIENT)
Dept: FAMILY MEDICINE | Facility: CLINIC | Age: 45
End: 2025-03-31

## 2025-03-31 DIAGNOSIS — E11.65 TYPE 2 DIABETES MELLITUS WITH HYPERGLYCEMIA, WITH LONG-TERM CURRENT USE OF INSULIN (H): Primary | ICD-10-CM

## 2025-03-31 DIAGNOSIS — Z79.4 TYPE 2 DIABETES MELLITUS WITH HYPERGLYCEMIA, WITH LONG-TERM CURRENT USE OF INSULIN (H): Primary | ICD-10-CM

## 2025-03-31 NOTE — TELEPHONE ENCOUNTER
"S-(situation):   Patient calling asking if she can have Lantus orders placed while she is waiting on her prior authorization for her Insulin Glargine.   - Changed to Insulin Glargine since injecting Lantus is uncomfortable - but patient requests Lantus again and states she can tolerate it until PA is approved)    B-(background):   Complex - see diagnoses    Sees 2 different endocrinologists at [place] - 1  for cancer diagnosis and 1 for diabetes.     A-(assessment):   CGM - Blood sugars have been reading above 300 after meals at times. Last happened yesterday at lunch. Average readings are around 200 otherwise.     Taking Trulicity while awaiting [employer] appeal   Taking Jardiance and Metformin.     Has orders for Humalog but she doesn't want to take because of \"weight gain - and also long acting insulin stabilizes sugars better and is better for liver failure.\"     R-(recommendations):   Patient has not contacted endocrinology. When RN advised calling endocrinology, patient declined and stated Dr. Ahn manages her diabetes and she wants her to order the Lantus.     Would PCP order lantus while awaiting PA appeal for Insulin glargine?         "

## 2025-04-01 ENCOUNTER — LAB (OUTPATIENT)
Dept: LAB | Facility: CLINIC | Age: 45
End: 2025-04-01
Payer: COMMERCIAL

## 2025-04-01 DIAGNOSIS — Z79.84 DIABETES MELLITUS TREATED WITH ORAL MEDICATION (H): Primary | ICD-10-CM

## 2025-04-01 DIAGNOSIS — C73 MALIGNANT NEOPLASM OF THYROID GLAND (H): ICD-10-CM

## 2025-04-01 DIAGNOSIS — C73 MALIGNANT NEOPLASM OF THYROID GLAND (H): Primary | ICD-10-CM

## 2025-04-01 DIAGNOSIS — E11.9 DIABETES MELLITUS TREATED WITH ORAL MEDICATION (H): Primary | ICD-10-CM

## 2025-04-01 DIAGNOSIS — E11.65 TYPE 2 DIABETES MELLITUS WITH HYPERGLYCEMIA, WITH LONG-TERM CURRENT USE OF INSULIN (H): ICD-10-CM

## 2025-04-01 DIAGNOSIS — Z79.4 TYPE 2 DIABETES MELLITUS WITH HYPERGLYCEMIA, WITH LONG-TERM CURRENT USE OF INSULIN (H): ICD-10-CM

## 2025-04-01 LAB
ALBUMIN SERPL BCG-MCNC: 4.4 G/DL (ref 3.5–5.2)
ALP SERPL-CCNC: 88 U/L (ref 40–150)
ALT SERPL W P-5'-P-CCNC: 72 U/L (ref 0–50)
AST SERPL W P-5'-P-CCNC: 46 U/L (ref 0–45)
BILIRUB DIRECT SERPL-MCNC: 0.14 MG/DL (ref 0–0.3)
BILIRUB SERPL-MCNC: 0.3 MG/DL
CHOLEST SERPL-MCNC: 182 MG/DL
EST. AVERAGE GLUCOSE BLD GHB EST-MCNC: 206 MG/DL
FASTING STATUS PATIENT QL REPORTED: NO
HBA1C MFR BLD: 8.8 % (ref 0–5.6)
HDLC SERPL-MCNC: 30 MG/DL
LDLC SERPL CALC-MCNC: ABNORMAL MG/DL
LDLC SERPL DIRECT ASSAY-MCNC: 75 MG/DL
NONHDLC SERPL-MCNC: 152 MG/DL
PROT SERPL-MCNC: 7.4 G/DL (ref 6.4–8.3)
T4 FREE SERPL-MCNC: 1.17 NG/DL (ref 0.9–1.7)
TRIGL SERPL-MCNC: 1020 MG/DL
TSH SERPL DL<=0.005 MIU/L-ACNC: 2.5 UIU/ML (ref 0.3–4.2)

## 2025-04-01 PROCEDURE — 36415 COLL VENOUS BLD VENIPUNCTURE: CPT

## 2025-04-01 PROCEDURE — 80076 HEPATIC FUNCTION PANEL: CPT

## 2025-04-01 PROCEDURE — 84439 ASSAY OF FREE THYROXINE: CPT

## 2025-04-01 PROCEDURE — 83036 HEMOGLOBIN GLYCOSYLATED A1C: CPT

## 2025-04-01 PROCEDURE — 83721 ASSAY OF BLOOD LIPOPROTEIN: CPT | Mod: 59

## 2025-04-01 PROCEDURE — 80061 LIPID PANEL: CPT

## 2025-04-01 PROCEDURE — 84443 ASSAY THYROID STIM HORMONE: CPT

## 2025-04-29 ENCOUNTER — VIRTUAL VISIT (OUTPATIENT)
Dept: PSYCHIATRY | Facility: CLINIC | Age: 45
End: 2025-04-29
Payer: COMMERCIAL

## 2025-04-29 ENCOUNTER — VIRTUAL VISIT (OUTPATIENT)
Dept: BEHAVIORAL HEALTH | Facility: CLINIC | Age: 45
End: 2025-04-29
Payer: COMMERCIAL

## 2025-04-29 VITALS — WEIGHT: 230 LBS | SYSTOLIC BLOOD PRESSURE: 116 MMHG | BODY MASS INDEX: 33.95 KG/M2 | DIASTOLIC BLOOD PRESSURE: 79 MMHG

## 2025-04-29 DIAGNOSIS — F43.10 PTSD (POST-TRAUMATIC STRESS DISORDER): ICD-10-CM

## 2025-04-29 DIAGNOSIS — F33.1 MODERATE EPISODE OF RECURRENT MAJOR DEPRESSIVE DISORDER (H): Primary | ICD-10-CM

## 2025-04-29 DIAGNOSIS — F43.10 POSTTRAUMATIC STRESS DISORDER WITH DISSOCIATIVE SYMPTOMS: Primary | ICD-10-CM

## 2025-04-29 DIAGNOSIS — F33.2 SEVERE EPISODE OF RECURRENT MAJOR DEPRESSIVE DISORDER, WITHOUT PSYCHOTIC FEATURES (H): ICD-10-CM

## 2025-04-29 DIAGNOSIS — F41.9 ANXIETY DISORDER, UNSPECIFIED TYPE: ICD-10-CM

## 2025-04-29 DIAGNOSIS — F41.1 GENERALIZED ANXIETY DISORDER: ICD-10-CM

## 2025-04-29 PROCEDURE — 90834 PSYTX W PT 45 MINUTES: CPT | Mod: 93 | Performed by: COUNSELOR

## 2025-04-29 RX ORDER — BUPROPION HYDROCHLORIDE 150 MG/1
150 TABLET ORAL EVERY MORNING
Qty: 30 TABLET | Refills: 1 | Status: SHIPPED | OUTPATIENT
Start: 2025-04-29

## 2025-04-29 RX ORDER — GUANFACINE 2 MG/1
2 TABLET, EXTENDED RELEASE ORAL AT BEDTIME
Qty: 30 TABLET | Refills: 1 | Status: SHIPPED | OUTPATIENT
Start: 2025-04-29

## 2025-04-29 ASSESSMENT — PATIENT HEALTH QUESTIONNAIRE - PHQ9
SUM OF ALL RESPONSES TO PHQ QUESTIONS 1-9: 14
SUM OF ALL RESPONSES TO PHQ QUESTIONS 1-9: 14
10. IF YOU CHECKED OFF ANY PROBLEMS, HOW DIFFICULT HAVE THESE PROBLEMS MADE IT FOR YOU TO DO YOUR WORK, TAKE CARE OF THINGS AT HOME, OR GET ALONG WITH OTHER PEOPLE: SOMEWHAT DIFFICULT

## 2025-04-29 ASSESSMENT — PAIN SCALES - GENERAL: PAINLEVEL_OUTOF10: SEVERE PAIN (8)

## 2025-04-29 NOTE — NURSING NOTE
Is the patient currently in the state of [place]? YES    Current patient location: Patient declined to provide     Visit mode:Converted to Telephone Pt unable to open video     If the visit is dropped, the patient can be reconnected by: VIDEO VISIT: Text to cell phone:   Telephone Information:   Mobile [phone]       Will anyone else be joining the visit? No  (If patient encounters technical issues they should call [phone])    Are changes needed to the allergy or medication list? No    Are refills needed on medications prescribed by this physician? Yes Pantoprozole    Rooming Documentation: Questionnaire(s) completed.    Reason for visit: RECHECK     JOSAFAT Murdock
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
## 2025-04-29 NOTE — PROGRESS NOTES
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 [place] Crisis Text Line. Text [place] to 169551 or Suicide LifeLine Chat: suicidepreventionNaehasline.org/chat  Schedule an appointment with me and Behavioral Health Consultant in 4 weeks or sooner as needed. Call [facility] at [phone] to schedule.  Follow up with primary care provider as planned or for acute medical concerns.  Call the psychiatric nurse line with medication questions or concerns at [phone].  Citymap[place]art may be used to communicate with your provider, but this is not intended to be used for emergencies.    Patient Education:  Medication side effects and alternatives reviewed. Health promotion activities recommended and reviewed today. All questions addressed. Education and counseling completed regarding risks and benefits of medications and psychotherapy options.  Consent provided by patient/guardian  Call the psychiatric nurse line with medication questions or concerns at [phone].  Citymapshart may be used to communicate with your provider, but this is not intended to be used for emergencies.  Medlineplus.gov is information for patients.  It is run by the [employer] and it contains information about all disorders, diseases and all medications.      Discussed side effects of bupropion to include agitation and other activation issues, ^ BP or HR,  insomnia, stomach upset, appetite loss, weight loss, risk for precipitation of jordan, and increased risk for seizures.  Patient agreed to take Bupropion to treat low mood, lack of motivation and poor concentration. Patient verbalized understanding of medication schedule, of side effects, avoidance of alcohol and marijuana due to increase risk for seizures.    Discussed that guanfacine is being used off label given current clinical picture, with possible benefit to anxiety and/or nightmares at bedtime. Discussed risks of side effects to monitor, including drowsiness, low HR, BP, lightheadedness or dizziness, as well as not to suddenly stop medication due to risk for rebound hypertension.        Community Resources:    National Suicide Prevention Lifeline: 948.970.8191 (TTY: 451.608.1871). Call anytime for help.  (www.suicidepreventionlifeline.org)  National [place] on Mental Illness (www.stacie.org): 351.953.5925 or 334-015-5190.   Mental Health Association (www.mentalhealth.org): 493.552.7449 or 639-960-6826.  [place] Crisis Text Line: Text [place] to 656851  Suicide LifeLine Chat: suicideRECUPYL.org/chat    Administrative Billing:       Level of Medical Decision Making:   - At least 1 chronic problem that is not stable  - Engaged in prescription drug management during visit (discussed any medication benefits, side effects, alternatives, etc.)             Patient Status:  CCPS MD/DO/NP/PA providers offer care a specialty service for Primary Care Providers in the [facility] that seek to optimize psychotropic medications for unstable patients.  Once medications have been optimized, our providers discharge the patient back to the referring Primary Care Provider for ongoing medication management.  This type of system allows our providers to serve a high volume of patients.   The patient is being referred to long term community psychiatry care and provider will provide bridging until patient is established  with new community provider.     Signed:   Tanika Gruber, MSN, APRN, PMHNP-BC  Collaborative Care Psychiatry Service (CCPS)  [facility]    Chart documentation done in part with Dragon Voice Recognition software.  Although reviewed after completion, some word and grammatical errors may remain.

## 2025-04-29 NOTE — PATIENT INSTRUCTIONS
**For crisis resources, please see the information at the end of this document**     Thank you for coming to the [facility] MENTAL HEALTH & ADDICTION [place] [facility].    TREATMENT PLAN:    You have been referred for long-term psychiatric care due to the complexity, chronicity, and severity of your psychiatric case.  It is my opinion that your care will be more optimally managed by a long-term psychiatric prescriber versus returning your psychiatric care back to your primary care provider for ongoing management.  They were unable to reach you. Please return their call at [phone] to get scheduled.  If your appointment to establish care with a long-term psychiatric provider is greater than 4 weeks out from today's visit, please schedule a follow-up visit with me in 4 weeks from now.  As discussed during your appointment, I am agreeable to bridging care for up to 3 months only, or until you establish with new provider, whichever comes first.  This allows us to continue to see a large number of new patients in the collaborative care model. Please make sure you keep any scheduled appointments.  If you have not scheduled with a long-term provider within the 3 months, your care will be returned to your primary care provider.      Medications:   START bupropion  mg every day. Script sent + 1 refill.  CONTINUE  guanfacine ER 2 mg at bedtime. Script sent + 1 refill.  Continue all other medications per primary care provider.     Consults / Referrals:   Consider alternative DBT in community per Behavioral Health Consultant recommendations.  Referral placed for long term psychiatry. Please call [phone] to schedule.  Care coordination referral placed.    Follow-up:  Schedule an appointment with me and Behavioral Health Consultant in 4 weeks or sooner as needed. Call [facility] at [phone] to schedule.  Follow up with primary care provider as planned or for acute medical concerns.  Call  the psychiatric nurse line with medication questions or concerns at [phone].  MyChart may be used to communicate with your provider, but this is not intended to be used for emergencies.    Psychoeducation:  Discussed side effects of bupropion to include agitation and other activation issues, ^ BP or HR, insomnia, stomach upset, appetite loss, weight loss, risk for precipitation of jordan, and increased risk for seizures.  Patient agreed to take Bupropion to treat low mood, lack of motivation and poor concentration. Patient verbalized understanding of medication schedule, of side effects, avoidance of alcohol and marijuana due to increase risk for seizures.    Discussed that guanfacine is being used off label given current clinical picture, with possible benefit to anxiety and/or nightmares at bedtime. Discussed risks of side effects to monitor, including drowsiness, low HR, BP, lightheadedness or dizziness, as well as not to suddenly stop medication due to risk for rebound hypertension.       Financial Assistance [phone]  [place] Billing [phone]  Central Billing Office, [facility]: [phone]  [place] Billing [phone]  Medical Records [phone]  [place] Patient Bill of Rights [email or web address]       MENTAL HEALTH CRISIS RESOURCES:  For a emergency help, please call 911 or go to the nearest Emergency Department.     Emergency Walk-In Options:   EmPATH Unit @ [facility] ([place]): [phone] - Specialized mental health emergency area designed to be calming  [facility] ([place]): [phone]  [facility] ([place]): [phone]  [facility]): [phone]    [facility] Crisis Information:   [place]: [phone]  [place]: [phone]  [place] ([place]) - Adult: [phone]     Child: [phone]  [place] - Adult: [phone]     Child:  [phone]  [place]: [phone]  List of all [facility] resources:   https://[place].gov/dhs/people-we-serve/adults/health-care/mental-health/resources/crisis-contacts.jsp    National Crisis Information:   National Suicide & Crisis Lifeline: Call 988        For online chat options, visit https://suicidepreventionlifeline.org/chat/  Poison Control Center: 2-717-970-9007  Poison Control Center: 6-416-569-5234  Trans Lifeline: 2-678-844-9016 - Hotline for transgender people of all ages  The [employer]: 3-523-599-5668 - Hotline for LGBT youth     For Non-Emergency Support:   Fast Tracker: Mental Health & Substance Use Disorder Resources -   https://www.[employer] Innovationsn.org/       Again thank you for choosing [facility] MENTAL HEALTH & ADDICTION [place] [facility] and please let us know how we can best partner with you to improve you and your family's health.    You may be receiving a survey regarding this appointment. We would love to have your feedback, both positive and negative. The survey is done by an external company, so your answers are anonymous.        Patient Education   Collaborative Care Psychiatry Service  What to Expect  Here's what to expect from your Collaborative Care Psychiatry Service (CCPS).   About CCPS  CCPS means 2 people work together to help you get better. You'll meet with a behavioral health clinician and a psychiatric doctor. A behavioral health clinician helps people with mental health problems by talking with them. A psychiatric doctor helps people by giving them medicine.  How it works  At every visit, you'll see the behavioral health clinician (BHC) first. They'll talk with you about how you're doing and teach you how to feel better.   Then you'll see the psychiatric doctor. This doctor can help you deal with troubling thoughts and feelings by giving you medicine. They'll make sure you know the plan for your care.   CCPS usually takes 3 to 6 visits. If you need more visits, we may have  "you start seeing a different psychiatric doctor for ongoing care.  If you have any questions or concerns, we'll be glad to talk with you.  About visits  Be open  At your visits, please talk openly about your problems. It may feel hard, but it's the best way for us to help you.  Cancelling visits  If you can't come to your visit, please call us right away at [phone]. If you don't cancel at least 24 hours (1 full day) before your visit, that's \"late cancellation.\"  Being late to visits  Being very late is the same as not showing up. You will be a \"no show\" if:  Your appointment starts with a [facility], and you're more than 15 minutes late for a 30-minute (half hour) visit. This will also cancel your appointment with the psychiatric doctor.  Your appointment is with a psychiatric doctor only, and you're more than 15 minutes late for a 30-minute (half hour) visit.  Your appointment is with a psychiatric doctor only, and you're more than 30 minutes late for a 60-minute (full hour) visit.  If you cancel late or don't show up 2 times within 6 months, we may end your care.   Getting help between visits  If you need help between visits, you can call us Monday to Friday from 8 a.m. to 4:30 p.m. at [phone].  Emergency care  Call 911 or go to the nearest emergency department if your life or someone else's life is in danger.  Call 988 anytime to reach the national Suicide and Crisis hotline.  Medicine refills  To refill your medicine, call your pharmacy. You can also call [facility]'s Behavioral Access at [phone], Monday to Friday, 8 a.m. to 4:30 p.m. It can take 1 to 3 business days to get a refill.   Forms, letters, and tests  You may have papers to fill out, like FMLA, short-term disability, and workability. We can help you with these forms at your visits, but you must have an appointment. You may need more than 1 visit for this, to be in an intensive therapy program, or both.  Before we can give you " medicine for ADHD, we may refer you to get tested for it or confirm it another way.  We may not be able to give you an emotional support animal letter.  We don't do mental health checks ordered by the court.   We don't do mental health testing, but we can refer you to get tested.   Thank you for choosing us for your care.  For informational purposes only. Not to replace the advice of your health care provider. Copyright   2022 [facility]. All rights reserved. [employer] [id number] - 12/22.

## 2025-04-30 ENCOUNTER — PATIENT OUTREACH (OUTPATIENT)
Dept: CARE COORDINATION | Facility: CLINIC | Age: 45
End: 2025-04-30
Payer: COMMERCIAL

## 2025-05-01 ENCOUNTER — PATIENT OUTREACH (OUTPATIENT)
Dept: CARE COORDINATION | Facility: CLINIC | Age: 45
End: 2025-05-01
Payer: COMMERCIAL

## 2025-05-01 NOTE — PROGRESS NOTES
Clinic Care Coordination Contact  Clinic Care Coordination Contact  OUTREACH    Referral Information: JAMIR Infante CNP     Chief Complaint   Patient presents with    Clinic Care Coordination - Initial      Universal Utilization:    Utilization      No Show Count (past year)  16             ED Visits  1             Hospital Admissions  0                    Current as of: 4/30/2025 11:42 AM              Clinical Concerns:  Current Medical Concerns: Did not discuss.    Current Behavioral Concerns: [facility] contacted patient to follow up on referral placed by Tanika Gruber. Patient reports that she is looking for a therapist that accepts her insurance. She [place] in [place]. She would prefer a female therapist, and wants to see somebody with trauma experience. She requested these resources be sent to her via [employer]. She denies having further social service needs at this time.    Medication Management:  Medication review status: Did not discuss.    Lifestyle & Psychosocial Needs:    Social Drivers of Health     Food Insecurity: High Risk (11/11/2024)    Food Insecurity     Within the past 12 months, did you worry that your food would run out before you got money to buy more?: Yes     Within the past 12 months, did the food you bought just not last and you didn t have money to get more?: Yes   Depression: Not at risk (4/29/2025)    PHQ-2     PHQ-2 Score: 2   Recent Concern: Depression - At risk (2/7/2025)    PHQ-2     PHQ-2 Score: 4   Housing Stability: Low Risk  (11/11/2024)    Housing Stability     Do you have housing? : Yes     Are you worried about losing your housing?: No   Tobacco Use: Medium Risk (4/29/2025)    Patient History     Smoking Tobacco Use: Former     Smokeless Tobacco Use: Never     Passive Exposure: Past   Financial Resource Strain: Low Risk  (11/11/2024)    Financial Resource Strain     Within the past 12 months, have you or your family members you live with been unable to get  utilities (heat, electricity) when it was really needed?: No   Alcohol Use: Not on file   Transportation Needs: High Risk (11/11/2024)    Transportation Needs     Within the past 12 months, has lack of transportation kept you from medical appointments, getting your medicines, non-medical meetings or appointments, work, or from getting things that you need?: Yes   Physical Activity: Sufficiently Active (2/12/2024)    [facility]    Exercise Vital Sign     Days of Exercise per Week: 2 days     Minutes of Exercise per Session: 120 min   Interpersonal Safety: Low Risk  (10/31/2024)    Interpersonal Safety     Do you feel physically and emotionally safe where you currently live?: Yes     Within the past 12 months, have you been hit, slapped, kicked or otherwise physically hurt by someone?: No     Within the past 12 months, have you been humiliated or emotionally abused in other ways by your partner or ex-partner?: No   Stress: Not on file   Social Connections: Unknown (1/1/2022)    Received from [facility], [facility]    Social Connections     Frequency of Communication with Friends and Family: Not on file   Health Literacy: Not on file      Patient/Caregiver understanding: Patient verbalized understanding, engaged in AIDET communication during patient encounter.     Future Appointments                In 4 weeks Kaycee Sutton LICSW [facility] Mental [facility] & Addiction [facility], [facility]    In 4 weeks Tanika Gruber APRN CNP [facility] Addiction [facility]    In 7 months Samuel Drake MD [facility] CLIN          Plan: Will send patient therapy resources via [employer], per her request.     Care Coordinator will follow up with patient in one month.    TREVOR Leach for TREVOR Todd  Clinic Care Coordination  [facility]  [email or web address]  [phone]

## 2025-05-12 ENCOUNTER — OFFICE VISIT (OUTPATIENT)
Dept: FAMILY MEDICINE | Facility: CLINIC | Age: 45
End: 2025-05-12
Payer: COMMERCIAL

## 2025-05-12 VITALS
HEART RATE: 82 BPM | BODY MASS INDEX: 33.93 KG/M2 | OXYGEN SATURATION: 97 % | WEIGHT: 229.1 LBS | TEMPERATURE: 98.3 F | SYSTOLIC BLOOD PRESSURE: 131 MMHG | HEIGHT: 69 IN | DIASTOLIC BLOOD PRESSURE: 82 MMHG | RESPIRATION RATE: 16 BRPM

## 2025-05-12 DIAGNOSIS — R25.3 MUSCLE TWITCHING: Primary | ICD-10-CM

## 2025-05-12 DIAGNOSIS — Z87.19 HISTORY OF PANCREATITIS: ICD-10-CM

## 2025-05-12 DIAGNOSIS — K76.0 METABOLIC DYSFUNCTION-ASSOCIATED STEATOTIC LIVER DISEASE (MASLD): ICD-10-CM

## 2025-05-12 DIAGNOSIS — F33.2 SEVERE EPISODE OF RECURRENT MAJOR DEPRESSIVE DISORDER, WITHOUT PSYCHOTIC FEATURES (H): ICD-10-CM

## 2025-05-12 DIAGNOSIS — F06.30 MOOD DISORDER IN CONDITIONS CLASSIFIED ELSEWHERE: ICD-10-CM

## 2025-05-12 DIAGNOSIS — E11.9 TYPE 2 DIABETES MELLITUS WITHOUT COMPLICATION, WITHOUT LONG-TERM CURRENT USE OF INSULIN (H): ICD-10-CM

## 2025-05-12 DIAGNOSIS — E89.0 S/P PARTIAL THYROIDECTOMY: ICD-10-CM

## 2025-05-12 DIAGNOSIS — E78.1 HYPERTRIGLYCERIDEMIA: Chronic | ICD-10-CM

## 2025-05-12 DIAGNOSIS — K59.00 CONSTIPATION, UNSPECIFIED CONSTIPATION TYPE: ICD-10-CM

## 2025-05-12 DIAGNOSIS — C73 MALIGNANT NEOPLASM OF THYROID GLAND (H): ICD-10-CM

## 2025-05-12 LAB
BASOPHILS # BLD AUTO: 0.1 10E3/UL (ref 0–0.2)
BASOPHILS NFR BLD AUTO: 1 %
EOSINOPHIL # BLD AUTO: 0.1 10E3/UL (ref 0–0.7)
EOSINOPHIL NFR BLD AUTO: 1 %
ERYTHROCYTE [DISTWIDTH] IN BLOOD BY AUTOMATED COUNT: 14.5 % (ref 10–15)
HCT VFR BLD AUTO: 47.6 % (ref 35–47)
HGB BLD-MCNC: 15.8 G/DL (ref 11.7–15.7)
IMM GRANULOCYTES # BLD: 0 10E3/UL
IMM GRANULOCYTES NFR BLD: 0 %
LYMPHOCYTES # BLD AUTO: 2.4 10E3/UL (ref 0.8–5.3)
LYMPHOCYTES NFR BLD AUTO: 27 %
MCH RBC QN AUTO: 29.7 PG (ref 26.5–33)
MCHC RBC AUTO-ENTMCNC: 33.2 G/DL (ref 31.5–36.5)
MCV RBC AUTO: 90 FL (ref 78–100)
MONOCYTES # BLD AUTO: 0.4 10E3/UL (ref 0–1.3)
MONOCYTES NFR BLD AUTO: 4 %
NEUTROPHILS # BLD AUTO: 5.7 10E3/UL (ref 1.6–8.3)
NEUTROPHILS NFR BLD AUTO: 66 %
PLATELET # BLD AUTO: 300 10E3/UL (ref 150–450)
RBC # BLD AUTO: 5.32 10E6/UL (ref 3.8–5.2)
WBC # BLD AUTO: 8.7 10E3/UL (ref 4–11)

## 2025-05-12 PROCEDURE — 85025 COMPLETE CBC W/AUTO DIFF WBC: CPT | Mod: QW | Performed by: FAMILY MEDICINE

## 2025-05-12 PROCEDURE — 99215 OFFICE O/P EST HI 40 MIN: CPT | Performed by: FAMILY MEDICINE

## 2025-05-12 PROCEDURE — 82150 ASSAY OF AMYLASE: CPT | Performed by: FAMILY MEDICINE

## 2025-05-12 PROCEDURE — 82977 ASSAY OF GGT: CPT | Performed by: FAMILY MEDICINE

## 2025-05-12 PROCEDURE — 86140 C-REACTIVE PROTEIN: CPT | Performed by: FAMILY MEDICINE

## 2025-05-12 PROCEDURE — 82077 ASSAY SPEC XCP UR&BREATH IA: CPT | Performed by: FAMILY MEDICINE

## 2025-05-12 PROCEDURE — 80053 COMPREHEN METABOLIC PANEL: CPT | Performed by: FAMILY MEDICINE

## 2025-05-12 PROCEDURE — 83690 ASSAY OF LIPASE: CPT | Performed by: FAMILY MEDICINE

## 2025-05-12 PROCEDURE — 36415 COLL VENOUS BLD VENIPUNCTURE: CPT | Performed by: FAMILY MEDICINE

## 2025-05-12 PROCEDURE — 84443 ASSAY THYROID STIM HORMONE: CPT | Performed by: FAMILY MEDICINE

## 2025-05-12 RX ORDER — INSULIN LISPRO 100 [IU]/ML
15 INJECTION, SOLUTION INTRAVENOUS; SUBCUTANEOUS
COMMUNITY
Start: 2025-03-06

## 2025-05-12 RX ORDER — POLYETHYLENE GLYCOL 3350 17 G/17G
1 POWDER, FOR SOLUTION ORAL DAILY
Qty: 510 G | Refills: 1 | Status: SHIPPED | OUTPATIENT
Start: 2025-05-12

## 2025-05-12 RX ORDER — TIRZEPATIDE 2.5 MG/.5ML
2.5 INJECTION, SOLUTION SUBCUTANEOUS WEEKLY
COMMUNITY
Start: 2025-03-06

## 2025-05-12 NOTE — PATIENT INSTRUCTIONS
Thank you for visiting us today. Here is a summary of my recommendations based on what we discussed:  I will have you follow-up with your primary care provider in the next week approximately, in the meantime I recommend you start daily MiraLAX, if you notice that your symptoms are only partially improved you can go ahead and take it twice a day.  Bear in mind that Mounjaro slows down intestinal transit and can cause issues with constipation and abdominal pain as well.  Labs were ordered and we will keep you posted about test results  Upon following up with your primary remember that you need to get checked for your concerns about a lump in the right breast.  If you do not have an appointment for an ultrasound of your thyroid and neck coming up I will first look at the history, plans and perhaps ordering the ultrasound ourselves if necessary.      Please let us know if you have any questions via Transfer To or you can call us too.      Omari Jordan MD)

## 2025-05-12 NOTE — PROGRESS NOTES
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         Type 2 diabetes mellitus without complication, without long-term current use of insulin (H)    The patient is currently on Mounjaro and metformin, she is at 2.5 mg of the Mounjaro.  She is not aware of any side effects, she says her glucose is much better controlled with this.  According to the A1c she did have some worsening numbers recently but now she showed me her continuous monitor with an average over the last 2 weeks equivalent to an A1c of 6.9%, furthermore the graft shows no episodes of hypoglycemia or any significant hyperglycemia.         Relevant Medications    tirzepatide (MOUNJARO) 2.5 MG/0.5ML SOAJ auto-injector pen    insulin lispro (HUMALOG KWIKPEN) 100 UNIT/ML (1 unit dial) KWIKPEN    Malignant neoplasm of thyroid gland (H)    Patient follows up at [facility] endocrinology.  Diagnosed with Papillary Thyroid Cancer (Follicular Variant), left lobe  thyroidectomy with the follow up ultrasound indicating a  remnant thyroid tissue noted on ultrasound. No evidence of disease recurrence.  She was seen last February and they recommend annual surveillance including ultrasound, TSH and thyroglobulin.  The patient states that she needs surveillance every 3 months.  New TSH  drawn today            Relevant Orders    TSH with free T4 reflex (Completed)    S/P partial thyroidectomy    She has been followed by endocrinology at [facility], she thought she needed another ultrasound in 3 months but the plan clearly states she is a stable and needs 1 every year.         MDD (major depressive disorder), recurrent episode, severe (H)    Denies any new suicidal ideations or exacerbation of her depressive symptoms.         Metabolic dysfunction-associated steatotic liver  "disease (MASLD)    The patient is being evaluated by GI for the symptoms, recent LFTs were elevated         Muscle twitching - Primary    The reported muscle twitching that she is having is not well-defined, I did order labs to check for calcium as well as other electrolytes         Relevant Orders    Comprehensive metabolic panel (BMP + Alb, Alk Phos, ALT, AST, Total. Bili, TP) (Completed)    CRP, inflammation (Completed)    CBC with platelets and differential (Completed)    Constipation, unspecified constipation type    The patient currently takes benafiber and I recommended adding MiraLAX daily, I feel her current abdominal pain can be related to this in addition to the Mounjaro causing some gastroparesis         Relevant Medications    polyethylene glycol (MIRALAX) 17 GM/Dose powder    History of pancreatitis    The patient has had several episodes of pancreatitis, she does report vague symptoms of upper abdominal pain, her triglycerides were extremely high today but her lipase and amylase are within normal limits.         Relevant Orders    Lipase (Completed)    Amylase (Completed)    GGT (Completed)    Ethanol Level Blood (Completed)    Anti Nuclear Dara IgG by IFA with Reflex    Hypertriglyceridemia    She has a very high triglyceride levels over 1000         Relevant Medications    fenofibrate (TRICOR) 145 MG tablet    Other Relevant Orders    GGT (Completed)    Ethanol Level Blood (Completed)    Anti Nuclear Dara IgG by IFA with Reflex      I spent a total of 45 minutes engaged in this appointment including review of past medical history, medication history, face-to-face evaluation of the patient and management of the plan of care.  This included extensive research about past medical history, ER visits, specialist visits, labs and imaging studies       BMI  Estimated body mass index is 33.64 kg/m  as calculated from the following:    Height as of this encounter: 1.758 m (5' 9.2\").    Weight as of this " "encounter: 103.9 kg (229 lb 1.6 oz).           Subjective   Kayla is a 45 year old, presenting for the following health issues:  Recheck Medication (Medication follow up /Dehydration concerns )        5/12/2025    11:15 AM   Additional Questions   Roomed by QUAN Rodriguez     Shannan is here with several concerns, she has been in correspondence through [employer] with her primary care provider in regards to her concerns about muscle twitching around her face, torso as well as concerns about possible electrolyte abnormality.  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  She states that her symptoms are mostly from her torso up and she noticed some twitching on the right side of her face.  She states she was diagnosed with diabetes insipidus, I did not find the records but she indicates that she finished a 24-hour urine collection and was told that this was a likely diagnosis.  She is very concerned about intermittent aches and pains that she refers as \"charley horses\" in left upper and right upper part of the abdomen,Shannan utilizes a few awkward descriptions including that she feels as if her pancreas was flipping over inside her.  In her list of concerns she also mentioned about a lump in her right breast that she agreed to defer for her follow-up with her primary care provider coming up.  She also endorses pain in her bottom right foot especially after long periods of rest, she has been wearing some sandals that she thought will give her the most arch support.  Review of medical records shows history of thyroid papillary cancer minimally invasive, follow-up ultrasound did reveal a leftover fragment of her thyroid which she calls her thyroid regrowth that it is being monitored with ultrasound on a regular basis.    History of Present Illness       Diabetes:   She presents for follow up of diabetes.   She is checking home blood glucose with a " continuous glucose monitor.   She checks blood glucose before meals, after meals, before and after meals and at bedtime.  Blood glucose is sometimes over 200 and never under 70. She is aware of hypoglycemia symptoms including shakiness and weakness.   She is concerned about other.   She is having blurry vision and weight gain.            Hypothyroidism:     Since last visit, patient describes the following symptoms::  Anxiety, Constipation, Depression, Dry skin, Fatigue, Hair loss, Loose stools, Tremors and Weight gain    Weight gain::  5 lbs.    Reason for visit:  New symptoms    She eats 4 or more servings of fruits and vegetables daily.She consumes 0 sweetened beverage(s) daily.She exercises with enough effort to increase her heart rate 20 to 29 minutes per day.  She exercises with enough effort to increase her heart rate 5 days per week.   She is taking medications regularly.                  Review of Systems  Review of Systems   Constitutional:  Positive for activity change, appetite change and fatigue. Negative for fever.   HENT: Negative.     Eyes: Negative.    Respiratory:  Negative for choking, chest tightness and shortness of breath.    Cardiovascular:  Negative for chest pain and palpitations.   Gastrointestinal:  Positive for abdominal pain, constipation and nausea. Negative for abdominal distention, blood in stool and diarrhea.   Genitourinary:  Negative for frequency and pelvic pain.   Musculoskeletal:  Positive for arthralgias and myalgias.   Skin:  Negative for color change and rash.   Neurological:  Positive for dizziness, weakness, numbness and headaches. Negative for tremors, syncope and speech difficulty.   Hematological:  Negative for adenopathy. Does not bruise/bleed easily.   Psychiatric/Behavioral:  Positive for agitation and sleep disturbance. Negative for suicidal ideas. The patient is nervous/anxious.            Objective    /82   Pulse 82   Temp 98.3  F (36.8  C) (Oral)   Resp  "16   Ht 1.758 m (5' 9.2\")   Wt 103.9 kg (229 lb 1.6 oz)   LMP 09/25/2019 (Within Days)   SpO2 97%   BMI 33.64 kg/m    Body mass index is 33.64 kg/m .  Physical Exam   On exam she appears a bit fidgety and nervous although she does not have hallucinations or delusions she utilizes bizarre descriptions for her symptoms including feeling that her pancreas is flipping over inside her abdomen, charley horses instead of colicky pain, she also uses the word hyperexcitability because she says she read that is part of Braulio's syndrome that describes her issues, however when queried further she is basically talking about some twitching feeling around her face and upper torso, she was not much more specific.  HEENT:  Head: Normocephalic, atraumatic.  Eyes: PERRLA; EOMI; sclerae and conjunctivae clear.  Ears: Tympanic membranes intact, no discharge.  Nose: Mucosa pink, septum midline.  Throat: Oral mucosa pink, no tonsillar enlargement or exudate.  Neck: Supple, no lymphadenopathy or thyroid palpated, no masses   Heart exam: Heart rate and rhythm are normal and regular. No murmurs, gallop or rubs are auscultated, S1 and S2 are heard at normal intensity.  Lungs: Breath sounds are clear and vesicular throughout all lung fields. No wheezes, crackles, or rhonchi detected.  Abdomen with some tenderness in the left upper quadrant area extending to the mesogastric, no signs of peritoneal irritation.  Musculoskeletal:  Tenderness in the HEENT:  Head: Normocephalic, atraumatic.  Eyes: PERRLA; EOMI; sclerae and conjunctivae clear.  Ears: Tympanic membranes intact, no discharge.  Nose: Mucosa pink, septum midline.  Throat: Oral mucosa pink, no tonsillar enlargement or exudate.  Neck: Supple, no lymphadenopathy or thyroid enlargement.  Plantar fascia insertion and around the posterior ankle, this was mentioned and observed towards the end of the visit    Neurological exam:  The cranial nerve and peripheral nerve  examination revealed " normal findings: PERRL , extraocular muscles were intact. Facial sensation throughout equal on both sides,  muscles of facial expression are symmetric. Grossly no hearing impairment noticed during  low voice conversation.  The oropharynx was clear, with a normal  elevation of the palate. Tongue movements were smooth and without deviation,  shoulder shrug and head rotation were strong and symmetrical. Balance is normal with no gross deficits detected. Patient has adequate and symmetric   strength without weakness, extends wrists and fingers smoothly, abducts shoulder against resistance, extends legs while seating , has normal plantar and dorsiflexion in both feet.  Using tuning fork I check for sensibility and vibration over her face arms and legs all of which appear to be within normal limits.    Results for orders placed or performed in visit on 05/12/25   Comprehensive metabolic panel (BMP + Alb, Alk Phos, ALT, AST, Total. Bili, TP)     Status: Abnormal   Result Value Ref Range    Sodium 141 135 - 145 mmol/L    Potassium 3.7 3.4 - 5.3 mmol/L    Carbon Dioxide (CO2) 24 22 - 29 mmol/L    Anion Gap 16 (H) 7 - 15 mmol/L    Urea Nitrogen 9.5 6.0 - 20.0 mg/dL    Creatinine 0.61 0.51 - 0.95 mg/dL    GFR Estimate >90 >60 mL/min/1.73m2    Calcium 9.9 8.8 - 10.4 mg/dL    Chloride 101 98 - 107 mmol/L    Glucose 132 (H) 70 - 99 mg/dL    Alkaline Phosphatase 103 40 - 150 U/L    AST 81 (H) 0 - 45 U/L     (H) 0 - 50 U/L    Protein Total 7.9 6.4 - 8.3 g/dL    Albumin 4.9 3.5 - 5.2 g/dL    Bilirubin Total 0.4 <=1.2 mg/dL   CRP, inflammation     Status: Abnormal   Result Value Ref Range    CRP Inflammation 9.78 (H) <5.00 mg/L   CBC with platelets and differential     Status: Abnormal   Result Value Ref Range    WBC Count 8.7 4.0 - 11.0 10e3/uL    RBC Count 5.32 (H) 3.80 - 5.20 10e6/uL    Hemoglobin 15.8 (H) 11.7 - 15.7 g/dL    Hematocrit 47.6 (H) 35.0 - 47.0 %    MCV 90 78 - 100 fL    MCH 29.7 26.5 - 33.0 pg    MCHC 33.2  31.5 - 36.5 g/dL    RDW 14.5 10.0 - 15.0 %    Platelet Count 300 150 - 450 10e3/uL    % Neutrophils 66 %    % Lymphocytes 27 %    % Monocytes 4 %    % Eosinophils 1 %    % Basophils 1 %    % Immature Granulocytes 0 %    Absolute Neutrophils 5.7 1.6 - 8.3 10e3/uL    Absolute Lymphocytes 2.4 0.8 - 5.3 10e3/uL    Absolute Monocytes 0.4 0.0 - 1.3 10e3/uL    Absolute Eosinophils 0.1 0.0 - 0.7 10e3/uL    Absolute Basophils 0.1 0.0 - 0.2 10e3/uL    Absolute Immature Granulocytes 0.0 <=0.4 10e3/uL   TSH with free T4 reflex     Status: Normal   Result Value Ref Range    TSH 1.27 0.30 - 4.20 uIU/mL   Lipase     Status: Normal   Result Value Ref Range    Lipase 34 13 - 60 U/L   Amylase     Status: Normal   Result Value Ref Range    Amylase 51 28 - 100 U/L   Ethanol Level Blood     Status: Normal   Result Value Ref Range    Ethanol Level Blood <0.01 <=0.01 g/dL   GGT     Status: Abnormal   Result Value Ref Range     (H) 5 - 36 U/L   CBC with platelets and differential     Status: Abnormal    Narrative    The following orders were created for panel order CBC with platelets and differential.  Procedure                               Abnormality         Status                     ---------                               -----------         ------                     CBC with platelets and ...[[id number]]  Abnormal            Final result                 Please view results for these tests on the individual orders.           Signed Electronically by: Enoc Jordan MD

## 2025-05-13 PROBLEM — E78.1 HYPERTRIGLYCERIDEMIA: Status: ACTIVE | Noted: 2025-05-13

## 2025-05-13 PROBLEM — Z71.1 WORRIED WELL: Status: ACTIVE | Noted: 2025-05-13

## 2025-05-13 PROBLEM — Z87.19 HISTORY OF PANCREATITIS: Status: ACTIVE | Noted: 2025-05-13

## 2025-05-13 PROBLEM — E11.9 TYPE 2 DIABETES MELLITUS WITHOUT COMPLICATION, WITHOUT LONG-TERM CURRENT USE OF INSULIN (H): Status: ACTIVE | Noted: 2022-08-22

## 2025-05-13 PROBLEM — K59.00 CONSTIPATION, UNSPECIFIED CONSTIPATION TYPE: Status: ACTIVE | Noted: 2025-05-13

## 2025-05-13 PROBLEM — R25.3 MUSCLE TWITCHING: Status: ACTIVE | Noted: 2025-05-13

## 2025-05-13 LAB
ALBUMIN SERPL BCG-MCNC: 4.9 G/DL (ref 3.5–5.2)
ALP SERPL-CCNC: 103 U/L (ref 40–150)
ALT SERPL W P-5'-P-CCNC: 121 U/L (ref 0–50)
AMYLASE SERPL-CCNC: 51 U/L (ref 28–100)
ANION GAP SERPL CALCULATED.3IONS-SCNC: 16 MMOL/L (ref 7–15)
AST SERPL W P-5'-P-CCNC: 81 U/L (ref 0–45)
BILIRUB SERPL-MCNC: 0.4 MG/DL
BUN SERPL-MCNC: 9.5 MG/DL (ref 6–20)
CALCIUM SERPL-MCNC: 9.9 MG/DL (ref 8.8–10.4)
CHLORIDE SERPL-SCNC: 101 MMOL/L (ref 98–107)
CREAT SERPL-MCNC: 0.61 MG/DL (ref 0.51–0.95)
CRP SERPL-MCNC: 9.78 MG/L
EGFRCR SERPLBLD CKD-EPI 2021: >90 ML/MIN/1.73M2
ETHANOL SERPL-MCNC: <0.01 G/DL
GGT SERPL-CCNC: 112 U/L (ref 5–36)
GLUCOSE SERPL-MCNC: 132 MG/DL (ref 70–99)
HCO3 SERPL-SCNC: 24 MMOL/L (ref 22–29)
LIPASE SERPL-CCNC: 34 U/L (ref 13–60)
POTASSIUM SERPL-SCNC: 3.7 MMOL/L (ref 3.4–5.3)
PROT SERPL-MCNC: 7.9 G/DL (ref 6.4–8.3)
SODIUM SERPL-SCNC: 141 MMOL/L (ref 135–145)
TSH SERPL DL<=0.005 MIU/L-ACNC: 1.27 UIU/ML (ref 0.3–4.2)

## 2025-05-13 RX ORDER — FENOFIBRATE 145 MG/1
145 TABLET, FILM COATED ORAL DAILY
Qty: 30 TABLET | Refills: 1 | Status: SHIPPED | OUTPATIENT
Start: 2025-05-13

## 2025-05-13 ASSESSMENT — ENCOUNTER SYMPTOMS
ARTHRALGIAS: 1
ABDOMINAL PAIN: 1
WEAKNESS: 1
NERVOUS/ANXIOUS: 1
COLOR CHANGE: 0
ACTIVITY CHANGE: 1
FEVER: 0
AGITATION: 1
HEADACHES: 1
CONSTIPATION: 1
FREQUENCY: 0
APPETITE CHANGE: 1
ADENOPATHY: 0
ABDOMINAL DISTENTION: 0
SLEEP DISTURBANCE: 1
BRUISES/BLEEDS EASILY: 0
DIARRHEA: 0
DIZZINESS: 1
EYES NEGATIVE: 1
SPEECH DIFFICULTY: 0
BLOOD IN STOOL: 0
MYALGIAS: 1
TREMORS: 0
CHEST TIGHTNESS: 0
PALPITATIONS: 0
FATIGUE: 1
SHORTNESS OF BREATH: 0
CHOKING: 0
NAUSEA: 1
NUMBNESS: 1

## 2025-05-13 NOTE — ASSESSMENT & PLAN NOTE
She has been followed by endocrinology at [facility], she thought she needed another ultrasound in 3 months but the plan clearly states she is a stable and needs 1 every year.

## 2025-05-13 NOTE — ASSESSMENT & PLAN NOTE
This diagnosis was taken into consideration for the evaluation as well as management of plan due to the nature of the symptoms.

## 2025-05-13 NOTE — ASSESSMENT & PLAN NOTE
Patient follows up at [facility] endocrinology.  Diagnosed with Papillary Thyroid Cancer (Follicular Variant), left lobe  thyroidectomy with the follow up ultrasound indicating a  remnant thyroid tissue noted on ultrasound. No evidence of disease recurrence.  She was seen last February and they recommend annual surveillance including ultrasound, TSH and thyroglobulin.  The patient states that she needs surveillance every 3 months.  New TSH  drawn today

## 2025-05-13 NOTE — ASSESSMENT & PLAN NOTE
The patient has had several episodes of pancreatitis, she does report vague symptoms of upper abdominal pain, her triglycerides were extremely high today but her lipase and amylase are within normal limits.

## 2025-05-13 NOTE — ASSESSMENT & PLAN NOTE
The reported muscle twitching that she is having is not well-defined, I did order labs to check for calcium as well as other electrolytes

## 2025-05-13 NOTE — ASSESSMENT & PLAN NOTE
The patient is currently on Mounjaro and metformin, she is at 2.5 mg of the Mounjaro.  She is not aware of any side effects, she says her glucose is much better controlled with this.  According to the A1c she did have some worsening numbers recently but now she showed me her continuous monitor with an average over the last 2 weeks equivalent to an A1c of 6.9%, furthermore the graft shows no episodes of hypoglycemia or any significant hyperglycemia.

## 2025-05-13 NOTE — ASSESSMENT & PLAN NOTE
The patient currently takes benafiber and I recommended adding MiraLAX daily, I feel her current abdominal pain can be related to this in addition to the Mounjaro causing some gastroparesis

## 2025-05-14 ENCOUNTER — TELEPHONE (OUTPATIENT)
Dept: FAMILY MEDICINE | Facility: CLINIC | Age: 45
End: 2025-05-14
Payer: COMMERCIAL

## 2025-05-21 ENCOUNTER — OFFICE VISIT (OUTPATIENT)
Dept: FAMILY MEDICINE | Facility: CLINIC | Age: 45
End: 2025-05-21
Payer: COMMERCIAL

## 2025-05-21 VITALS
DIASTOLIC BLOOD PRESSURE: 72 MMHG | RESPIRATION RATE: 12 BRPM | OXYGEN SATURATION: 97 % | HEART RATE: 65 BPM | BODY MASS INDEX: 34.21 KG/M2 | SYSTOLIC BLOOD PRESSURE: 120 MMHG | WEIGHT: 231 LBS | TEMPERATURE: 97.3 F | HEIGHT: 69 IN

## 2025-05-21 DIAGNOSIS — G43.109 MIGRAINE WITH AURA AND WITHOUT STATUS MIGRAINOSUS, NOT INTRACTABLE: ICD-10-CM

## 2025-05-21 DIAGNOSIS — E78.1 HYPERTRIGLYCERIDEMIA: Primary | ICD-10-CM

## 2025-05-21 DIAGNOSIS — R25.3 MUSCLE TWITCHING: ICD-10-CM

## 2025-05-21 DIAGNOSIS — E11.65 TYPE 2 DIABETES MELLITUS WITH HYPERGLYCEMIA, WITH LONG-TERM CURRENT USE OF INSULIN (H): ICD-10-CM

## 2025-05-21 DIAGNOSIS — K21.9 LPRD (LARYNGOPHARYNGEAL REFLUX DISEASE): ICD-10-CM

## 2025-05-21 DIAGNOSIS — Z79.4 TYPE 2 DIABETES MELLITUS WITH HYPERGLYCEMIA, WITH LONG-TERM CURRENT USE OF INSULIN (H): ICD-10-CM

## 2025-05-21 DIAGNOSIS — Z87.19 HISTORY OF PANCREATITIS: ICD-10-CM

## 2025-05-21 DIAGNOSIS — I10 BENIGN ESSENTIAL HYPERTENSION: ICD-10-CM

## 2025-05-21 DIAGNOSIS — N63.11 LUMP IN UPPER OUTER QUADRANT OF RIGHT BREAST: ICD-10-CM

## 2025-05-21 LAB
ALBUMIN SERPL BCG-MCNC: 4.7 G/DL (ref 3.5–5.2)
ALP SERPL-CCNC: 89 U/L (ref 40–150)
ALT SERPL W P-5'-P-CCNC: 90 U/L (ref 0–50)
ANION GAP SERPL CALCULATED.3IONS-SCNC: 15 MMOL/L (ref 7–15)
AST SERPL W P-5'-P-CCNC: 55 U/L (ref 0–45)
BILIRUB SERPL-MCNC: 0.4 MG/DL
BUN SERPL-MCNC: 9.1 MG/DL (ref 6–20)
CALCIUM SERPL-MCNC: 10.2 MG/DL (ref 8.8–10.4)
CHLORIDE SERPL-SCNC: 100 MMOL/L (ref 98–107)
CHOLEST SERPL-MCNC: 150 MG/DL
CREAT SERPL-MCNC: 0.7 MG/DL (ref 0.51–0.95)
EGFRCR SERPLBLD CKD-EPI 2021: >90 ML/MIN/1.73M2
FASTING STATUS PATIENT QL REPORTED: YES
FASTING STATUS PATIENT QL REPORTED: YES
GLUCOSE SERPL-MCNC: 137 MG/DL (ref 70–99)
HCO3 SERPL-SCNC: 28 MMOL/L (ref 22–29)
HDLC SERPL-MCNC: 35 MG/DL
LDLC SERPL CALC-MCNC: 56 MG/DL
MAGNESIUM SERPL-MCNC: 2.2 MG/DL (ref 1.7–2.3)
NONHDLC SERPL-MCNC: 115 MG/DL
POTASSIUM SERPL-SCNC: 4.3 MMOL/L (ref 3.4–5.3)
PROT SERPL-MCNC: 7.8 G/DL (ref 6.4–8.3)
SODIUM SERPL-SCNC: 143 MMOL/L (ref 135–145)
TRIGL SERPL-MCNC: 293 MG/DL

## 2025-05-21 PROCEDURE — 36415 COLL VENOUS BLD VENIPUNCTURE: CPT | Performed by: FAMILY MEDICINE

## 2025-05-21 PROCEDURE — 83735 ASSAY OF MAGNESIUM: CPT | Performed by: FAMILY MEDICINE

## 2025-05-21 PROCEDURE — 80061 LIPID PANEL: CPT | Performed by: FAMILY MEDICINE

## 2025-05-21 PROCEDURE — 80053 COMPREHEN METABOLIC PANEL: CPT | Performed by: FAMILY MEDICINE

## 2025-05-21 PROCEDURE — 86038 ANTINUCLEAR ANTIBODIES: CPT | Performed by: FAMILY MEDICINE

## 2025-05-21 PROCEDURE — 99214 OFFICE O/P EST MOD 30 MIN: CPT | Performed by: FAMILY MEDICINE

## 2025-05-21 RX ORDER — LOSARTAN POTASSIUM 25 MG/1
25 TABLET ORAL DAILY
Qty: 90 TABLET | Refills: 4 | Status: SHIPPED | OUTPATIENT
Start: 2025-05-21

## 2025-05-21 RX ORDER — ONDANSETRON 8 MG/1
8 TABLET, FILM COATED ORAL EVERY 8 HOURS PRN
Qty: 12 TABLET | Refills: 2 | Status: SHIPPED | OUTPATIENT
Start: 2025-05-21

## 2025-05-21 RX ORDER — PANTOPRAZOLE SODIUM 40 MG/1
40 TABLET, DELAYED RELEASE ORAL DAILY
Qty: 90 TABLET | Refills: 4 | Status: SHIPPED | OUTPATIENT
Start: 2025-05-21

## 2025-05-21 ASSESSMENT — PATIENT HEALTH QUESTIONNAIRE - PHQ9: SUM OF ALL RESPONSES TO PHQ QUESTIONS 1-9: 15

## 2025-05-21 NOTE — PROGRESS NOTES
Assessment & Plan     Assessment & Plan    Hypertriglyceridemia:  - Elevated triglycerides noted, possibly related to metabolic syndrome.  - Recheck triglycerides while fasting. Consider repeating anti-nuclear antibody test.    History of pancreatitis  Recent lab work did not show any evidence of pancreatitis and an area of discomfort is not likely related to pancreas.  Patient will continue to monitor.    Benign essential hypertension  Blood pressure is well-controlled on current regimen, no changes at this time    Type 2 diabetes mellitus with hyperglycemia, with long-term current use of insulin (H):  - Blood sugar has been stable since starting Wegovy.  -Rechecking liver function test today.  If elevated will discontinue metformin  - Increase Mounjaro dose to 5 mg. Recheck liver function tests and cholesterol panel.  - Continues to follow with endocrinology    Migraine with aura and without status migrainosus not intractable  - Refill ondansetron which has been effective for patient to help with nausea    Lump in upper outer quadrant of right breast:  - Noticed a new bump in the breast last week.  Not palpable on exam today  - Monitor the lump for a few weeks to see if it fluctuates with hormonal cycles. If it persists, consider a diagnostic mammogram and ultrasound.    Muscle twitching:  - Possible electrolyte imbalance due to hydrochlorothiazide.  -Also discussed could be secondary to increased stress  - Recheck calcium and consider checking magnesium levels.    Laryngeal pharyngeal reflux disease  - Pantoprazole refilled    Prescription  - Mounjaro 5 mg, increase from 2.5 mg  - Fenofibrate (Tricor)    Appointments  - Phone appointment with Doctor Lary Kendall on May 22, 2025  - In-person appointment with Doctor Lary Kendall on May 27, 2025  - Follow-up with GI in August 2025 (recommended), sooner as needed    Consent was obtained from the patient to use an AI documentation tool in the creation of  "this note.          BMI  Estimated body mass index is 33.9 kg/m  as calculated from the following:    Height as of this encounter: 1.758 m (5' 9.21\").    Weight as of this encounter: 104.8 kg (231 lb).       Depression Screening Follow Up        5/21/2025     9:48 AM   PHQ   PHQ-9 Total Score 15   Q9: Thoughts of better off dead/self-harm past 2 weeks Several days                     Follow Up Actions Taken  Crisis resource information provided in the After Visit Summary  Referred patient back to mental health provider    Discussed the following ways the patient can remain in a safe environment:  be around others        Subjective   Kayla is a 45 year old, presenting for the following health issues:  Blood Draw (Fasting - for lab work.) and RECHECK        5/21/2025     9:45 AM   Additional Questions   Roomed by Joanne ARELLANO CMA   Accompanied by None       History of Present Illness-  Kayla Cameron, 45 years    Thyroid Regrowth  - Thyroid tissue regrowth causing pressure and discomfort, but no choking sensation  - No mass or cancer detected on ultrasound  - Left side of thyroid removed previously; regrowth noted along esophagus  - Head and neck MRI in November showed regrowth; swallow study performed due to regrowth  - No calcifications or signs of cancer; active surveillance initiated  - Continues to follow with endocrinology    Triglycerides  -Reviewed visit with Dr. Salcido from last week, tolerating Tricor  - History of mildly elevated triglycerides with recent significant increase  - Previous spike in triglycerides before cancer surgery last year, followed by normalization  - Concerns about potential toxicity and connection to other health issues    Pancreatic Pain  - Severe sharp pain in left upper quadrant, radiating to back  - Pain described as \"flopping like a fish\" and causing doubling over  - Concerns about liver injury and connection to medication    Breast Lump  - New bump felt in breast during shower " "last week  - Recent mammogram check-up; unsure of significance    Muscle Twitching and Seizures  - Visible twitching in face, chest, and heart  - Described as hyperexcitability of the nervous system  - Concerns about electrolyte imbalance due to hydrochlorothiazide, recent chemistry panel was normal except for elevated liver function tests    Kidney Pain  - Excruciating pain in kidney area, possibly related to dehydration  - Frequent urination and kidney ache, leading to ER visits    Bowel Pain  - Pain before bowel movements, with pinching sensation near ovaries  - Relief after bowel movement; possible bowel issue    Misc  - Missed follow-up appointment with GI nurse due to severe virus in February  - Liver biopsy showed severe steatosis and steatohepatitis with fibrosis stage two    History of Present Illness       Diabetes:   She presents for follow up of diabetes.   She is checking home blood glucose with a continuous glucose monitor.   She checks blood glucose before meals, after meals, before and after meals and at bedtime.  Blood glucose is sometimes over 200 and never under 70. She is aware of hypoglycemia symptoms including shakiness and weakness.   She is concerned about other.   She is having blurry vision and weight gain.            Hypothyroidism:     Since last visit, patient describes the following symptoms::  Anxiety, Constipation, Depression, Dry skin, Fatigue, Hair loss, Loose stools, Tremors and Weight gain    Weight gain::  5 lbs.    Reason for visit:  New symptoms    She eats 4 or more servings of fruits and vegetables daily.She consumes 0 sweetened beverage(s) daily.She exercises with enough effort to increase her heart rate 20 to 29 minutes per day.  She exercises with enough effort to increase her heart rate 5 days per week.   She is taking medications regularly.                    Objective    /72   Pulse 65   Temp 97.3  F (36.3  C)   Resp 12   Ht 1.758 m (5' 9.21\")   Wt 104.8 kg " (231 lb)   LMP 09/25/2019 (Within Days)   SpO2 97%   BMI 33.90 kg/m    Body mass index is 33.9 kg/m .  Physical Exam     Alert cooperative no acute distress  CV regular rate and rhythm, lungs clear to auscultation  No palpable mass on upper right breast on exam today            Signed Electronically by: Agnisezka Ahn MD

## 2025-05-22 ENCOUNTER — RESULTS FOLLOW-UP (OUTPATIENT)
Dept: FAMILY MEDICINE | Facility: CLINIC | Age: 45
End: 2025-05-22

## 2025-05-22 LAB — ANA SER QL IF: NEGATIVE

## 2025-05-22 NOTE — RESULT ENCOUNTER NOTE
Hi Shannan your triglycerides are much much better, definitely outside the danger zone to cause pancreatitis , your liver enzymes are also coming down nicely, continue with your tricor please  Omari

## 2025-05-23 ENCOUNTER — TELEPHONE (OUTPATIENT)
Dept: FAMILY MEDICINE | Facility: CLINIC | Age: 45
End: 2025-05-23
Payer: COMMERCIAL

## 2025-05-23 NOTE — TELEPHONE ENCOUNTER
Prior Authorization Retail Medication Request    Medication/Dose: empagliflozin (JARDIANCE) 10 MG TABS tablet  Diagnosis and ICD code (if different than what is on RX):    New/renewal/insurance change PA/secondary ins. PA:  Previously Tried and Failed:    Rationale:      Insurance   Primary: [employer]   Insurance ID:  [id number]     Secondary (if applicable):  Insurance ID:      Pharmacy Information (if different than what is on RX)  Name:    Phone:    Fax:    Clinic Information  Preferred routing pool for dept communication: [facility]

## 2025-05-28 ENCOUNTER — MYC MEDICAL ADVICE (OUTPATIENT)
Dept: FAMILY MEDICINE | Facility: CLINIC | Age: 45
End: 2025-05-28
Payer: COMMERCIAL

## 2025-05-28 DIAGNOSIS — R76.0 ABNORMAL ANTIBODY TITER: ICD-10-CM

## 2025-05-28 DIAGNOSIS — K76.0 METABOLIC DYSFUNCTION-ASSOCIATED STEATOTIC LIVER DISEASE (MASLD): Primary | ICD-10-CM

## 2025-05-28 DIAGNOSIS — R59.0 LYMPHADENOPATHY, INGUINAL: ICD-10-CM

## 2025-05-28 DIAGNOSIS — R59.9 REACTIVE LYMPHOID HYPERPLASIA: ICD-10-CM

## 2025-05-28 NOTE — TELEPHONE ENCOUNTER
Prior Authorization Not Needed per Insurance    Medication: JARDIANCE 10 MG PO TABS  Insurance Company: [employer] ([facility]) - Phone [phone] Fax [phone]  Expected CoPay: $    Pharmacy Filling the Rx: [facility] - [address]  Pharmacy Notified: YES  Patient Notified: YES

## 2025-05-30 ENCOUNTER — VIRTUAL VISIT (OUTPATIENT)
Dept: PSYCHIATRY | Facility: CLINIC | Age: 45
End: 2025-05-30
Payer: COMMERCIAL

## 2025-05-30 VITALS — HEIGHT: 69 IN | BODY MASS INDEX: 34.11 KG/M2

## 2025-05-30 DIAGNOSIS — F43.10 PTSD (POST-TRAUMATIC STRESS DISORDER): ICD-10-CM

## 2025-05-30 DIAGNOSIS — F41.9 ANXIETY DISORDER, UNSPECIFIED TYPE: ICD-10-CM

## 2025-05-30 PROCEDURE — 98014 SYNCH AUDIO-ONLY EST MOD 30: CPT | Performed by: NURSE PRACTITIONER

## 2025-05-30 RX ORDER — GUANFACINE 2 MG/1
2 TABLET, EXTENDED RELEASE ORAL AT BEDTIME
Qty: 90 TABLET | Refills: 0 | Status: SHIPPED | OUTPATIENT
Start: 2025-05-30

## 2025-05-30 ASSESSMENT — PAIN SCALES - GENERAL: PAINLEVEL_OUTOF10: MODERATE PAIN (5)

## 2025-05-30 NOTE — NURSING NOTE
Current [place] [facility] [place] [address] [address]    Is the patient currently in the state of [place]? YES    Visit mode: TELEPHONE    If the visit is dropped, the patient can be reconnected by:VIDEO VISIT: Call cell phone:   Telephone Information:   Mobile [phone]       Will anyone else be joining the visit? NO  (If patient encounters technical issues they should call [phone])    Are changes needed to the allergy or medication list? No    Are refills needed on medications prescribed by this physician? NO    Rooming Documentation:  Patient will complete questionnaire(s) in [facility]    Reason for visit: RECHECK    Ginna REBOLLARF

## 2025-05-30 NOTE — PROGRESS NOTES
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 without bupropion per patient. Denies any current suicidal ideation, chronic suicidal ideation \"manageable\" at this time per patient. Reports committed to safety plan at this time. Mood aggravated by medical comorbidities. Referred to rheumatology to rule out an autoimmune disease, as well as GI for ongoing abdominal pain per patient. She has continued guanfacine QHS and does still think it is helpful for anxiety, hypervigilance, nightmares. She was provided a list of therapists in [place] by care coordinator. Reports reached out to and told didn't accept insurance, then gave up - so unsure if contacted all on list. She has not follow-up on long term psych referral - plans to today. Discussed risks / benefits of medication changes today. Given recent discontinuation of bupropion and difficulty with past medication side effects, patient is most interested in continuing just guanfacine at this time. Strongly encouraged patient to establish with therapist which she plans to look at. We will plan to follow-up in 1 month with Behavioral Health Consultant, unless scheduled with long term psychiatry. Patient agreeable to plan.     Medication side effects and alternatives were reviewed. Health promotion activities recommended and reviewed today. All questions addressed. Education and counseling completed regarding risks and benefits of medications and psychotherapy options. " Recommend therapy for additional support.       Treatment Plan:  DISCONTINUE bupropion (already stopped by patient).  CONTINUE  guanfacine ER 2 mg at bedtime. Script sent for#90..  Continue all other medications per primary care provider.   Consider alternative DBT in community per Behavioral Health Consultant recommendations.  Referral placed for long term psychiatry. Please call [phone] to schedule.  Care coordination sent Snoobet message with multiple therapists in [place] that confirmed accept your insurance. Reach out if questions.   Safety plan reviewed. To the Emergency Department as needed or call after hours crisis line at [phone] or [phone]. [place] Crisis Text Line. Text [place] to 332058 or Suicide LifeLine Chat: suicidepreventionUlympixline.org/chat  Schedule an appointment with me and Behavioral Health Consultant in 4 weeks or sooner as needed. Call [facility] at [phone] to schedule.  Follow up with primary care provider as planned or for acute medical concerns.  Call the psychiatric nurse line with medication questions or concerns at [phone].  Medivance may be used to communicate with your provider, but this is not intended to be used for emergencies.    Patient Education:  Medication side effects and alternatives reviewed. Health promotion activities recommended and reviewed today. All questions addressed. Education and counseling completed regarding risks and benefits of medications and psychotherapy options.  Consent provided by patient/guardian  Call the psychiatric nurse line with medication questions or concerns at [phone].  Snoobet may be used to communicate with your provider, but this is not intended to be used for emergencies.  Medlineplus.gov is information for patients.  It is run by the National Library of Medicine and it contains information about all disorders, diseases and all medications.      Discussed that guanfacine is being used off label given  current clinical picture, with possible benefit to anxiety and/or nightmares at bedtime. Discussed risks of side effects to monitor, including drowsiness, low HR, BP, lightheadedness or dizziness, as well as not to suddenly stop medication due to risk for rebound hypertension.      Community Resources:    National Suicide Prevention Lifeline: 945.721.4596 (TTY: 743.870.3080). Call anytime for help.  (www.suicidepreventionlifeline.org)  National [place] on Mental Illness (www.stacie.org): 749.539.8750 or 572-652-2003.   Mental Health Association (www.mentalhealth.org): 794.486.9030 or 990-119-2104.  [place] Crisis Text Line: Text [place] to 406119  Suicide LifeLine Chat: [employer].org/chat    Administrative Billing:     Level of Medical Decision Making:   - At least 1 chronic problem that is not stable  - Engaged in prescription drug management during visit (discussed any medication benefits, side effects, alternatives, etc.)             Patient Status:  CCPS MD/DO/NP/PA providers offer care a specialty service for Primary Care Providers in the [facility] that seek to optimize psychotropic medications for unstable patients.  Once medications have been optimized, our providers discharge the patient back to the referring Primary Care Provider for ongoing medication management.  This type of system allows our providers to serve a high volume of patients.   The patient is being referred to long term community psychiatry care and provider will provide bridging until patient is established with new community provider.     Signed:   Tanika Gruber, MSN, APRN, PMHNP-BC  Collaborative Care Psychiatry Service (CCPS)  [facility]    Chart documentation done in part with Dragon Voice Recognition software.  Although reviewed after completion, some word and grammatical errors may remain.

## 2025-05-31 NOTE — PATIENT INSTRUCTIONS
**For crisis resources, please see the information at the end of this document**     Thank you for coming to the [facility].    TREATMENT PLAN:    You have been referred for long-term psychiatric care due to the complexity, chronicity, and severity of your psychiatric case.  It is my opinion that your care will be more optimally managed by a long-term psychiatric prescriber versus returning your psychiatric care back to your primary care provider for ongoing management.  They were unable to reach you. Please return their call at [phone] to get scheduled.  If your appointment to establish care with a long-term psychiatric provider is greater than 4 weeks out from today's visit, please schedule a follow-up visit with me in 4 weeks from now.  As discussed during your appointment, I am agreeable to bridging care for up to 3 months only, or until you establish with new provider, whichever comes first.  This allows us to continue to see a large number of new patients in the collaborative care model. Please make sure you keep any scheduled appointments.  If you have not scheduled with a long-term provider within the 3 months, your care will be returned to your primary care provider.      Medications:   DISCONTINUE bupropion (already stopped by patient).  CONTINUE  guanfacine ER 2 mg at bedtime. Script sent for#90..  Continue all other medications per primary care provider.     Consults / Referrals:   Referral placed for long term psychiatry. Please call [phone] to schedule.  Care coordination sent NationBuilderhart message with multiple therapists in [place] that confirmed accept your insurance. Reach out if questions.     Follow-up:  Schedule an appointment with me and Behavioral Health Consultant in 4 weeks or sooner as needed. Call [facility] at [phone] to schedule.  Follow up with primary care provider as planned or for acute medical concerns.  Call the  psychiatric nurse line with medication questions or concerns at [phone].  MyChart may be used to communicate with your provider, but this is not intended to be used for emergencies.    Psychoeducation:  Discussed that guanfacine is being used off label given current clinical picture, with possible benefit to anxiety and/or nightmares at bedtime. Discussed risks of side effects to monitor, including drowsiness, low HR, BP, lightheadedness or dizziness, as well as not to suddenly stop medication due to risk for rebound hypertension.       Financial Assistance [phone]  [facility] Billing [phone]  Central Billing Office, [facility]: [phone]  [facility] Billing [phone]  Medical Records [phone]  [facility] Patient Bill of Rights [email or web address]       MENTAL HEALTH CRISIS RESOURCES:  For a emergency help, please call 911 or go to the nearest Emergency Department.     Emergency Walk-In Options:   EmPATH Unit @ [facility] ([place]): [phone] - Specialized mental health emergency area designed to be [facility]  [facility] ([place]): [phone]  [facility] ([place]): [phone]  [facility] ([place]): [phone]    [facility] Crisis Information:   [place]: [phone]  [place]: [phone]  [place] ([place]) - Adult: [phone]     Child: [phone]  [place] - Adult: [phone]     Child: [phone]  [place]: [phone]  List of all [facility] resources:   https://[place].gov/dhs/people-we-serve/adults/health-care/mental-health/resources/crisis-contacts.jsp    National Crisis Information:   National Suicide & Crisis Lifeline: Call 988        For online chat options, visit https://suicidepreventionlifeline.org/chat/  Poison Control Center: 1-111.876.4916  Poison Control Center: 5-367-522-1209  Trans Lifeline: 1-304.705.2202 - Hotline for transgender people of all  "ages  The [employer]: 1-817-185-0452 - Hotline for LGBT youth     For Non-Emergency Support:   Fast Tracker: Mental Health & Substance Use Disorder Resources -   https://www.[employer].org/       Again thank you for choosing [facility] and please let us know how we can best partner with you to improve you and your family's health.    You may be receiving a survey regarding this appointment. We would love to have your feedback, both positive and negative. The survey is done by an external company, so your answers are anonymous.        Patient Education   Collaborative Care Psychiatry Service  What to Expect  Here's what to expect from your Collaborative Care Psychiatry Service (CCPS).   About CCPS  CCPS means 2 people work together to help you get better. You'll meet with a behavioral health clinician and a psychiatric doctor. A behavioral health clinician helps people with mental health problems by talking with them. A psychiatric doctor helps people by giving them medicine.  How it works  At every visit, you'll see the behavioral health clinician (BHC) first. They'll talk with you about how you're doing and teach you how to feel better.   Then you'll see the psychiatric doctor. This doctor can help you deal with troubling thoughts and feelings by giving you medicine. They'll make sure you know the plan for your care.   CCPS usually takes 3 to 6 visits. If you need more visits, we may have you start seeing a different psychiatric doctor for ongoing care.  If you have any questions or concerns, we'll be glad to talk with you.  About visits  Be open  At your visits, please talk openly about your problems. It may feel hard, but it's the best way for us to help you.  Cancelling visits  If you can't come to your visit, please call us right away at [phone]. If you don't cancel at least 24 hours (1 full day) before your visit, that's \"late cancellation.\"  Being " "late to visits  Being very late is the same as not showing up. You will be a \"no show\" if:  Your appointment starts with a C, and you're more than 15 minutes late for a 30-minute (half hour) visit. This will also cancel your appointment with the psychiatric doctor.  Your appointment is with a psychiatric doctor only, and you're more than 15 minutes late for a 30-minute (half hour) visit.  Your appointment is with a psychiatric doctor only, and you're more than 30 minutes late for a 60-minute (full hour) visit.  If you cancel late or don't show up 2 times within 6 months, we may end your care.   Getting help between visits  If you need help between visits, you can call us Monday to Friday from 8 a.m. to 4:30 p.m. at [phone].  Emergency care  Call 911 or go to the nearest emergency department if your life or someone else's life is in danger.  Call 988 anytime to reach the national Suicide and Crisis hotline.  Medicine refills  To refill your medicine, call your pharmacy. You can also call [facility]'s Behavioral Access at [phone], Monday to Friday, 8 a.m. to 4:30 p.m. It can take 1 to 3 business days to get a refill.   Forms, letters, and tests  You may have papers to fill out, like FMLA, short-term disability, and workability. We can help you with these forms at your visits, but you must have an appointment. You may need more than 1 visit for this, to be in an intensive therapy program, or both.  Before we can give you medicine for ADHD, we may refer you to get tested for it or confirm it another way.  We may not be able to give you an emotional support animal letter.  We don't do mental health checks ordered by the court.   We don't do mental health testing, but we can refer you to get tested.   Thank you for choosing us for your care.  For informational purposes only. Not to replace the advice of your health care provider. Copyright   2022 [facility]. All rights reserved. " [employer] [id number] - 12/22.

## 2025-06-02 ENCOUNTER — PATIENT OUTREACH (OUTPATIENT)
Dept: CARE COORDINATION | Facility: CLINIC | Age: 45
End: 2025-06-02
Payer: COMMERCIAL

## 2025-06-04 ENCOUNTER — PATIENT OUTREACH (OUTPATIENT)
Dept: CARE COORDINATION | Facility: CLINIC | Age: 45
End: 2025-06-04
Payer: COMMERCIAL

## 2025-06-06 ENCOUNTER — TELEPHONE (OUTPATIENT)
Dept: FAMILY MEDICINE | Facility: CLINIC | Age: 45
End: 2025-06-06
Payer: COMMERCIAL

## 2025-06-06 NOTE — TELEPHONE ENCOUNTER
Prior Authorization Retail Medication Request    Medication/Dose: Mounjaro 5mg  Diagnosis and ICD code (if different than what is on RX):    New/renewal/insurance change PA/secondary ins. PA:  Previously Tried and Failed:    Rationale:      Insurance   Primary:   Insurance ID:      Secondary (if applicable):  Insurance ID:      Pharmacy Information (if different than what is on RX)  Name:    Phone:    Fax:    Clinic Information  Preferred routing pool for dept communication:

## 2025-06-06 NOTE — TELEPHONE ENCOUNTER
Retail Pharmacy Prior Authorization Team   Phone: [phone]    Called [facility] - requested to have the forms faxed to the PA team fax.

## 2025-06-06 NOTE — TELEPHONE ENCOUNTER
Retail Pharmacy Prior Authorization Team   Phone: [phone]    PA Initiation    Medication: MOUNJARO 5 MG/0.5ML SC SOAJ  Insurance Company: [place] Medicaid ([facility]) - Phone [phone] Fax [phone]  Pharmacy Filling the Rx: [facility] - [place], [place] - [facility]  Filling Pharmacy Phone: [phone]  Filling Pharmacy Fax:    Start Date: 6/6/2025      RECEIVED FORMS, FILLED OUT AND FAXED BACK TO PHARMACY

## 2025-06-06 NOTE — TELEPHONE ENCOUNTER
Patient did not tolerate trulicity due to nausea/vomiting. I updated allergy list. Had tolerated tirzepatide.

## 2025-06-09 DIAGNOSIS — G43.109 MIGRAINE WITH AURA AND WITHOUT STATUS MIGRAINOSUS, NOT INTRACTABLE: ICD-10-CM

## 2025-06-09 RX ORDER — FREMANEZUMAB-VFRM 225 MG/1.5ML
225 INJECTION SUBCUTANEOUS
Qty: 1.5 ML | Refills: 2 | Status: SHIPPED | OUTPATIENT
Start: 2025-06-09

## 2025-06-09 NOTE — TELEPHONE ENCOUNTER
Refill request for the following medication (s) listed below.    Pending Prescriptions:                       Disp   Refills    AJOVY 225 MG/1.5ML SOAJ [Pharmacy Med Nam*       6            Sig: INJECT 225 MG SUBCUTANEOUSLY EVERY 30 DAYS.      Last office visit provider:  11/8/24  Next appointment scheduled: Unscheduled.  Pt cancelled follow-up due to illness. Message sent to scheduling      Medication T'd for review and signature  Jose PERALES ATC

## 2025-06-10 NOTE — TELEPHONE ENCOUNTER
Called [facility] to follow up - they have not yet sent the forms off to the state. They received the forms and chart notes that I sent on June 6th, they will review and get it sent.

## 2025-06-12 NOTE — TELEPHONE ENCOUNTER
6-12-25    General Call      Reason for Call:  checking status on PA for mounjaro      What are your questions or concerns:  I advised per below notes dated 6-10:  [facility] to follow up - they have not yet sent the forms off to the state.They received the forms and chart notes that I sent on June 6th, they will review and get it sent.   Pt will follow up w/ Danny Crespo

## 2025-06-13 NOTE — TELEPHONE ENCOUNTER
Called [facility] to follow-up - pharmacy staff states they do not see that [place] medicaid has started processing on their end. Staff states they will refax the forms to [place] Medicaid to make sure they get the request processed as soon as possible.

## 2025-06-16 ENCOUNTER — PATIENT OUTREACH (OUTPATIENT)
Dept: CARE COORDINATION | Facility: CLINIC | Age: 45
End: 2025-06-16
Payer: COMMERCIAL

## 2025-06-16 NOTE — PROGRESS NOTES
Clinic Care Coordination Contact  [facility]/Voicemail    Clinical Data: Care Coordinator Outreach    Outreach Documentation Number of Outreach Attempt   6/16/2025   1:59 PM 1       Left message on patient's voicemail with call back information and requested return call.      Plan: Care Coordinator will try to reach patient again in 10 business days.    CAROLYN Todd, LSW? Social Work Care Coordinator   [facility]  [email or web address]? "[email or web address]    Phone: [phone]  she/her

## 2025-06-19 NOTE — TELEPHONE ENCOUNTER
Prior Authorization Approval    Medication: MOUNJARO 5 MG/0.5ML SC SOAJ  Authorization Effective Date:    Authorization Expiration Date:    Insurance Company: [place] Medicaid ([employer]) - Phone [phone] Fax [phone]  Which Pharmacy is filling the prescription: [facility] [address]  Pharmacy Notified: YES  Patient Notified: YES    Per pharmacy - claim went through yesterday and they ordered product in for today. They were unable to provide any approval dates.

## 2025-07-05 DIAGNOSIS — E78.1 HYPERTRIGLYCERIDEMIA: Chronic | ICD-10-CM

## 2025-07-07 RX ORDER — FENOFIBRATE 145 MG/1
145 TABLET, FILM COATED ORAL DAILY
Qty: 90 TABLET | Refills: 2 | Status: SHIPPED | OUTPATIENT
Start: 2025-07-07

## 2025-07-15 DIAGNOSIS — E78.1 HYPERTRIGLYCERIDEMIA: Chronic | ICD-10-CM

## 2025-07-15 RX ORDER — FENOFIBRATE 145 MG/1
145 TABLET, FILM COATED ORAL DAILY
Qty: 90 TABLET | Refills: 2 | Status: SHIPPED | OUTPATIENT
Start: 2025-07-15

## 2025-07-20 ENCOUNTER — HEALTH MAINTENANCE LETTER (OUTPATIENT)
Age: 45
End: 2025-07-20

## 2025-07-22 DIAGNOSIS — Z79.4 TYPE 2 DIABETES MELLITUS WITH HYPERGLYCEMIA, WITH LONG-TERM CURRENT USE OF INSULIN (H): Primary | ICD-10-CM

## 2025-07-22 DIAGNOSIS — E11.65 TYPE 2 DIABETES MELLITUS WITH HYPERGLYCEMIA, WITH LONG-TERM CURRENT USE OF INSULIN (H): Primary | ICD-10-CM

## 2025-07-23 RX ORDER — INSULIN GLARGINE 300 U/ML
INJECTION, SOLUTION SUBCUTANEOUS
Qty: 9 ML | Refills: 11 | Status: SHIPPED | OUTPATIENT
Start: 2025-07-23

## 2025-07-24 ENCOUNTER — TELEPHONE (OUTPATIENT)
Dept: FAMILY MEDICINE | Facility: CLINIC | Age: 45
End: 2025-07-24
Payer: COMMERCIAL

## 2025-08-01 ENCOUNTER — MYC MEDICAL ADVICE (OUTPATIENT)
Dept: FAMILY MEDICINE | Facility: CLINIC | Age: 45
End: 2025-08-01
Payer: COMMERCIAL

## 2025-08-15 ENCOUNTER — OFFICE VISIT (OUTPATIENT)
Dept: FAMILY MEDICINE | Facility: CLINIC | Age: 45
End: 2025-08-15
Payer: COMMERCIAL

## 2025-08-15 VITALS
RESPIRATION RATE: 16 BRPM | OXYGEN SATURATION: 96 % | WEIGHT: 222 LBS | DIASTOLIC BLOOD PRESSURE: 86 MMHG | BODY MASS INDEX: 32.78 KG/M2 | HEART RATE: 90 BPM | SYSTOLIC BLOOD PRESSURE: 126 MMHG | TEMPERATURE: 98 F

## 2025-08-15 DIAGNOSIS — N63.23 LUMP IN LOWER OUTER QUADRANT OF LEFT BREAST: ICD-10-CM

## 2025-08-15 DIAGNOSIS — N63.11 LUMP IN UPPER OUTER QUADRANT OF RIGHT BREAST: Primary | ICD-10-CM

## 2025-08-15 PROCEDURE — 99213 OFFICE O/P EST LOW 20 MIN: CPT

## 2025-08-15 ASSESSMENT — PATIENT HEALTH QUESTIONNAIRE - PHQ9
SUM OF ALL RESPONSES TO PHQ QUESTIONS 1-9: 17
SUM OF ALL RESPONSES TO PHQ QUESTIONS 1-9: 17
10. IF YOU CHECKED OFF ANY PROBLEMS, HOW DIFFICULT HAVE THESE PROBLEMS MADE IT FOR YOU TO DO YOUR WORK, TAKE CARE OF THINGS AT HOME, OR GET ALONG WITH OTHER PEOPLE: EXTREMELY DIFFICULT

## 2025-08-19 ENCOUNTER — HOSPITAL ENCOUNTER (OUTPATIENT)
Dept: MAMMOGRAPHY | Facility: CLINIC | Age: 45
Discharge: HOME | End: 2025-08-19
Attending: FAMILY MEDICINE
Payer: COMMERCIAL

## 2025-08-19 DIAGNOSIS — N63.11 LUMP IN UPPER OUTER QUADRANT OF RIGHT BREAST: ICD-10-CM

## 2025-08-19 DIAGNOSIS — N63.23 LUMP IN LOWER OUTER QUADRANT OF LEFT BREAST: ICD-10-CM

## 2025-08-19 PROCEDURE — 77062 BREAST TOMOSYNTHESIS BI: CPT

## 2025-08-19 PROCEDURE — 76642 ULTRASOUND BREAST LIMITED: CPT | Mod: 50

## 2025-08-22 ENCOUNTER — OFFICE VISIT (OUTPATIENT)
Dept: SLEEP MEDICINE | Facility: CLINIC | Age: 45
End: 2025-08-22
Payer: COMMERCIAL

## 2025-08-22 VITALS
HEIGHT: 69 IN | OXYGEN SATURATION: 98 % | WEIGHT: 223.2 LBS | BODY MASS INDEX: 33.06 KG/M2 | SYSTOLIC BLOOD PRESSURE: 117 MMHG | DIASTOLIC BLOOD PRESSURE: 70 MMHG | HEART RATE: 70 BPM

## 2025-08-22 DIAGNOSIS — Z72.820 LACK OF ADEQUATE SLEEP: ICD-10-CM

## 2025-08-22 DIAGNOSIS — G47.00 INSOMNIA, UNSPECIFIED TYPE: ICD-10-CM

## 2025-08-22 DIAGNOSIS — R53.81 MALAISE AND FATIGUE: ICD-10-CM

## 2025-08-22 DIAGNOSIS — R53.83 MALAISE AND FATIGUE: ICD-10-CM

## 2025-08-22 DIAGNOSIS — G47.30 SLEEP-DISORDERED BREATHING: ICD-10-CM

## 2025-08-22 DIAGNOSIS — I10 ESSENTIAL HYPERTENSION: ICD-10-CM

## 2025-08-22 DIAGNOSIS — R06.00 DYSPNEA AND RESPIRATORY ABNORMALITY: Primary | ICD-10-CM

## 2025-08-22 DIAGNOSIS — R06.89 DYSPNEA AND RESPIRATORY ABNORMALITY: Primary | ICD-10-CM

## 2025-08-22 DIAGNOSIS — E66.811 CLASS 1 OBESITY DUE TO EXCESS CALORIES WITH SERIOUS COMORBIDITY AND BODY MASS INDEX (BMI) OF 32.0 TO 32.9 IN ADULT: ICD-10-CM

## 2025-08-22 DIAGNOSIS — E66.09 CLASS 1 OBESITY DUE TO EXCESS CALORIES WITH SERIOUS COMORBIDITY AND BODY MASS INDEX (BMI) OF 32.0 TO 32.9 IN ADULT: ICD-10-CM

## 2025-08-22 PROBLEM — F33.2 MDD (MAJOR DEPRESSIVE DISORDER), RECURRENT EPISODE, SEVERE (H): Chronic | Status: ACTIVE | Noted: 2025-02-07

## 2025-08-22 PROBLEM — F43.10 PTSD (POST-TRAUMATIC STRESS DISORDER): Chronic | Status: RESOLVED | Noted: 2022-07-20 | Resolved: 2025-08-22

## 2025-08-22 PROBLEM — K59.00 CONSTIPATION, UNSPECIFIED CONSTIPATION TYPE: Chronic | Status: ACTIVE | Noted: 2025-05-13

## 2025-08-22 PROBLEM — G43.109 MIGRAINE WITH AURA AND WITHOUT STATUS MIGRAINOSUS, NOT INTRACTABLE: Chronic | Status: ACTIVE | Noted: 2024-04-22

## 2025-08-22 PROBLEM — F43.10 PTSD (POST-TRAUMATIC STRESS DISORDER): Chronic | Status: ACTIVE | Noted: 2022-07-20

## 2025-08-22 PROBLEM — E11.9 TYPE 2 DIABETES MELLITUS WITHOUT COMPLICATION, WITHOUT LONG-TERM CURRENT USE OF INSULIN (H): Chronic | Status: ACTIVE | Noted: 2022-08-22

## 2025-08-22 PROBLEM — E28.2 POLYCYSTIC OVARY SYNDROME: Chronic | Status: ACTIVE | Noted: 2022-08-22

## 2025-08-22 PROBLEM — E66.01 CLASS 2 SEVERE OBESITY DUE TO EXCESS CALORIES WITH SERIOUS COMORBIDITY IN ADULT (H): Chronic | Status: ACTIVE | Noted: 2024-10-31

## 2025-08-22 PROBLEM — E89.0 S/P PARTIAL THYROIDECTOMY: Chronic | Status: ACTIVE | Noted: 2024-04-22

## 2025-08-22 PROBLEM — E11.9 TYPE 2 DIABETES MELLITUS (H): Chronic | Status: ACTIVE | Noted: 2022-08-22

## 2025-08-22 PROBLEM — F33.2 MAJOR DEPRESSIVE DISORDER, RECURRENT EPISODE, SEVERE WITH ANXIOUS DISTRESS (H): Chronic | Status: ACTIVE | Noted: 2024-11-14

## 2025-08-22 PROBLEM — E78.1 HYPERTRIGLYCERIDEMIA: Chronic | Status: ACTIVE | Noted: 2025-05-13

## 2025-08-22 PROBLEM — K75.81 NONALCOHOLIC STEATOHEPATITIS (NASH): Status: ACTIVE | Noted: 2025-02-10

## 2025-08-22 PROBLEM — F43.10 POSTTRAUMATIC STRESS DISORDER WITH DISSOCIATIVE SYMPTOMS: Chronic | Status: ACTIVE | Noted: 2024-11-14

## 2025-08-22 PROBLEM — K21.9 GASTROESOPHAGEAL REFLUX DISEASE, UNSPECIFIED WHETHER ESOPHAGITIS PRESENT: Chronic | Status: ACTIVE | Noted: 2024-10-31

## 2025-08-22 PROBLEM — F32.1 MODERATE MAJOR DEPRESSION (H): Chronic | Status: ACTIVE | Noted: 2022-08-22

## 2025-08-22 PROBLEM — E66.812 CLASS 2 SEVERE OBESITY DUE TO EXCESS CALORIES WITH SERIOUS COMORBIDITY IN ADULT (H): Chronic | Status: ACTIVE | Noted: 2024-10-31

## 2025-08-22 PROBLEM — K75.81 NONALCOHOLIC STEATOHEPATITIS (NASH): Chronic | Status: ACTIVE | Noted: 2025-02-10

## 2025-08-22 PROCEDURE — 99245 OFF/OP CONSLTJ NEW/EST HI 55: CPT | Performed by: PHYSICIAN ASSISTANT

## 2025-08-22 RX ORDER — ZOLPIDEM TARTRATE 5 MG/1
TABLET ORAL
Qty: 1 TABLET | Refills: 0 | Status: SHIPPED | OUTPATIENT
Start: 2025-08-22

## 2025-08-25 ENCOUNTER — PATIENT OUTREACH (OUTPATIENT)
Dept: CARE COORDINATION | Facility: CLINIC | Age: 45
End: 2025-08-25
Payer: COMMERCIAL

## 2025-08-25 ENCOUNTER — TELEPHONE (OUTPATIENT)
Dept: FAMILY MEDICINE | Facility: CLINIC | Age: 45
End: 2025-08-25
Payer: COMMERCIAL

## 2025-08-27 ENCOUNTER — PATIENT OUTREACH (OUTPATIENT)
Dept: CARE COORDINATION | Facility: CLINIC | Age: 45
End: 2025-08-27
Payer: COMMERCIAL

## 2025-08-28 ENCOUNTER — TELEPHONE (OUTPATIENT)
Dept: SLEEP MEDICINE | Facility: CLINIC | Age: 45
End: 2025-08-28
Payer: COMMERCIAL

## 2025-09-02 ENCOUNTER — OFFICE VISIT (OUTPATIENT)
Dept: SLEEP MEDICINE | Facility: CLINIC | Age: 45
End: 2025-09-02
Payer: COMMERCIAL

## 2025-09-02 DIAGNOSIS — G47.00 INSOMNIA, UNSPECIFIED TYPE: ICD-10-CM

## 2025-09-02 DIAGNOSIS — G47.30 SLEEP-DISORDERED BREATHING: Primary | ICD-10-CM

## 2025-09-02 DIAGNOSIS — G43.109 MIGRAINE WITH AURA AND WITHOUT STATUS MIGRAINOSUS, NOT INTRACTABLE: ICD-10-CM

## 2025-09-04 RX ORDER — FREMANEZUMAB-VFRM 225 MG/1.5ML
225 INJECTION SUBCUTANEOUS
Qty: 1 ML | Refills: 2 | Status: SHIPPED | OUTPATIENT
Start: 2025-09-04